# Patient Record
Sex: FEMALE | Race: WHITE | NOT HISPANIC OR LATINO | Employment: FULL TIME | URBAN - METROPOLITAN AREA
[De-identification: names, ages, dates, MRNs, and addresses within clinical notes are randomized per-mention and may not be internally consistent; named-entity substitution may affect disease eponyms.]

---

## 2017-10-13 ENCOUNTER — TELEPHONE (OUTPATIENT)
Dept: FAMILY MEDICINE | Facility: CLINIC | Age: 52
End: 2017-10-13

## 2017-10-13 NOTE — TELEPHONE ENCOUNTER
Pt called the office and spoke to phone , Lyndsay and stated that she needed to speak to the office. While Lyndsay was trying to transfer the call the pt hung up. Lyndsay tried calling the pt back twice with no answer. I tried calling the pt back with no answer.

## 2017-10-16 ENCOUNTER — TELEPHONE (OUTPATIENT)
Dept: FAMILY MEDICINE | Facility: CLINIC | Age: 52
End: 2017-10-16

## 2017-10-16 NOTE — TELEPHONE ENCOUNTER
----- Message from Cynthia Diaz sent at 10/16/2017  7:30 AM CDT -----  Contact: [phone]/ self   Pt its requesting an appointment for today , states she has anxiety problems . Please advise

## 2017-10-20 ENCOUNTER — OFFICE VISIT (OUTPATIENT)
Dept: FAMILY MEDICINE | Facility: CLINIC | Age: 52
End: 2017-10-20
Payer: OTHER GOVERNMENT

## 2017-10-20 VITALS
DIASTOLIC BLOOD PRESSURE: 90 MMHG | BODY MASS INDEX: 49.21 KG/M2 | SYSTOLIC BLOOD PRESSURE: 142 MMHG | HEART RATE: 84 BPM | HEIGHT: 63 IN | OXYGEN SATURATION: 97 % | WEIGHT: 277.75 LBS

## 2017-10-20 DIAGNOSIS — R03.0 ELEVATED BLOOD PRESSURE READING: ICD-10-CM

## 2017-10-20 DIAGNOSIS — F41.9 ANXIETY: Primary | ICD-10-CM

## 2017-10-20 PROCEDURE — 99213 OFFICE O/P EST LOW 20 MIN: CPT | Mod: PBBFAC,PO | Performed by: FAMILY MEDICINE

## 2017-10-20 PROCEDURE — 99203 OFFICE O/P NEW LOW 30 MIN: CPT | Mod: S$PBB,,, | Performed by: FAMILY MEDICINE

## 2017-10-20 PROCEDURE — 99999 PR PBB SHADOW E&M-EST. PATIENT-LVL III: CPT | Mod: PBBFAC,,, | Performed by: FAMILY MEDICINE

## 2017-10-20 RX ORDER — ESCITALOPRAM OXALATE 10 MG/1
10 TABLET ORAL DAILY
Qty: 90 TABLET | Refills: 0 | Status: SHIPPED | OUTPATIENT
Start: 2017-10-20 | End: 2017-11-06 | Stop reason: SDUPTHER

## 2017-10-20 NOTE — ASSESSMENT & PLAN NOTE
Likely component of depression and anxiety along with bereavement   PHQ9: severe depresion   No TV in bedroom  Sleep hygiene   Journal - 3 good things ever day  Every bad day is a good day to journal  The opposite of depression is expression  Discussed 5 stages of grief  Counseling visit in the future will be considered  SSRI - Lexapro today  No suicidal thoughts, patient repeatedly counseled that if any thoughts being, she is to seek medical advice immediately

## 2017-10-20 NOTE — PATIENT INSTRUCTIONS
No TV in bedroom  Sleep hygiene   Journal - 3 good things  Every bad day is a good day to journal  Families problems are not your own  Counseling visit in the future  SSRI      Helping Yourself Through Grief and Loss  Do what you can to stay healthy. Reaching out for support will help a great deal. You may find yourself asking: Why? Its normal to seek meaning by asking questions, but theres not always an answer for loss. With time, your loss may still be part of your life, but not the only thing in it.    Take care of yourself  Taking good care of yourself helps your body heal from the physical and emotional symptoms of grief. Pay extra attention to healthy exercise, sleep, and eating routines. What else do you need to feel better? Having family around can help you feel loved. Or you might need a walk or movie with friends to take your mind off things for a little while.  Accept support  Joining the world again is part of healing. These tips may help:  · Stay in touch with family and friends, even if its hard to talk.  · Tell people how they can help. It can be as simple as walking your dog.  · Stick to a daily routine that keeps you connected to friends and family.  · Try to avoid making major decisions   · Attend a support group of people who have been through the same type of loss.  When to get help  There's no normal length of time to grieve. But if you feel stuck and unable to move on, or if it has been 6 months or more since your loss and you still have signs of grief listed below, it may be time for professional help. Seeking professional help is not a sign of weakness. It indicates that you are taking responsibility for your recovery. Be alert to depression and call your healthcare provider if you:  · Cant go to work or take care of the kids.  · Cant eat or sleep normally.  · Feel helpless, hopeless, or worthless.  · Lose interest in hobbies, friends, and activities that used to give  pleasure.  · Gain or lose a lot of weight.  · Feel your grief is getting worse, or not getting any better.  · Have repeated thoughts of suicide or of harming yourself. You can also call 9-1-1 or a crisis hotline (located in the yellow pages of your phonebook) if you have these thoughts.  At some point, youll begin thinking about the future. Youll want to look ahead and make plans. To help yourself reach this point, try to do one thing each day to join in life. Keep at it, even if it feels strange at first. Your life can never be exactly the same. But one day youll find youre living life fully again.  Date Last Reviewed: 11/10/2015  © 3017-8355 [employer]. [address] [place] [address], [place] [address]. All rights reserved. This information is not intended as a substitute for professional medical care. Always follow your healthcare professional's instructions.    Treating Anxiety Disorders with Medicine  An anxiety disorder can make you feel nervous or apprehensive, even without a clear reason. In people age 65 and older, generalized anxiety disorder is one of the most commonly diagnosed anxiety disorders. Many times it occurs with depression. Certain anxiety disorders can cause intense feelings of fear or panic. You may even have physical symptoms such as a racing heartbeat, sweating, or dizziness. If you have these feelings, you dont have to suffer anymore. Treatment to help you overcome your fears will likely include therapy (also called counseling). Medicine may also be prescribed to help control your symptoms.    Medicines  Certain medicines may be prescribed to help control your symptoms. So you may feel less anxious. You may also feel able to move forward with therapy. At first, medicines and dosages may need to be adjusted to find what works best for you. Try to be patient. Tell your healthcare provider how a medicine makes you feel. This way, you can work together to find the treatment thats  best for you. Keep in mind that medicines can have side effects. Talk with your provider about any side effects that are bothering you. Changing the dose or type of medicine may help. Dont stop taking medicine on your own. That can cause symptoms to come back.  · Anti-anxiety medicine. This medicine eases symptoms and helps you relax. Your healthcare provider will explain when and how to use it. It may be prescribed for use before situations that make you anxious. You may also be told to take medicine on a regular schedule. Anti-anxiety medicine may make you feel a little sleepy or out of it. Dont drive a car or operate machinery while on this medicine, until you know how it affects you.  Caution  Never use alcohol or other drugs with anti-anxiety medicines. This could result in loss of muscular control, sedation, coma, or death. Also, use only the amount of medicine prescribed for you. If you think you may have taken too much, get emergency care right away.   · Antidepressant medicine. This kind of medicine is often used to treat anxiety, even if you arent depressed. An antidepressant helps balance out brain chemicals. This helps keep anxiety under control. This medicine is taken on a schedule. It takes a few weeks to start working. If you dont notice a change at first, you may just need more time. But if you dont notice results after the first few weeks, tell your provider.  Keep taking medicines as prescribed  Never change your dosage, share or use another person's medicine, or stop taking your medicines without talking to your healthcare provider first. Keep the following in mind:  · Some medicines must be taken on a schedule. Make this part of your daily routine. For instance, always take your pill before brushing your teeth. A pillbox can help you remember if youve taken your medicine each day.  · Medicines are often taken for 6 to 12 months. Your healthcare provider will then evaluate whether you need  to stay on them. Many people who have also had therapy may no longer need medicine to manage anxiety.  · You may need to stop taking medicine slowly to give your body time to adjust. When its time to stop, your healthcare provider will tell you more. Remember: Never stop taking your medicine without talking to your provider first.  · If symptoms return, you may need to start taking medicines again. This isnt your fault. Its just the nature of your anxiety disorder.  Special concerns  · Side effects. Medicines may cause side effects. Ask your healthcare provider or pharmacist what you can expect. They may have ideas for avoiding some side effects.  · Sexual problems. Some antidepressants can affect your desire for sex or your ability to have an orgasm. A change in dosage or medicine often solves the problem. If you have a sexual side effect that concerns you, tell your healthcare provider.  · Addiction. If youve never had a problem with drugs or alcohol, you may not have a problem with medicines used to treat anxiety disorders. But always discuss the medicines with your healthcare provider before taking them. If you have a history of addiction, you may not be able to use certain medicines used to treat anxiety disorders.  · Medicine interactions. Always check with your pharmacist before using any over-the-counter medicines, including herbal supplements.   Date Last Reviewed: 5/1/2017  © 5379-4647 [employer]. [address] [place] [address], [place] [address]. All rights reserved. This information is not intended as a substitute for professional medical care. Always follow your healthcare professional's instructions.

## 2017-10-20 NOTE — PROGRESS NOTES
Subjective:       Patient ID: Kalina Ryan is a 52 y.o. female.    Chief Complaint: Anxiety    Patient is presenting today for anxiety. 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 She was given IVIG and lexapro at that time and that helped. Her mom recently passed away in March. She has had to sell her mom's home and possessions. She feels like she wants to ball up and cry. She has not tried anything for it.   No thoughts of self harm  She hasn't seen a doctor since 2011  Social: she is at home with her , and two kids. Her daughter is  and lives nearby. She feels like she has a good support system.     PHQ9: 24      Anxiety   Presents for initial visit. Onset was 1 to 6 months ago. The problem has been gradually worsening. Symptoms include chest pain and palpitations. Patient reports no confusion. Symptoms occur constantly. The severity of symptoms is causing significant distress and interfering with daily activities. The symptoms are aggravated by family issues. The quality of sleep is non-restorative. Nighttime awakenings: several.     Risk factors include a major life event. Her past medical history is significant for anxiety/panic attacks. There is no history of suicide attempts. Past treatments include nothing.     Review of Systems   Constitutional: Positive for activity change. Negative for unexpected weight change.   HENT: Negative for hearing loss, rhinorrhea and trouble swallowing.    Eyes: Negative for discharge and visual disturbance.   Respiratory: Negative for chest tightness and wheezing.    Cardiovascular: Positive for chest pain and palpitations.   Gastrointestinal: Negative for blood in stool, constipation, diarrhea and vomiting.   Endocrine: Negative for polydipsia and polyuria.   Genitourinary: Negative for difficulty urinating, dysuria, hematuria and menstrual problem.   Musculoskeletal: Negative for  arthralgias, joint swelling and neck pain.   Neurological: Negative for weakness and headaches.   Psychiatric/Behavioral: Positive for dysphoric mood. Negative for confusion.         Objective:      Physical Exam   Constitutional: She is oriented to person, place, and time.   tearful   Eyes: Conjunctivae are normal.   Cardiovascular: Normal rate and regular rhythm.    Pulmonary/Chest: Effort normal and breath sounds normal.   Neurological: She is alert and oriented to person, place, and time.   Skin: Skin is warm.   Psychiatric: Her speech is normal. Her mood appears anxious. Cognition and memory are normal. She does not express impulsivity. She exhibits a depressed mood.   tearful She is attentive.   Nursing note and vitals reviewed.      Assessment:       1. Anxiety    2. Elevated blood pressure reading        Plan:       Anxiety  Likely component of depression and anxiety along with bereavement   PHQ9: severe depresion   No TV in bedroom  Sleep hygiene   Journal - 3 good things ever day  Every bad day is a good day to journal  The opposite of depression is expression  Discussed 5 stages of grief  Counseling visit in the future will be considered  SSRI - Lexapro today  No suicidal thoughts, patient repeatedly counseled that if any thoughts being, she is to seek medical advice immediately    Elevated Pressure  Noted, likely due to emotional state  Will recheck at next visit    Warning signs discussed, patient to call with any further issues or worsening of symptoms.

## 2017-11-06 ENCOUNTER — OFFICE VISIT (OUTPATIENT)
Dept: FAMILY MEDICINE | Facility: CLINIC | Age: 52
End: 2017-11-06
Payer: OTHER GOVERNMENT

## 2017-11-06 VITALS
DIASTOLIC BLOOD PRESSURE: 86 MMHG | TEMPERATURE: 98 F | WEIGHT: 275.56 LBS | SYSTOLIC BLOOD PRESSURE: 128 MMHG | HEART RATE: 80 BPM | BODY MASS INDEX: 48.82 KG/M2 | OXYGEN SATURATION: 98 % | HEIGHT: 63 IN

## 2017-11-06 DIAGNOSIS — F41.9 ANXIETY: ICD-10-CM

## 2017-11-06 PROCEDURE — 99213 OFFICE O/P EST LOW 20 MIN: CPT | Mod: S$PBB,,, | Performed by: FAMILY MEDICINE

## 2017-11-06 PROCEDURE — 99999 PR PBB SHADOW E&M-EST. PATIENT-LVL III: CPT | Mod: PBBFAC,,, | Performed by: FAMILY MEDICINE

## 2017-11-06 PROCEDURE — 99213 OFFICE O/P EST LOW 20 MIN: CPT | Mod: PBBFAC,PO | Performed by: FAMILY MEDICINE

## 2017-11-06 RX ORDER — ESCITALOPRAM OXALATE 10 MG/1
10 TABLET ORAL DAILY
Qty: 90 TABLET | Refills: 2 | Status: SHIPPED | OUTPATIENT
Start: 2017-11-06 | End: 2018-06-11 | Stop reason: SDUPTHER

## 2017-11-06 NOTE — PROGRESS NOTES
Subjective:       Patient ID: Kalina Ryan is a 52 y.o. female.    Chief Complaint: Medication Reaction (2 week f/u starting Lexapro)      Kalina is a 52 y.o. female well known to me who presents today for follow up of anxiety. Things are improved. She states that she feels about 80% better. Her emotional lability has improved. Her triggers included a recent death in the family. At the last visit, she was counseled to start journaling; she has complied and this has helped. She is also trying to make a cookbook with her mother's recipes and this has been therapeutic. Previously, she was not able to do her daily activities, but now she can. She has also started biking.     No thoughts of self harm  She hasn't seen a doctor since 2011, until the last visit.   Social: she is at home with her , and two kids. Her daughter is  and lives nearby. She feels like she has a good support system.          Anxiety   Presents for follow-up visit. Symptoms include excessive worry, irritability and restlessness. Patient reports no compulsions, decreased concentration, dry mouth, hyperventilation, insomnia, nausea, nervous/anxious behavior, obsessions, palpitations, panic, shortness of breath or suicidal ideas. The severity of symptoms is interfering with daily activities. The quality of sleep is poor.     Compliance with medications is %. Treatment side effects: none.     Review of Systems   Constitutional: Positive for irritability.   Eyes: Negative for visual disturbance.   Respiratory: Negative for shortness of breath.    Cardiovascular: Negative for palpitations.   Gastrointestinal: Negative for nausea.   Genitourinary: Negative for difficulty urinating.   Neurological: Negative for light-headedness and headaches.   Psychiatric/Behavioral: Positive for sleep disturbance. Negative for decreased concentration, self-injury and suicidal ideas. The patient is not nervous/anxious and does not have insomnia.          Anxiety - improved         Objective:     Vitals:    11/06/17 0839   BP: 128/86   Pulse: 80   Temp: 98.3 °F (36.8 °C)        Physical Exam   Constitutional: She appears well-developed and well-nourished.   No longer tearful as in last visit, smiling and pleasant   HENT:   Head: Normocephalic and atraumatic.   Eyes: Conjunctivae are normal.   Neck: Neck supple.   Cardiovascular: Normal rate and regular rhythm.    Pulmonary/Chest: Effort normal and breath sounds normal.   Abdominal: Soft.   Psychiatric: She has a normal mood and affect. Her behavior is normal. Judgment and thought content normal.   Nursing note and vitals reviewed.      Assessment:       1. Anxiety    2.     Elevated blood pressure reading    Plan:       Anxiety  Likely component of depression and anxiety along with bereavement   Significantly improved since last visit - reports 80% improvement  Continue Lexapro, exercise, journal  Sleep hygiene   Journal - 3 good things every day  Every bad day is a good day to journal  The opposite of depression is expression  Discussed 5 stages of grief  No suicidal thoughts, patient repeatedly counseled that if any thoughts being, she is to seek medical advice immediately  Patient would like to find a PCP closer to home, recommended Dr. Alex. Refilled medicine but strongly recommended close follow up, q2-3 months for anxiety. Patient expressed understanding     Elevated Pressure  Resolved      Warning signs discussed, patient to call with any further issues or worsening of symptoms.

## 2018-06-11 ENCOUNTER — TELEPHONE (OUTPATIENT)
Dept: OPHTHALMOLOGY | Facility: CLINIC | Age: 53
End: 2018-06-11

## 2018-06-11 ENCOUNTER — OFFICE VISIT (OUTPATIENT)
Dept: OPTOMETRY | Facility: CLINIC | Age: 53
End: 2018-06-11
Payer: OTHER GOVERNMENT

## 2018-06-11 ENCOUNTER — LAB VISIT (OUTPATIENT)
Dept: LAB | Facility: HOSPITAL | Age: 53
End: 2018-06-11
Attending: FAMILY MEDICINE
Payer: OTHER GOVERNMENT

## 2018-06-11 ENCOUNTER — OFFICE VISIT (OUTPATIENT)
Dept: INTERNAL MEDICINE | Facility: CLINIC | Age: 53
End: 2018-06-11
Payer: OTHER GOVERNMENT

## 2018-06-11 VITALS
OXYGEN SATURATION: 99 % | HEIGHT: 63 IN | BODY MASS INDEX: 50.71 KG/M2 | TEMPERATURE: 98 F | DIASTOLIC BLOOD PRESSURE: 104 MMHG | SYSTOLIC BLOOD PRESSURE: 173 MMHG | WEIGHT: 286.19 LBS | HEART RATE: 83 BPM

## 2018-06-11 DIAGNOSIS — F41.9 ANXIETY: ICD-10-CM

## 2018-06-11 DIAGNOSIS — Z00.00 ROUTINE GENERAL MEDICAL EXAMINATION AT A HEALTH CARE FACILITY: ICD-10-CM

## 2018-06-11 DIAGNOSIS — H53.8 FLASHING LIGHTS SEEN: ICD-10-CM

## 2018-06-11 DIAGNOSIS — I10 ESSENTIAL HYPERTENSION: Primary | ICD-10-CM

## 2018-06-11 DIAGNOSIS — H43.811 POSTERIOR VITREOUS DETACHMENT OF RIGHT EYE: Primary | ICD-10-CM

## 2018-06-11 DIAGNOSIS — I10 ESSENTIAL HYPERTENSION: ICD-10-CM

## 2018-06-11 LAB
ALBUMIN SERPL BCP-MCNC: 3.6 G/DL
ALP SERPL-CCNC: 141 U/L
ALT SERPL W/O P-5'-P-CCNC: 14 U/L
ANION GAP SERPL CALC-SCNC: 9 MMOL/L
AST SERPL-CCNC: 14 U/L
BASOPHILS # BLD AUTO: 0.02 K/UL
BASOPHILS NFR BLD: 0.2 %
BILIRUB SERPL-MCNC: 0.3 MG/DL
BUN SERPL-MCNC: 17 MG/DL
CALCIUM SERPL-MCNC: 9.4 MG/DL
CHLORIDE SERPL-SCNC: 108 MMOL/L
CO2 SERPL-SCNC: 26 MMOL/L
CREAT SERPL-MCNC: 0.9 MG/DL
DIFFERENTIAL METHOD: ABNORMAL
EOSINOPHIL # BLD AUTO: 0.2 K/UL
EOSINOPHIL NFR BLD: 2 %
ERYTHROCYTE [DISTWIDTH] IN BLOOD BY AUTOMATED COUNT: 13.6 %
EST. GFR  (AFRICAN AMERICAN): >60 ML/MIN/1.73 M^2
EST. GFR  (NON AFRICAN AMERICAN): >60 ML/MIN/1.73 M^2
ESTIMATED AVG GLUCOSE: 134 MG/DL
GLUCOSE SERPL-MCNC: 102 MG/DL
HBA1C MFR BLD HPLC: 6.3 %
HCT VFR BLD AUTO: 40.1 %
HGB BLD-MCNC: 12.8 G/DL
IMM GRANULOCYTES # BLD AUTO: 0.02 K/UL
IMM GRANULOCYTES NFR BLD AUTO: 0.2 %
LYMPHOCYTES # BLD AUTO: 2.9 K/UL
LYMPHOCYTES NFR BLD: 35.7 %
MCH RBC QN AUTO: 27.8 PG
MCHC RBC AUTO-ENTMCNC: 31.9 G/DL
MCV RBC AUTO: 87 FL
MONOCYTES # BLD AUTO: 0.4 K/UL
MONOCYTES NFR BLD: 4.8 %
NEUTROPHILS # BLD AUTO: 4.6 K/UL
NEUTROPHILS NFR BLD: 57.1 %
NRBC BLD-RTO: 0 /100 WBC
PLATELET # BLD AUTO: 296 K/UL
PMV BLD AUTO: 9.2 FL
POTASSIUM SERPL-SCNC: 4.9 MMOL/L
PROT SERPL-MCNC: 7.1 G/DL
RBC # BLD AUTO: 4.6 M/UL
SODIUM SERPL-SCNC: 143 MMOL/L
T4 FREE SERPL-MCNC: 0.91 NG/DL
TSH SERPL DL<=0.005 MIU/L-ACNC: 1.99 UIU/ML
WBC # BLD AUTO: 8.1 K/UL

## 2018-06-11 PROCEDURE — 99214 OFFICE O/P EST MOD 30 MIN: CPT | Mod: PBBFAC,PO | Performed by: FAMILY MEDICINE

## 2018-06-11 PROCEDURE — 84439 ASSAY OF FREE THYROXINE: CPT

## 2018-06-11 PROCEDURE — 83036 HEMOGLOBIN GLYCOSYLATED A1C: CPT

## 2018-06-11 PROCEDURE — 84443 ASSAY THYROID STIM HORMONE: CPT

## 2018-06-11 PROCEDURE — 80053 COMPREHEN METABOLIC PANEL: CPT

## 2018-06-11 PROCEDURE — 36415 COLL VENOUS BLD VENIPUNCTURE: CPT | Mod: PO

## 2018-06-11 PROCEDURE — 85025 COMPLETE CBC W/AUTO DIFF WBC: CPT

## 2018-06-11 PROCEDURE — 99999 PR PBB SHADOW E&M-EST. PATIENT-LVL IV: CPT | Mod: PBBFAC,,, | Performed by: FAMILY MEDICINE

## 2018-06-11 PROCEDURE — 92004 COMPRE OPH EXAM NEW PT 1/>: CPT | Mod: S$PBB,,, | Performed by: OPTOMETRIST

## 2018-06-11 PROCEDURE — 99212 OFFICE O/P EST SF 10 MIN: CPT | Mod: PBBFAC,27,PO | Performed by: OPTOMETRIST

## 2018-06-11 PROCEDURE — 99215 OFFICE O/P EST HI 40 MIN: CPT | Mod: S$PBB,,, | Performed by: FAMILY MEDICINE

## 2018-06-11 PROCEDURE — 99999 PR PBB SHADOW E&M-EST. PATIENT-LVL II: CPT | Mod: PBBFAC,,, | Performed by: OPTOMETRIST

## 2018-06-11 RX ORDER — ESCITALOPRAM OXALATE 10 MG/1
10 TABLET ORAL DAILY
Qty: 90 TABLET | Refills: 3 | Status: SHIPPED | OUTPATIENT
Start: 2018-06-11 | End: 2018-06-11 | Stop reason: SDUPTHER

## 2018-06-11 RX ORDER — ESCITALOPRAM OXALATE 10 MG/1
10 TABLET ORAL DAILY
Qty: 90 TABLET | Refills: 3 | Status: SHIPPED | OUTPATIENT
Start: 2018-06-11 | End: 2018-06-15 | Stop reason: SDUPTHER

## 2018-06-11 RX ORDER — LISINOPRIL 20 MG/1
20 TABLET ORAL DAILY
Qty: 30 TABLET | Refills: 1 | Status: SHIPPED | OUTPATIENT
Start: 2018-06-11 | End: 2018-06-15 | Stop reason: SDUPTHER

## 2018-06-11 RX ORDER — ESCITALOPRAM OXALATE 10 MG/1
10 TABLET ORAL DAILY
COMMUNITY
End: 2018-06-11

## 2018-06-11 NOTE — PROGRESS NOTES
Subjective:   Patient ID: Kalina Ryna is a 53 y.o. female.    Chief Complaint: Blood Pressure Check (elevated blood pressure) and Eye Problem (seeing flashes of light in the right eye)      HPI  54 yo female has had some episodic flashes of light in right eye and noticed a new and rather large floater; started about two weeks ago. Not worsening and not improving. No eye pain. 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 She denies headache or any other focal neurological changes. She has a first-degree relative who had a detached retina.     Patient queried and denies any further complaints.    Her labs from outside facility today and these were actually non-fasting  Total chol 163  HDL 46  Trig 131  LDL 91  Glucose 106    ALLERGIES AND MEDICATIONS: updated and reviewed.  Review of patient's allergies indicates:   Allergen Reactions    Amoxicillin Hives       Current Outpatient Prescriptions:     escitalopram oxalate (LEXAPRO) 10 MG tablet, Take 1 tablet (10 mg total) by mouth once daily., Disp: 90 tablet, Rfl: 3    lisinopril (PRINIVIL,ZESTRIL) 20 MG tablet, Take 1 tablet (20 mg total) by mouth once daily., Disp: 30 tablet, Rfl: 1    Review of Systems   Constitutional: Negative for activity change, appetite change, chills, diaphoresis, fatigue, fever and unexpected weight change.   HENT: Negative for congestion, ear discharge, ear pain, facial swelling, hearing loss, nosebleeds, postnasal drip, rhinorrhea, sinus pressure, sneezing, sore throat, tinnitus, trouble swallowing and voice change.    Eyes: Negative for photophobia, pain, discharge, redness and itching.   Respiratory: Negative for cough, chest tightness, shortness of breath and wheezing.    Cardiovascular: Negative for chest pain, palpitations and leg swelling.   Gastrointestinal: Negative for abdominal distention, abdominal pain, anal bleeding, blood in stool,  "constipation, diarrhea, nausea, rectal pain and vomiting.   Endocrine: Negative for cold intolerance, heat intolerance, polydipsia, polyphagia and polyuria.   Genitourinary: Negative for difficulty urinating, dysuria and flank pain.   Musculoskeletal: Negative for arthralgias, back pain, joint swelling, myalgias and neck pain.   Skin: Negative for rash.   Neurological: Negative for dizziness, tremors, seizures, syncope, speech difficulty, weakness, light-headedness, numbness and headaches.   Psychiatric/Behavioral: Negative for behavioral problems, confusion, decreased concentration, dysphoric mood, sleep disturbance and suicidal ideas. The patient is not nervous/anxious and is not hyperactive.        Objective:     Vitals:    06/11/18 1122   BP: (!) 173/104   Pulse: 83   Temp: 98.4 °F (36.9 °C)   TempSrc: Oral   SpO2: 99%   Weight: 129.8 kg (286 lb 2.5 oz)   Height: 5' 3" (1.6 m)   PainSc: 0-No pain     Body mass index is 50.69 kg/m².    Physical Exam   Constitutional: She is oriented to person, place, and time. She appears well-developed and well-nourished. She is cooperative. She does not have a sickly appearance. No distress.   HENT:   Head: Normocephalic and atraumatic.   Right Ear: Hearing, tympanic membrane, external ear and ear canal normal. No tenderness.   Left Ear: Hearing, tympanic membrane, external ear and ear canal normal. No tenderness.   Nose: Nose normal.   Mouth/Throat: Oropharynx is clear and moist. Normal dentition. No oropharyngeal exudate, posterior oropharyngeal edema or posterior oropharyngeal erythema.   Eyes: Conjunctivae and lids are normal. Right eye exhibits no discharge. Left eye exhibits no discharge. Right conjunctiva is not injected. Left conjunctiva is not injected. No scleral icterus. Right eye exhibits normal extraocular motion. Left eye exhibits normal extraocular motion.   Neck: Normal range of motion. Neck supple. No JVD present. Carotid bruit is not present. No tracheal " deviation and no edema present. No thyromegaly present.   Cardiovascular: Normal rate, regular rhythm, normal heart sounds and normal pulses.  Exam reveals no friction rub.    No murmur heard.  Pulmonary/Chest: Effort normal and breath sounds normal. No accessory muscle usage. No respiratory distress. She has no wheezes. She has no rhonchi. She has no rales.   Musculoskeletal: She exhibits no edema.   Lymphadenopathy:        Head (right side): No submandibular adenopathy present.        Head (left side): No submandibular adenopathy present.     She has no cervical adenopathy.   Neurological: She is alert and oriented to person, place, and time.   Skin: Skin is warm and dry. She is not diaphoretic.   Psychiatric: Her speech is normal and behavior is normal. Thought content normal. Her mood appears not anxious. Her affect is not angry, not labile and not inappropriate. She does not exhibit a depressed mood.       Assessment and Plan:   Kalina was seen today for blood pressure check and eye problem.    Diagnoses and all orders for this visit:    Essential hypertension  -     CBC auto differential; Future  -     Comprehensive metabolic panel; Future    Flashing lights seen  -     CBC auto differential; Future  -     Comprehensive metabolic panel; Future  -     Cancel: Ambulatory Referral to Ophthalmology  -     Ambulatory Referral to Ophthalmology    Anxiety  -     Discontinue: escitalopram oxalate (LEXAPRO) 10 MG tablet; Take 1 tablet (10 mg total) by mouth once daily.  -     escitalopram oxalate (LEXAPRO) 10 MG tablet; Take 1 tablet (10 mg total) by mouth once daily.    Routine general medical examination at a health care facility  -     CBC auto differential; Future  -     Comprehensive metabolic panel; Future  -     TSH; Future  -     T4, free; Future  -     Hemoglobin A1c; Future    Other orders  -     lisinopril (PRINIVIL,ZESTRIL) 20 MG tablet; Take 1 tablet (20 mg total) by mouth once daily.    start lisinopril  today. She has appt with new pcp in four days so will check bp again then. Get her in with ophthalmology today. For any other focal neurological change or for dyspnea or chest pain--which she has not had--she will go to the ER or call 911 immediately. Patient expressed understanding of the plan as evidenced by brief summary back to me.     PREVENTIVE MED  Diet  Exercise  Colorectal Ca  Alcohol use  Tobacco  BP  Depression  Type 2 DM  Hep C  STD  Vision  ALL REVIEWED  60min  Follow-up in about 1 week (around 6/18/2018).    THIS NOTE WILL BE SHARED WITH THE PATIENT.

## 2018-06-15 ENCOUNTER — OFFICE VISIT (OUTPATIENT)
Dept: FAMILY MEDICINE | Facility: CLINIC | Age: 53
End: 2018-06-15
Payer: OTHER GOVERNMENT

## 2018-06-15 VITALS
HEIGHT: 63 IN | BODY MASS INDEX: 50.72 KG/M2 | OXYGEN SATURATION: 98 % | SYSTOLIC BLOOD PRESSURE: 138 MMHG | RESPIRATION RATE: 18 BRPM | TEMPERATURE: 98 F | WEIGHT: 286.25 LBS | HEART RATE: 80 BPM | DIASTOLIC BLOOD PRESSURE: 88 MMHG

## 2018-06-15 DIAGNOSIS — I10 ESSENTIAL HYPERTENSION: Primary | ICD-10-CM

## 2018-06-15 DIAGNOSIS — F41.9 ANXIETY: ICD-10-CM

## 2018-06-15 DIAGNOSIS — Z12.31 ENCOUNTER FOR SCREENING MAMMOGRAM FOR BREAST CANCER: ICD-10-CM

## 2018-06-15 PROCEDURE — 99215 OFFICE O/P EST HI 40 MIN: CPT | Mod: S$PBB,,, | Performed by: FAMILY MEDICINE

## 2018-06-15 PROCEDURE — 99214 OFFICE O/P EST MOD 30 MIN: CPT | Mod: PBBFAC,PN | Performed by: FAMILY MEDICINE

## 2018-06-15 PROCEDURE — 99999 PR PBB SHADOW E&M-EST. PATIENT-LVL IV: CPT | Mod: PBBFAC,,, | Performed by: FAMILY MEDICINE

## 2018-06-15 RX ORDER — ESCITALOPRAM OXALATE 10 MG/1
10 TABLET ORAL DAILY
Qty: 90 TABLET | Refills: 0 | Status: SHIPPED | OUTPATIENT
Start: 2018-06-15 | End: 2018-09-18 | Stop reason: SDUPTHER

## 2018-06-15 RX ORDER — LISINOPRIL 20 MG/1
20 TABLET ORAL DAILY
Qty: 30 TABLET | Refills: 2 | Status: SHIPPED | OUTPATIENT
Start: 2018-06-15 | End: 2018-09-18 | Stop reason: SDUPTHER

## 2018-06-15 NOTE — PROGRESS NOTES
HPI:  Kalina Ryan is a 53 y.o. year old female that  presents to establish care. She will be seeing an Ophthalmologist  and retinal specialist . She was sent home from work during a Wellness Check due to an elevated BP reading.  Pt has had a gastric bypass in the past and the a subsequent gall bladder removal. She was not happy with the result so she has not been back for any kind of f/u and does not want to do any f/u with [facility]. She also developed Gullian Danese. She states that every time she eats anything it goes right through he. Lianet nelson not really watch what she eats and is not riding her bike like she should.  Chief Complaint   Patient presents with    [facility] [place] [facility]     pt needs a lipid, hepC, mammo, FIT kit--pt denies Tdap because of Guillain Danese    Hyperglycemia     pt concerned with becoming diabetic A1c 6.3 & has family hx of diabetes   .     HPI    Past Medical History:   Diagnosis Date    Anxiety     Guillain Barré syndrome 2009     Social History     Social History    Marital status:      Spouse name: N/A    Number of children: N/A    Years of education: N/A     Occupational History    Not on file.     Social History Main Topics    Smoking status: Never Smoker    Smokeless tobacco: Never Used    Alcohol use 0.5 oz/week     1 Standard drinks or equivalent per week    Drug use: No    Sexual activity: Not on file     Other Topics Concern    Not on file     Social History Narrative    No narrative on file     Past Surgical History:   Procedure Laterality Date    CHOLECYSTECTOMY  2009    GASTRIC BYPASS  2009    HYSTERECTOMY  1999     Family History   Problem Relation Age of Onset    Heart disease Mother     Diabetes Mother     Hypertension Mother     Heart disease Father     Diabetes Father     Hypertension Father     Dementia Father     Pancreatic cancer Brother     Stomach cancer Brother     No Known Problems Daughter     No Known Problems Son  "    Heart disease Brother     Hypertension Brother            Review of Systems  General ROS: negative for chills, fever or weight loss  Psychological ROS: negative for hallucination, depression or suicidal ideation  Ophthalmic ROS: negative for blurry vision, photophobia or eye pain  ENT ROS: negative for epistaxis, sore throat or rhinorrhea  Respiratory ROS: no cough, shortness of breath, or wheezing  Cardiovascular ROS: no chest pain or dyspnea on exertion  Gastrointestinal ROS: no abdominal pain, change in bowel habits, or black/ bloody stools  Genito-Urinary ROS: no dysuria, trouble voiding, or hematuria  Musculoskeletal ROS: negative for gait disturbance or muscular weakness  Neurological ROS: no syncope or seizures; no ataxia  Dermatological ROS: negative for pruritis, rash and jaundice      Physical Exam:  /88   Pulse 80   Temp 98.4 °F (36.9 °C) (Oral)   Resp 18   Ht 5' 3" (1.6 m)   Wt 129.8 kg (286 lb 3.6 oz)   SpO2 98%   BMI 50.70 kg/m²   General appearance: alert, cooperative, no distress  Constitutional:Oriented to person, place, and time.appears well-developed and well-nourished.  HEENT: Normocephalic, atraumatic, neck symmetrical, no nasal discharge, TM - clear bilaterally   Eyes: conjunctivae/corneas clear, PERRL, EOM's intact  Lungs: clear to auscultation bilaterally, no dullness to percussion bilaterally  Heart: regular rate and rhythm without rub; no displacement of the PMI   Abdomen: soft, non-tender; bowel sounds normoactive; no organomegaly  Extremities: extremities symmetric; no clubbing, cyanosis, or edema  Integument: Skin color, texture, turgor normal; no rashes; hair distrubution normal  Neurologic: Alert and oriented X 3, normal strength, normal coordination and gait  Psychiatric: no pressured speech; normal affect; no evidence of impaired cognition   Physical Exam  LABS:    Complete Blood Count  Lab Results   Component Value Date    RBC 4.60 06/11/2018    HGB 12.8 " 06/11/2018    HCT 40.1 06/11/2018    MCV 87 06/11/2018    MCH 27.8 06/11/2018    MCHC 31.9 (L) 06/11/2018    RDW 13.6 06/11/2018     06/11/2018    MPV 9.2 06/11/2018    GRAN 4.6 06/11/2018    GRAN 57.1 06/11/2018    LYMPH 2.9 06/11/2018    LYMPH 35.7 06/11/2018    MONO 0.4 06/11/2018    MONO 4.8 06/11/2018    EOS 0.2 06/11/2018    BASO 0.02 06/11/2018    EOSINOPHIL 2.0 06/11/2018    BASOPHIL 0.2 06/11/2018    DIFFMETHOD Automated 06/11/2018       Comprehensive Metabolic Panel  Lab Results   Component Value Date     06/11/2018    BUN 17 06/11/2018    CREATININE 0.9 06/11/2018     06/11/2018    K 4.9 06/11/2018     06/11/2018    PROT 7.1 06/11/2018    ALBUMIN 3.6 06/11/2018    BILITOT 0.3 06/11/2018    AST 14 06/11/2018    ALKPHOS 141 (H) 06/11/2018    CO2 26 06/11/2018    ALT 14 06/11/2018    ANIONGAP 9 06/11/2018    EGFRNONAA >60.0 06/11/2018    ESTGFRAFRICA >60.0 06/11/2018       LIPID  Lab Results   Component Value Date    CHOL 172 08/03/2010    HDL 61 08/03/2010       TSH  Lab Results   Component Value Date    TSH 1.992 06/11/2018       Current Outpatient Prescriptions   Medication Sig Dispense Refill    escitalopram oxalate (LEXAPRO) 10 MG tablet Take 1 tablet (10 mg total) by mouth once daily. 90 tablet 0    lisinopril (PRINIVIL,ZESTRIL) 20 MG tablet Take 1 tablet (20 mg total) by mouth once daily. 30 tablet 2     No current facility-administered medications for this visit.        Assessment:    ICD-10-CM ICD-9-CM    1. Essential hypertension I10 401.9 Lipid panel      lisinopril (PRINIVIL,ZESTRIL) 20 MG tablet      [facility]      [facility]      MyChart Patient Entered [facility] ([employer])   2. Anxiety F41.9 300.00 escitalopram oxalate (LEXAPRO) 10 MG tablet   3. BMI 50.0-59.9, adult Z68.43 V85.43 Ambulatory Referral to [facility] ([employer])      [facility] ASSIGN QUESTIONNAIRE SERIES ([employer])       Maria Guadalupe Patient Entered [facility] ([employer])   4. Encounter for screening mammogram for breast cancer Z12.31 V76.12 Mammo Digital Screening Bilateral With CAD         Plan:    Follow-up in 3 months (on 9/18/2018).          Erum Hernández MD  Answers for HPI/ROS submitted by the patient on 6/13/2018   activity change: No  unexpected weight change: No  neck pain: No  hearing loss: No  rhinorrhea: No  trouble swallowing: No  eye discharge: No  visual disturbance: Yes  chest tightness: No  wheezing: No  chest pain: No  palpitations: No  blood in stool: No  constipation: No  vomiting: No  diarrhea: No  polydipsia: No  polyuria: No  difficulty urinating: No  hematuria: No  menstrual problem: No  dysuria: No  joint swelling: No  arthralgias: No  headaches: No  weakness: No  confusion: No  dysphoric mood: No

## 2018-06-15 NOTE — PROGRESS NOTES
[employer] was given to patient on 6/15/2018 8:43 AM         Answers for HPI/ROS submitted by the patient on 6/13/2018   activity change: No  unexpected weight change: No  neck pain: No  hearing loss: No  rhinorrhea: No  trouble swallowing: No  eye discharge: No  visual disturbance: Yes  chest tightness: No  wheezing: No  chest pain: No  palpitations: No  blood in stool: No  constipation: No  vomiting: No  diarrhea: No  polydipsia: No  polyuria: No  difficulty urinating: No  hematuria: No  menstrual problem: No  dysuria: No  joint swelling: No  arthralgias: No  headaches: No  weakness: No  confusion: No  dysphoric mood: No

## 2018-06-21 ENCOUNTER — INITIAL CONSULT (OUTPATIENT)
Dept: OPHTHALMOLOGY | Facility: CLINIC | Age: 53
End: 2018-06-21
Payer: OTHER GOVERNMENT

## 2018-06-21 DIAGNOSIS — H43.811 POSTERIOR VITREOUS DETACHMENT OF RIGHT EYE: Primary | ICD-10-CM

## 2018-06-21 DIAGNOSIS — H31.001 CHORIORETINAL SCAR OF RIGHT EYE: ICD-10-CM

## 2018-06-21 PROCEDURE — 99999 PR PBB SHADOW E&M-EST. PATIENT-LVL II: CPT | Mod: PBBFAC,,, | Performed by: OPHTHALMOLOGY

## 2018-06-21 PROCEDURE — 92014 COMPRE OPH EXAM EST PT 1/>: CPT | Mod: S$PBB,,, | Performed by: OPHTHALMOLOGY

## 2018-06-21 PROCEDURE — 92225 PR SPECIAL EYE EXAM, INITIAL: CPT | Mod: PBBFAC,RT | Performed by: OPHTHALMOLOGY

## 2018-06-21 PROCEDURE — 92225 PR SPECIAL EYE EXAM, INITIAL: CPT | Mod: S$PBB,RT,, | Performed by: OPHTHALMOLOGY

## 2018-06-21 PROCEDURE — 99212 OFFICE O/P EST SF 10 MIN: CPT | Mod: PBBFAC | Performed by: OPHTHALMOLOGY

## 2018-06-21 NOTE — LETTER
June 22, 2018      Justin Rosales, OD  [address]  [place] [address]           [facility] - Ophthalmology  [address]  [place] [address]  Phone: [phone]  Fax: [phone]          Patient: Kalina Ryan   MR Number: [id number]   YOB: 1965   Date of Visit: 6/21/2018       Dear Dr. Justin Rosales:    Thank you for referring Kalina Ryan to me for evaluation. Attached you will find relevant portions of my assessment and plan of care.    If you have questions, please do not hesitate to call me. I look forward to following Kalina Ryan along with you.    Sincerely,    Silverio Whitehead MD    Enclosure  CC:  No Recipients    If you would like to receive this communication electronically, please contact [email or web address] or [phone] to request more information on [employer] access.    For providers and/or their staff who would like to refer a patient to [facility], please contact us through our one-stop-shop provider referral line, [facility], at [phone].    If you feel you have received this communication in error or would no longer like to receive these types of communications, please e-mail [email or web address]

## 2018-06-21 NOTE — PROGRESS NOTES
HPI     Eye Problem    Additional comments: consult per Bobby PVD           Comments   She started seeing a flashing light temporally OD and hairlike floater.   This started 4 weeks ago. She is also seeing flashes several times a   daily. This happens on a daily basis. She is also noticing the vision very   blurry since this started.  Floater and flash OD about same as onset.  No   f/f OS.  Va stable OS (always worse eye) and blurrier OD.  VF full OU.    +FH father with RD       Last edited by Silverio Whitehead MD on 6/21/2018  9:20 AM. (History)            Assessment /Plan     For exam results, see Encounter Report.    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

## 2018-06-22 DIAGNOSIS — Z12.11 COLON CANCER SCREENING: ICD-10-CM

## 2018-06-29 ENCOUNTER — PATIENT MESSAGE (OUTPATIENT)
Dept: ADMINISTRATIVE | Facility: HOSPITAL | Age: 53
End: 2018-06-29

## 2018-07-11 ENCOUNTER — PATIENT OUTREACH (OUTPATIENT)
Dept: FAMILY MEDICINE | Facility: CLINIC | Age: 53
End: 2018-07-11

## 2018-07-12 ENCOUNTER — OFFICE VISIT (OUTPATIENT)
Dept: OPHTHALMOLOGY | Facility: CLINIC | Age: 53
End: 2018-07-12
Payer: OTHER GOVERNMENT

## 2018-07-12 DIAGNOSIS — H43.811 POSTERIOR VITREOUS DETACHMENT OF RIGHT EYE: Primary | ICD-10-CM

## 2018-07-12 DIAGNOSIS — H31.001 CHORIORETINAL SCAR OF RIGHT EYE: ICD-10-CM

## 2018-07-12 PROCEDURE — 92226 PR SPECIAL EYE EXAM, SUBSEQUENT: CPT | Mod: PBBFAC,RT | Performed by: OPHTHALMOLOGY

## 2018-07-12 PROCEDURE — 92226 PR SPECIAL EYE EXAM, SUBSEQUENT: CPT | Mod: S$PBB,RT,, | Performed by: OPHTHALMOLOGY

## 2018-07-12 PROCEDURE — 99213 OFFICE O/P EST LOW 20 MIN: CPT | Mod: PBBFAC | Performed by: OPHTHALMOLOGY

## 2018-07-12 PROCEDURE — 99999 PR PBB SHADOW E&M-EST. PATIENT-LVL III: CPT | Mod: PBBFAC,,, | Performed by: OPHTHALMOLOGY

## 2018-07-12 PROCEDURE — 92012 INTRM OPH EXAM EST PATIENT: CPT | Mod: S$PBB,,, | Performed by: OPHTHALMOLOGY

## 2018-07-13 DIAGNOSIS — Z12.11 COLON CANCER SCREENING: Primary | ICD-10-CM

## 2018-07-13 NOTE — PROGRESS NOTES
"HPI     DLS: 06/21/2018    Patient states she is doing much better. She states she no longer has the   flashes but still the "little worm" floating in front of the right eye   persists.  Va and VF nl OU     1.) Posterior vitreous detachment of right eye  2.) Chorioretinal scar of right eye    Last edited by Silverio Whitehead MD on 7/12/2018 10:30 PM. (History)            Assessment /Plan     For exam results, see Encounter Report.    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

## 2018-07-18 ENCOUNTER — TELEPHONE (OUTPATIENT)
Dept: ENDOSCOPY | Facility: HOSPITAL | Age: 53
End: 2018-07-18

## 2018-07-18 DIAGNOSIS — Z12.11 SPECIAL SCREENING FOR MALIGNANT NEOPLASMS, COLON: Primary | ICD-10-CM

## 2018-07-18 RX ORDER — POLYETHYLENE GLYCOL 3350, SODIUM SULFATE ANHYDROUS, SODIUM BICARBONATE, SODIUM CHLORIDE, POTASSIUM CHLORIDE 236; 22.74; 6.74; 5.86; 2.97 G/4L; G/4L; G/4L; G/4L; G/4L
4 POWDER, FOR SOLUTION ORAL ONCE
Qty: 4000 ML | Refills: 0 | Status: SHIPPED | OUTPATIENT
Start: 2018-07-18 | End: 2018-07-18

## 2018-07-31 PROBLEM — Z80.0 FAMILY HISTORY OF COLON CANCER REQUIRING SCREENING COLONOSCOPY: Status: ACTIVE | Noted: 2018-07-31

## 2018-07-31 PROBLEM — Z12.11 COLON CANCER SCREENING: Status: ACTIVE | Noted: 2018-07-31

## 2018-09-18 ENCOUNTER — OFFICE VISIT (OUTPATIENT)
Dept: FAMILY MEDICINE | Facility: CLINIC | Age: 53
End: 2018-09-18
Payer: OTHER GOVERNMENT

## 2018-09-18 VITALS
SYSTOLIC BLOOD PRESSURE: 126 MMHG | HEART RATE: 72 BPM | OXYGEN SATURATION: 98 % | HEIGHT: 63 IN | RESPIRATION RATE: 18 BRPM | DIASTOLIC BLOOD PRESSURE: 84 MMHG | BODY MASS INDEX: 43.4 KG/M2 | TEMPERATURE: 99 F

## 2018-09-18 DIAGNOSIS — I10 ESSENTIAL HYPERTENSION: ICD-10-CM

## 2018-09-18 DIAGNOSIS — F41.9 ANXIETY: ICD-10-CM

## 2018-09-18 PROCEDURE — 99213 OFFICE O/P EST LOW 20 MIN: CPT | Mod: PBBFAC,PN | Performed by: FAMILY MEDICINE

## 2018-09-18 PROCEDURE — 99214 OFFICE O/P EST MOD 30 MIN: CPT | Mod: S$PBB,,, | Performed by: FAMILY MEDICINE

## 2018-09-18 PROCEDURE — 99999 PR PBB SHADOW E&M-EST. PATIENT-LVL III: CPT | Mod: PBBFAC,,, | Performed by: FAMILY MEDICINE

## 2018-09-18 RX ORDER — LISINOPRIL 20 MG/1
20 TABLET ORAL DAILY
Qty: 30 TABLET | Refills: 2 | Status: SHIPPED | OUTPATIENT
Start: 2018-09-18 | End: 2019-01-07 | Stop reason: SDUPTHER

## 2018-09-18 RX ORDER — ESCITALOPRAM OXALATE 10 MG/1
10 TABLET ORAL DAILY
Qty: 90 TABLET | Refills: 0 | Status: SHIPPED | OUTPATIENT
Start: 2018-09-18 | End: 2019-01-07 | Stop reason: SDUPTHER

## 2018-09-18 NOTE — PROGRESS NOTES
HPI:  Kalina Ryan is a 53 y.o. year old female that  presents for follow-up of lab work.  The patient states she feels good and he has not been able to get to the gym due to removal of her job.  She has a plan to go to the gym in the next month after her job transferred her back to her usual location.  She needs refill on medications  Chief Complaint   Patient presents with    Follow-up     lab results    Medication Refill     on all medications    .     HPI      Past Medical History:   Diagnosis Date    Anxiety     Guillain Barré syndrome 2009     Social History     Socioeconomic History    Marital status:      Spouse name: Not on file    Number of children: Not on file    Years of education: Not on file    Highest education level: Not on file   Social Needs    Financial resource strain: Not on file    Food insecurity - worry: Not on file    Food insecurity - inability: Not on file    Transportation needs - medical: Not on file    Transportation needs - non-medical: Not on file   Occupational History    Not on file   Tobacco Use    Smoking status: Never Smoker    Smokeless tobacco: Never Used   Substance and Sexual Activity    Alcohol use: Yes     Alcohol/week: 0.5 oz     Types: 1 Standard drinks or equivalent per week    Drug use: No    Sexual activity: Not on file   Other Topics Concern    Not on file   Social History Narrative    Not on file     Past Surgical History:   Procedure Laterality Date    CHOLECYSTECTOMY  2009    COLONOSCOPY N/A 7/31/2018    Procedure: COLONOSCOPY;  Surgeon: Elpidio Fortune MD;  [facility];  Service: Endoscopy;  Laterality: N/A;    COLONOSCOPY N/A 7/31/2018    Performed by [facility]io Fortune [facility] at [facility]    GASTRIC BYPASS  2009    HYSTERECTOMY  1999     Family History   Problem Relation Age of Onset    Heart disease Mother     Diabetes Mother     Hypertension Mother     Heart disease Father     Diabetes Father      "Hypertension Father     Dementia Father     Pancreatic cancer Brother     Stomach cancer Brother     No Known Problems Daughter     No Known Problems Son     Heart disease Brother     Hypertension Brother            Review of Systems  General ROS: negative for chills, fever or weight loss  ENT ROS: negative for epistaxis, sore throat or rhinorrhea  Respiratory ROS: no cough, shortness of breath, or wheezing  Cardiovascular ROS: no chest pain or dyspnea on exertion  Gastrointestinal ROS: no abdominal pain, change in bowel habits, or black/ bloody stools    Physical Exam:  /84   Pulse 72   Temp 98.5 °F (36.9 °C) (Oral)   Resp 18   Ht 5' 3" (1.6 m)   SpO2 98%   BMI 43.40 kg/m²   General appearance: alert, cooperative, no distress  Constitutional:Oriented to person, place, and time.appears well-developed and well-nourished.  HEENT: Normocephalic, atraumatic, neck symmetrical, no nasal discharge, TM- clear bilaterally  Lungs: clear to auscultation bilaterally, no dullness to percussion bilaterally  Heart: regular rate and rhythm without rub; no displacement of the PMI , S1&S2 present  Abdomen: soft, non-tender; bowel sounds normoactive; no organomegaly  Physical Exam    LABS:    Complete Blood Count  Lab Results   Component Value Date    RBC 4.60 06/11/2018    HGB 12.8 06/11/2018    HCT 40.1 06/11/2018    MCV 87 06/11/2018    MCH 27.8 06/11/2018    MCHC 31.9 (L) 06/11/2018    RDW 13.6 06/11/2018     06/11/2018    MPV 9.2 06/11/2018    GRAN 4.6 06/11/2018    GRAN 57.1 06/11/2018    LYMPH 2.9 06/11/2018    LYMPH 35.7 06/11/2018    MONO 0.4 06/11/2018    MONO 4.8 06/11/2018    EOS 0.2 06/11/2018    BASO 0.02 06/11/2018    EOSINOPHIL 2.0 06/11/2018    BASOPHIL 0.2 06/11/2018    DIFFMETHOD Automated 06/11/2018       Comprehensive Metabolic Panel  Lab Results   Component Value Date     06/11/2018    BUN 17 06/11/2018    CREATININE 0.9 06/11/2018     06/11/2018    K 4.9 06/11/2018    CL " 108 06/11/2018    PROT 7.1 06/11/2018    ALBUMIN 3.6 06/11/2018    BILITOT 0.3 06/11/2018    AST 14 06/11/2018    ALKPHOS 141 (H) 06/11/2018    CO2 26 06/11/2018    ALT 14 06/11/2018    ANIONGAP 9 06/11/2018    EGFRNONAA >60.0 06/11/2018    ESTGFRAFRICA >60.0 06/11/2018       LIPID  Lab Results   Component Value Date    CHOL 153 06/25/2018    HDL 46 06/25/2018         TSH  Lab Results   Component Value Date    TSH 1.992 06/11/2018       Current Outpatient Medications   Medication Sig Dispense Refill    escitalopram oxalate (LEXAPRO) 10 MG tablet Take 1 tablet (10 mg total) by mouth once daily. 90 tablet 0    lisinopril (PRINIVIL,ZESTRIL) 20 MG tablet Take 1 tablet (20 mg total) by mouth once daily. 30 tablet 2     No current facility-administered medications for this visit.        Assessment:    ICD-10-CM ICD-9-CM    1. Essential hypertension I10 401.9 lisinopril (PRINIVIL,ZESTRIL) 20 MG tablet   2. Anxiety F41.9 300.00 escitalopram oxalate (LEXAPRO) 10 MG tablet         Plan:  Exercise counseling done. Pt advised to exercise 30-45 minutes 5-7 times a week.Pt advised to decrease intake of white bread, white rice, corn, potatoes, pasta and sugar. May have moderate intake of Complex Carbohydrates such as brown rice, whole grain bread, and other whole grains.  Follow-up in 6 days (on 9/24/2018).          Erum Hernández MD

## 2018-09-24 ENCOUNTER — OFFICE VISIT (OUTPATIENT)
Dept: FAMILY MEDICINE | Facility: CLINIC | Age: 53
End: 2018-09-24
Payer: OTHER GOVERNMENT

## 2018-09-24 VITALS
RESPIRATION RATE: 18 BRPM | BODY MASS INDEX: 50.49 KG/M2 | TEMPERATURE: 99 F | SYSTOLIC BLOOD PRESSURE: 130 MMHG | HEART RATE: 83 BPM | OXYGEN SATURATION: 97 % | HEIGHT: 63 IN | WEIGHT: 284.94 LBS | DIASTOLIC BLOOD PRESSURE: 84 MMHG

## 2018-09-24 DIAGNOSIS — F41.9 ANXIETY: ICD-10-CM

## 2018-09-24 DIAGNOSIS — I10 ESSENTIAL HYPERTENSION: ICD-10-CM

## 2018-09-24 DIAGNOSIS — N89.8 VAGINAL DISCHARGE: ICD-10-CM

## 2018-09-24 DIAGNOSIS — Z12.4 CERVICAL CANCER SCREENING: Primary | ICD-10-CM

## 2018-09-24 LAB
CANDIDA RRNA VAG QL PROBE: NEGATIVE
G VAGINALIS RRNA GENITAL QL PROBE: NEGATIVE
T VAGINALIS RRNA GENITAL QL PROBE: NEGATIVE

## 2018-09-24 PROCEDURE — 87491 CHLMYD TRACH DNA AMP PROBE: CPT

## 2018-09-24 PROCEDURE — 87480 CANDIDA DNA DIR PROBE: CPT

## 2018-09-24 PROCEDURE — 99215 OFFICE O/P EST HI 40 MIN: CPT | Mod: 25,S$PBB,, | Performed by: FAMILY MEDICINE

## 2018-09-24 PROCEDURE — 99213 OFFICE O/P EST LOW 20 MIN: CPT | Mod: PBBFAC,PN | Performed by: FAMILY MEDICINE

## 2018-09-24 PROCEDURE — 88175 CYTOPATH C/V AUTO FLUID REDO: CPT

## 2018-09-24 PROCEDURE — 99999 PR PBB SHADOW E&M-EST. PATIENT-LVL III: CPT | Mod: PBBFAC,,, | Performed by: FAMILY MEDICINE

## 2018-09-24 PROCEDURE — 87510 GARDNER VAG DNA DIR PROBE: CPT

## 2018-09-25 LAB
C TRACH DNA SPEC QL NAA+PROBE: NOT DETECTED
N GONORRHOEA DNA SPEC QL NAA+PROBE: NOT DETECTED

## 2018-09-26 NOTE — PROGRESS NOTES
HPI:  Kalina Ryan is a 53 y.o. year old female that  presents with   Chief Complaint   Patient presents with    wellwomen   .     HPI      Past Medical History:   Diagnosis Date    Anxiety     Guillain Barré syndrome 2009     Social History     Socioeconomic History    Marital status:      Spouse name: Not on file    Number of children: Not on file    Years of education: Not on file    Highest education level: Not on file   Social Needs    Financial resource strain: Not on file    Food insecurity - worry: Not on file    Food insecurity - inability: Not on file    Transportation needs - medical: Not on file    Transportation needs - non-medical: Not on file   Occupational History    Not on file   Tobacco Use    Smoking status: Never Smoker    Smokeless tobacco: Never Used   Substance and Sexual Activity    Alcohol use: Yes     Alcohol/week: 0.5 oz     Types: 1 Standard drinks or equivalent per week    Drug use: No    Sexual activity: Not on file   Other Topics Concern    Not on file   Social History Narrative    Not on file     Past Surgical History:   Procedure Laterality Date    CHOLECYSTECTOMY  2009    COLONOSCOPY N/A 7/31/2018    Procedure: COLONOSCOPY;  Surgeon: Elpidio Fortune MD;  Location: [facility];  Service: Endoscopy;  Laterality: N/A;    COLONOSCOPY N/A 7/31/2018    [facility]    GASTRIC BYPASS  2009    HYSTERECTOMY  1999     Family History   Problem Relation Age of Onset    Heart disease Mother     Diabetes Mother     Hypertension Mother     Heart disease Father     Diabetes Father     Hypertension Father     Dementia Father     Pancreatic cancer Brother     Stomach cancer Brother     No Known Problems Daughter     No Known Problems Son     Heart disease Brother     Hypertension Brother            Review of Systems  General ROS: negative for chills, fever or weight loss  ENT ROS: negative for epistaxis, sore  "throat or rhinorrhea  Respiratory ROS: no cough, shortness of breath, or wheezing  Cardiovascular ROS: no chest pain or dyspnea on exertion  Gastrointestinal ROS: no abdominal pain, change in bowel habits, or black/ bloody stools    Physical Exam:  /84 (BP Location: Right arm, Patient Position: Sitting, BP Method: Large (Manual))   Pulse 83   Temp 98.5 °F (36.9 °C) (Oral)   Resp 18   Ht 5' 3" (1.6 m)   Wt 129.2 kg (284 lb 15.1 oz)   SpO2 97%   BMI 50.48 kg/m²   General appearance: alert, cooperative, no distress  Constitutional:Oriented to person, place, and time.appears well-developed and well-nourished.  HEENT: Normocephalic, atraumatic, neck symmetrical, no nasal discharge, TM- clear bilaterally  Lungs: clear to auscultation bilaterally, no dullness to percussion bilaterally  Heart: regular rate and rhythm without rub; no displacement of the PMI , S1&S2 present  Abdomen: soft, non-tender; bowel sounds normoactive; no organomegaly  Physical Exam   Genitourinary: Pelvic exam was performed with patient supine. Cervix exhibits no motion tenderness, no discharge and no friability. Right adnexum displays no mass, no tenderness and no fullness. Left adnexum displays no mass, no tenderness and no fullness. No erythema, tenderness or bleeding in the vagina. No foreign body in the vagina. No signs of injury around the vagina. Vaginal discharge found.       LABS:    Complete Blood Count  Lab Results   Component Value Date    RBC 4.60 06/11/2018    HGB 12.8 06/11/2018    HCT 40.1 06/11/2018    MCV 87 06/11/2018    MCH 27.8 06/11/2018    MCHC 31.9 (L) 06/11/2018    RDW 13.6 06/11/2018     06/11/2018    MPV 9.2 06/11/2018    GRAN 4.6 06/11/2018    GRAN 57.1 06/11/2018    LYMPH 2.9 06/11/2018    LYMPH 35.7 06/11/2018    MONO 0.4 06/11/2018    MONO 4.8 06/11/2018    EOS 0.2 06/11/2018    BASO 0.02 06/11/2018    EOSINOPHIL 2.0 06/11/2018    BASOPHIL 0.2 06/11/2018    DIFFMETHOD Automated 06/11/2018 "       Comprehensive Metabolic Panel  Lab Results   Component Value Date     06/11/2018    BUN 17 06/11/2018    CREATININE 0.9 06/11/2018     06/11/2018    K 4.9 06/11/2018     06/11/2018    PROT 7.1 06/11/2018    ALBUMIN 3.6 06/11/2018    BILITOT 0.3 06/11/2018    AST 14 06/11/2018    ALKPHOS 141 (H) 06/11/2018    CO2 26 06/11/2018    ALT 14 06/11/2018    ANIONGAP 9 06/11/2018    EGFRNONAA >60.0 06/11/2018    ESTGFRAFRICA >60.0 06/11/2018       LIPID  Lab Results   Component Value Date    CHOL 153 06/25/2018    HDL 46 06/25/2018         TSH  Lab Results   Component Value Date    TSH 1.992 06/11/2018       Current Outpatient Medications   Medication Sig Dispense Refill    escitalopram oxalate (LEXAPRO) 10 MG tablet Take 1 tablet (10 mg total) by mouth once daily. 90 tablet 0    lisinopril (PRINIVIL,ZESTRIL) 20 MG tablet Take 1 tablet (20 mg total) by mouth once daily. 30 tablet 2     No current facility-administered medications for this visit.        Assessment:    ICD-10-CM ICD-9-CM    1. Cervical cancer screening Z12.4 V76.2 Liquid-based pap smear, screening   2. Vaginal discharge N89.8 623.5 VAGINOSIS SCREEN BY DNA PROBE      C. trachomatis/N. gonorrhoeae by AMP DNA [facility]; Vagina   3. Essential hypertension I10 401.9    4. Anxiety F41.9 300.00          Plan:    Follow-up in 3 months (on 1/7/2019).          Erum Hernández MD

## 2018-10-26 ENCOUNTER — OFFICE VISIT (OUTPATIENT)
Dept: URGENT CARE | Facility: CLINIC | Age: 53
End: 2018-10-26
Payer: OTHER GOVERNMENT

## 2018-10-26 VITALS
SYSTOLIC BLOOD PRESSURE: 138 MMHG | HEIGHT: 63 IN | HEART RATE: 92 BPM | WEIGHT: 284 LBS | DIASTOLIC BLOOD PRESSURE: 86 MMHG | TEMPERATURE: 97 F | BODY MASS INDEX: 50.32 KG/M2 | RESPIRATION RATE: 18 BRPM | OXYGEN SATURATION: 98 %

## 2018-10-26 DIAGNOSIS — R19.7 DIARRHEA, UNSPECIFIED TYPE: ICD-10-CM

## 2018-10-26 DIAGNOSIS — J32.9 SINUSITIS, UNSPECIFIED CHRONICITY, UNSPECIFIED LOCATION: Primary | ICD-10-CM

## 2018-10-26 PROCEDURE — 96372 THER/PROPH/DIAG INJ SC/IM: CPT | Mod: S$GLB,,, | Performed by: FAMILY MEDICINE

## 2018-10-26 PROCEDURE — 99214 OFFICE O/P EST MOD 30 MIN: CPT | Mod: 25,S$GLB,, | Performed by: NURSE PRACTITIONER

## 2018-10-26 RX ORDER — AZITHROMYCIN 250 MG/1
TABLET, FILM COATED ORAL
Qty: 6 TABLET | Refills: 0 | Status: SHIPPED | OUTPATIENT
Start: 2018-10-26 | End: 2019-01-07

## 2018-10-26 RX ORDER — IPRATROPIUM BROMIDE 21 UG/1
2 SPRAY, METERED NASAL 3 TIMES DAILY PRN
Qty: 30 ML | Refills: 0 | Status: SHIPPED | OUTPATIENT
Start: 2018-10-26 | End: 2018-10-30

## 2018-10-26 RX ORDER — DEXAMETHASONE SODIUM PHOSPHATE 100 MG/10ML
6 INJECTION INTRAMUSCULAR; INTRAVENOUS ONCE
Status: COMPLETED | OUTPATIENT
Start: 2018-10-26 | End: 2018-10-26

## 2018-10-26 RX ADMIN — DEXAMETHASONE SODIUM PHOSPHATE 6 MG: 100 INJECTION INTRAMUSCULAR; INTRAVENOUS at 02:10

## 2018-10-26 NOTE — PATIENT INSTRUCTIONS
"Please follow up with your Primary care provider within 2-5 days if your signs and symptoms have not resolved or worsen.  The usual course of cold symptoms are 10-14 days.     If your condition worsens or fails to improve we recommend that you receive another evaluation at the emergency room immediately or contact your primary medical clinic to discuss your concerns.     You must understand that you have received an Urgent Care treatment only and that you may be released before all of your medical problems are known or treated.   You, the patient, will arrange for follow up care as instructed.     Tylenol or Ibuprofen can also be used as directed for pain/fever unless you have an allergy to them or medical condition such as stomach ulcers, kidney or liver disease or blood thinners etc for which you should not be taking these type of medications.     Take over the counter cough medication as directed as needed for cough.  You should avoid medications with pseudoephedrine or phenylephrine (any medication with "D") if you have high blood pressure as this can cause an elevation in your blood pressure. Instead consider Corcidin HBP as needed to prevent an elevated blood pressure.     Natural remedies of symptoms (as needed) include humidification, saline nasal sprays, and/or steamy showers.  Increase fluids, warm tea with honey, cough drops as needed.  You may also use salt water gargles for sore throat.    IF you received a steroid shot today - As discussed, this can elevate your blood pressure, elevate your blood sugar, water weight gain, nervous energy, redness to the face and dimpling of the skin at the injection site.       Nausea/Vomiting/Diarrhea    Increase fluids and advance your diet as tolerated.      Start with liquids, soft foods, and progress into regular diet.  Please drink Pedialyte or use Pedialyte ice pops for electrolyte replacement daily as needed for fluid loss from diarrhea or vomiting.      Keep " hydrated by drinking fluids regularly, can start with ice chips or sips.      Take over the counter Imodium as directed/ as needed for diarrhea.    Take probiotics or yogurt to replace lost gut clifton from GI issues.     If you were given Zofran, please wait 15-30 minutes after taking it to attempt fluid or food consumption.     Watch for any increase pain, fever, localized pain to right lower abdomen or continued vomiting or diarrhea. Follow up in ER for these symptoms.          Sinusitis (Antibiotic Treatment)    The sinuses are air-filled spaces within the bones of the face. They connect to the inside of the nose. Sinusitis is an inflammation of the tissue lining the sinus cavity. Sinus inflammation can occur during a cold. It can also be due to allergies to pollens and other particles in the air. Sinusitis can cause symptoms of sinus congestion and fullness. A sinus infection causes fever, headache and facial pain. There is often green or yellow drainage from the nose or into the back of the throat (post-nasal drip). You have been given antibiotics to treat this condition.  Home care:  · Take the full course of antibiotics as instructed. Do not stop taking them, even if you feel better.  · Drink plenty of water, hot tea, and other liquids. This may help thin mucus. It also may promote sinus drainage.  · Heat may help soothe painful areas of the face. Use a towel soaked in hot water. Or,  the shower and direct the hot spray onto your face. Using a vaporizer along with a menthol rub at night may also help.   · An expectorant containing guaifenesin may help thin the mucus and promote drainage from the sinuses.  · Over-the-counter decongestants may be used unless a similar medicine was prescribed. Nasal sprays work the fastest. Use one that contains phenylephrine or oxymetazoline. First blow the nose gently. Then use the spray. Do not use these medicines more often than directed on the label or symptoms may  get worse. You may also use tablets containing pseudoephedrine. Avoid products that combine ingredients, because side effects may be increased. Read labels. You can also ask the pharmacist for help. (NOTE: Persons with high blood pressure should not use decongestants. They can raise blood pressure.)  · Over-the-counter antihistamines may help if allergies contributed to your sinusitis.    · Do not use nasal rinses or irrigation during an acute sinus infection, unless told to by your health care provider. Rinsing may spread the infection to other sinuses.  · Use acetaminophen or ibuprofen to control pain, unless another pain medicine was prescribed. (If you have chronic liver or kidney disease or ever had a stomach ulcer, talk with your doctor before using these medicines. Aspirin should never be used in anyone under 18 years of age who is ill with a fever. It may cause severe liver damage.)  · Don't smoke. This can worsen symptoms.  Follow-up care  Follow up with your healthcare provider or our staff if you are not improving within the next week.  When to seek medical advice  Call your healthcare provider if any of these occur:  · Facial pain or headache becoming more severe  · Stiff neck  · Unusual drowsiness or confusion  · Swelling of the forehead or eyelids  · Vision problems, including blurred or double vision  · Fever of 100.4ºF (38ºC) or higher, or as directed by your healthcare provider  · Seizure  · Breathing problems  · Symptoms not resolving within 10 days  Date Last Reviewed: 4/13/2015  © 6917-8003 [employer]. [address] [place] [address], [place] [address]. All rights reserved. This information is not intended as a substitute for professional medical care. Always follow your healthcare professional's instructions.      Treating Diarrhea    Diarrhea happens when you have loose, watery, or frequent bowel movements. It is a common problem with many causes. Most cases of diarrhea clear up on their  own. But certain cases may need treatment. Be sure to see your healthcare provider if your symptoms do not improve within a few days.  Getting relief  Treatment of diarrhea depends on its cause. Diarrhea caused by bacterial or parasite infection is often treated with antibiotics. Diarrhea caused by other factors, such as a stomach virus, often improves with simple home treatment. The tips below may also help relieve your symptoms.  · Drink plenty of fluids. This helps prevent too much fluid loss (dehydration). Water, clear soups, and electrolyte solutions are good choices. Avoid alcohol, coffee, tea, and milk. These can irritate your intestines and make symptoms worse.  · Suck on ice chips if drinking makes you queasy.  · Return to your normal diet slowly. You may want to eat bland foods at first, such as rice and toast. Also, you may need to avoid certain foods for a while, such as dairy products. These can make symptoms worse. Ask your healthcare provider if there are any other foods you should avoid.  · If you were prescribed antibiotics, take them as directed.  · Do not take anti-diarrhea medicines without asking your healthcare provider first.  Call your healthcare provider   Call your healthcare provider if you have any of the following:   · A fever of 100.4°F (38.0°C) or higher, or as directed by your healthcare provider  · Severe pain  · Worsening diarrhea or diarrhea for more than 2 days  · Bloody vomit or stool  · Signs of dehydration (dizziness, dry mouth and tongue, rapid pulse, dark urine)  Date Last Reviewed: 7/1/2016  © 2535-4746 [employer]. [address] [place] [address], [place] [address]. All rights reserved. This information is not intended as a substitute for professional medical care. Always follow your healthcare professional's instructions.

## 2018-10-26 NOTE — PROGRESS NOTES
"Subjective:       Patient ID: Kalina Ryan is a 53 y.o. female.    Vitals:  height is 5' 3" (1.6 m) and weight is 128.8 kg (284 lb). Her temperature is 97 °F (36.1 °C). Her blood pressure is 138/86 and her pulse is 92. Her respiration is 18 and oxygen saturation is 98%.     Chief Complaint: Sinus Problem    Sinus Problem   This is a new problem. The current episode started in the past 7 days (6 days). The problem has been gradually worsening since onset. There has been no fever. Her pain is at a severity of 9/10. The pain is severe. Associated symptoms include congestion, ear pain, headaches, sinus pressure and sneezing. Pertinent negatives include no chills, coughing, hoarse voice, shortness of breath or sore throat. Past treatments include oral decongestants (Zyrtec). The treatment provided no relief.     Review of Systems   Constitution: Negative for chills, fever and malaise/fatigue.   HENT: Positive for congestion, ear pain, sinus pressure and sneezing. Negative for hoarse voice and sore throat.    Eyes: Negative for discharge and redness.   Cardiovascular: Negative for chest pain, dyspnea on exertion and leg swelling.   Respiratory: Negative for cough, shortness of breath, sputum production and wheezing.    Musculoskeletal: Negative for myalgias.   Gastrointestinal: Positive for diarrhea. Negative for abdominal pain and nausea.   Neurological: Positive for headaches.       Objective:      Physical Exam    Assessment:       1. Sinusitis, unspecified chronicity, unspecified location    2. Diarrhea, unspecified type        Plan:         Sinusitis, unspecified chronicity, unspecified location    Diarrhea, unspecified type    Other orders  -     azithromycin (ZITHROMAX Z-KAMILLE) 250 MG tablet; Take 2 tablets (500 mg) on  Day 1,  followed by 1 tablet (250 mg) once daily on Days 2 through 5.  Dispense: 6 tablet; Refill: 0  -     dexamethasone injection 6 mg  -     ipratropium (ATROVENT) 0.03 % nasal spray; 2 sprays " by Nasal route 3 (three) times daily as needed. Use no more than 4 days.  Dispense: 30 mL; Refill: 0

## 2018-10-29 ENCOUNTER — TELEPHONE (OUTPATIENT)
Dept: URGENT CARE | Facility: CLINIC | Age: 53
End: 2018-10-29

## 2019-01-07 ENCOUNTER — OFFICE VISIT (OUTPATIENT)
Dept: FAMILY MEDICINE | Facility: CLINIC | Age: 54
End: 2019-01-07
Payer: OTHER GOVERNMENT

## 2019-01-07 VITALS
BODY MASS INDEX: 51.48 KG/M2 | TEMPERATURE: 98 F | RESPIRATION RATE: 18 BRPM | DIASTOLIC BLOOD PRESSURE: 82 MMHG | WEIGHT: 290.56 LBS | SYSTOLIC BLOOD PRESSURE: 126 MMHG | OXYGEN SATURATION: 97 % | HEIGHT: 63 IN | HEART RATE: 81 BPM

## 2019-01-07 DIAGNOSIS — I10 ESSENTIAL HYPERTENSION: ICD-10-CM

## 2019-01-07 DIAGNOSIS — F41.9 ANXIETY: ICD-10-CM

## 2019-01-07 PROCEDURE — 99999 PR PBB SHADOW E&M-EST. PATIENT-LVL III: CPT | Mod: PBBFAC,,, | Performed by: FAMILY MEDICINE

## 2019-01-07 PROCEDURE — 99999 PR PBB SHADOW E&M-EST. PATIENT-LVL III: ICD-10-PCS | Mod: PBBFAC,,, | Performed by: FAMILY MEDICINE

## 2019-01-07 PROCEDURE — 99214 PR OFFICE/OUTPT VISIT, EST, LEVL IV, 30-39 MIN: ICD-10-PCS | Mod: S$PBB,,, | Performed by: FAMILY MEDICINE

## 2019-01-07 PROCEDURE — 99213 OFFICE O/P EST LOW 20 MIN: CPT | Mod: PBBFAC,PN | Performed by: FAMILY MEDICINE

## 2019-01-07 PROCEDURE — 99214 OFFICE O/P EST MOD 30 MIN: CPT | Mod: S$PBB,,, | Performed by: FAMILY MEDICINE

## 2019-01-07 RX ORDER — LISINOPRIL 20 MG/1
20 TABLET ORAL DAILY
Qty: 30 TABLET | Refills: 2 | Status: SHIPPED | OUTPATIENT
Start: 2019-01-07 | End: 2019-05-07 | Stop reason: SDUPTHER

## 2019-01-07 RX ORDER — ESCITALOPRAM OXALATE 10 MG/1
10 TABLET ORAL DAILY
Qty: 90 TABLET | Refills: 0 | Status: SHIPPED | OUTPATIENT
Start: 2019-01-07 | End: 2019-05-16 | Stop reason: SDUPTHER

## 2019-01-07 RX ORDER — ESCITALOPRAM OXALATE 10 MG/1
TABLET ORAL
COMMUNITY
Start: 2018-12-04 | End: 2019-01-07 | Stop reason: SDUPTHER

## 2019-01-07 NOTE — PROGRESS NOTES
HPI:  Kalina Ryan is a 53 y.o. year old female that  presents for f/u and med refill. She denies any current complaints.States that her current dose of lexapro is doing well.  Chief Complaint   Patient presents with    Blood Pressure Check    Medication Refill     Lisinopril   .           Past Medical History:   Diagnosis Date    Anxiety     Guillain Barré syndrome 2009     Social History     Socioeconomic History    Marital status:      Spouse name: Not on file    Number of children: Not on file    Years of education: Not on file    Highest education level: Not on file   Social Needs    Financial resource strain: Not on file    Food insecurity - worry: Not on file    Food insecurity - inability: Not on file    Transportation needs - medical: Not on file    Transportation needs - non-medical: Not on file   Occupational History    Not on file   Tobacco Use    Smoking status: Never Smoker    Smokeless tobacco: Never Used   Substance and Sexual Activity    Alcohol use: Yes     Alcohol/week: 0.5 oz     Types: 1 Standard drinks or equivalent per week    Drug use: No    Sexual activity: Not on file   Other Topics Concern    Not on file   Social History Narrative    Not on file     Past Surgical History:   Procedure Laterality Date    CHOLECYSTECTOMY  2009    COLONOSCOPY N/A 7/31/2018    [facility]    GASTRIC BYPASS  2009    HYSTERECTOMY  1999     Family History   Problem Relation Age of Onset    Heart disease Mother     Diabetes Mother     Hypertension Mother     Heart disease Father     Diabetes Father     Hypertension Father     Dementia Father     Pancreatic cancer Brother     Stomach cancer Brother     No Known Problems Daughter     No Known Problems Son     Heart disease Brother     Hypertension Brother            Review of Systems  General ROS: negative for chills, fever or weight loss  ENT ROS: negative for epistaxis, sore  "throat or rhinorrhea  Respiratory ROS: no cough, shortness of breath, or wheezing  Cardiovascular ROS: no chest pain or dyspnea on exertion  Gastrointestinal ROS: no abdominal pain, change in bowel habits, or black/ bloody stools    Physical Exam:  /82   Pulse 81   Temp 98.2 °F (36.8 °C) (Oral)   Resp 18   Ht 5' 3" (1.6 m)   Wt 131.8 kg (290 lb 9.1 oz)   SpO2 97%   BMI 51.47 kg/m²   General appearance: alert, cooperative, no distress  Constitutional:Oriented to person, place, and time.appears well-developed and well-nourished.  Lungs: clear to auscultation bilaterally, no dullness to percussion bilaterally  Heart: regular rate and rhythm without rub; no displacement of the PMI , S1&S2 present  Abdomen: soft, non-tender; bowel sounds normoactive; no organomegaly  Physical Exam      LABS:    Complete Blood Count  Lab Results   Component Value Date    RBC 4.60 06/11/2018    HGB 12.8 06/11/2018    HCT 40.1 06/11/2018    MCV 87 06/11/2018    MCH 27.8 06/11/2018    MCHC 31.9 (L) 06/11/2018    RDW 13.6 06/11/2018     06/11/2018    MPV 9.2 06/11/2018    GRAN 4.6 06/11/2018    GRAN 57.1 06/11/2018    LYMPH 2.9 06/11/2018    LYMPH 35.7 06/11/2018    MONO 0.4 06/11/2018    MONO 4.8 06/11/2018    EOS 0.2 06/11/2018    BASO 0.02 06/11/2018    EOSINOPHIL 2.0 06/11/2018    BASOPHIL 0.2 06/11/2018    DIFFMETHOD Automated 06/11/2018       Comprehensive Metabolic Panel  Lab Results   Component Value Date     06/11/2018    BUN 17 06/11/2018    CREATININE 0.9 06/11/2018     06/11/2018    K 4.9 06/11/2018     06/11/2018    PROT 7.1 06/11/2018    ALBUMIN 3.6 06/11/2018    BILITOT 0.3 06/11/2018    AST 14 06/11/2018    ALKPHOS 141 (H) 06/11/2018    CO2 26 06/11/2018    ALT 14 06/11/2018    ANIONGAP 9 06/11/2018    EGFRNONAA >60.0 06/11/2018    ESTGFRAFRICA >60.0 06/11/2018       LIPID  Lab Results   Component Value Date    CHOL 153 06/25/2018    HDL 46 06/25/2018         TSH  Lab Results   Component " Value Date    TSH 1.992 06/11/2018       Current Outpatient Medications   Medication Sig Dispense Refill    lisinopril (PRINIVIL,ZESTRIL) 20 MG tablet Take 1 tablet (20 mg total) by mouth once daily. 30 tablet 2    escitalopram oxalate (LEXAPRO) 10 MG tablet Take 1 tablet (10 mg total) by mouth once daily. 90 tablet 0     No current facility-administered medications for this visit.        Assessment:    ICD-10-CM ICD-9-CM    1. Essential hypertension I10 401.9 lisinopril (PRINIVIL,ZESTRIL) 20 MG tablet   2. Anxiety F41.9 300.00 escitalopram oxalate (LEXAPRO) 10 MG tablet         Plan:    Follow-up in 6 months (on 7/7/2019) for Annual Well Visit.          Erum Hernández MD

## 2019-02-07 ENCOUNTER — OFFICE VISIT (OUTPATIENT)
Dept: URGENT CARE | Facility: CLINIC | Age: 54
End: 2019-02-07
Payer: OTHER GOVERNMENT

## 2019-02-07 VITALS
DIASTOLIC BLOOD PRESSURE: 85 MMHG | SYSTOLIC BLOOD PRESSURE: 139 MMHG | TEMPERATURE: 98 F | BODY MASS INDEX: 51.38 KG/M2 | RESPIRATION RATE: 18 BRPM | HEART RATE: 86 BPM | HEIGHT: 63 IN | OXYGEN SATURATION: 98 % | WEIGHT: 290 LBS

## 2019-02-07 DIAGNOSIS — J01.90 ACUTE SINUSITIS, RECURRENCE NOT SPECIFIED, UNSPECIFIED LOCATION: Primary | ICD-10-CM

## 2019-02-07 PROCEDURE — 96372 THER/PROPH/DIAG INJ SC/IM: CPT | Mod: S$GLB,,, | Performed by: EMERGENCY MEDICINE

## 2019-02-07 PROCEDURE — 96372 PR INJECTION,THERAP/PROPH/DIAG2ST, IM OR SUBCUT: ICD-10-PCS | Mod: S$GLB,,, | Performed by: EMERGENCY MEDICINE

## 2019-02-07 PROCEDURE — 99214 OFFICE O/P EST MOD 30 MIN: CPT | Mod: 25,S$GLB,, | Performed by: EMERGENCY MEDICINE

## 2019-02-07 PROCEDURE — 99214 PR OFFICE/OUTPT VISIT, EST, LEVL IV, 30-39 MIN: ICD-10-PCS | Mod: 25,S$GLB,, | Performed by: EMERGENCY MEDICINE

## 2019-02-07 RX ORDER — BETAMETHASONE SODIUM PHOSPHATE AND BETAMETHASONE ACETATE 3; 3 MG/ML; MG/ML
6 INJECTION, SUSPENSION INTRA-ARTICULAR; INTRALESIONAL; INTRAMUSCULAR; SOFT TISSUE
Status: COMPLETED | OUTPATIENT
Start: 2019-02-07 | End: 2019-02-07

## 2019-02-07 RX ORDER — AZITHROMYCIN 250 MG/1
TABLET, FILM COATED ORAL
Qty: 6 TABLET | Refills: 0 | Status: SHIPPED | OUTPATIENT
Start: 2019-02-07 | End: 2019-09-04 | Stop reason: ALTCHOICE

## 2019-02-07 RX ADMIN — BETAMETHASONE SODIUM PHOSPHATE AND BETAMETHASONE ACETATE 6 MG: 3; 3 INJECTION, SUSPENSION INTRA-ARTICULAR; INTRALESIONAL; INTRAMUSCULAR; SOFT TISSUE at 09:02

## 2019-02-07 NOTE — PATIENT INSTRUCTIONS
Dontrell Vizcaino MD  Go to the Emergency Department for any problems  Call your PCP for follow up next available.    Sinusitis (Antibiotic Treatment)    The sinuses are air-filled spaces within the bones of the face. They connect to the inside of the nose. Sinusitis is an inflammation of the tissue lining the sinus cavity. Sinus inflammation can occur during a cold. It can also be due to allergies to pollens and other particles in the air. Sinusitis can cause symptoms of sinus congestion and fullness. A sinus infection causes fever, headache and facial pain. There is often green or yellow drainage from the nose or into the back of the throat (post-nasal drip). You have been given antibiotics to treat this condition.  Home care:  · Take the full course of antibiotics as instructed. Do not stop taking them, even if you feel better.  · Drink plenty of water, hot tea, and other liquids. This may help thin mucus. It also may promote sinus drainage.  · Heat may help soothe painful areas of the face. Use a towel soaked in hot water. Or,  the shower and direct the hot spray onto your face. Using a vaporizer along with a menthol rub at night may also help.   · An expectorant containing guaifenesin may help thin the mucus and promote drainage from the sinuses.  · Over-the-counter decongestants may be used unless a similar medicine was prescribed. Nasal sprays work the fastest. Use one that contains phenylephrine or oxymetazoline. First blow the nose gently. Then use the spray. Do not use these medicines more often than directed on the label or symptoms may get worse. You may also use tablets containing pseudoephedrine. Avoid products that combine ingredients, because side effects may be increased. Read labels. You can also ask the pharmacist for help. (NOTE: Persons with high blood pressure should not use decongestants. They can raise blood pressure.)  · Over-the-counter antihistamines may help if allergies contributed  to your sinusitis.    · Do not use nasal rinses or irrigation during an acute sinus infection, unless told to by your health care provider. Rinsing may spread the infection to other sinuses.  · Use acetaminophen or ibuprofen to control pain, unless another pain medicine was prescribed. (If you have chronic liver or kidney disease or ever had a stomach ulcer, talk with your doctor before using these medicines. Aspirin should never be used in anyone under 18 years of age who is ill with a fever. It may cause severe liver damage.)  · Don't smoke. This can worsen symptoms.  Follow-up care  Follow up with your healthcare provider or our staff if you are not improving within the next week.  When to seek medical advice  Call your healthcare provider if any of these occur:  · Facial pain or headache becoming more severe  · Stiff neck  · Unusual drowsiness or confusion  · Swelling of the forehead or eyelids  · Vision problems, including blurred or double vision  · Fever of 100.4ºF (38ºC) or higher, or as directed by your healthcare provider  · Seizure  · Breathing problems  · Symptoms not resolving within 10 days  Date Last Reviewed: 4/13/2015  © 3531-4444 [employer]. [address] [place] [id number]. All rights reserved. This information is not intended as a substitute for professional medical care. Always follow your healthcare professional's instructions.

## 2019-02-07 NOTE — LETTER
February 7, 2019      [facility]  [address], [address]  [place] [address]  Phone: [phone]  Fax: [phone]       Patient: Kalina Ryan   YOB: 1965  Date of Visit: 02/07/2019    To Whom It May Concern:    Karthikeyan Ryan  was at [facility] on 02/07/2019. She may return to work/school on 2/11/19, earlier if feels better with no restrictions. If you have any questions or concerns, or if I can be of further assistance, please do not hesitate to contact me.    Sincerely,            Dontrell Vizcaino MD

## 2019-02-07 NOTE — PROGRESS NOTES
"Subjective:       Patient ID: Kalina Ryan is a 53 y.o. female.    Vitals:  height is 5' 3" (1.6 m) and weight is 131.5 kg (290 lb). Her oral temperature is 98 °F (36.7 °C). Her blood pressure is 139/85 and her pulse is 86. Her respiration is 18 and oxygen saturation is 98%.     Chief Complaint: Sinus Problem    3-4 d hx sinus drainage/congestion/pressure, hx of same, does well with steroid IM and z-karolina, NOC      Sinus Problem   This is a new problem. Episode onset: 4 days. The problem has been gradually worsening since onset. There has been no fever. Her pain is at a severity of 7/10. The pain is moderate. Associated symptoms include congestion, headaches, sinus pressure and sneezing. Pertinent negatives include no chills, coughing, diaphoresis, ear pain, shortness of breath or sore throat. Treatments tried: OTC sinus medication. The treatment provided no relief.       Constitution: Negative for chills, sweating, fatigue and fever.   HENT: Positive for congestion, sinus pain and sinus pressure. Negative for ear pain, sore throat and voice change.         Pressure in both ears   Neck: Negative for painful lymph nodes.   Eyes: Negative for eye redness.   Respiratory: Negative for chest tightness, cough, sputum production, bloody sputum, COPD, shortness of breath, stridor, wheezing and asthma.    Gastrointestinal: Negative for nausea and vomiting.   Musculoskeletal: Negative for muscle ache.   Skin: Negative for rash.   Allergic/Immunologic: Positive for sneezing. Negative for seasonal allergies and asthma.   Neurological: Positive for headaches.   Hematologic/Lymphatic: Negative for swollen lymph nodes.       Objective:      Physical Exam   Constitutional: She is oriented to person, place, and time.   Overweight   HENT:   Head: Normocephalic and atraumatic.   Right Ear: Tympanic membrane, external ear and ear canal normal.   Left Ear: Tympanic membrane, external ear and ear canal normal.   Nose: Mucosal edema " present. No rhinorrhea. Right sinus exhibits maxillary sinus tenderness and frontal sinus tenderness. Left sinus exhibits maxillary sinus tenderness and frontal sinus tenderness.   Mouth/Throat: Uvula is midline, oropharynx is clear and moist and mucous membranes are normal.   Neck: Normal range of motion. Neck supple.   Cardiovascular: Normal rate, regular rhythm and normal heart sounds.   Pulmonary/Chest: Breath sounds normal.   Musculoskeletal: Normal range of motion.   Lymphadenopathy:     She has no cervical adenopathy.   Neurological: She is alert and oriented to person, place, and time.   Skin: Skin is warm and dry.   Psychiatric: She has a normal mood and affect. Her behavior is normal.       Assessment:       1. Acute sinusitis, recurrence not specified, unspecified location        Plan:         Acute sinusitis, recurrence not specified, unspecified location  -     betamethasone acetate-betamethasone sodium phosphate injection 6 mg  -     azithromycin (ZITHROMAX Z-KAMILLE) 250 MG tablet; Take 2 tablets (500 mg) on  Day 1,  followed by 1 tablet (250 mg) once daily on Days 2 through 5.  Dispense: 6 tablet; Refill: 0        Dontrell Vizcaino MD  Go to the Emergency Department for any problems  Call your PCP for follow up next available.

## 2019-02-10 ENCOUNTER — TELEPHONE (OUTPATIENT)
Dept: URGENT CARE | Facility: CLINIC | Age: 54
End: 2019-02-10

## 2019-05-07 DIAGNOSIS — I10 ESSENTIAL HYPERTENSION: ICD-10-CM

## 2019-05-07 RX ORDER — LISINOPRIL 20 MG/1
TABLET ORAL
Qty: 30 TABLET | Refills: 2 | Status: SHIPPED | OUTPATIENT
Start: 2019-05-07 | End: 2019-08-12 | Stop reason: SDUPTHER

## 2019-05-13 PROBLEM — J01.90 ACUTE SINUSITIS: Status: RESOLVED | Noted: 2019-02-07 | Resolved: 2019-05-13

## 2019-05-16 DIAGNOSIS — F41.9 ANXIETY: ICD-10-CM

## 2019-05-16 RX ORDER — ESCITALOPRAM OXALATE 10 MG/1
TABLET ORAL
Qty: 90 TABLET | Refills: 0 | Status: SHIPPED | OUTPATIENT
Start: 2019-05-16 | End: 2019-08-12 | Stop reason: SDUPTHER

## 2019-07-10 DIAGNOSIS — I10 ESSENTIAL HYPERTENSION: ICD-10-CM

## 2019-07-10 RX ORDER — LISINOPRIL 20 MG/1
TABLET ORAL
Qty: 30 TABLET | Refills: 1 | OUTPATIENT
Start: 2019-07-10

## 2019-08-12 DIAGNOSIS — Z13.1 DIABETES MELLITUS SCREENING: ICD-10-CM

## 2019-08-12 DIAGNOSIS — Z13.220 SCREENING CHOLESTEROL LEVEL: ICD-10-CM

## 2019-08-12 DIAGNOSIS — Z11.59 NEED FOR HEPATITIS C SCREENING TEST: ICD-10-CM

## 2019-08-12 DIAGNOSIS — Z13.0 SCREENING FOR DEFICIENCY ANEMIA: Primary | ICD-10-CM

## 2019-08-12 DIAGNOSIS — I10 ESSENTIAL HYPERTENSION: ICD-10-CM

## 2019-08-12 DIAGNOSIS — Z13.29 THYROID DISORDER SCREEN: ICD-10-CM

## 2019-08-12 DIAGNOSIS — F41.9 ANXIETY: ICD-10-CM

## 2019-08-12 RX ORDER — ESCITALOPRAM OXALATE 10 MG/1
10 TABLET ORAL DAILY
Qty: 30 TABLET | Refills: 0 | Status: SHIPPED | OUTPATIENT
Start: 2019-08-12 | End: 2019-09-04 | Stop reason: SDUPTHER

## 2019-08-12 RX ORDER — LISINOPRIL 20 MG/1
20 TABLET ORAL DAILY
Qty: 30 TABLET | Refills: 0 | Status: SHIPPED | OUTPATIENT
Start: 2019-08-12 | End: 2019-09-04 | Stop reason: SDUPTHER

## 2019-08-12 NOTE — TELEPHONE ENCOUNTER
Called and spoke with pt in regards of medication refill. Pt made appt to see CHIRAG Ayon on 8/26/2019. Pt made her lab appt for 8/22/19. Can you please put in lab orders for pt please. Please advise.

## 2019-08-26 ENCOUNTER — TELEPHONE (OUTPATIENT)
Dept: FAMILY MEDICINE | Facility: CLINIC | Age: 54
End: 2019-08-26

## 2019-08-26 NOTE — TELEPHONE ENCOUNTER
----- Message from Dyana Zafar sent at 8/26/2019  8:56 AM CDT -----  Called patient to reschedule appointment for 08/26/19 at 1:00 , she states would like to be seen on 09/04/19 at 8:00 am , it's here [facility] , please call her about this matter # [phone]. Thanks

## 2019-09-04 ENCOUNTER — TELEPHONE (OUTPATIENT)
Dept: FAMILY MEDICINE | Facility: CLINIC | Age: 54
End: 2019-09-04

## 2019-09-04 ENCOUNTER — OFFICE VISIT (OUTPATIENT)
Dept: FAMILY MEDICINE | Facility: CLINIC | Age: 54
End: 2019-09-04
Payer: OTHER GOVERNMENT

## 2019-09-04 VITALS
WEIGHT: 291 LBS | OXYGEN SATURATION: 96 % | DIASTOLIC BLOOD PRESSURE: 70 MMHG | BODY MASS INDEX: 51.56 KG/M2 | RESPIRATION RATE: 18 BRPM | HEIGHT: 63 IN | TEMPERATURE: 98 F | HEART RATE: 87 BPM | SYSTOLIC BLOOD PRESSURE: 128 MMHG

## 2019-09-04 DIAGNOSIS — I10 ESSENTIAL HYPERTENSION: ICD-10-CM

## 2019-09-04 DIAGNOSIS — Z12.39 BREAST CANCER SCREENING: ICD-10-CM

## 2019-09-04 DIAGNOSIS — G61.0 GUILLAIN BARRÉ SYNDROME: ICD-10-CM

## 2019-09-04 DIAGNOSIS — Z00.00 ANNUAL PHYSICAL EXAM: Primary | ICD-10-CM

## 2019-09-04 DIAGNOSIS — E66.01 CLASS 3 SEVERE OBESITY DUE TO EXCESS CALORIES WITH SERIOUS COMORBIDITY AND BODY MASS INDEX (BMI) OF 50.0 TO 59.9 IN ADULT: ICD-10-CM

## 2019-09-04 DIAGNOSIS — Z98.84 PERSONAL HISTORY OF GASTRIC BYPASS: ICD-10-CM

## 2019-09-04 DIAGNOSIS — F41.9 ANXIETY: ICD-10-CM

## 2019-09-04 DIAGNOSIS — E11.9 NEW ONSET TYPE 2 DIABETES MELLITUS: ICD-10-CM

## 2019-09-04 PROBLEM — E66.813 CLASS 3 SEVERE OBESITY DUE TO EXCESS CALORIES WITH SERIOUS COMORBIDITY AND BODY MASS INDEX (BMI) OF 50.0 TO 59.9 IN ADULT: Status: ACTIVE | Noted: 2019-09-04

## 2019-09-04 PROCEDURE — 99999 PR PBB SHADOW E&M-EST. PATIENT-LVL IV: CPT | Mod: PBBFAC,,, | Performed by: NURSE PRACTITIONER

## 2019-09-04 PROCEDURE — 99214 OFFICE O/P EST MOD 30 MIN: CPT | Mod: PBBFAC,PN | Performed by: NURSE PRACTITIONER

## 2019-09-04 PROCEDURE — 99999 PR PBB SHADOW E&M-EST. PATIENT-LVL IV: ICD-10-PCS | Mod: PBBFAC,,, | Performed by: NURSE PRACTITIONER

## 2019-09-04 PROCEDURE — 99396 PREV VISIT EST AGE 40-64: CPT | Mod: S$PBB,,, | Performed by: NURSE PRACTITIONER

## 2019-09-04 PROCEDURE — 99396 PR PREVENTIVE VISIT,EST,40-64: ICD-10-PCS | Mod: S$PBB,,, | Performed by: NURSE PRACTITIONER

## 2019-09-04 RX ORDER — LISINOPRIL 20 MG/1
20 TABLET ORAL DAILY
Qty: 90 TABLET | Refills: 0 | Status: SHIPPED | OUTPATIENT
Start: 2019-09-04 | End: 2019-12-10 | Stop reason: SDUPTHER

## 2019-09-04 RX ORDER — ESCITALOPRAM OXALATE 10 MG/1
10 TABLET ORAL DAILY
Qty: 30 TABLET | Refills: 2 | Status: SHIPPED | OUTPATIENT
Start: 2019-09-04 | End: 2019-12-10 | Stop reason: SDUPTHER

## 2019-09-04 NOTE — PROGRESS NOTES
"Subjective:       Patient ID: Kalina Ryan is a 54 y.o. female.    Chief Complaint: Wellness (pt here for a wellness visit/ med refill )    ####NEW PATIENT TO ME - OLD PATIENT OF DR. 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    Patient has hypertension that is controlled on lisinopril 20 mg daily.  /70 (BP Location: Right arm, Patient Position: Sitting, BP Method: Large (Manual))   Pulse 87   Temp 97.8 °F (36.6 °C) (Oral)   Resp 18   Ht 5' 3" (1.6 m)   Wt 132 kg (291 lb 0.1 oz)   SpO2 96%   BMI 51.55 kg/m²     Patient has anxiety that is controlled on Lexapro 10 mg daily.    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  She reports residual generalized weakness and neuropathy to her hands and feet.  She reports an occasional foot drop.  She is able to ambulate without difficulty and without assistance.    Patient has a personal history of gastric bypass in 2009.  Patient is morbidly obese with a BMI 51.55.    Patient is noted to have NEW ONSET TYPE 2 DIABETES today on wellness exam September 2019.  Her fasting blood sugar is 129 with a hemoglobin A1c 6.5%.  Will refer patient to diabetes educator and recheck in 3 months.    Wellness labs:  -  CBC is within normal limits  -  CMP is okay other than impaired fasting glucose of 129 with hemoglobin A1c is 6.5%:  New onset diabetes  -  cholesterol levels are good with a total cholesterol 156 and an LDL of 86.8  -  thyroid screening is normal    Health maintenance:  -  patient is not allowed to have any vaccines due to Herlinda [place].  -  due for mammogram will schedule    Component      Latest Ref Rng & Units 8/22/2019 6/25/2018 " 6/11/2018   WBC      3.90 - 12.70 K/uL 7.29  8.10   RBC      4.00 - 5.40 M/uL 4.50  4.60   Hemoglobin      12.0 - 16.0 g/dL 12.6  12.8   Hematocrit      37.0 - 48.5 % 38.4  40.1   MCV      82 - 98 fL 85  87   MCH      27.0 - 31.0 pg 28.0  27.8   MCHC      32.0 - 36.0 g/dL 32.8  31.9 (L)   RDW      11.5 - 14.5 % 14.3  13.6   Platelets      150 - 350 K/uL 286  296   MPV      9.2 - 12.9 fL 8.7 (L)  9.2   Immature Granulocytes      0.0 - 0.5 %   0.2   Gran # (ANC)      1.8 - 7.7 K/uL 3.9  4.6   Immature Grans (Abs)      0.00 - 0.04 K/uL   0.02   Lymph #      1.0 - 4.8 K/uL 2.8  2.9   Mono #      0.3 - 1.0 K/uL 0.4  0.4   Eos #      0.0 - 0.5 K/uL 0.2  0.2   Baso #      0.00 - 0.20 K/uL 0.02  0.02   nRBC      0 /100 WBC   0   Gran%      38.0 - 73.0 % 53.7  57.1   Lymph%      18.0 - 48.0 % 38.3  35.7   Mono%      4.0 - 15.0 % 4.9  4.8   Eosinophil%      0.0 - 8.0 % 2.9  2.0   Basophil%      0.0 - 1.9 % 0.3  0.2   Differential Method       Automated  Automated   Sodium      136 - 145 mmol/L 140  143   Potassium      3.5 - 5.1 mmol/L 4.5  4.9   Chloride      95 - 110 mmol/L 107  108   CO2      23 - 29 mmol/L 27  26   Glucose      70 - 110 mg/dL 129 (H)  102   BUN, Bld      7 - 17 mg/dL 21 (H)  17   Creatinine      0.50 - 1.40 mg/dL 0.75  0.9   Calcium      8.7 - 10.5 mg/dL 9.1  9.4   PROTEIN TOTAL      6.0 - 8.4 g/dL 7.0  7.1   Albumin      3.5 - 5.2 g/dL 4.0  3.6   BILIRUBIN TOTAL      0.1 - 1.0 mg/dL 0.3  0.3   Alkaline Phosphatase      38 - 126 U/L 130 (H)  141 (H)   AST      15 - 46 U/L 22  14   ALT      10 - 44 U/L 22  14   Anion Gap      8 - 16 mmol/L 6 (L)  9   eGFR if African American      >60 mL/min/1.73 m:2 >60.0  >60.0   eGFR if non African American      >60 mL/min/1.73 m:2 >60.0  >60.0   Cholesterol      120 - 199 mg/dL 156 153    Triglycerides      30 - 150 mg/dL 121 112    HDL      40 - 75 mg/dL 45 46    LDL Cholesterol External      63.0 - 159.0 mg/dL 86.8 84.6    Hdl/Cholesterol Ratio      20.0 - 50.0 % 28.8  30.1    Total Cholesterol/HDL Ratio      2.0 - 5.0 3.5 3.3    Non-HDL Cholesterol      mg/dL 111 107    Hemoglobin A1C External      4.0 - 5.6 % 6.5 (H)  6.3 (H)   Estimated Avg Glucose      68 - 131 mg/dL 140 (H)  134 (H)   TSH      0.400 - 4.000 uIU/mL 3.000  1.992   Hepatitis C Ab       Negative       Current Outpatient Medications   Medication Sig Dispense Refill    escitalopram oxalate (LEXAPRO) 10 MG tablet Take 1 tablet (10 mg total) by mouth once daily. 30 tablet 0    lisinopril (PRINIVIL,ZESTRIL) 20 MG tablet Take 1 tablet (20 mg total) by mouth once daily. 30 tablet 0    azithromycin (ZITHROMAX Z-KAMILLE) 250 MG tablet Take 2 tablets (500 mg) on  Day 1,  followed by 1 tablet (250 mg) once daily on Days 2 through 5. 6 tablet 0     No current facility-administered medications for this visit.        Past Medical History:   Diagnosis Date    Anxiety     Guillain Barré syndrome 2009       Past Surgical History:   Procedure Laterality Date    CHOLECYSTECTOMY  2009    COLONOSCOPY N/A 7/31/2018    [facility]    GASTRIC BYPASS  2009    HYSTERECTOMY  1999       Family History   Problem Relation Age of Onset    Heart disease Mother     Diabetes Mother     Hypertension Mother     Heart disease Father     Diabetes Father     Hypertension Father     Dementia Father     Pancreatic cancer Brother     Stomach cancer Brother     No Known Problems Daughter     No Known Problems Son     Heart disease Brother     Hypertension Brother        Social History     Socioeconomic History    Marital status:      Spouse name: Not on file    Number of children: Not on file    Years of education: Not on file    Highest education level: Not on file   Occupational History    Not on file   Social Needs    Financial resource strain: Not on file    Food insecurity:     Worry: Not on file     Inability: Not on file    Transportation needs:     Medical: Not on file      Non-medical: Not on file   Tobacco Use    Smoking status: Never Smoker    Smokeless tobacco: Never Used   Substance and Sexual Activity    Alcohol use: Yes     Alcohol/week: 0.5 oz     Types: 1 Standard drinks or equivalent per week     Frequency: 2-4 times a month     Drinks per session: 1 or 2     Binge frequency: Never    Drug use: No    Sexual activity: Not on file   Lifestyle    Physical activity:     Days per week: 0 days     Minutes per session: 0 min    Stress: Only a little   Relationships    Social connections:     Talks on phone: Once a week     Gets together: Once a week     Attends Synagogue service: Not on file     Active member of club or organization: No     Attends meetings of clubs or organizations: Never     Relationship status:    Other Topics Concern    Not on file   Social History Narrative    Not on file       Review of Systems   Constitutional: Negative for appetite change, chills, fatigue, fever and unexpected weight change.   HENT: Negative for congestion, ear pain, mouth sores, nosebleeds, postnasal drip, rhinorrhea, sinus pressure, sneezing, sore throat, trouble swallowing and voice change.    Eyes: Negative for photophobia, pain, discharge, redness, itching and visual disturbance.   Respiratory: Negative for cough, chest tightness and shortness of breath.    Cardiovascular: Negative for chest pain, palpitations and leg swelling.   Gastrointestinal: Negative for abdominal pain, blood in stool, constipation, diarrhea, nausea and vomiting.   Genitourinary: Negative for dysuria, frequency, hematuria and urgency.   Musculoskeletal: Negative for arthralgias, back pain, joint swelling and myalgias.   Skin: Negative for color change and rash.   Allergic/Immunologic: Negative for immunocompromised state.   Neurological: Positive for weakness. Negative for dizziness, seizures, syncope and headaches.        Generalized weakness to extremities due to the Herlinda [place]  "  Hematological: Negative for adenopathy. Does not bruise/bleed easily.   Psychiatric/Behavioral: Negative for agitation, dysphoric mood, sleep disturbance and suicidal ideas. The patient is not nervous/anxious.          Objective:     Vitals:    09/04/19 0755   BP: 128/70   BP Location: Right arm   Patient Position: Sitting   BP Method: Large (Manual)   Pulse: 87   Resp: 18   Temp: 97.8 °F (36.6 °C)   TempSrc: Oral   SpO2: 96%   Weight: 132 kg (291 lb 0.1 oz)   Height: 5' 3" (1.6 m)          Physical Exam   Constitutional: She is oriented to person, place, and time. She appears well-developed and well-nourished. No distress.   + morbid obesity with Body mass index is 51.55 kg/m².   HENT:   Head: Normocephalic and atraumatic.   Right Ear: External ear normal.   Left Ear: External ear normal.   Nose: Nose normal.   Mouth/Throat: Oropharynx is clear and moist. No oropharyngeal exudate.   Eyes: Pupils are equal, round, and reactive to light. EOM are normal.   Neck: Normal range of motion. Neck supple. No tracheal deviation present. No thyromegaly present.   Cardiovascular: Normal rate, regular rhythm and normal heart sounds.   No murmur heard.  Pulmonary/Chest: Effort normal and breath sounds normal. No respiratory distress.   Abdominal: Soft. She exhibits no distension.   Musculoskeletal: Normal range of motion. She exhibits no edema.   Lymphadenopathy:     She has no cervical adenopathy.   Neurological: She is alert and oriented to person, place, and time. No cranial nerve deficit. Coordination normal.   Skin: Skin is warm and dry. No rash noted. She is not diaphoretic.   Psychiatric: She has a normal mood and affect.         Assessment:         ICD-10-CM ICD-9-CM   1. Annual physical exam Z00.00 V70.0   2. New onset type 2 diabetes mellitus E11.9 250.00   3. Essential hypertension I10 401.9   4. Anxiety F41.9 300.00   5. Guillain Barré syndrome G61.0 357.0   6. Personal history of gastric bypass Z98.84 V45.86   7. " Class 3 severe obesity due to excess calories with serious comorbidity and body mass index (BMI) of 50.0 to 59.9 in adult E66.01 278.01    Z68.43 V85.43   8. Breast cancer screening Z12.31 V76.10       Plan:       Annual physical exam  -  will schedule mammogram  Health Maintenance Summary     Mammogram Next Due 6/25/2020     Done 6/25/2018 MAMMO DIGITAL SCREENING BILAT WITH CAD    Declined 6/11/2018    Colonoscopy Next Due 7/31/2023     Done 7/31/2018 COLONOSCOPY    Done 7/31/2018 [facility]: COLONOSCOPY PROCEDURE COMPLETED    Declined 6/11/2018    Lipid Panel Next Due 8/22/2024     Done 8/22/2019 LIPID PANEL    Done 6/25/2018 LIPID PANEL    Done 6/11/2018     Done 8/3/2010 LIPID PANEL    Done 4/29/2010 LIPID PANEL    Patient has more history with this topic...   Hepatitis C Screening This plan is no longer active.     Done 8/22/2019 HEPATITIS C ANTIBODY    Declined 6/11/2018    TETANUS VACCINE This plan is no longer active.    Shingles Vaccine This plan is no longer active.    Influenza Vaccine This plan is no longer active.          New onset type 2 diabetes mellitus  -  referral to Diabetes educator.  Recheck in 3 months  -     Ambulatory consult to Diabetic Education  -     Comprehensive metabolic panel; Future; Expected date: 09/04/2019  -     Hemoglobin A1c; Future; Expected date: 09/04/2019    Essential hypertension  -  controlled on lisinopril at present dose.  Recheck in 3 months  -     lisinopril (PRINIVIL,ZESTRIL) 20 MG tablet; Take 1 tablet (20 mg total) by mouth once daily.  Dispense: 90 tablet; Refill: 0    Anxiety  -  controlled on Lexapro at present dose.  Will recheck in 3 months  -     escitalopram oxalate (LEXAPRO) 10 MG tablet; Take 1 tablet (10 mg total) by mouth once daily.  Dispense: 30 tablet; Refill: 2    Guillain Barré syndrome  -  generalized weakness present    Personal history of gastric bypass    Morbid obesity  -  work on lifestyle modifications to promote weight loss    Breast cancer  screening  -     Mammo Digital Screening Bilat w/ Tristan; Future; Expected date: 09/04/2019      Follow up in about 3 months (around 12/4/2019) for fasting labs and follow up.     Patient's Medications   New Prescriptions    No medications on file   Previous Medications    No medications on file   Modified Medications    Modified Medication Previous Medication    ESCITALOPRAM OXALATE (LEXAPRO) 10 MG TABLET escitalopram oxalate (LEXAPRO) 10 MG tablet       Take 1 tablet (10 mg total) by mouth once daily.    Take 1 tablet (10 mg total) by mouth once daily.    LISINOPRIL (PRINIVIL,ZESTRIL) 20 MG TABLET lisinopril (PRINIVIL,ZESTRIL) 20 MG tablet       Take 1 tablet (20 mg total) by mouth once daily.    Take 1 tablet (20 mg total) by mouth once daily.   Discontinued Medications    AZITHROMYCIN (ZITHROMAX Z-KAMILLE) 250 MG TABLET    Take 2 tablets (500 mg) on  Day 1,  followed by 1 tablet (250 mg) once daily on Days 2 through 5.

## 2019-09-23 ENCOUNTER — CLINICAL SUPPORT (OUTPATIENT)
Dept: DIABETES | Facility: CLINIC | Age: 54
End: 2019-09-23
Payer: OTHER GOVERNMENT

## 2019-09-23 DIAGNOSIS — E11.9 NEW ONSET TYPE 2 DIABETES MELLITUS: ICD-10-CM

## 2019-09-23 DIAGNOSIS — E11.9 DIABETES MELLITUS WITHOUT COMPLICATION: Primary | ICD-10-CM

## 2019-09-23 PROCEDURE — G0108 DIAB MANAGE TRN  PER INDIV: HCPCS | Mod: PBBFAC,PN | Performed by: DIETITIAN, REGISTERED

## 2019-09-23 RX ORDER — INSULIN PUMP SYRINGE, 3 ML
EACH MISCELLANEOUS
Qty: 1 EACH | Refills: 0 | Status: SHIPPED | OUTPATIENT
Start: 2019-09-23 | End: 2021-04-16 | Stop reason: ALTCHOICE

## 2019-09-23 RX ORDER — LANCETS
1 EACH MISCELLANEOUS DAILY
Qty: 30 EACH | Refills: 11 | Status: SHIPPED | OUTPATIENT
Start: 2019-09-23 | End: 2021-04-16 | Stop reason: ALTCHOICE

## 2019-09-23 NOTE — PROGRESS NOTES
Diabetes Education  Author: Shahida Salazar RD  Date: 9/23/2019    Diabetes Care Management Summary  Diabetes Education Record Assessment/Progress: Initial  Current Diabetes Risk Level: Low         Diabetes Type  Diabetes Type : Type II    Diabetes History  Diabetes Diagnosis: 0-1 year  Reviewed Problem List with Patient: No    Health Maintenance was reviewed today with patient. Discussed with patient importance of routine eye exams, foot exams/foot care, blood work (i.e.: A1c, microalbumin, and lipid), dental visits, yearly flu vaccine, and pneumonia vaccine as indicated by PCP. Patient verbalized understanding.     Health Maintenance Topics with due status: Not Due       Topic Last Completion Date    Mammogram 06/25/2018    Colonoscopy 07/31/2018    Lipid Panel 08/22/2019     There are no preventive care reminders to display for this patient.    Nutrition  Meal Planning: skipping meals  What type of sweetener do you use?: sugar  What type of beverages do you drink?: (Coffee)  Meal Plan 24 Hour Recall - Dinner: Steak & Eggs  Patient states that meals vary - s/p gastric bypass 2009      Monitoring   Self Monitoring : No Monitor  Blood Glucose Logs: No  Do you use a personal continuous glucose monitor?: No    Exercise   Exercise Type: none         Social History  Preferred Learning Method: Face to Face  Primary Support: Self  Smoking Status: Never a Smoker  Alcohol Use: Weekly    DDS-2 Score  ( > 3 = SIGNIFICANT DISTRESS): 2    Barriers to Change  Barriers to Change: None  Learning Challenges : None    Readiness to Learn   Readiness to Learn : Acceptance    Cultural Influences  Cultural Influences: None    Diabetes Education Assessment/Progress  Diabetes Disease Process (diabetes disease process and treatment options): Individual Session, Written Materials Provided, Instructed, Demonstrates Understanding/Competency(verbalizes/demonstrates), Discussion  Nutrition (Incorporating nutritional management into one's  lifestyle): Individual Session, Written Materials Provided, Instructed, Demonstrates Understanding/Competency (verbalizes/demonstrates), Discussion  Physical Activity (incorporating physical activity into one's lifestyle): Individual Session, Written Materials Provided, Instructed, Demonstrates Understanding/Competency (verbalizes/demonstrates), Discussion  Medications (states correct name, dose, onset, peak, duration, side effects & timing of meds): Individual Session, Written Materials Provided, Instructed, Demonstrates Understanding/Competency(verbalizes/demonstrates), Discussion  Monitoring (monitoring blood glucose/other parameters & using results): Individual Session, Written Materials Provided, Instructed, Demonstrates Understanding/Competency (verbalizes/demonstrates), Discussion  Acute Complications (preventing, detecting, and treating acute complications): Individual Session, Written Materials Provided, Instructed, Demonstrates Understanding/Competency (verbalizes/demonstrates), Discussion  Chronic Complications (preventing, detecting, and treating chronic complications): Individual Session, Written Materials Provided, Instructed, Demonstrates Understanding/Competency (verbalizes/demonstrates), Discussion  Clinical (diabetes, other pertinent medical history, and relevant comorbidities reviewed during visit): Discussion, Comprehends Key Points  Cognitive (knowledge of self-management skills, functional health literacy): Discussion, Comprehends Key Points  Psychosocial (emotional response to diabetes): Discussion, Comprehends Key Points  Diabetes Distress and Support Systems: Discussion, Comprehends Key Points  Behavioral (readiness for change, lifestyle practices, self-care behaviors): Discussion, Comprehends Key Points    Goals  Patient has selected/evaluated goals during today's session: Yes, selected  Healthy Eating: Set(Patient will limit CHO intake in order to keep blood glucose levels wnl)  Start  Date: 09/23/19         Diabetes Care Plan/Intervention  Education Plan/Intervention: Individual Follow-Up DSMT    Diabetes Meal Plan  Carbohydrate Per Meal: 30-45g  Carbohydrate Per Snack : 7-15g    Today's Self-Management Care Plan was developed with the patient's input and is based on barriers identified during today's assessment.    The long and short-term goals in the care plan were written with the patient/caregiver's input. The patient has agreed to work toward these goals to improve her overall diabetes control.      The patient received a copy of today's self-management plan and verbalized understanding of the care plan, goals, and all of today's instructions.      The patient was encouraged to communicate with her physician and care team regarding her condition(s) and treatment.  I provided the patient with my contact information today and encouraged her to contact me via phone or patient portal as needed.     Education Units of Time   Time Spent: 60 min

## 2019-11-29 ENCOUNTER — TELEPHONE (OUTPATIENT)
Dept: FAMILY MEDICINE | Facility: CLINIC | Age: 54
End: 2019-11-29

## 2019-11-29 NOTE — TELEPHONE ENCOUNTER
----- Message from Sue Parrish sent at 11/29/2019  2:01 PM CST -----  Contact: [phone]/self  Type:  Patient Returning Call    Who Called: pt  Who Left Message for Patient: unknown  Does the patient know what this is regarding?: reschedule appt  Would the patient rather a call back or a response via [facility]? Call back  Best Call Back Number: [phone]  Additional Information:

## 2019-12-10 ENCOUNTER — OFFICE VISIT (OUTPATIENT)
Dept: FAMILY MEDICINE | Facility: CLINIC | Age: 54
End: 2019-12-10
Payer: OTHER GOVERNMENT

## 2019-12-10 VITALS
BODY MASS INDEX: 51.71 KG/M2 | SYSTOLIC BLOOD PRESSURE: 136 MMHG | WEIGHT: 291.88 LBS | HEART RATE: 91 BPM | TEMPERATURE: 99 F | HEIGHT: 63 IN | DIASTOLIC BLOOD PRESSURE: 84 MMHG | OXYGEN SATURATION: 98 %

## 2019-12-10 DIAGNOSIS — E11.59 HYPERTENSION ASSOCIATED WITH DIABETES: Primary | ICD-10-CM

## 2019-12-10 DIAGNOSIS — E66.01 CLASS 3 SEVERE OBESITY DUE TO EXCESS CALORIES WITH SERIOUS COMORBIDITY AND BODY MASS INDEX (BMI) OF 50.0 TO 59.9 IN ADULT: ICD-10-CM

## 2019-12-10 DIAGNOSIS — G61.0 GUILLAIN BARRÉ SYNDROME: ICD-10-CM

## 2019-12-10 DIAGNOSIS — F41.9 ANXIETY: ICD-10-CM

## 2019-12-10 DIAGNOSIS — E11.9 TYPE 2 DIABETES MELLITUS WITHOUT COMPLICATION, WITHOUT LONG-TERM CURRENT USE OF INSULIN: ICD-10-CM

## 2019-12-10 DIAGNOSIS — I15.2 HYPERTENSION ASSOCIATED WITH DIABETES: Primary | ICD-10-CM

## 2019-12-10 PROCEDURE — 99999 PR PBB SHADOW E&M-EST. PATIENT-LVL IV: CPT | Mod: PBBFAC,,, | Performed by: NURSE PRACTITIONER

## 2019-12-10 PROCEDURE — 99214 OFFICE O/P EST MOD 30 MIN: CPT | Mod: S$PBB,,, | Performed by: NURSE PRACTITIONER

## 2019-12-10 PROCEDURE — 99999 PR PBB SHADOW E&M-EST. PATIENT-LVL IV: ICD-10-PCS | Mod: PBBFAC,,, | Performed by: NURSE PRACTITIONER

## 2019-12-10 PROCEDURE — 99214 OFFICE O/P EST MOD 30 MIN: CPT | Mod: PBBFAC,PN | Performed by: NURSE PRACTITIONER

## 2019-12-10 PROCEDURE — 99214 PR OFFICE/OUTPT VISIT, EST, LEVL IV, 30-39 MIN: ICD-10-PCS | Mod: S$PBB,,, | Performed by: NURSE PRACTITIONER

## 2019-12-10 RX ORDER — LISINOPRIL 20 MG/1
20 TABLET ORAL DAILY
Qty: 90 TABLET | Refills: 0 | Status: SHIPPED | OUTPATIENT
Start: 2019-12-10 | End: 2020-03-06

## 2019-12-10 RX ORDER — ESCITALOPRAM OXALATE 10 MG/1
10 TABLET ORAL DAILY
Qty: 30 TABLET | Refills: 2 | Status: SHIPPED | OUTPATIENT
Start: 2019-12-10 | End: 2020-03-10 | Stop reason: SDUPTHER

## 2019-12-10 NOTE — PROGRESS NOTES
"Subjective:       Patient ID: Kalina Ryan is a 54 y.o. female.    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     Patient has hypertension that is controlled on lisinopril 20 mg daily. Blood pressure 136/84, pulse 91, temperature 98.7 °F (37.1 °C), temperature source Oral, height 5' 3" (1.6 m), weight 132.4 kg (291 lb 14.2 oz), SpO2 98 %. No chest pain, headache, shortness of breath, or edema.    Patient has anxiety that is controlled on Lexapro 10 mg daily.     Patient has a personal history of Herlinda Cottonwood syndrome diagnosed in 2009 by [facility].  She reports residual generalized weakness and neuropathy to her hands and feet.  She reports an occasional foot drop.  She is able to ambulate without difficulty and without assistance.     Patient has a personal history of gastric bypass in 2009.  Patient is morbidly obese with a BMI 51.55.     Patient is noted to have NEW ONSET TYPE 2 DIABETES as of 09/2019. Slight improvement in HgbA1c compared to 3 months ago, 6.3% and 6.5% respectively. Patient continues to work on low carb intake.       Component      Latest Ref Rng & Units 12/6/2019 8/22/2019   Sodium      136 - 145 mmol/L 143 140   Potassium      3.5 - 5.1 mmol/L 4.6 4.5   Chloride      95 - 110 mmol/L 107 107   CO2      23 - 29 mmol/L 27 27   Glucose      70 - 110 mg/dL 124 (H) 129 (H)   BUN, Bld      7 - 17 mg/dL 23 (H) 21 (H)   Creatinine      0.50 - 1.40 mg/dL 0.69 0.75   Calcium      8.7 - 10.5 mg/dL 8.9 9.1   PROTEIN TOTAL      6.0 - 8.4 g/dL 7.2 7.0   Albumin      3.5 - 5.2 g/dL 3.8 4.0   BILIRUBIN TOTAL      0.1 - 1.0 mg/dL 0.2 0.3   Alkaline Phosphatase      38 - 126 U/L 121 130 (H)   AST      15 - 46 U/L 25 22   ALT      10 - 44 U/L 21 22   Anion " Gap      8 - 16 mmol/L 9 6 (L)   eGFR if African American      >60 mL/min/1.73 m:2 >60.0 >60.0   eGFR if non African American      >60 mL/min/1.73 m:2 >60.0 >60.0   Cholesterol      120 - 199 mg/dL  156   Triglycerides      30 - 150 mg/dL  121   HDL      40 - 75 mg/dL  45   LDL Cholesterol External      63.0 - 159.0 mg/dL  86.8   Hdl/Cholesterol Ratio      20.0 - 50.0 %  28.8   Total Cholesterol/HDL Ratio      2.0 - 5.0  3.5   Non-HDL Cholesterol      mg/dL  111   Hemoglobin A1C External      4.0 - 5.6 % 6.3 (H) 6.5 (H)   Estimated Avg Glucose      68 - 131 mg/dL 134 (H) 140 (H)   TSH      0.400 - 4.000 uIU/mL  3.000   Hepatitis C Ab        Negative     Current Outpatient Medications   Medication Sig Dispense Refill    escitalopram oxalate (LEXAPRO) 10 MG tablet Take 1 tablet (10 mg total) by mouth once daily. 30 tablet 2    lisinopril (PRINIVIL,ZESTRIL) 20 MG tablet Take 1 tablet (20 mg total) by mouth once daily. 90 tablet 0    blood sugar diagnostic (BLOOD GLUCOSE TEST) Strp 1 each by Misc.(Non-Drug; Combo Route) route once daily. (Patient not taking: Reported on 12/10/2019) 30 each 11    blood-glucose meter kit Use as instructed (Patient not taking: Reported on 12/10/2019) 1 each 0    lancets Misc 1 each by Misc.(Non-Drug; Combo Route) route once daily. (Patient not taking: Reported on 12/10/2019) 30 each 11     No current facility-administered medications for this visit.        Past Medical History:   Diagnosis Date    Anxiety     Guillain Barré syndrome 2009    weakness and neuropathy to hands and feet - diagnosed by Neurologist back in 2009 and last seen by neurology in 2011    Hypertension     New onset type 2 diabetes mellitus 9/4/2019    Personal history of gastric bypass 9/4/2019       Past Surgical History:   Procedure Laterality Date    CHOLECYSTECTOMY  2009    COLONOSCOPY N/A 7/31/2018    Procedure: COLONOSCOPY;  Surgeon: Elpidio Fortune MD;  Location: [facility];  Service: Endoscopy;   Laterality: N/A;    GASTRIC BYPASS  2009    HYSTERECTOMY  1999       Family History   Problem Relation Age of Onset    Heart disease Mother     Hypertension Mother     Diabetes Mother 65    Heart disease Father 60        Massive MI; DEFIB/ CABG    Diabetes Father     Hypertension Father     Dementia Father     Pancreatic cancer Brother 54    Stomach cancer Brother     No Known Problems Daughter     No Known Problems Son     Heart disease Brother 56        maintain with medication - no surgery    Hypertension Brother     Hypertension Brother        Social History     Socioeconomic History    Marital status:      Spouse name: Not on file    Number of children: Not on file    Years of education: Not on file    Highest education level: Not on file   Occupational History    Occupation: work at bank   Social Needs    Financial resource strain: Not on file    Food insecurity:     Worry: Not on file     Inability: Not on file    Transportation needs:     Medical: Not on file     Non-medical: Not on file   Tobacco Use    Smoking status: Never Smoker    Smokeless tobacco: Never Used   Substance and Sexual Activity    Alcohol use: Yes     Alcohol/week: 0.8 standard drinks     Types: 1 Standard drinks or equivalent per week     Frequency: 2-4 times a month     Drinks per session: 1 or 2     Binge frequency: Never     Comment: every other weekend - 1 drink per occasion    Drug use: No    Sexual activity: Not Currently   Lifestyle    Physical activity:     Days per week: 0 days     Minutes per session: 0 min    Stress: Only a little   Relationships    Social connections:     Talks on phone: Once a week     Gets together: Once a week     Attends [employer] service: Not on file     Active member of club or organization: No     Attends meetings of clubs or organizations: Never     Relationship status:    Other Topics Concern    Not on file   Social History Narrative    Not on file  "      Review of Systems   Constitutional: Negative for activity change, fatigue, fever and unexpected weight change.   HENT: Negative for hearing loss, rhinorrhea and trouble swallowing.    Eyes: Negative for discharge and visual disturbance.   Respiratory: Negative for chest tightness and wheezing.    Cardiovascular: Negative for chest pain and palpitations.   Gastrointestinal: Negative for blood in stool, constipation, diarrhea and vomiting.   Endocrine: Negative for polydipsia and polyuria.   Genitourinary: Negative for difficulty urinating, dysuria, hematuria and menstrual problem.   Musculoskeletal: Negative for arthralgias, joint swelling and neck pain.   Allergic/Immunologic: Negative for immunocompromised state.   Neurological: Positive for weakness (2/2 Guillian Rowley). Negative for headaches.   Psychiatric/Behavioral: Negative for confusion and dysphoric mood.         Objective:     Vitals:    12/10/19 0940   BP: 136/84   Pulse: 91   Temp: 98.7 °F (37.1 °C)   TempSrc: Oral   SpO2: 98%   Weight: 132.4 kg (291 lb 14.2 oz)   Height: 5' 3" (1.6 m)          Physical Exam   Constitutional: She is oriented to person, place, and time. She appears well-developed and well-nourished. No distress.   +obesity with: Body mass index is 51.71 kg/m².   HENT:   Head: Normocephalic.   Eyes: Pupils are equal, round, and reactive to light. Conjunctivae are normal.   Neck: Normal range of motion. Neck supple.   Cardiovascular: Normal rate, regular rhythm and intact distal pulses.   No murmur heard.  Pulses:       Dorsalis pedis pulses are 2+ on the right side, and 2+ on the left side.        Posterior tibial pulses are 2+ on the right side, and 2+ on the left side.   Pulmonary/Chest: Effort normal and breath sounds normal.   Abdominal: Soft. Bowel sounds are normal.   Musculoskeletal: Normal range of motion.        Right foot: There is normal range of motion and no deformity.        Left foot: There is normal range of motion " and no deformity.   Feet:   Right Foot:   Protective Sensation: 7 sites tested. 7 sites sensed.   Skin Integrity: Negative for skin breakdown or callus.   Left Foot:   Protective Sensation: 7 sites tested. 7 sites sensed.   Skin Integrity: Negative for skin breakdown or callus.   Neurological: She is alert and oriented to person, place, and time.   Skin: Skin is warm and dry.   Psychiatric: She has a normal mood and affect. Her behavior is normal.         Assessment:         ICD-10-CM ICD-9-CM   1. Hypertension associated with diabetes E11.59 250.80    I10 401.9   2. Type 2 diabetes mellitus without complication, without long-term current use of insulin E11.9 250.00   3. Anxiety F41.9 300.00   4. Class 3 severe obesity due to excess calories with serious comorbidity and body mass index (BMI) of 50.0 to 59.9 in adult E66.01 278.01    Z68.43 V85.43   5. Guillain Barré syndrome G61.0 357.0       Plan:       Hypertension associated with diabetes  -     lisinopril (PRINIVIL,ZESTRIL) 20 MG tablet; Take 1 tablet (20 mg total) by mouth once daily.  Dispense: 90 tablet; Refill: 0    Type 2 diabetes mellitus without complication, without long-term current use of insulin  -     Hemoglobin A1c; Future; Expected date: 03/10/2020  -     Comprehensive metabolic panel; Future; Expected date: 03/10/2020    Anxiety  -     escitalopram oxalate (LEXAPRO) 10 MG tablet; Take 1 tablet (10 mg total) by mouth once daily.  Dispense: 30 tablet; Refill: 2    Class 3 severe obesity due to excess calories with serious comorbidity and body mass index (BMI) of 50.0 to 59.9 in adult  -Weight loss advised. Dietary and exercise counseling done.    Guillain Barré syndrome      Follow up in about 3 months (around 3/10/2020) for lab results, medication management.     Patient's Medications   New Prescriptions    No medications on file   Previous Medications    BLOOD SUGAR DIAGNOSTIC (BLOOD GLUCOSE TEST) STRP    1 each by Misc.(Non-Drug; Combo Route)  route once daily.    BLOOD-GLUCOSE METER KIT    Use as instructed    LANCETS MISC    1 each by Misc.(Non-Drug; Combo Route) route once daily.   Modified Medications    Modified Medication Previous Medication    ESCITALOPRAM OXALATE (LEXAPRO) 10 MG TABLET escitalopram oxalate (LEXAPRO) 10 MG tablet       Take 1 tablet (10 mg total) by mouth once daily.    Take 1 tablet (10 mg total) by mouth once daily.    LISINOPRIL (PRINIVIL,ZESTRIL) 20 MG TABLET lisinopril (PRINIVIL,ZESTRIL) 20 MG tablet       Take 1 tablet (20 mg total) by mouth once daily.    Take 1 tablet (20 mg total) by mouth once daily.   Discontinued Medications    No medications on file

## 2019-12-17 ENCOUNTER — PATIENT MESSAGE (OUTPATIENT)
Dept: FAMILY MEDICINE | Facility: CLINIC | Age: 54
End: 2019-12-17

## 2019-12-17 ENCOUNTER — OFFICE VISIT (OUTPATIENT)
Dept: URGENT CARE | Facility: CLINIC | Age: 54
End: 2019-12-17
Payer: OTHER GOVERNMENT

## 2019-12-17 VITALS
HEIGHT: 63 IN | WEIGHT: 291 LBS | SYSTOLIC BLOOD PRESSURE: 124 MMHG | OXYGEN SATURATION: 99 % | BODY MASS INDEX: 51.56 KG/M2 | DIASTOLIC BLOOD PRESSURE: 60 MMHG | RESPIRATION RATE: 17 BRPM | TEMPERATURE: 98 F | HEART RATE: 99 BPM

## 2019-12-17 DIAGNOSIS — R19.7 DIARRHEA, UNSPECIFIED TYPE: ICD-10-CM

## 2019-12-17 DIAGNOSIS — J10.1 INFLUENZA A: Primary | ICD-10-CM

## 2019-12-17 DIAGNOSIS — R05.9 COUGH: ICD-10-CM

## 2019-12-17 LAB
CTP QC/QA: YES
FLUAV AG NPH QL: POSITIVE
FLUBV AG NPH QL: NEGATIVE

## 2019-12-17 PROCEDURE — 99214 OFFICE O/P EST MOD 30 MIN: CPT | Mod: S$GLB,,, | Performed by: NURSE PRACTITIONER

## 2019-12-17 PROCEDURE — 87804 POCT INFLUENZA A/B: ICD-10-PCS | Mod: 59,QW,S$GLB, | Performed by: NURSE PRACTITIONER

## 2019-12-17 PROCEDURE — 99214 PR OFFICE/OUTPT VISIT, EST, LEVL IV, 30-39 MIN: ICD-10-PCS | Mod: S$GLB,,, | Performed by: NURSE PRACTITIONER

## 2019-12-17 PROCEDURE — 87804 INFLUENZA ASSAY W/OPTIC: CPT | Mod: QW,S$GLB,, | Performed by: NURSE PRACTITIONER

## 2019-12-17 RX ORDER — OSELTAMIVIR PHOSPHATE 75 MG/1
75 CAPSULE ORAL 2 TIMES DAILY
Qty: 10 CAPSULE | Refills: 0 | Status: SHIPPED | OUTPATIENT
Start: 2019-12-17 | End: 2019-12-22

## 2019-12-17 RX ORDER — LANCETS 28 GAUGE
EACH MISCELLANEOUS
COMMUNITY
Start: 2019-12-10 | End: 2021-04-16 | Stop reason: ALTCHOICE

## 2019-12-17 RX ORDER — ONDANSETRON 4 MG/1
4 TABLET, FILM COATED ORAL EVERY 8 HOURS PRN
Qty: 16 TABLET | Refills: 0 | Status: SHIPPED | OUTPATIENT
Start: 2019-12-17 | End: 2019-12-21

## 2019-12-17 NOTE — PATIENT INSTRUCTIONS
Always follow your healthcare professional's instructions.    You have received urgent care diagnosis and treatment and you may be released before all of your medical problems are known or treated. Unless you have been given a referral, you (the patient), will arrange for follow-up care as instructed.     Please follow up with your primary care provider within 5-7 days if your signs and symptoms have not resolved or have worsen.     If your condition worsens or fails to improve, I recommend that you receive another evaluation in the emergency room immediately or contact your primary care office to discuss your concerns.     The Flu (Influenza)     The virus that causes the flu spreads through the air in droplets when someone who has the flu coughs, sneezes, laughs, or talks.     The flu (influenza) is an infection that affects your respiratory tract. This tract is made up of your mouth, nose, and lungs, and the passages between them. Unlike a cold, the flu can make you very ill. And it can lead to pneumonia, a serious lung infection. The flu can have serious complications and even cause death.  You have been given an antiviral today for treatment of your condition.  Please complete the antiviral as directed.     Who is at risk for the flu?  Anyone can get the flu. But you are more likely to become infected if you:  · Have a weakened immune system  · Work in a healthcare setting where you may be exposed to flu germs  · Live or work with someone who has the flu  · Haven't had an annual flu shot    How does the flu spread?  The flu is caused by a virus. The virus spreads through the air in droplets when someone who has the flu coughs, sneezes, laughs, or talks. You can become infected when you inhale these viruses directly. You can also become infected when you touch a surface on which the droplets have landed and then transfer the germs to your eyes, nose, or mouth. Touching used tissues, or sharing utensils, drinking  glasses, or a toothbrush from an infected person can expose you to flu viruses, too.    What are the symptoms of the flu?  Flu symptoms tend to come on quickly and may last a few days to a few weeks. They include:   Fever usually higher than 100.4°F  (38°C) and chills   Sore throat and headache   Dry cough   Runny nose   Tiredness and weakness   Muscle aches    Who is at risk for flu complications?  For some people, the flu can be very serious. The risk for complications is greater for:  · Children younger than age 5  · Adults ages 65 and older  · People with a chronic illness such as diabetes or heart, kidney, or lung disease  People who live in a nursing home or long-term care facility    Easing flu symptoms  · Drink lots of fluids such as water, juice, and warm soup. A good rule is to drink enough so that you urinate your normal amount.  · Get plenty of rest.  · Ask your healthcare provider what to take for fever and pain.  · Call your provider if your fever is 100.4°F (38°C) or higher, or you become dizzy, lightheaded, or short of breath.    Taking steps to protect others  · Wash your hands often, especially after coughing or sneezing. Or clean your hands with an alcohol-based hand  containing at least 60% alcohol.  · Cough or sneeze into a tissue. Then throw the tissue away and wash your hands. If you don't have a tissue, cough and sneeze into your elbow.  · Stay home until at least 24 hours after you no longer have a fever or chills. Be sure the fever isn't being hidden by fever-reducing medicine.  · Don't share food, utensils, drinking glasses, or a toothbrush with others.  · Ask your healthcare provider if others in your household should get antiviral medicine to help them avoid infection.    How can the flu be prevented?  · One of the best ways to avoid the flu is to get a flu vaccine each year. The virus that causes the flu changes from year to year. For that reason, healthcare providers  recommend getting the flu vaccine each year, as soon as it's available in your area. The vaccine is given as a shot. Your healthcare provider can tell you which vaccine is right for you. A nasal spray is also available but is not recommended for the 3557-7453 flu season. The CDC says this is because the nasal spray did not seem to protect against the flu over the last several flu seasons. In the past, it was meant for people ages 2 to 49.  · Wash your hands often. Frequent handwashing is a proven way to help prevent infection.  · Carry an alcohol-based hand gel containing at least 60% alcohol. Use it when you can't use soap and water. Then wash your hands as soon as you can.  · Avoid touching your eyes, nose, and mouth.  · At home and work, clean phones, computer keyboards, and toys often with disinfectant wipes.  · If possible, avoid close contact with others who have the flu or symptoms of the flu.    Handwashing tips  Handwashing is one of the best ways to prevent many common infections. If you are caring for or visiting someone with the flu, wash your hands each time you enter and leave the room. Follow these steps:  · Use warm water and plenty of soap. Rub your hands together well.  · Clean the whole hand, including under your nails, between your fingers, and up the wrists.  · Wash for at least 15 seconds.  · Rinse, letting the water run down your fingers, not up your wrists.  · Dry your hands well. Use a paper towel to turn off the faucet and open the door.    Using alcohol-based hand   Alcohol-based hand  are also a good choice. Use them when you can't use soap and water. Follow these steps:  · Squeeze about a tablespoon of gel into the palm of one hand.  · Rub your hands together briskly, cleaning the backs of your hands, the palms, between your fingers, and up the wrists.  · Rub until the gel is gone and your hands are completely dry.    Preventing the flu in healthcare settings  The flu is  a special concern for people in hospitals and long-term care facilities. To help prevent the spread of flu, many hospitals and nursing homes take these steps:  · Healthcare providers wash their hands or use an alcohol-based hand  before and after treating each patient.  · People with the flu have private rooms and bathrooms or share a room with someone with the same infection.  · People who are at high risk for the flu but don't have it are encouraged to get the flu and pneumonia vaccines.  All healthcare workers are encouraged or required to get flu shots.    Rapid flu testing:    Why wasn't I tested for the flu? Why did I receive medication?  During known influenza outbreaks, most patients with acute febrile illnesses not requiring hospital admission and who are not at increased risk for complications can be diagnosed based on symptoms alone. The rapid test performed in clinic can distinguish between influenza A and B, but not among subtypes. False-negative results are more likely to occur when influenza prevalence is high in the community, which is typically at the peak of the influenza season. When you were tested, your viral load may not have been high enough to detect the flu (too early on or too late on in the course of your illness).    The decision of whether to test a patient is NOT influenced by the patient's influenza vaccination (vaccinations should prevent MORTALITY, they do NOT necessarily prevent disease acquisition).    oseltamivir (TAMIFLU)  Good Bad      Decreases the amount of virus you are carrying  Should shorten the length of your illness and lessen the likeliness of you spreading the virus  Improve the effectiveness of infection prevention and control measures  Helps to prevent epidemics and pandemics     Does not CURE the flu, you will still have symptoms  Will not shorten the length of your illness by much  Side effects can include nausea, vomiting, or diarrhea due to irritation of  the GI tract   Decreases in benefit to you if not administered within the first 48 hrs of your illness  Cost of the medication may be expensive     Taking the medication is a personal decision, you need to decide if taking the medication is the right thing for YOU.   You have been given a work/school note for FIVE days, so that you can complete oseltamivir before increasing your exposure to others. If you decide not to take the medication, you will still be given a work/school note for FIVE days. I do NOT include the terminology when fever free for 24 hrs when I have confirmed diagnosis of the flu because you may be fever free and still have enough virus to spread.     Symptom management: Despite all of this, your symptoms might persist for as long as TWO weeks.    Fever Cough Body Aches Nausea and Vomiting Diarrhea Congestion or Runny Nose    OTC  Tylenol   OTC  NSAIDs  Tessalon Pearls   Phenergan DM   OTC cough medications  Mobic   OTC  Tylenol   OTC  NSAIDs  Zofran   Phenergan   OTC Emetrol  DO NOT take ant-diarrheal medications  OTC Claritin, Zyrtec, Allegra, OR Xyzal   OTC Flonase   OTC Benadryl      Cough Allergy Symptoms Asthma   Tessalon Perles are a non-narcotic cough medicine. It works by numbing the throat and lungs, making the cough reflex less active, it is used to relieve coughing during the day. If you have been given Phenergan DM cough syrup, you do NOT need to take both medications at the same time.    Phenergan DM is a combination medication that is used to treat cough. Phenergan DM works like an antihistamine and cough suppressant. This medication will make you sleepy like Benadryl, have a drying effect, and act on a part of the brain (cough center) to reduce the need to cough. DO NOT take Benadryl and Phenergan together. Take over-the-counter claritin, zyrtec, allegra, or xyzal as directed, these are antihistamines that will work to dry up secretions/mucus. Antihistamines work to  "block the effects of a certain natural substance (histamine), which causes allergy symptoms.    Use over the counter Flonase as directed for sinus congestion and postnasal drip. Proper administration is to "look down at your toes and aim for your nose". The goal is to aim for your nasolabial folds, the creases in your nose. If you feel the medication drip down your throat, you have NOT administered it correctly. If you can "smell the roses" (floral scent), then you have administered it correctly. If you have a history of asthma, expressed a concern about wheezing or have been told you were wheezing during your exam today, you are being given Albuterol. Albuterol is a bronchodilator that relaxes muscles in the airways and increases air flow to the lungs; it is used to treat or prevent bronchospasm, or narrowing of the airways in the lungs.     If you have a history of asthma, expressed a concern about wheezing or have been told you were wheezing during your exam today and are NOT being prescribed Albuterol that is because you have insured me that you have an adequate supply of the drug at home.        Dehydration (Adult)  Dehydration occurs when your body loses too much fluid. This may be the result of prolonged vomiting or diarrhea, excessive sweating, or a high fever. It may also happen if you don't drink enough fluid when you're sick or out in the heat. Misuse of diuretics (water pills) can also be a cause.  Symptoms include thirst and decreased urine output. You may also feel dizzy, weak, fatigued, or very drowsy. The diet described below is usually enough to treat dehydration. In some cases, you may need medicine.    Home care  · Drink at least 12 8-ounce glasses of fluid every day to resolve the dehydration. Fluid may include water; orange juice; lemonade; apple, grape, or cranberry juice; clear fruit drinks; electrolyte replacement and sports drinks; and teas and coffee without caffeine. If you have been " diagnosed with a kidney disease, ask your doctor how much and what types of fluids you should drink to prevent dehydration. If you have kidney disease, fluid can build up in the body. This can be dangerous to your health.  · If you have a fever, muscle aches, or a headache as a result of a cold or flu, you may take acetaminophen or ibuprofen, unless another medicine was prescribed. If you have chronic liver or kidney disease, or have ever had a stomach ulcer or gastrointestinal bleeding, talk with your health care provider before using these medicines. Don't take aspirin if you are younger than 18 and have a fever. Aspirin raises the chance for severe liver injury.    Why didn't I get a steroid shot or a medrol dose pack?    The benefits are small and, unlike topical glucocorticoids, systemic glucocorticoids possess a significant side effect profile. Major side effects include:     Effects immunity predisposing you to getting a more severe infection and increases your white blood cell count. You have the flu, you do not need anything to weaken your immune system right now.   Skin consequences: Skin thinning and ecchymoses, Cushingoid appearance (rounded face), acne, weight gain, mild hirsutism, facial erythema, and striae.   Eye consequences: Cataracts, increased intraocular pressure, exophthalmos.    Cardiovascular consequences: Fluid retention, premature atherosclerotic disease, and arrhythmias.    GI consequences: Increased risk for adverse gastrointestinal effects, such as gastritis, ulcer formation, and gastrointestinal bleeding   Bone and muscle consequences: Osteoporosis, osteonecrosis, and myopathy.   Neuropsychiatric effects: Mood disorders, psychosis, memory impairment, and    Metabolic and Endocrine consequences: Suppress the hypothalamic-pituitary-adrenal (HPA) axis and increase blood sugar.   Young children -- Growth impairment        Can I have a shot instead of pills?  No. I have diagnosed  you with influenza and I want you to have a FULL course of antivirals. An antibiotic shot will not help you because viruses do not have cell walls and this is how antibiotics work. I have discussed above why you did not receive a steroid shot.                                                                                    When to seek medical advice  DEHYDRATION:  · Continued vomiting  · Frequent diarrhea (more than 5 times a day); blood (red or black color) or mucus in diarrhea  · Blood in vomit or stool  · Swollen abdomen or increasing abdominal pain  · Weakness, dizziness, or fainting  · Unusual drowsiness or confusion  · Reduced urine output or extreme thirst  FEVER:  Call your healthcare provider right away if any of these occur:  · Your child is 3 months old or younger and has a fever of 100.4°F (38°C) or higher. Get medical care right away. Fever in a young baby can be a sign of a dangerous infection.  · Your child is of any age and has repeated fevers above 104°F (40°C).  · Your child is younger than 2 years of age and a fever of 100.4°F (38°C) continues for more than 1 day.  · A fever of 100.4°F (38°C) for more than 3 days in anyone older than 2 years of age.       Your provider discussed your plan of care with you during your physical exam. It was reviewed once more by the provider when giving you an after visit summary. If the patient is a minor, the discharge instructions were discussed with an adult and that adult acknowledge their understanding of the provider's teaching.

## 2019-12-17 NOTE — LETTER
December 17, 2019      [facility]  [address] [address], [address]  [place] [address]  Phone: [phone]  Fax: [phone]       Patient: Kalina Ryan   YOB: 1965  Date of Visit: 12/17/2019    To Whom It May Concern:    Karthikeyan Ryan  was at [facility] on 12/17/2019. She may return to work/school on 12/23/19 with no restrictions. If you have any questions or concerns, or if I can be of further assistance, please do not hesitate to contact me.    Sincerely,    Analia Johnson NP

## 2019-12-17 NOTE — PROGRESS NOTES
"Subjective:       Patient ID: Kalina Ryan is a 54 y.o. female.    Vitals:  height is 5' 3" (1.6 m) and weight is 132 kg (291 lb). Her tympanic temperature is 98.3 °F (36.8 °C). Her blood pressure is 124/60 and her pulse is 99. Her respiration is 17 and oxygen saturation is 99%.     Chief Complaint: Sinus Problem    Patient presents with cough , sinus pain and pressure and has been having watery eyes . Patient denies fever but says she feels lethargic . Onset of symptoms three days ago .     Sinus Problem   This is a new problem. The current episode started in the past 7 days (3 days ago ). The problem is unchanged. There has been no fever. Her pain is at a severity of 8/10. The pain is moderate. Associated symptoms include congestion, coughing and sinus pressure. Pertinent negatives include no chills, headaches, shortness of breath or sore throat. Past treatments include nothing (tylenol sinus cold and flu ). The treatment provided no relief.       Constitution: Positive for fatigue. Negative for chills and fever.   HENT: Positive for congestion, postnasal drip, sinus pain and sinus pressure. Negative for sore throat.    Neck: Negative for painful lymph nodes.   Cardiovascular: Negative for chest pain and leg swelling.   Eyes: Negative for double vision and blurred vision.   Respiratory: Positive for cough. Negative for shortness of breath.    Gastrointestinal: Positive for diarrhea. Negative for nausea and vomiting.   Genitourinary: Negative for dysuria, frequency, urgency and history of kidney stones.   Musculoskeletal: Negative for joint pain, joint swelling, muscle cramps and muscle ache.   Skin: Negative for color change, pale, rash and bruising.   Allergic/Immunologic: Negative for seasonal allergies.   Neurological: Negative for dizziness, history of vertigo, light-headedness, passing out and headaches.   Hematologic/Lymphatic: Negative for swollen lymph nodes.   Psychiatric/Behavioral: Negative for " nervous/anxious, sleep disturbance and depression. The patient is not nervous/anxious.        Objective:      Physical Exam   Constitutional: She is oriented to person, place, and time. She appears well-developed and well-nourished. She is cooperative.  Non-toxic appearance. She does not have a sickly appearance. She does not appear ill. No distress.   HENT:   Head: Normocephalic and atraumatic.   Right Ear: Hearing, external ear and ear canal normal. A middle ear effusion is present.   Left Ear: Hearing, external ear and ear canal normal. A middle ear effusion is present.   Nose: Mucosal edema present. No rhinorrhea or nasal deformity. No epistaxis. Right sinus exhibits maxillary sinus tenderness. Right sinus exhibits no frontal sinus tenderness. Left sinus exhibits maxillary sinus tenderness. Left sinus exhibits no frontal sinus tenderness.   Mouth/Throat: Uvula is midline and mucous membranes are normal. No trismus in the jaw. Normal dentition. No uvula swelling. Posterior oropharyngeal erythema present. No oropharyngeal exudate or posterior oropharyngeal edema.   Eyes: Conjunctivae and lids are normal. No scleral icterus.   Neck: Trachea normal, full passive range of motion without pain and phonation normal. Neck supple. No neck rigidity. No edema and no erythema present.   Cardiovascular: Normal rate, regular rhythm, normal heart sounds, intact distal pulses and normal pulses.   Pulmonary/Chest: Effort normal and breath sounds normal. No respiratory distress. She has no decreased breath sounds. She has no rhonchi.   Abdominal: Normal appearance.   Musculoskeletal: Normal range of motion. She exhibits no edema or deformity.   Lymphadenopathy:     She has cervical adenopathy.        Right cervical: Superficial cervical adenopathy present.        Left cervical: Superficial cervical adenopathy present.   Neurological: She is alert and oriented to person, place, and time. She exhibits normal muscle tone.  Coordination normal.   Skin: Skin is warm, dry, intact, not diaphoretic and not pale.   Psychiatric: She has a normal mood and affect. Her speech is normal and behavior is normal. Judgment and thought content normal. Cognition and memory are normal.   Nursing note and vitals reviewed.        Assessment:       1. Influenza A    2. Cough    3. Diarrhea, unspecified type        Plan:         Influenza A  -     oseltamivir (TAMIFLU) 75 MG capsule; Take 1 capsule (75 mg total) by mouth 2 (two) times daily. for 5 days  Dispense: 10 capsule; Refill: 0  -     ondansetron (ZOFRAN) 4 MG tablet; Take 1 tablet (4 mg total) by mouth every 8 (eight) hours as needed for Nausea.  Dispense: 16 tablet; Refill: 0    Cough  -     POCT Influenza A/B    Diarrhea, unspecified type         Results for orders placed or performed in visit on 12/17/19   POCT Influenza A/B   Result Value Ref Range    Rapid Influenza A Ag Positive (A) Negative    Rapid Influenza B Ag Negative Negative     Acceptable Yes      Patient Instructions     Always follow your healthcare professional's instructions.    You have received urgent care diagnosis and treatment and you may be released before all of your medical problems are known or treated. Unless you have been given a referral, you (the patient), will arrange for follow-up care as instructed.     Please follow up with your primary care provider within 5-7 days if your signs and symptoms have not resolved or have worsen.     If your condition worsens or fails to improve, I recommend that you receive another evaluation in the emergency room immediately or contact your primary care office to discuss your concerns.     The Flu (Influenza)     The virus that causes the flu spreads through the air in droplets when someone who has the flu coughs, sneezes, laughs, or talks.     The flu (influenza) is an infection that affects your respiratory tract. This tract is made up of your mouth, nose, and  lungs, and the passages between them. Unlike a cold, the flu can make you very ill. And it can lead to pneumonia, a serious lung infection. The flu can have serious complications and even cause death.  You have been given an antiviral today for treatment of your condition.  Please complete the antiviral as directed.     Who is at risk for the flu?  Anyone can get the flu. But you are more likely to become infected if you:  · Have a weakened immune system  · Work in a healthcare setting where you may be exposed to flu germs  · Live or work with someone who has the flu  · Haven't had an annual flu shot    How does the flu spread?  The flu is caused by a virus. The virus spreads through the air in droplets when someone who has the flu coughs, sneezes, laughs, or talks. You can become infected when you inhale these viruses directly. You can also become infected when you touch a surface on which the droplets have landed and then transfer the germs to your eyes, nose, or mouth. Touching used tissues, or sharing utensils, drinking glasses, or a toothbrush from an infected person can expose you to flu viruses, too.    What are the symptoms of the flu?  Flu symptoms tend to come on quickly and may last a few days to a few weeks. They include:   Fever usually higher than 100.4°F  (38°C) and chills   Sore throat and headache   Dry cough   Runny nose   Tiredness and weakness   Muscle aches    Who is at risk for flu complications?  For some people, the flu can be very serious. The risk for complications is greater for:  · Children younger than age 5  · Adults ages 65 and older  · People with a chronic illness such as diabetes or heart, kidney, or lung disease  People who live in a nursing home or long-term care facility    Easing flu symptoms  · Drink lots of fluids such as water, juice, and warm soup. A good rule is to drink enough so that you urinate your normal amount.  · Get plenty of rest.  · Ask your healthcare  provider what to take for fever and pain.  · Call your provider if your fever is 100.4°F (38°C) or higher, or you become dizzy, lightheaded, or short of breath.    Taking steps to protect others  · Wash your hands often, especially after coughing or sneezing. Or clean your hands with an alcohol-based hand  containing at least 60% alcohol.  · Cough or sneeze into a tissue. Then throw the tissue away and wash your hands. If you don't have a tissue, cough and sneeze into your elbow.  · Stay home until at least 24 hours after you no longer have a fever or chills. Be sure the fever isn't being hidden by fever-reducing medicine.  · Don't share food, utensils, drinking glasses, or a toothbrush with others.  · Ask your healthcare provider if others in your household should get antiviral medicine to help them avoid infection.    How can the flu be prevented?  · One of the best ways to avoid the flu is to get a flu vaccine each year. The virus that causes the flu changes from year to year. For that reason, healthcare providers recommend getting the flu vaccine each year, as soon as it's available in your area. The vaccine is given as a shot. Your healthcare provider can tell you which vaccine is right for you. A nasal spray is also available but is not recommended for the 0530-3245 flu season. The CDC says this is because the nasal spray did not seem to protect against the flu over the last several flu seasons. In the past, it was meant for people ages 2 to 49.  · Wash your hands often. Frequent handwashing is a proven way to help prevent infection.  · Carry an alcohol-based hand gel containing at least 60% alcohol. Use it when you can't use soap and water. Then wash your hands as soon as you can.  · Avoid touching your eyes, nose, and mouth.  · At home and work, clean phones, computer keyboards, and toys often with disinfectant wipes.  · If possible, avoid close contact with others who have the flu or symptoms of the  flu.    Handwashing tips  Handwashing is one of the best ways to prevent many common infections. If you are caring for or visiting someone with the flu, wash your hands each time you enter and leave the room. Follow these steps:  · Use warm water and plenty of soap. Rub your hands together well.  · Clean the whole hand, including under your nails, between your fingers, and up the wrists.  · Wash for at least 15 seconds.  · Rinse, letting the water run down your fingers, not up your wrists.  · Dry your hands well. Use a paper towel to turn off the faucet and open the door.    Using alcohol-based hand   Alcohol-based hand  are also a good choice. Use them when you can't use soap and water. Follow these steps:  · Squeeze about a tablespoon of gel into the palm of one hand.  · Rub your hands together briskly, cleaning the backs of your hands, the palms, between your fingers, and up the wrists.  · Rub until the gel is gone and your hands are completely dry.    Preventing the flu in healthcare settings  The flu is a special concern for people in hospitals and long-term care facilities. To help prevent the spread of flu, many hospitals and nursing homes take these steps:  · Healthcare providers wash their hands or use an alcohol-based hand  before and after treating each patient.  · People with the flu have private rooms and bathrooms or share a room with someone with the same infection.  · People who are at high risk for the flu but don't have it are encouraged to get the flu and pneumonia vaccines.  All healthcare workers are encouraged or required to get flu shots.    Rapid flu testing:    Why wasn't I tested for the flu? Why did I receive medication?  During known influenza outbreaks, most patients with acute febrile illnesses not requiring hospital admission and who are not at increased risk for complications can be diagnosed based on symptoms alone. The rapid test performed in clinic can  distinguish between influenza A and B, but not among subtypes. False-negative results are more likely to occur when influenza prevalence is high in the community, which is typically at the peak of the influenza season. When you were tested, your viral load may not have been high enough to detect the flu (too early on or too late on in the course of your illness).    The decision of whether to test a patient is NOT influenced by the patient's influenza vaccination (vaccinations should prevent MORTALITY, they do NOT necessarily prevent disease acquisition).    oseltamivir (TAMIFLU)  Good Bad      Decreases the amount of virus you are carrying  Should shorten the length of your illness and lessen the likeliness of you spreading the virus  Improve the effectiveness of infection prevention and control measures  Helps to prevent epidemics and pandemics     Does not CURE the flu, you will still have symptoms  Will not shorten the length of your illness by much  Side effects can include nausea, vomiting, or diarrhea due to irritation of the GI tract   Decreases in benefit to you if not administered within the first 48 hrs of your illness  Cost of the medication may be expensive     Taking the medication is a personal decision, you need to decide if taking the medication is the right thing for YOU.   You have been given a work/school note for FIVE days, so that you can complete oseltamivir before increasing your exposure to others. If you decide not to take the medication, you will still be given a work/school note for FIVE days. I do NOT include the terminology when fever free for 24 hrs when I have confirmed diagnosis of the flu because you may be fever free and still have enough virus to spread.     Symptom management: Despite all of this, your symptoms might persist for as long as TWO weeks.    Fever Cough Body Aches Nausea and Vomiting Diarrhea Congestion or Runny Nose    OTC  Tylenol   OTC  NSAIDs  Tessalon  "Pearls   Phenergan DM   OTC cough medications  Mobic   OTC  Tylenol   OTC  NSAIDs  Zofran   Phenergan   OTC Emetrol  DO NOT take ant-diarrheal medications  OTC Claritin, Zyrtec, Allegra, OR Xyzal   OTC Flonase   OTC Benadryl      Cough Allergy Symptoms Asthma   Tessalon Perles are a non-narcotic cough medicine. It works by numbing the throat and lungs, making the cough reflex less active, it is used to relieve coughing during the day. If you have been given Phenergan DM cough syrup, you do NOT need to take both medications at the same time.    Phenergan DM is a combination medication that is used to treat cough. Phenergan DM works like an antihistamine and cough suppressant. This medication will make you sleepy like Benadryl, have a drying effect, and act on a part of the brain (cough center) to reduce the need to cough. DO NOT take Benadryl and Phenergan together. Take over-the-counter claritin, zyrtec, allegra, or xyzal as directed, these are antihistamines that will work to dry up secretions/mucus. Antihistamines work to block the effects of a certain natural substance (histamine), which causes allergy symptoms.    Use over the counter Flonase as directed for sinus congestion and postnasal drip. Proper administration is to "look down at your toes and aim for your nose". The goal is to aim for your nasolabial folds, the creases in your nose. If you feel the medication drip down your throat, you have NOT administered it correctly. If you can "smell the roses" (floral scent), then you have administered it correctly. If you have a history of asthma, expressed a concern about wheezing or have been told you were wheezing during your exam today, you are being given Albuterol. Albuterol is a bronchodilator that relaxes muscles in the airways and increases air flow to the lungs; it is used to treat or prevent bronchospasm, or narrowing of the airways in the lungs.     If you have a history of asthma, expressed " a concern about wheezing or have been told you were wheezing during your exam today and are NOT being prescribed Albuterol that is because you have insured me that you have an adequate supply of the drug at home.        Dehydration (Adult)  Dehydration occurs when your body loses too much fluid. This may be the result of prolonged vomiting or diarrhea, excessive sweating, or a high fever. It may also happen if you don't drink enough fluid when you're sick or out in the heat. Misuse of diuretics (water pills) can also be a cause.  Symptoms include thirst and decreased urine output. You may also feel dizzy, weak, fatigued, or very drowsy. The diet described below is usually enough to treat dehydration. In some cases, you may need medicine.    Home care  · Drink at least 12 8-ounce glasses of fluid every day to resolve the dehydration. Fluid may include water; orange juice; lemonade; apple, grape, or cranberry juice; clear fruit drinks; electrolyte replacement and sports drinks; and teas and coffee without caffeine. If you have been diagnosed with a kidney disease, ask your doctor how much and what types of fluids you should drink to prevent dehydration. If you have kidney disease, fluid can build up in the body. This can be dangerous to your health.  · If you have a fever, muscle aches, or a headache as a result of a cold or flu, you may take acetaminophen or ibuprofen, unless another medicine was prescribed. If you have chronic liver or kidney disease, or have ever had a stomach ulcer or gastrointestinal bleeding, talk with your health care provider before using these medicines. Don't take aspirin if you are younger than 18 and have a fever. Aspirin raises the chance for severe liver injury.    Why didn't I get a steroid shot or a medrol dose pack?    The benefits are small and, unlike topical glucocorticoids, systemic glucocorticoids possess a significant side effect profile. Major side effects include:     Effects  immunity predisposing you to getting a more severe infection and increases your white blood cell count. You have the flu, you do not need anything to weaken your immune system right now.   Skin consequences: Skin thinning and ecchymoses, Cushingoid appearance (rounded face), acne, weight gain, mild hirsutism, facial erythema, and striae.   Eye consequences: Cataracts, increased intraocular pressure, exophthalmos.    Cardiovascular consequences: Fluid retention, premature atherosclerotic disease, and arrhythmias.    GI consequences: Increased risk for adverse gastrointestinal effects, such as gastritis, ulcer formation, and gastrointestinal bleeding   Bone and muscle consequences: Osteoporosis, osteonecrosis, and myopathy.   Neuropsychiatric effects: Mood disorders, psychosis, memory impairment, and    Metabolic and Endocrine consequences: Suppress the hypothalamic-pituitary-adrenal (HPA) axis and increase blood sugar.   Young children -- Growth impairment        Can I have a shot instead of pills?  No. I have diagnosed you with influenza and I want you to have a FULL course of antivirals. An antibiotic shot will not help you because viruses do not have cell walls and this is how antibiotics work. I have discussed above why you did not receive a steroid shot.                                                                                    When to seek medical advice  DEHYDRATION:  · Continued vomiting  · Frequent diarrhea (more than 5 times a day); blood (red or black color) or mucus in diarrhea  · Blood in vomit or stool  · Swollen abdomen or increasing abdominal pain  · Weakness, dizziness, or fainting  · Unusual drowsiness or confusion  · Reduced urine output or extreme thirst  FEVER:  Call your healthcare provider right away if any of these occur:  · Your child is 3 months old or younger and has a fever of 100.4°F (38°C) or higher. Get medical care right away. Fever in a young baby can be a sign of a  dangerous infection.  · Your child is of any age and has repeated fevers above 104°F (40°C).  · Your child is younger than 2 years of age and a fever of 100.4°F (38°C) continues for more than 1 day.  · A fever of 100.4°F (38°C) for more than 3 days in anyone older than 2 years of age.       Your provider discussed your plan of care with you during your physical exam. It was reviewed once more by the provider when giving you an after visit summary. If the patient is a minor, the discharge instructions were discussed with an adult and that adult acknowledge their understanding of the provider's teaching.

## 2019-12-20 ENCOUNTER — TELEPHONE (OUTPATIENT)
Dept: URGENT CARE | Facility: CLINIC | Age: 54
End: 2019-12-20

## 2019-12-20 NOTE — TELEPHONE ENCOUNTER
Call back DOS 12/17/19 - Spoke with patient, she said that she is feeling a little better but is still having some headaches. She said she still has a few days left of Tamiflu.

## 2020-01-16 ENCOUNTER — PATIENT OUTREACH (OUTPATIENT)
Dept: ADMINISTRATIVE | Facility: OTHER | Age: 55
End: 2020-01-16

## 2020-01-16 DIAGNOSIS — Z13.5 ENCOUNTER FOR SCREENING FOR DIABETIC RETINOPATHY: Primary | ICD-10-CM

## 2020-01-16 NOTE — PROGRESS NOTES
Chart reviewed.   Immunizations: RECONCILED   Orders placed: DIABETIC EYE PHOTO  Upcoming appts: OPHTHALMOLOGY

## 2020-03-05 ENCOUNTER — PATIENT OUTREACH (OUTPATIENT)
Dept: ADMINISTRATIVE | Facility: OTHER | Age: 55
End: 2020-03-05

## 2020-03-05 NOTE — PROGRESS NOTES
Chart reviewed.   Immunizations: NOT in Links  Orders placed: n/a  Upcoming appts to satisfy MANUEL topics: Ophthalmology 3/06

## 2020-03-06 ENCOUNTER — OFFICE VISIT (OUTPATIENT)
Dept: OPHTHALMOLOGY | Facility: CLINIC | Age: 55
End: 2020-03-06
Payer: OTHER GOVERNMENT

## 2020-03-06 DIAGNOSIS — I15.2 HYPERTENSION ASSOCIATED WITH DIABETES: ICD-10-CM

## 2020-03-06 DIAGNOSIS — H43.811 POSTERIOR VITREOUS DETACHMENT OF RIGHT EYE: ICD-10-CM

## 2020-03-06 DIAGNOSIS — E11.59 HYPERTENSION ASSOCIATED WITH DIABETES: ICD-10-CM

## 2020-03-06 DIAGNOSIS — H31.001 CHORIORETINAL SCAR OF RIGHT EYE: Primary | ICD-10-CM

## 2020-03-06 PROCEDURE — 99212 OFFICE O/P EST SF 10 MIN: CPT | Mod: PBBFAC | Performed by: OPHTHALMOLOGY

## 2020-03-06 PROCEDURE — 92201 OPSCPY EXTND RTA DRAW UNI/BI: CPT | Mod: ,,, | Performed by: OPHTHALMOLOGY

## 2020-03-06 PROCEDURE — 92014 PR EYE EXAM, EST PATIENT,COMPREHESV: ICD-10-PCS | Mod: S$PBB,,, | Performed by: OPHTHALMOLOGY

## 2020-03-06 PROCEDURE — 99999 PR PBB SHADOW E&M-EST. PATIENT-LVL II: ICD-10-PCS | Mod: PBBFAC,,, | Performed by: OPHTHALMOLOGY

## 2020-03-06 PROCEDURE — 99999 PR PBB SHADOW E&M-EST. PATIENT-LVL II: CPT | Mod: PBBFAC,,, | Performed by: OPHTHALMOLOGY

## 2020-03-06 PROCEDURE — 92014 COMPRE OPH EXAM EST PT 1/>: CPT | Mod: S$PBB,,, | Performed by: OPHTHALMOLOGY

## 2020-03-06 PROCEDURE — 92201 PR OPHTHALMOSCOPY, EXT, W/RET DRAW/SCLERAL DEPR, I&R, UNI/BI: ICD-10-PCS | Mod: ,,, | Performed by: OPHTHALMOLOGY

## 2020-03-06 RX ORDER — LISINOPRIL 20 MG/1
TABLET ORAL
Qty: 90 TABLET | Refills: 0 | Status: SHIPPED | OUTPATIENT
Start: 2020-03-06 | End: 2020-03-10 | Stop reason: SDUPTHER

## 2020-03-07 NOTE — PROGRESS NOTES
Subjective:       Patient ID: Kalina Ryan is a 54 y.o. female      Chief Complaint   Patient presents with    Concerns About Ocular Health     History of Present Illness  HPI     Pt c/o increase in floaters OD in the past 2-3 months  She states she has always had floaters OD but they come and go. She is   also beginning to have more trouble with her near and far vision.  No flashes OU.      1.) Posterior vitreous detachment of right eye   2.) Chorioretinal scar of right eye    Last edited by Silverio Whitehead MD on 3/6/2020 11:46 AM. (History)        Imaging:    See report    Assessment/Plan:     1. Chorioretinal scar of right eye  Around tuft.  Stable.  Observe    2. Posterior vitreous detachment of right eye  Still with some symptoms.  No breaks    Pathology of PVD, Retinal Tear, Retinal Detachment reviewed in great detail  RD precautions discussed in detail, patient expressed understanding  RTC immediately PRN (especially ANY change flashes, floaters, vision, visual field)    Refraction    F/u yearly with me or with optometry    Follow up in about 1 year (around 3/6/2021), or if symptoms worsen or fail to improve, for Comprehensive Examination.

## 2020-03-10 ENCOUNTER — OFFICE VISIT (OUTPATIENT)
Dept: FAMILY MEDICINE | Facility: CLINIC | Age: 55
End: 2020-03-10
Payer: OTHER GOVERNMENT

## 2020-03-10 VITALS
BODY MASS INDEX: 46.46 KG/M2 | OXYGEN SATURATION: 98 % | RESPIRATION RATE: 18 BRPM | DIASTOLIC BLOOD PRESSURE: 66 MMHG | SYSTOLIC BLOOD PRESSURE: 106 MMHG | WEIGHT: 262.25 LBS | TEMPERATURE: 98 F | HEIGHT: 63 IN | HEART RATE: 79 BPM

## 2020-03-10 DIAGNOSIS — E66.01 CLASS 3 SEVERE OBESITY DUE TO EXCESS CALORIES WITH SERIOUS COMORBIDITY AND BODY MASS INDEX (BMI) OF 45.0 TO 49.9 IN ADULT: ICD-10-CM

## 2020-03-10 DIAGNOSIS — E11.9 TYPE 2 DIABETES MELLITUS WITHOUT COMPLICATION, WITHOUT LONG-TERM CURRENT USE OF INSULIN: Primary | ICD-10-CM

## 2020-03-10 DIAGNOSIS — I15.2 HYPERTENSION ASSOCIATED WITH DIABETES: ICD-10-CM

## 2020-03-10 DIAGNOSIS — F41.9 ANXIETY: ICD-10-CM

## 2020-03-10 DIAGNOSIS — E11.59 HYPERTENSION ASSOCIATED WITH DIABETES: ICD-10-CM

## 2020-03-10 PROCEDURE — 99214 PR OFFICE/OUTPT VISIT, EST, LEVL IV, 30-39 MIN: ICD-10-PCS | Mod: S$PBB,,, | Performed by: NURSE PRACTITIONER

## 2020-03-10 PROCEDURE — 99214 OFFICE O/P EST MOD 30 MIN: CPT | Mod: PBBFAC,PN | Performed by: NURSE PRACTITIONER

## 2020-03-10 PROCEDURE — 99999 PR PBB SHADOW E&M-EST. PATIENT-LVL IV: CPT | Mod: PBBFAC,,, | Performed by: NURSE PRACTITIONER

## 2020-03-10 PROCEDURE — 99999 PR PBB SHADOW E&M-EST. PATIENT-LVL IV: ICD-10-PCS | Mod: PBBFAC,,, | Performed by: NURSE PRACTITIONER

## 2020-03-10 PROCEDURE — 99214 OFFICE O/P EST MOD 30 MIN: CPT | Mod: S$PBB,,, | Performed by: NURSE PRACTITIONER

## 2020-03-10 RX ORDER — ESCITALOPRAM OXALATE 10 MG/1
10 TABLET ORAL DAILY
Qty: 90 TABLET | Refills: 0 | Status: SHIPPED | OUTPATIENT
Start: 2020-03-10 | End: 2020-06-08

## 2020-03-10 RX ORDER — LISINOPRIL 20 MG/1
20 TABLET ORAL DAILY
Qty: 90 TABLET | Refills: 0 | Status: SHIPPED | OUTPATIENT
Start: 2020-03-10 | End: 2020-06-11 | Stop reason: SDUPTHER

## 2020-03-10 NOTE — PROGRESS NOTES
"Subjective:       Patient ID: Kalina Ryan is a 54 y.o. female.    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     Patient has hypertension that is controlled on lisinopril 20 mg daily.  /66 (BP Location: Left arm, Patient Position: Sitting, BP Method: Large (Manual))   Pulse 79   Temp 98.4 °F (36.9 °C) (Oral)   Resp 18   Ht 5' 3" (1.6 m)   Wt 118.9 kg (262 lb 3.8 oz)   SpO2 98%   BMI 46.45 kg/m²     Patient has anxiety that is controlled on Lexapro 10 mg daily.     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  She reports she was completely paralyzed but did not require intubation. She reports residual generalized weakness and neuropathy to her hands and feet.  She reports an occasional foot drop.  She is able to ambulate without difficulty and without assistance.     Patient has a personal history of gastric bypass in 2009.  Patient is morbidly obese with Body mass index 46.45 kg/m². Patient has lost 30 pounds in the past 3 months on Optivia diet 5 in 1.         Patient has NEW ONSET TYPE 2 DIABETES noted on wellness exam September 2019.  Her fasting blood sugar was 129 with a hemoglobin A1c 6.5%.  Patient has been working on lifestyle modifications and A1C is now down to 6.1%.  Will recheck in 3 months.    Component      Latest Ref Rng & Units 3/6/2020 12/6/2019 8/22/2019 6/25/2018   Sodium      136 - 145 mmol/L  143 140    Potassium      3.5 - 5.1 mmol/L  4.6 4.5    Chloride      95 - 110 mmol/L  107 107    CO2      23 - 29 mmol/L  27 27    Glucose      70 - 110 mg/dL  124 (H) 129 (H)  "   BUN, Bld      7 - 17 mg/dL  23 (H) 21 (H)    Creatinine      0.50 - 1.40 mg/dL  0.69 0.75    Calcium      8.7 - 10.5 mg/dL  8.9 9.1    PROTEIN TOTAL      6.0 - 8.4 g/dL  7.2 7.0    Albumin      3.5 - 5.2 g/dL  3.8 4.0    BILIRUBIN TOTAL      0.1 - 1.0 mg/dL  0.2 0.3    Alkaline Phosphatase      38 - 126 U/L  121 130 (H)    AST      15 - 46 U/L  25 22    ALT      10 - 44 U/L  21 22    Anion Gap      8 - 16 mmol/L  9 6 (L)    eGFR if African American      >60 mL/min/1.73 m:2  >60.0 >60.0    eGFR if non African American      >60 mL/min/1.73 m:2  >60.0 >60.0    Cholesterol      120 - 199 mg/dL   156 153   Triglycerides      30 - 150 mg/dL   121 112   HDL      40 - 75 mg/dL   45 46   LDL Cholesterol External      63.0 - 159.0 mg/dL   86.8 84.6   Hdl/Cholesterol Ratio      20.0 - 50.0 %   28.8 30.1   Total Cholesterol/HDL Ratio      2.0 - 5.0   3.5 3.3   Non-HDL Cholesterol      mg/dL   111 107   Hemoglobin A1C External      4.0 - 5.6 % 6.1 (H) 6.3 (H) 6.5 (H)    Estimated Avg Glucose      68 - 131 mg/dL 128 134 (H) 140 (H)        Current Outpatient Medications   Medication Sig Dispense Refill    blood sugar diagnostic (BLOOD GLUCOSE TEST) Strp 1 each by Misc.(Non-Drug; Combo Route) route once daily. 30 each 11    blood-glucose meter kit Use as instructed 1 each 0    escitalopram oxalate (LEXAPRO) 10 MG tablet Take 1 tablet (10 mg total) by mouth once daily. 30 tablet 2    FREESTYLE LANCETS 28 gauge lancets       lancets Misc 1 each by Misc.(Non-Drug; Combo Route) route once daily. 30 each 11    lisinopril (PRINIVIL,ZESTRIL) 20 MG tablet TAKE 1 TABLET BY MOUTH ONCE DAILY 90 tablet 0     No current facility-administered medications for this visit.        Past Medical History:   Diagnosis Date    Anxiety     Guillain Barré syndrome 2009    weakness and neuropathy to hands and feet - diagnosed by Neurologist back in 2009 and last seen by neurology in 2011    Hypertension     New onset type 2 diabetes mellitus  9/4/2019    Personal history of gastric bypass 9/4/2019       Past Surgical History:   Procedure Laterality Date    CHOLECYSTECTOMY  2009    COLONOSCOPY N/A 7/31/2018    Procedure: COLONOSCOPY;  Surgeon: Elpidio Fortune MD;  Location: [facility];  Service: Endoscopy;  Laterality: N/A;    GASTRIC BYPASS  2009    HYSTERECTOMY  1999       Family History   Problem Relation Age of Onset    Heart disease Mother     Hypertension Mother     Diabetes Mother 65    Heart disease Father 60        Massive MI; DEFIB/ CABG    Diabetes Father     Hypertension Father     Dementia Father     Pancreatic cancer Brother 54    Stomach cancer Brother     No Known Problems Daughter     No Known Problems Son     Heart disease Brother 56        maintain with medication - no surgery    Hypertension Brother     Hypertension Brother        Social History     Socioeconomic History    Marital status:      Spouse name: Not on file    Number of children: Not on file    Years of education: Not on file    Highest education level: Not on file   Occupational History    Occupation: work at bank   Social Needs    Financial resource strain: Not on file    Food insecurity:     Worry: Not on file     Inability: Not on file    Transportation needs:     Medical: Not on file     Non-medical: Not on file   Tobacco Use    Smoking status: Never Smoker    Smokeless tobacco: Never Used   Substance and Sexual Activity    Alcohol use: Yes     Alcohol/week: 0.8 standard drinks     Types: 1 Standard drinks or equivalent per week     Frequency: 2-4 times a month     Drinks per session: 1 or 2     Binge frequency: Never     Comment: every other weekend - 1 drink per occasion    Drug use: No    Sexual activity: Not Currently   Lifestyle    Physical activity:     Days per week: 0 days     Minutes per session: 0 min    Stress: Only a little   Relationships    Social connections:     Talks on phone: Once a week     Gets together:  "Once a week     Attends Hinduism service: Not on file     Active member of club or organization: No     Attends meetings of clubs or organizations: Never     Relationship status:    Other Topics Concern    Not on file   Social History Narrative    Not on file       Review of Systems   Constitutional: Negative for activity change and unexpected weight change.   HENT: Negative for hearing loss, rhinorrhea and trouble swallowing.    Eyes: Negative for discharge and visual disturbance.   Respiratory: Negative for chest tightness and wheezing.    Cardiovascular: Negative for chest pain and palpitations.   Gastrointestinal: Negative for blood in stool, constipation, diarrhea and vomiting.   Endocrine: Negative for polydipsia and polyuria.   Genitourinary: Negative for difficulty urinating, dysuria, hematuria and menstrual problem.   Musculoskeletal: Negative for arthralgias, joint swelling and neck pain.   Neurological: Negative for weakness and headaches.   Psychiatric/Behavioral: Negative for confusion and dysphoric mood.         Objective:     Vitals:    03/10/20 0818   BP: 106/66   BP Location: Left arm   Patient Position: Sitting   BP Method: Large (Manual)   Pulse: 79   Resp: 18   Temp: 98.4 °F (36.9 °C)   TempSrc: Oral   SpO2: 98%   Weight: 118.9 kg (262 lb 3.8 oz)   Height: 5' 3" (1.6 m)          Physical Exam   Constitutional: She is oriented to person, place, and time. She appears well-developed and well-nourished. No distress.   + morbid obesity with Body mass index is 46.45 kg/m².   HENT:   Head: Normocephalic and atraumatic.   Right Ear: External ear normal.   Left Ear: External ear normal.   Nose: Nose normal.   Mouth/Throat: Oropharynx is clear and moist. No oropharyngeal exudate.   Eyes: Pupils are equal, round, and reactive to light. EOM are normal.   Neck: Normal range of motion. Neck supple. No tracheal deviation present. No thyromegaly present.   Cardiovascular: Normal rate, regular rhythm " and normal heart sounds.   No murmur heard.  Pulmonary/Chest: Effort normal and breath sounds normal. No respiratory distress.   Abdominal: Soft. She exhibits no distension.   Musculoskeletal: Normal range of motion. She exhibits no edema.   Lymphadenopathy:     She has no cervical adenopathy.   Neurological: She is alert and oriented to person, place, and time. No cranial nerve deficit. Coordination normal.   Skin: Skin is warm and dry. No rash noted. She is not diaphoretic.   Psychiatric: She has a normal mood and affect.         Assessment:         ICD-10-CM ICD-9-CM   1. Type 2 diabetes mellitus without complication, without long-term current use of insulin E11.9 250.00   2. Hypertension associated with diabetes E11.59 250.80    I10 401.9   3. Anxiety F41.9 300.00   4. Class 3 severe obesity due to excess calories with serious comorbidity and body mass index (BMI) of 45.0 to 49.9 in adult E66.01 278.01    Z68.42 V85.42       Plan:       Type 2 diabetes mellitus without complication, without long-term current use of insulin  -  Controlled with dietary modifications = will recheck in 3 months.  -     Comprehensive metabolic panel; Future; Expected date: 03/10/2020  -     Hemoglobin A1c; Future; Expected date: 03/10/2020    Hypertension associated with diabetes  -  Controlled on present medication - much improved with weight loss.  Advised if she starts experiencing dizziness with standing, etc - check blood pressure at home.  IF BP starts dropping < 100/60 - send me message over portal - may need to cut back on Lisinopril as she continues to lose weight.  -     lisinopriL (PRINIVIL,ZESTRIL) 20 MG tablet; Take 1 tablet (20 mg total) by mouth once daily.  Dispense: 90 tablet; Refill: 0    Anxiety  -     escitalopram oxalate (LEXAPRO) 10 MG tablet; Take 1 tablet (10 mg total) by mouth once daily.  Dispense: 90 tablet; Refill: 0    Class 3 severe obesity due to excess calories with serious comorbidity and body mass  index (BMI) of 45.0 to 49.9 in adult  -  Lost 30 pounds in past 3 months -       Follow up in about 3 months (around 6/10/2020) for fasting labs and follow up.     Patient's Medications   New Prescriptions    No medications on file   Previous Medications    BLOOD SUGAR DIAGNOSTIC (BLOOD GLUCOSE TEST) STRP    1 each by Misc.(Non-Drug; Combo Route) route once daily.    BLOOD-GLUCOSE METER KIT    Use as instructed    FREESTYLE LANCETS 28 GAUGE LANCETS        LANCETS MISC    1 each by Misc.(Non-Drug; Combo Route) route once daily.   Modified Medications    Modified Medication Previous Medication    ESCITALOPRAM OXALATE (LEXAPRO) 10 MG TABLET escitalopram oxalate (LEXAPRO) 10 MG tablet       Take 1 tablet (10 mg total) by mouth once daily.    Take 1 tablet (10 mg total) by mouth once daily.    LISINOPRIL (PRINIVIL,ZESTRIL) 20 MG TABLET lisinopril (PRINIVIL,ZESTRIL) 20 MG tablet       Take 1 tablet (20 mg total) by mouth once daily.    TAKE 1 TABLET BY MOUTH ONCE DAILY   Discontinued Medications    No medications on file

## 2020-05-10 ENCOUNTER — PATIENT MESSAGE (OUTPATIENT)
Dept: FAMILY MEDICINE | Facility: CLINIC | Age: 55
End: 2020-05-10

## 2020-06-11 ENCOUNTER — OFFICE VISIT (OUTPATIENT)
Dept: FAMILY MEDICINE | Facility: CLINIC | Age: 55
End: 2020-06-11
Payer: OTHER GOVERNMENT

## 2020-06-11 VITALS
WEIGHT: 240.06 LBS | HEART RATE: 75 BPM | BODY MASS INDEX: 38.58 KG/M2 | OXYGEN SATURATION: 97 % | HEIGHT: 66 IN | RESPIRATION RATE: 16 BRPM | DIASTOLIC BLOOD PRESSURE: 64 MMHG | SYSTOLIC BLOOD PRESSURE: 102 MMHG | TEMPERATURE: 98 F

## 2020-06-11 DIAGNOSIS — E66.01 CLASS 2 SEVERE OBESITY WITH SERIOUS COMORBIDITY AND BODY MASS INDEX (BMI) OF 38.0 TO 38.9 IN ADULT, UNSPECIFIED OBESITY TYPE: ICD-10-CM

## 2020-06-11 DIAGNOSIS — Z23 NEED FOR TDAP VACCINATION: ICD-10-CM

## 2020-06-11 DIAGNOSIS — Z13.0 SCREENING FOR DEFICIENCY ANEMIA: ICD-10-CM

## 2020-06-11 DIAGNOSIS — Z86.69 HISTORY OF GUILLAIN-BARRE SYNDROME: ICD-10-CM

## 2020-06-11 DIAGNOSIS — Z98.84 PERSONAL HISTORY OF GASTRIC BYPASS: ICD-10-CM

## 2020-06-11 DIAGNOSIS — E11.59 HYPERTENSION ASSOCIATED WITH DIABETES: ICD-10-CM

## 2020-06-11 DIAGNOSIS — F41.9 ANXIETY: ICD-10-CM

## 2020-06-11 DIAGNOSIS — Z23 NEED FOR STREPTOCOCCUS PNEUMONIAE VACCINATION: ICD-10-CM

## 2020-06-11 DIAGNOSIS — Z13.29 THYROID DISORDER SCREEN: ICD-10-CM

## 2020-06-11 DIAGNOSIS — Z53.20 HIV SCREENING DECLINED: ICD-10-CM

## 2020-06-11 DIAGNOSIS — I15.2 HYPERTENSION ASSOCIATED WITH DIABETES: ICD-10-CM

## 2020-06-11 DIAGNOSIS — Z13.220 SCREENING CHOLESTEROL LEVEL: ICD-10-CM

## 2020-06-11 DIAGNOSIS — E11.9 TYPE 2 DIABETES MELLITUS WITHOUT COMPLICATION, WITHOUT LONG-TERM CURRENT USE OF INSULIN: Primary | ICD-10-CM

## 2020-06-11 PROBLEM — E66.812 CLASS 2 SEVERE OBESITY WITH SERIOUS COMORBIDITY AND BODY MASS INDEX (BMI) OF 38.0 TO 38.9 IN ADULT: Status: ACTIVE | Noted: 2019-09-04

## 2020-06-11 PROCEDURE — 90472 IMMUNIZATION ADMIN EACH ADD: CPT | Mod: PBBFAC,PN

## 2020-06-11 PROCEDURE — 99999 PR PBB SHADOW E&M-EST. PATIENT-LVL V: ICD-10-PCS | Mod: PBBFAC,,, | Performed by: NURSE PRACTITIONER

## 2020-06-11 PROCEDURE — 99999 PR PBB SHADOW E&M-EST. PATIENT-LVL V: CPT | Mod: PBBFAC,,, | Performed by: NURSE PRACTITIONER

## 2020-06-11 PROCEDURE — 99214 PR OFFICE/OUTPT VISIT, EST, LEVL IV, 30-39 MIN: ICD-10-PCS | Mod: S$PBB,,, | Performed by: NURSE PRACTITIONER

## 2020-06-11 PROCEDURE — 99214 OFFICE O/P EST MOD 30 MIN: CPT | Mod: S$PBB,,, | Performed by: NURSE PRACTITIONER

## 2020-06-11 PROCEDURE — 90471 IMMUNIZATION ADMIN: CPT | Mod: PBBFAC,PN

## 2020-06-11 PROCEDURE — 99215 OFFICE O/P EST HI 40 MIN: CPT | Mod: PBBFAC,PN | Performed by: NURSE PRACTITIONER

## 2020-06-11 RX ORDER — LISINOPRIL 10 MG/1
10 TABLET ORAL DAILY
Qty: 90 TABLET | Refills: 1 | Status: SHIPPED | OUTPATIENT
Start: 2020-06-11 | End: 2020-10-02 | Stop reason: SDUPTHER

## 2020-06-11 NOTE — PROGRESS NOTES
"Subjective:       Patient ID: Kalina Ryan is a 55 y.o. female.    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     Patient has hypertension and currently takes lisinopril 20 mg daily. Patient has lost 22 pounds in the past 3 months and blood pressure is on the low end of normal. Will cut back on dose and recheck in 3 to 4 months.  /64 (BP Location: Left arm)   Pulse 75   Temp 98 °F (36.7 °C) (Oral)   Resp 16   Ht 5' 6" (1.676 m)   Wt 108.9 kg (240 lb 1.3 oz)   SpO2 97%   BMI 38.75 kg/m²      Patient has anxiety that is controlled on Lexapro 10 mg daily.     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  She reports she was completely paralyzed but did not require intubation. She reports residual generalized weakness and neuropathy to her hands and feet.  She reports an occasional foot drop.  She is able to ambulate without difficulty and without assistance.     Patient has a personal history of gastric bypass in 2009.  Patient is obese with Body mass index  38.75 kg/m². Patient has lost 50 pounds in the past 6 months on Optivia diet 5 in 1.          Patient has TYPE 2 DIABETES diagnosed on wellness exam September 2019.  Her fasting blood sugar was 129 with a hemoglobin A1c 6.5%.  Patient has been working on lifestyle modifications and A1C is now down to 5.5% on lifestyle modifications only.  Will recheck in 3 months.    Patient's AST level is 69 today but never had high levels in past.  AST level is isolated high - no other liver elevations.  NO tylenol.  Does not drink - very rare.  No on any medications that usually affect " liver.       Hypertension   This is a chronic problem. The current episode started more than 1 year ago. The problem has been gradually improving since onset. The problem is controlled. Associated symptoms include blurred vision. Pertinent negatives include no anxiety, chest pain, headaches, malaise/fatigue, neck pain, orthopnea, palpitations, peripheral edema, PND, shortness of breath or sweats. Risk factors for coronary artery disease include family history and obesity. Past treatments include lifestyle changes and ACE inhibitors. The current treatment provides significant improvement. Compliance problems include exercise.      Component      Latest Ref Rng & Units 6/8/2020 3/6/2020 12/6/2019 8/22/2019   Sodium      136 - 145 mmol/L 139  143 140   Potassium      3.5 - 5.1 mmol/L 4.7  4.6 4.5   Chloride      95 - 110 mmol/L 106  107 107   CO2      23 - 29 mmol/L 28  27 27   Glucose      70 - 110 mg/dL 104  124 (H) 129 (H)   BUN, Bld      7 - 17 mg/dL 31 (H)  23 (H) 21 (H)   Creatinine      0.50 - 1.40 mg/dL 0.95  0.69 0.75   Calcium      8.7 - 10.5 mg/dL 9.5  8.9 9.1   PROTEIN TOTAL      6.0 - 8.4 g/dL 7.0  7.2 7.0   Albumin      3.5 - 5.2 g/dL 3.9  3.8 4.0   BILIRUBIN TOTAL      0.1 - 1.0 mg/dL 0.4  0.2 0.3   Alkaline Phosphatase      38 - 126 U/L 97  121 130 (H)   AST      15 - 46 U/L 69 (H)  25 22   ALT      10 - 44 U/L 19  21 22   Anion Gap      8 - 16 mmol/L 5 (L)  9 6 (L)   eGFR if African American      >60 mL/min/1.73 m:2 >60.0  >60.0 >60.0   eGFR if non African American      >60 mL/min/1.73 m:2 >60.0  >60.0 >60.0   Hemoglobin A1C External      4.0 - 5.6 % 5.5 6.1 (H) 6.3 (H) 6.5 (H)   Estimated Avg Glucose      68 - 131 mg/dL 111 128 134 (H) 140 (H)     Current Outpatient Medications   Medication Sig Dispense Refill    escitalopram oxalate (LEXAPRO) 10 MG tablet TAKE 1 TABLET BY MOUTH ONCE DAILY 90 tablet 0    lisinopriL (PRINIVIL,ZESTRIL) 20 MG tablet Take 1 tablet (20 mg total) by mouth once daily. 90  tablet 0    blood sugar diagnostic (BLOOD GLUCOSE TEST) Strp 1 each by Misc.(Non-Drug; Combo Route) route once daily. (Patient not taking: Reported on 6/11/2020) 30 each 11    blood-glucose meter kit Use as instructed (Patient not taking: Reported on 6/11/2020) 1 each 0    FREESTYLE LANCETS 28 gauge lancets       lancets Misc 1 each by Misc.(Non-Drug; Combo Route) route once daily. (Patient not taking: Reported on 6/11/2020) 30 each 11     No current facility-administered medications for this visit.        Past Medical History:   Diagnosis Date    Anxiety     Guillain Barré syndrome 2009    full paralysis but no intubation; residual weakness and neuropathy to hands and feet - diagnosed by Neurologist back in 2009 and last seen by neurology in 2011    Hypertension     New onset type 2 diabetes mellitus 9/4/2019    Personal history of gastric bypass 9/4/2019       Past Surgical History:   Procedure Laterality Date    CHOLECYSTECTOMY  2009    COLONOSCOPY N/A 7/31/2018    Procedure: COLONOSCOPY;  Surgeon: Elpidio Fortune MD;  Location: [facility];  Service: Endoscopy;  Laterality: N/A;    GASTRIC BYPASS  2009    HYSTERECTOMY  1999       Family History   Problem Relation Age of Onset    Heart disease Mother     Hypertension Mother     Diabetes Mother 65    Heart disease Father 60        Massive MI; DEFIB/ CABG    Diabetes Father     Hypertension Father     Dementia Father     Pancreatic cancer Brother 54    Stomach cancer Brother     No Known Problems Daughter     No Known Problems Son     Heart disease Brother 56        maintain with medication - no surgery    Hypertension Brother     Hypertension Brother        Social History     Socioeconomic History    Marital status:      Spouse name: Not on file    Number of children: Not on file    Years of education: Not on file    Highest education level: Not on file   Occupational History    Occupation: work at bank   Social Needs     Financial resource strain: Not on file    Food insecurity:     Worry: Not on file     Inability: Not on file    Transportation needs:     Medical: Not on file     Non-medical: Not on file   Tobacco Use    Smoking status: Never Smoker    Smokeless tobacco: Never Used   Substance and Sexual Activity    Alcohol use: Yes     Alcohol/week: 0.8 standard drinks     Types: 1 Standard drinks or equivalent per week     Frequency: 2-4 times a month     Drinks per session: 1 or 2     Binge frequency: Never     Comment: every other weekend - 1 drink per occasion    Drug use: No    Sexual activity: Not Currently   Lifestyle    Physical activity:     Days per week: 0 days     Minutes per session: 0 min    Stress: Only a little   Relationships    Social connections:     Talks on phone: Once a week     Gets together: Once a week     Attends Jewish service: Not on file     Active member of club or organization: No     Attends meetings of clubs or organizations: Never     Relationship status:    Other Topics Concern    Not on file   Social History Narrative    Not on file       Review of Systems   Constitutional: Negative for appetite change, chills, fatigue, fever, malaise/fatigue and unexpected weight change.   HENT: Negative for congestion, ear pain, mouth sores, nosebleeds, postnasal drip, rhinorrhea, sinus pressure, sneezing, sore throat, trouble swallowing and voice change.    Eyes: Positive for blurred vision. Negative for photophobia, pain, discharge, redness, itching and visual disturbance.   Respiratory: Negative for cough, chest tightness and shortness of breath.    Cardiovascular: Negative for chest pain, palpitations, orthopnea, leg swelling and PND.   Gastrointestinal: Negative for abdominal pain, blood in stool, constipation, diarrhea, nausea and vomiting.   Genitourinary: Negative for dysuria, frequency, hematuria and urgency.   Musculoskeletal: Negative for arthralgias, back pain, joint swelling,  "myalgias and neck pain.   Skin: Negative for color change and rash.   Allergic/Immunologic: Negative for immunocompromised state.   Neurological: Negative for dizziness, seizures, syncope, weakness and headaches.   Hematological: Negative for adenopathy. Does not bruise/bleed easily.   Psychiatric/Behavioral: Negative for agitation, dysphoric mood, sleep disturbance and suicidal ideas. The patient is not nervous/anxious.    All other systems reviewed and are negative.        Objective:     Vitals:    06/11/20 0708 06/11/20 0724   BP: (!) 98/58 102/64   BP Location: Left arm Left arm   Patient Position: Sitting    BP Method: Large (Manual)    Pulse: 75    Resp: 16    Temp: 98 °F (36.7 °C)    TempSrc: Oral    SpO2: 97%    Weight: 108.9 kg (240 lb 1.3 oz)    Height: 5' 6" (1.676 m)           Physical Exam   Constitutional: She is oriented to person, place, and time. She appears well-developed and well-nourished. No distress.   + morbid obesity with Body mass index  38.75 kg/m².     HENT:   Head: Normocephalic and atraumatic.   Right Ear: External ear normal.   Left Ear: External ear normal.   Nose: Nose normal.   Mouth/Throat: Oropharynx is clear and moist. No oropharyngeal exudate.   Eyes: Pupils are equal, round, and reactive to light. EOM are normal.   Neck: Normal range of motion. Neck supple. No tracheal deviation present. No thyromegaly present.   Cardiovascular: Normal rate, regular rhythm and normal heart sounds.   No murmur heard.  Pulmonary/Chest: Effort normal and breath sounds normal. No respiratory distress.   Abdominal: Soft. She exhibits no distension.   Musculoskeletal: Normal range of motion. She exhibits no edema.   Lymphadenopathy:     She has no cervical adenopathy.   Neurological: She is alert and oriented to person, place, and time. No cranial nerve deficit. Coordination normal.   Skin: Skin is warm and dry. No rash noted. She is not diaphoretic.   Psychiatric: She has a normal mood and affect.    "      Assessment:         ICD-10-CM ICD-9-CM   1. Type 2 diabetes mellitus without complication, without long-term current use of insulin E11.9 250.00   2. Hypertension associated with diabetes E11.59 250.80    I10 401.9   3. Anxiety F41.9 300.00   4. Class 2 severe obesity with serious comorbidity and body mass index (BMI) of 38.0 to 38.9 in adult, unspecified obesity type E66.01 278.01    Z68.38 V85.38   5. Personal history of gastric bypass Z98.84 V45.86   6. History of Guillain-[place] syndrome Z86.69 V12.49   7. Need for Tdap vaccination Z23 V06.1   8. Need for Streptococcus pneumoniae vaccination Z23 V03.82   9. Screening for deficiency anemia Z13.0 V78.1   10. Thyroid disorder screen Z13.29 V77.0   11. Screening cholesterol level Z13.220 V77.91   12. HIV screening declined Z53.20 V64.2       Plan:       Type 2 diabetes mellitus without complication, without long-term current use of insulin  -  continue with current lifestyle modifications.  Will recheck for wellness exam around October.  -     Pneumococcal Polysaccharide Vaccine (23 Valent) (SQ/IM)  -     Comprehensive metabolic panel; Future; Expected date: 06/11/2020  -     Hemoglobin A1C; Future; Expected date: 06/11/2020    Hypertension associated with diabetes  -  decrease the lisinopril down to 10 mg daily and will recheck in October  -     lisinopriL 10 MG tablet; Take 1 tablet (10 mg total) by mouth once daily.  Dispense: 90 tablet; Refill: 1    Anxiety  -  controlled on Lexapro present dose    Class 2 severe obesity with serious comorbidity and body mass index (BMI) of 38.0 to 38.9 in adult, unspecified obesity type  -  continue to work on lifestyle modifications as you are doing great    Personal history of gastric bypass    History of Guillain-[place] syndrome    Need for Tdap vaccination  -  patient is expecting the grandbaby - needs Pertussis vaccine.  CDC web site checked and a history of Herlinda [place] does not contradict Patient from having Tdap  vaccine  -     Tdap Vaccine    Need for Streptococcus pneumoniae vaccination  -   due for pneumonia vaccine due to diabetes  -     Pneumococcal Polysaccharide Vaccine (23 Valent) (SQ/IM)    Screening for deficiency anemia  -     CBC auto differential; Future; Expected date: 06/11/2020    Thyroid disorder screen  -     TSH; Future; Expected date: 06/11/2020    Screening cholesterol level  -     Lipid Panel; Future; Expected date: 06/11/2020    HIV screening declined  -     HIV 1/2 Ag/Ab (4th Gen); Future; Expected date: 06/11/2020      Follow up in about 4 months (around 10/11/2020) for fasting labs and WELLNESS EXAM.     Patient's Medications   New Prescriptions    No medications on file   Previous Medications    BLOOD SUGAR DIAGNOSTIC (BLOOD GLUCOSE TEST) STRP    1 each by Misc.(Non-Drug; Combo Route) route once daily.    BLOOD-GLUCOSE METER KIT    Use as instructed    ESCITALOPRAM OXALATE (LEXAPRO) 10 MG TABLET    TAKE 1 TABLET BY MOUTH ONCE DAILY    FREESTYLE LANCETS 28 GAUGE LANCETS        LANCETS MISC    1 each by Misc.(Non-Drug; Combo Route) route once daily.   Modified Medications    Modified Medication Previous Medication    LISINOPRIL 10 MG TABLET lisinopriL (PRINIVIL,ZESTRIL) 20 MG tablet       Take 1 tablet (10 mg total) by mouth once daily.    Take 1 tablet (20 mg total) by mouth once daily.   Discontinued Medications    No medications on file

## 2020-10-02 ENCOUNTER — OFFICE VISIT (OUTPATIENT)
Dept: FAMILY MEDICINE | Facility: CLINIC | Age: 55
End: 2020-10-02
Payer: OTHER GOVERNMENT

## 2020-10-02 VITALS
DIASTOLIC BLOOD PRESSURE: 68 MMHG | BODY MASS INDEX: 36.16 KG/M2 | TEMPERATURE: 98 F | SYSTOLIC BLOOD PRESSURE: 106 MMHG | WEIGHT: 225 LBS | OXYGEN SATURATION: 96 % | HEART RATE: 72 BPM | HEIGHT: 66 IN

## 2020-10-02 DIAGNOSIS — E66.01 CLASS 2 SEVERE OBESITY WITH SERIOUS COMORBIDITY AND BODY MASS INDEX (BMI) OF 36.0 TO 36.9 IN ADULT, UNSPECIFIED OBESITY TYPE: ICD-10-CM

## 2020-10-02 DIAGNOSIS — E11.9 TYPE 2 DIABETES MELLITUS WITHOUT COMPLICATION, WITHOUT LONG-TERM CURRENT USE OF INSULIN: ICD-10-CM

## 2020-10-02 DIAGNOSIS — Z86.69 HISTORY OF GUILLAIN-BARRE SYNDROME: ICD-10-CM

## 2020-10-02 DIAGNOSIS — I15.2 HYPERTENSION ASSOCIATED WITH DIABETES: ICD-10-CM

## 2020-10-02 DIAGNOSIS — F41.9 ANXIETY: ICD-10-CM

## 2020-10-02 DIAGNOSIS — Z00.00 ANNUAL PHYSICAL EXAM: Primary | ICD-10-CM

## 2020-10-02 DIAGNOSIS — E11.59 HYPERTENSION ASSOCIATED WITH DIABETES: ICD-10-CM

## 2020-10-02 PROCEDURE — 99396 PREV VISIT EST AGE 40-64: CPT | Mod: S$PBB,,, | Performed by: NURSE PRACTITIONER

## 2020-10-02 PROCEDURE — 99999 PR PBB SHADOW E&M-EST. PATIENT-LVL IV: ICD-10-PCS | Mod: PBBFAC,,, | Performed by: NURSE PRACTITIONER

## 2020-10-02 PROCEDURE — 99396 PR PREVENTIVE VISIT,EST,40-64: ICD-10-PCS | Mod: S$PBB,,, | Performed by: NURSE PRACTITIONER

## 2020-10-02 PROCEDURE — 99214 OFFICE O/P EST MOD 30 MIN: CPT | Mod: PBBFAC,PN | Performed by: NURSE PRACTITIONER

## 2020-10-02 PROCEDURE — 99999 PR PBB SHADOW E&M-EST. PATIENT-LVL IV: CPT | Mod: PBBFAC,,, | Performed by: NURSE PRACTITIONER

## 2020-10-02 RX ORDER — LISINOPRIL 10 MG/1
10 TABLET ORAL DAILY
Qty: 90 TABLET | Refills: 1 | Status: SHIPPED | OUTPATIENT
Start: 2020-10-02 | End: 2021-04-16 | Stop reason: SDUPTHER

## 2020-10-02 RX ORDER — ESCITALOPRAM OXALATE 10 MG/1
10 TABLET ORAL DAILY
Qty: 90 TABLET | Refills: 1 | Status: SHIPPED | OUTPATIENT
Start: 2020-10-02 | End: 2021-04-16 | Stop reason: SDUPTHER

## 2020-10-02 NOTE — PROGRESS NOTES
"Subjective:       Patient ID: Kalina Ryan is a 55 y.o. female.    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     Patient has hypertension and currently takes lisinopril 10 mg daily. Blood pressure is well controlled on present medication.  /68   Pulse 72   Temp 98.1 °F (36.7 °C) (Oral)   Ht 5' 6" (1.676 m)   Wt 102 kg (224 lb 15.7 oz)   SpO2 96%   BMI 36.31 kg/m²      Patient has anxiety that is controlled on Lexapro 10 mg daily.     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  She reports she was completely paralyzed but did not require intubation. She reports residual generalized weakness and neuropathy to her hands and feet.  She reports an occasional foot drop.  She is able to ambulate without difficulty and without assistance. She has chronic decreased sensation and tingling sensation to the toes of both feet.     Patient has a personal history of gastric bypass in 2009.  Patient is obese with Body mass index  36.31 kg/m².Patient has lost 66 pounds in the past 9 months and 15 pounds in the past 3 months.  She is on Optivia diet 5 in 1.          Patient has TYPE 2 DIABETES diagnosed on wellness exam September 2019.  Her fasting blood sugar was 129 with a hemoglobin A1c 6.5%.  Patient has been working on lifestyle modifications and A1C is now down to 5.5% on lifestyle modifications only.  Will recheck in 6 months.     Patient's AST level was 69 in June 2020 but never had high levels in past.  AST level was isolated high - no other liver elevations. Repeat AST level today is normal.     Wellness " labs:  -  CBC within normal limits  -  CMP with normal limits  -  cholesterol levels are good  -  thyroid screening is normal  -  HIV screening is negative    Health maintenance:  -  declined flu and shingles vaccines due to history of Herlinda [place]  -  Diabetic foot exam today    Hypertension  This is a chronic problem. The current episode started more than 1 year ago. The problem has been rapidly improving since onset. The problem is controlled. Associated symptoms include anxiety and blurred vision. Pertinent negatives include no chest pain, headaches, malaise/fatigue, neck pain, orthopnea, palpitations, PND or shortness of breath. There are no associated agents to hypertension. Risk factors for coronary artery disease include diabetes mellitus and obesity. Past treatments include ACE inhibitors. The current treatment provides moderate improvement. Compliance problems include exercise.      Component      Latest Ref Rng & Units 9/25/2020 6/8/2020 3/6/2020   WBC      3.90 - 12.70 K/uL 9.16     RBC      4.00 - 5.40 M/uL 4.38     Hemoglobin      12.0 - 16.0 g/dL 12.6     Hematocrit      37.0 - 48.5 % 39.5     MCV      82 - 98 fL 90     MCH      27.0 - 31.0 pg 28.8     MCHC      32.0 - 36.0 g/dL 31.9 (L)     RDW      11.5 - 14.5 % 13.6     Platelets      150 - 350 K/uL 284     MPV      9.2 - 12.9 fL 9.2     Immature Granulocytes      0.0 - 0.5 % 0.2     Gran # (ANC)      1.8 - 7.7 K/uL 4.9     Immature Grans (Abs)      0.00 - 0.04 K/uL 0.02     Lymph #      1.0 - 4.8 K/uL 3.5     Mono #      0.3 - 1.0 K/uL 0.5     Eos #      0.0 - 0.5 K/uL 0.2     Baso #      0.00 - 0.20 K/uL 0.04     nRBC      0 /100 WBC 0     Gran%      38.0 - 73.0 % 53.4     Lymph%      18.0 - 48.0 % 37.9     Mono%      4.0 - 15.0 % 5.7     Eosinophil%      0.0 - 8.0 % 2.4     Basophil%      0.0 - 1.9 % 0.4     Differential Method       Automated     Sodium      136 - 145 mmol/L 143 139    Potassium      3.5 - 5.1 mmol/L 5.0 4.7    Chloride       95 - 110 mmol/L 107 106    CO2      23 - 29 mmol/L 28 28    Glucose      70 - 110 mg/dL 104 104    BUN, Bld      7 - 17 mg/dL 33 (H) 31 (H)    Creatinine      0.50 - 1.40 mg/dL 0.88 0.95    Calcium      8.7 - 10.5 mg/dL 9.3 9.5    PROTEIN TOTAL      6.0 - 8.4 g/dL 7.2 7.0    Albumin      3.5 - 5.2 g/dL 4.1 3.9    BILIRUBIN TOTAL      0.1 - 1.0 mg/dL 0.3 0.4    Alkaline Phosphatase      38 - 126 U/L 89 97    AST      15 - 46 U/L 26 69 (H)    ALT      10 - 44 U/L 18 19    Anion Gap      8 - 16 mmol/L 8 5 (L)    eGFR if African American      >60 mL/min/1.73 m:2 >60.0 >60.0    eGFR if non African American      >60 mL/min/1.73 m:2 >60.0 >60.0    Cholesterol      120 - 199 mg/dL 140     Triglycerides      30 - 150 mg/dL 63     HDL      40 - 75 mg/dL 48     LDL Cholesterol External      63.0 - 159.0 mg/dL 79.4     Hdl/Cholesterol Ratio      20.0 - 50.0 % 34.3     Total Cholesterol/HDL Ratio      2.0 - 5.0 2.9     Non-HDL Cholesterol      mg/dL 92     Hemoglobin A1C External      4.0 - 5.6 % 5.5 5.5 6.1 (H)   Estimated Avg Glucose      68 - 131 mg/dL 111 111 128   TSH      0.400 - 4.000 uIU/mL 2.810     HIV 1/2 Ag/Ab      Negative Negative         Current Outpatient Medications   Medication Sig Dispense Refill    blood sugar diagnostic (BLOOD GLUCOSE TEST) Strp 1 each by Misc.(Non-Drug; Combo Route) route once daily. 30 each 11    escitalopram oxalate (LEXAPRO) 10 MG tablet Take 1 tablet (10 mg total) by mouth once daily. 90 tablet 1    FREESTYLE LANCETS 28 gauge lancets       lancets Misc 1 each by Misc.(Non-Drug; Combo Route) route once daily. 30 each 11    lisinopriL 10 MG tablet Take 1 tablet (10 mg total) by mouth once daily. 90 tablet 1    blood-glucose meter kit Use as instructed (Patient not taking: Reported on 6/11/2020) 1 each 0     No current facility-administered medications for this visit.        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;  Service: Endoscopy;  Laterality: N/A;    GASTRIC BYPASS  2009    HYSTERECTOMY  1999       Family History   Problem Relation Age of Onset    Heart disease Mother     Hypertension Mother     Diabetes Mother 65    Heart disease Father 60        Massive MI; DEFIB/ CABG    Diabetes Father     Hypertension Father     Dementia Father     Pancreatic cancer Brother 54    Stomach cancer Brother     No Known Problems Daughter     No Known Problems Son     Heart disease Brother 56        maintain with medication - no surgery    Hypertension Brother     Hypertension Brother        Social History     Socioeconomic History    Marital status:      Spouse name: Not on file    Number of children: Not on file    Years of education: Not on file    Highest education level: Not on file   Occupational History    Occupation: work at bank   Social Needs    Financial resource strain: Not on file    Food insecurity     Worry: Not on file     Inability: Not on file    Transportation needs     Medical: Not on file     Non-medical: Not on file   Tobacco Use    Smoking status: Never Smoker    Smokeless tobacco: Never Used   Substance and Sexual Activity    Alcohol use: Yes     Alcohol/week: 0.8 standard drinks     Types: 1 Standard drinks or equivalent per week     Frequency: 2-4 times a month     Drinks per session: 1 or 2     Binge frequency: Never     Comment: every other weekend - 1 drink per occasion    Drug use: No    Sexual activity: Not Currently   Lifestyle     Physical activity     Days per week: 0 days     Minutes per session: 0 min    Stress: Only a little   Relationships    Social connections     Talks on phone: Once a week     Gets together: Once a week     Attends Confucianism service: Not on file     Active member of club or organization: No     Attends meetings of clubs or organizations: Never     Relationship status:    Other Topics Concern    Not on file   Social History Narrative    Not on file       Review of Systems   Constitutional: Negative for appetite change, chills, fatigue, fever, malaise/fatigue and unexpected weight change.   HENT: Negative for congestion, ear pain, mouth sores, nosebleeds, postnasal drip, rhinorrhea, sinus pressure, sneezing, sore throat, trouble swallowing and voice change.    Eyes: Positive for blurred vision. Negative for photophobia, pain, discharge, redness and itching.   Respiratory: Negative for cough, chest tightness and shortness of breath.    Cardiovascular: Negative for chest pain, palpitations, orthopnea, leg swelling and PND.   Gastrointestinal: Negative for abdominal pain, blood in stool, constipation, diarrhea, nausea and vomiting.   Genitourinary: Negative for dysuria, frequency, hematuria and urgency.   Musculoskeletal: Negative for arthralgias, back pain, joint swelling, myalgias and neck pain.   Skin: Negative for color change and rash.   Allergic/Immunologic: Negative for immunocompromised state.   Neurological: Negative for dizziness, seizures, syncope, weakness and headaches.        Chronic neuropathy of toes to bilateral feet with mild foot drop due to history of Herlinda [place]   Hematological: Negative for adenopathy. Does not bruise/bleed easily.   Psychiatric/Behavioral: Negative for agitation, dysphoric mood, sleep disturbance and suicidal ideas. The patient is not nervous/anxious.    All other systems reviewed and are negative.        Objective:     Vitals:    10/02/20 0706   BP: 106/68   Pulse: 72  "  Temp: 98.1 °F (36.7 °C)   TempSrc: Oral   SpO2: 96%   Weight: 102 kg (224 lb 15.7 oz)   Height: 5' 6" (1.676 m)          Physical Exam  Constitutional:       General: She is not in acute distress.     Appearance: She is well-developed. She is obese. She is not ill-appearing, toxic-appearing or diaphoretic.      Comments: +obesity with Body mass index 36.31 kg/m².       HENT:      Head: Normocephalic and atraumatic.      Right Ear: Tympanic membrane and external ear normal. There is no impacted cerumen.      Left Ear: Tympanic membrane and external ear normal. There is no impacted cerumen.      Nose: Nose normal.   Eyes:      General: No scleral icterus.        Right eye: No discharge.         Left eye: No discharge.      Conjunctiva/sclera: Conjunctivae normal.      Pupils: Pupils are equal, round, and reactive to light.   Neck:      Musculoskeletal: Normal range of motion and neck supple.      Thyroid: No thyromegaly.      Trachea: No tracheal deviation.   Cardiovascular:      Rate and Rhythm: Normal rate and regular rhythm.      Pulses:           Dorsalis pedis pulses are 1+ on the right side and 1+ on the left side.      Heart sounds: Normal heart sounds. No murmur.   Pulmonary:      Effort: Pulmonary effort is normal. No respiratory distress.      Breath sounds: Normal breath sounds. No stridor. No wheezing, rhonchi or rales.   Abdominal:      General: There is no distension.      Palpations: Abdomen is soft.   Musculoskeletal: Normal range of motion.         General: No deformity.      Right lower leg: No edema.      Left lower leg: No edema.   Feet:      Right foot:      Protective Sensation: 7 sites tested. 4 sites sensed.      Skin integrity: No ulcer or skin breakdown.      Left foot:      Protective Sensation: 7 sites tested. 4 sites sensed.      Skin integrity: No ulcer or skin breakdown.      Comments: Chronic numbness/tingling with decreased sensation to the toes of bilateral feet from Herlinda [place] " syndrome in 2009.   Lymphadenopathy:      Cervical: No cervical adenopathy.   Skin:     General: Skin is warm and dry.      Findings: No rash.   Neurological:      Mental Status: She is alert and oriented to person, place, and time.      Cranial Nerves: No cranial nerve deficit.      Coordination: Coordination normal.   Psychiatric:         Mood and Affect: Mood normal.         Behavior: Behavior normal.         Thought Content: Thought content normal.         Judgment: Judgment normal.           Assessment:         ICD-10-CM ICD-9-CM   1. Annual physical exam  Z00.00 V70.0   2. Type 2 diabetes mellitus without complication, without long-term current use of insulin  E11.9 250.00   3. Hypertension associated with diabetes  E11.59 250.80    I10 401.9   4. Anxiety  F41.9 300.00   5. History of Guillain-Quin[place] syndrome  Z86.69 V12.49   6. Class 2 severe obesity with serious comorbidity and body mass index (BMI) of 36.0 to 36.9 in adult, unspecified obesity type  E66.01 278.01    Z68.36 V85.36       Plan:       Annual physical exam  Health Maintenance Summary    Influenza Vaccine Postponed 6/30/2021 Originally 8/1/2020. Patient Refused   Shingles Vaccine Postponed 10/2/2021 Originally 3/24/2014. Patient Refused   Eye Exam Next Due 3/6/2021     Done 3/6/2020 LOS:[id number] [place] EYE EXAM, EST PATIENT,COMPREHESV    Done 7/12/2018 LOS:26834 [place] EYE EXAM, EST PATIENT,INTERMED    Done 6/21/2018 LOS:81419 [place] EYE EXAM, EST PATIENT,COMPREHESV    Done 6/11/2018 LOS:86957 [place] EYE EXAM, NEW PATIENT,COMPREHESV   Hemoglobin A1c Next Due 3/25/2021     Done 9/25/2020 HEMOGLOBIN A1C Hemoglobin A1C External          Done 6/8/2020 HEMOGLOBIN A1C Hemoglobin A1C External          Done 3/6/2020 HEMOGLOBIN A1C  Hemoglobin A1C External           Done 12/6/2019 HEMOGLOBIN A1C  Hemoglobin A1C External           Done 8/22/2019 HEMOGLOBIN A1C  Hemoglobin A1C External           Patient has more history with this topic...   Mammogram Next Due 9/23/2021      Done 9/23/2019 MAMMO DIGITAL SCREENING BILAT WITH TOMOSYNTHESIS_CAD    Done 6/25/2018 MAMMO DIGITAL SCREENING BILAT WITH CAD    Declined 6/11/2018    Lipid Panel Next Due 9/25/2021     Done 9/25/2020 LIPID PANEL    Done 8/22/2019 LIPID PANEL    Done 6/25/2018 LIPID PANEL    Done 6/11/2018     Done 8/3/2010 LIPID PANEL    Patient has more history with this topic...   Foot Exam Next Due 10/2/2021     Done 10/2/2020     Done 12/10/2019 [employer]: WORKFLOW - DIABETES - DIABETIC FOOT EXAM PERFORMED   Colorectal Cancer Screening Next Due 7/31/2023    TETANUS VACCINE Next Due 6/11/2030     Done 6/11/2020 Imm Admin: Tdap   Pneumococcal Vaccine (Medium Risk) This plan is no longer active.     Done 6/11/2020 Imm Admin: Pneumococcal Polysaccharide - 23 Valent   HIV Screening This plan is no longer active.     Done 9/25/2020 HIV 1 / 2 ANTIBODY   Hepatitis C Screening This plan is no longer active.     Done 8/22/2019 HEPATITIS C ANTIBODY    Declined 6/11/2018    Low Dose Statin This plan is no longer active.          Type 2 diabetes mellitus without complication, without long-term current use of insulin  -  Controlled with lifestyle modfications  -  continue working on weight loss and will recheck labs in 6 months  -     Comprehensive metabolic panel; Future; Expected date: 10/02/2020  -     Hemoglobin A1C; Future; Expected date: 10/02/2020    Hypertension associated with diabetes  -  controlled on lisinopril present dose.  Recheck in 6 months  -     lisinopriL 10 MG tablet; Take 1 tablet (10 mg total) by mouth once daily.  Dispense: 90 tablet; Refill: 1    Anxiety  -  controlled on Lexapro present dose.  Recheck in 6 months  -     escitalopram oxalate (LEXAPRO) 10 MG tablet; Take 1 tablet (10 mg total) by mouth once daily.  Dispense: 90 tablet; Refill: 1    History of Guillain-Hinkle syndrome    Class 2 severe obesity with serious comorbidity and body mass index (BMI) of 36.0 to 36.9 in adult, unspecified obesity type  -   following diet to and continues to work on weight loss.  Recheck in 6 months      Follow up in about 6 months (around 4/2/2021) for fasting labs and follow up.     Patient's Medications   New Prescriptions    No medications on file   Previous Medications    BLOOD SUGAR DIAGNOSTIC (BLOOD GLUCOSE TEST) STRP    1 each by Misc.(Non-Drug; Combo Route) route once daily.    BLOOD-GLUCOSE METER KIT    Use as instructed    FREESTYLE LANCETS 28 GAUGE LANCETS        LANCETS MISC    1 each by Misc.(Non-Drug; Combo Route) route once daily.   Modified Medications    Modified Medication Previous Medication    ESCITALOPRAM OXALATE (LEXAPRO) 10 MG TABLET escitalopram oxalate (LEXAPRO) 10 MG tablet       Take 1 tablet (10 mg total) by mouth once daily.    TAKE 1 TABLET BY MOUTH ONCE DAILY     LISINOPRIL 10 MG TABLET lisinopriL 10 MG tablet       Take 1 tablet (10 mg total) by mouth once daily.    Take 1 tablet (10 mg total) by mouth once daily.   Discontinued Medications    No medications on file

## 2021-01-26 ENCOUNTER — CLINICAL SUPPORT (OUTPATIENT)
Dept: URGENT CARE | Facility: CLINIC | Age: 56
End: 2021-01-26
Payer: OTHER GOVERNMENT

## 2021-01-26 VITALS — TEMPERATURE: 98 F

## 2021-01-26 DIAGNOSIS — U07.1 COVID-19: Primary | ICD-10-CM

## 2021-01-26 LAB
CTP QC/QA: YES
SARS-COV-2 RDRP RESP QL NAA+PROBE: POSITIVE

## 2021-01-26 PROCEDURE — U0002: ICD-10-PCS | Mod: QW,S$GLB,, | Performed by: NURSE PRACTITIONER

## 2021-01-26 PROCEDURE — U0002 COVID-19 LAB TEST NON-CDC: HCPCS | Mod: QW,S$GLB,, | Performed by: NURSE PRACTITIONER

## 2021-01-27 DIAGNOSIS — U07.1 COVID-19 VIRUS DETECTED: ICD-10-CM

## 2021-01-28 ENCOUNTER — NURSE TRIAGE (OUTPATIENT)
Dept: ADMINISTRATIVE | Facility: CLINIC | Age: 56
End: 2021-01-28

## 2021-03-25 ENCOUNTER — PATIENT OUTREACH (OUTPATIENT)
Dept: ADMINISTRATIVE | Facility: HOSPITAL | Age: 56
End: 2021-03-25

## 2021-03-25 DIAGNOSIS — Z12.31 VISIT FOR SCREENING MAMMOGRAM: Primary | ICD-10-CM

## 2021-04-02 NOTE — TELEPHONE ENCOUNTER
Patient states she is doing much better.   Recent dog bite has drainage,  Sutures removed , skin is slightly open will be apply steri strips and calling provider for a follow  Reason for Disposition  • [1] Taking antibiotic AND [2] infected bite wound looks improved AND [3] no fever    Additional Information  • Negative: [1] Major bleeding (e.g., actively dripping or spurting) AND [2] can't be stopped  • Negative: Sounds like a life-threatening emergency to the triager  • Negative: Snake bite  • Negative: Bite, wound, or sting from fish  • Negative: [1] Any break in skin from BITE (e.g., cut, puncture or scratch) AND[2] WILD animal at risk for RABIES (e.g., bat, raccoon, brady, skunk, coyote, other carnivores)  • Negative: [1] Any break in skin from BITE (e.g., cut, puncture or scratch) AND[2] PET animial (e.g., dog, cat, or ferret) at risk for RABIES (e.g., sick, stray, unprovoked bite, developing country)  • Negative: [1] Any break in skin from BITE (e.g., cut, puncture or scratch) AND[2] monkey  • Negative: [1] EXPOSURE of non-intact skin (e.g., exposed person has dermatitis, abrasion, wound) AND[2] with animal BODY FLUID (e.g., saliva such as licking, blood, brain) AND[3] animal at high-risk for RABIES (e.g., bat, raccoon, brady, skunk, coyote, other carnivores)  • Negative: [1] Cut (length > 1/8 inch or 3 mm) or skin tear AND [2] any animal  • Negative: [1] Bleeding AND [2] won't stop after 10 minutes of direct pressure (using correct technique)  • Negative: Description of bite sounds severe to the triager  • Negative: [1] Puncture wound (hole through the skin) AND [2] from a cat bite (or deep claw puncture wound)  • Negative: [1] Puncture wound or small cut AND [2] on face  • Negative: [1] Puncture wound or small cut AND [2] on hands or genitals  • Negative: [1] Puncture wound or small cut AND [2] weak immune system (e.g., HIV positive, cancer chemo, splenectomy, organ transplant, chronic steroids)  • Negative: Looks infected (red area, red streak, or  "pus)  • Negative: [1] No bite roland or scratch AND [2] suspected bat exposure (e.g., bat found in same room as sleeping adult)  • Negative: [1] Taking antibiotic > 24 hours for infected bite AND [2] bite getting worse (more swollen, more redness and increased pain)  • Negative: [1] Taking antibiotic > 48 hours (2 days) for infected bite AND [2] fever persists  • Negative: [1] Taking antibiotic > 72 hours (3 days) for infected bite AND [2] has not improved (i.e., pain, pus, redness)  • Negative: [1] No prior tetanus shots (or is not fully vaccinated) AND [2] any wound (e.g., cut, scrape)  • Negative: [1] Last tetanus shot > 5 years ago AND [2] any wound (e.g., cut, scrape)  • Negative: [1] Taking antibiotic < 48 hours for infected bite AND [2] fever persists  • Negative: [1] Taking antibiotic < 72 hours (3 days) for infected bite AND [2] has not improved  • Negative: Cut that's too small to irrigate (<1/8 inch or 3 mm)  • Negative: Small puncture wound (e.g., from gerbil, mouse, hamster, puppy, turtle)  • Negative: Superficial scratch that didn't go through the dermis  • Negative: [1] Turtle bite AND [2] minor break in the skin  • Negative: Bite that didn't break the skin  • Negative: Rabies risk and reporting animal bites to animal control, questions about    Answer Assessment - Initial Assessment Questions  1. ANIMAL: \"What type of animal caused the bite?\" \"Is the injury from a bite or a claw?\" If the animal is a dog or a cat, ask: \"Was it a pet or a stray?\" \"Was it acting ill or behaving strangely?\"     bite  2. LOCATION: \"Where is the bite located?\"   Right ankle  3. SIZE: \"How big is the bite?\" \"What does it look like?\"    has been sutured  4. ONSET: \"When did the bite happen?\" (Minutes or hours ago)    03/18  5. CIRCUMSTANCES: \"Tell me how this happened.\"    unknown  6. TETANUS: \"When was the last tetanus booster?\"    yes. PREGNANCY: \"Is there any chance you are pregnant?\" \"When was your last menstrual " "period?\"  na    Protocols used: ANIMAL BITE-ADULT-AH      "

## 2021-04-05 ENCOUNTER — PATIENT MESSAGE (OUTPATIENT)
Dept: ADMINISTRATIVE | Facility: HOSPITAL | Age: 56
End: 2021-04-05

## 2021-04-16 ENCOUNTER — OFFICE VISIT (OUTPATIENT)
Dept: FAMILY MEDICINE | Facility: CLINIC | Age: 56
End: 2021-04-16
Payer: OTHER GOVERNMENT

## 2021-04-16 VITALS
SYSTOLIC BLOOD PRESSURE: 116 MMHG | HEART RATE: 74 BPM | HEIGHT: 66 IN | WEIGHT: 238.56 LBS | OXYGEN SATURATION: 97 % | DIASTOLIC BLOOD PRESSURE: 70 MMHG | BODY MASS INDEX: 38.34 KG/M2 | TEMPERATURE: 98 F

## 2021-04-16 DIAGNOSIS — E11.9 TYPE 2 DIABETES MELLITUS WITHOUT COMPLICATION, WITHOUT LONG-TERM CURRENT USE OF INSULIN: Primary | ICD-10-CM

## 2021-04-16 DIAGNOSIS — Z00.00 ENCOUNTER FOR BLOOD TEST FOR ROUTINE GENERAL PHYSICAL EXAMINATION: ICD-10-CM

## 2021-04-16 DIAGNOSIS — Z13.0 SCREENING FOR DEFICIENCY ANEMIA: ICD-10-CM

## 2021-04-16 DIAGNOSIS — E11.59 HYPERTENSION ASSOCIATED WITH DIABETES: ICD-10-CM

## 2021-04-16 DIAGNOSIS — E66.01 CLASS 2 SEVERE OBESITY WITH SERIOUS COMORBIDITY AND BODY MASS INDEX (BMI) OF 38.0 TO 38.9 IN ADULT, UNSPECIFIED OBESITY TYPE: ICD-10-CM

## 2021-04-16 DIAGNOSIS — I15.2 HYPERTENSION ASSOCIATED WITH DIABETES: ICD-10-CM

## 2021-04-16 DIAGNOSIS — F41.9 ANXIETY: ICD-10-CM

## 2021-04-16 DIAGNOSIS — Z13.29 THYROID DISORDER SCREEN: ICD-10-CM

## 2021-04-16 DIAGNOSIS — Z86.69 HISTORY OF GUILLAIN-BARRE SYNDROME: ICD-10-CM

## 2021-04-16 DIAGNOSIS — Z13.220 SCREENING CHOLESTEROL LEVEL: ICD-10-CM

## 2021-04-16 PROCEDURE — 99999 PR PBB SHADOW E&M-EST. PATIENT-LVL III: ICD-10-PCS | Mod: PBBFAC,,, | Performed by: NURSE PRACTITIONER

## 2021-04-16 PROCEDURE — 99213 OFFICE O/P EST LOW 20 MIN: CPT | Mod: PBBFAC,PN | Performed by: NURSE PRACTITIONER

## 2021-04-16 PROCEDURE — 99214 OFFICE O/P EST MOD 30 MIN: CPT | Mod: S$PBB,,, | Performed by: NURSE PRACTITIONER

## 2021-04-16 PROCEDURE — 99214 PR OFFICE/OUTPT VISIT, EST, LEVL IV, 30-39 MIN: ICD-10-PCS | Mod: S$PBB,,, | Performed by: NURSE PRACTITIONER

## 2021-04-16 PROCEDURE — 99999 PR PBB SHADOW E&M-EST. PATIENT-LVL III: CPT | Mod: PBBFAC,,, | Performed by: NURSE PRACTITIONER

## 2021-04-16 RX ORDER — LISINOPRIL 10 MG/1
10 TABLET ORAL DAILY
Qty: 90 TABLET | Refills: 1 | Status: SHIPPED | OUTPATIENT
Start: 2021-04-16 | End: 2021-10-19 | Stop reason: SDUPTHER

## 2021-04-16 RX ORDER — ESCITALOPRAM OXALATE 10 MG/1
10 TABLET ORAL DAILY
Qty: 90 TABLET | Refills: 1 | Status: SHIPPED | OUTPATIENT
Start: 2021-04-16 | End: 2021-10-19 | Stop reason: SDUPTHER

## 2021-06-16 ENCOUNTER — CLINICAL SUPPORT (OUTPATIENT)
Dept: OTHER | Facility: CLINIC | Age: 56
End: 2021-06-16

## 2021-06-16 DIAGNOSIS — Z00.8 ENCOUNTER FOR OTHER GENERAL EXAMINATION: ICD-10-CM

## 2021-06-28 ENCOUNTER — OFFICE VISIT (OUTPATIENT)
Dept: FAMILY MEDICINE | Facility: CLINIC | Age: 56
End: 2021-06-28
Payer: OTHER GOVERNMENT

## 2021-06-28 VITALS
HEART RATE: 69 BPM | BODY MASS INDEX: 39.97 KG/M2 | TEMPERATURE: 98 F | OXYGEN SATURATION: 98 % | DIASTOLIC BLOOD PRESSURE: 86 MMHG | RESPIRATION RATE: 18 BRPM | HEIGHT: 66 IN | WEIGHT: 248.69 LBS | SYSTOLIC BLOOD PRESSURE: 132 MMHG

## 2021-06-28 DIAGNOSIS — M25.561 PAIN AND SWELLING OF RIGHT KNEE: ICD-10-CM

## 2021-06-28 DIAGNOSIS — M25.461 PAIN AND SWELLING OF RIGHT KNEE: ICD-10-CM
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
## 2021-06-28 DIAGNOSIS — S89.91XA INJURY OF RIGHT KNEE, INITIAL ENCOUNTER: Primary | ICD-10-CM

## 2021-06-28 PROCEDURE — 99999 PR PBB SHADOW E&M-EST. PATIENT-LVL V: ICD-10-PCS | Mod: PBBFAC,,, | Performed by: NURSE PRACTITIONER

## 2021-06-28 PROCEDURE — 99215 OFFICE O/P EST HI 40 MIN: CPT | Mod: PBBFAC,PN | Performed by: NURSE PRACTITIONER

## 2021-06-28 PROCEDURE — 99214 OFFICE O/P EST MOD 30 MIN: CPT | Mod: S$PBB,,, | Performed by: NURSE PRACTITIONER

## 2021-06-28 PROCEDURE — 99214 PR OFFICE/OUTPT VISIT, EST, LEVL IV, 30-39 MIN: ICD-10-PCS | Mod: S$PBB,,, | Performed by: NURSE PRACTITIONER

## 2021-06-28 PROCEDURE — 99999 PR PBB SHADOW E&M-EST. PATIENT-LVL V: CPT | Mod: PBBFAC,,, | Performed by: NURSE PRACTITIONER

## 2021-06-29 ENCOUNTER — HOSPITAL ENCOUNTER (OUTPATIENT)
Dept: RADIOLOGY | Facility: HOSPITAL | Age: 56
Discharge: HOME | End: 2021-06-29
Attending: PHYSICIAN ASSISTANT
Payer: OTHER GOVERNMENT

## 2021-06-29 ENCOUNTER — OFFICE VISIT (OUTPATIENT)
Dept: ORTHOPEDICS | Facility: CLINIC | Age: 56
End: 2021-06-29
Payer: OTHER GOVERNMENT

## 2021-06-29 VITALS
DIASTOLIC BLOOD PRESSURE: 98 MMHG | HEIGHT: 63 IN | BODY MASS INDEX: 44.32 KG/M2 | SYSTOLIC BLOOD PRESSURE: 168 MMHG | HEART RATE: 102 BPM | WEIGHT: 250.13 LBS

## 2021-06-29 DIAGNOSIS — S80.01XA CONTUSION OF RIGHT KNEE, INITIAL ENCOUNTER: Primary | ICD-10-CM

## 2021-06-29 DIAGNOSIS — M25.561 RIGHT KNEE PAIN, UNSPECIFIED CHRONICITY: ICD-10-CM

## 2021-06-29 DIAGNOSIS — M25.561 RIGHT KNEE PAIN, UNSPECIFIED CHRONICITY: Primary | ICD-10-CM

## 2021-06-29 DIAGNOSIS — S89.91XA INJURY OF RIGHT KNEE, INITIAL ENCOUNTER: ICD-10-CM

## 2021-06-29 PROCEDURE — 99203 PR OFFICE/OUTPT VISIT, NEW, LEVL III, 30-44 MIN: ICD-10-PCS | Mod: S$PBB,,, | Performed by: PHYSICIAN ASSISTANT

## 2021-06-29 PROCEDURE — 73564 XR KNEE COMP 4 OR MORE VIEWS RIGHT: ICD-10-PCS | Mod: 26,RT,, | Performed by: RADIOLOGY

## 2021-06-29 PROCEDURE — 99213 OFFICE O/P EST LOW 20 MIN: CPT | Mod: PBBFAC,25,PN | Performed by: PHYSICIAN ASSISTANT

## 2021-06-29 PROCEDURE — 99203 OFFICE O/P NEW LOW 30 MIN: CPT | Mod: S$PBB,,, | Performed by: PHYSICIAN ASSISTANT

## 2021-06-29 PROCEDURE — 73564 X-RAY EXAM KNEE 4 OR MORE: CPT | Mod: TC,FY,RT

## 2021-06-29 PROCEDURE — 73564 X-RAY EXAM KNEE 4 OR MORE: CPT | Mod: 26,RT,, | Performed by: RADIOLOGY

## 2021-06-29 PROCEDURE — 99999 PR PBB SHADOW E&M-EST. PATIENT-LVL III: CPT | Mod: PBBFAC,,, | Performed by: PHYSICIAN ASSISTANT

## 2021-06-29 PROCEDURE — 99999 PR PBB SHADOW E&M-EST. PATIENT-LVL III: ICD-10-PCS | Mod: PBBFAC,,, | Performed by: PHYSICIAN ASSISTANT

## 2021-07-06 VITALS — HEIGHT: 63 IN | BODY MASS INDEX: 42.26 KG/M2

## 2021-07-06 LAB
HDLC SERPL-MCNC: 68 MG/DL
POC CHOLESTEROL, LDL (DOCK): 81 MG/DL
POC CHOLESTEROL, TOTAL: 173 MG/DL
POC GLUCOSE, FASTING: 97 MG/DL (ref 60–110)
TRIGL SERPL-MCNC: 118 MG/DL

## 2021-07-07 ENCOUNTER — PATIENT MESSAGE (OUTPATIENT)
Dept: ADMINISTRATIVE | Facility: HOSPITAL | Age: 56
End: 2021-07-07

## 2021-10-05 ENCOUNTER — PATIENT MESSAGE (OUTPATIENT)
Dept: ADMINISTRATIVE | Facility: HOSPITAL | Age: 56
End: 2021-10-05

## 2021-10-19 ENCOUNTER — OFFICE VISIT (OUTPATIENT)
Dept: FAMILY MEDICINE | Facility: CLINIC | Age: 56
End: 2021-10-19
Payer: OTHER GOVERNMENT

## 2021-10-19 VITALS
SYSTOLIC BLOOD PRESSURE: 130 MMHG | BODY MASS INDEX: 46.78 KG/M2 | HEART RATE: 93 BPM | WEIGHT: 264 LBS | OXYGEN SATURATION: 99 % | HEIGHT: 63 IN | RESPIRATION RATE: 18 BRPM | TEMPERATURE: 98 F | DIASTOLIC BLOOD PRESSURE: 80 MMHG

## 2021-10-19 DIAGNOSIS — Z00.00 ANNUAL PHYSICAL EXAM: Primary | ICD-10-CM

## 2021-10-19 DIAGNOSIS — E11.59 HYPERTENSION ASSOCIATED WITH DIABETES: ICD-10-CM

## 2021-10-19 DIAGNOSIS — E66.01 CLASS 3 SEVERE OBESITY DUE TO EXCESS CALORIES WITH SERIOUS COMORBIDITY AND BODY MASS INDEX (BMI) OF 45.0 TO 49.9 IN ADULT: ICD-10-CM

## 2021-10-19 DIAGNOSIS — Z13.31 POSITIVE DEPRESSION SCREENING: ICD-10-CM

## 2021-10-19 DIAGNOSIS — F41.9 ANXIETY: ICD-10-CM

## 2021-10-19 DIAGNOSIS — F32.9 REACTIVE DEPRESSION: ICD-10-CM

## 2021-10-19 DIAGNOSIS — Z98.84 PERSONAL HISTORY OF GASTRIC BYPASS: ICD-10-CM

## 2021-10-19 DIAGNOSIS — I15.2 HYPERTENSION ASSOCIATED WITH DIABETES: ICD-10-CM

## 2021-10-19 DIAGNOSIS — Z86.69 HISTORY OF GUILLAIN-BARRE SYNDROME: ICD-10-CM

## 2021-10-19 DIAGNOSIS — E11.9 TYPE 2 DIABETES MELLITUS WITHOUT COMPLICATION, WITHOUT LONG-TERM CURRENT USE OF INSULIN: ICD-10-CM

## 2021-10-19 PROCEDURE — 99396 PR PREVENTIVE VISIT,EST,40-64: ICD-10-PCS | Mod: S$PBB,,, | Performed by: NURSE PRACTITIONER

## 2021-10-19 PROCEDURE — 99396 PREV VISIT EST AGE 40-64: CPT | Mod: S$PBB,,, | Performed by: NURSE PRACTITIONER

## 2021-10-19 PROCEDURE — 99999 PR PBB SHADOW E&M-EST. PATIENT-LVL IV: ICD-10-PCS | Mod: PBBFAC,,, | Performed by: NURSE PRACTITIONER

## 2021-10-19 PROCEDURE — 82570 ASSAY OF URINE CREATININE: CPT | Performed by: NURSE PRACTITIONER

## 2021-10-19 PROCEDURE — 99214 OFFICE O/P EST MOD 30 MIN: CPT | Mod: PBBFAC,PN | Performed by: NURSE PRACTITIONER

## 2021-10-19 PROCEDURE — 99999 PR PBB SHADOW E&M-EST. PATIENT-LVL IV: CPT | Mod: PBBFAC,,, | Performed by: NURSE PRACTITIONER

## 2021-10-19 RX ORDER — ESCITALOPRAM OXALATE 10 MG/1
10 TABLET ORAL DAILY
Qty: 90 TABLET | Refills: 1 | Status: SHIPPED | OUTPATIENT
Start: 2021-10-19 | End: 2022-04-19 | Stop reason: SDUPTHER

## 2021-10-19 RX ORDER — LISINOPRIL 10 MG/1
10 TABLET ORAL DAILY
Qty: 90 TABLET | Refills: 1 | Status: SHIPPED | OUTPATIENT
Start: 2021-10-19 | End: 2022-04-19 | Stop reason: DRUGHIGH

## 2021-10-20 LAB
ALBUMIN/CREAT UR: 25.6 UG/MG (ref 0–30)
CREAT UR-MCNC: 43 MG/DL (ref 15–325)
MICROALBUMIN UR DL<=1MG/L-MCNC: 11 UG/ML

## 2021-11-12 ENCOUNTER — PATIENT OUTREACH (OUTPATIENT)
Dept: ADMINISTRATIVE | Facility: OTHER | Age: 56
End: 2021-11-12
Payer: OTHER GOVERNMENT

## 2021-11-15 ENCOUNTER — OFFICE VISIT (OUTPATIENT)
Dept: OPTOMETRY | Facility: CLINIC | Age: 56
End: 2021-11-15
Payer: OTHER GOVERNMENT

## 2021-11-15 DIAGNOSIS — H52.13 MYOPIA OF BOTH EYES WITH REGULAR ASTIGMATISM AND PRESBYOPIA: ICD-10-CM

## 2021-11-15 DIAGNOSIS — H52.223 MYOPIA OF BOTH EYES WITH REGULAR ASTIGMATISM AND PRESBYOPIA: ICD-10-CM

## 2021-11-15 DIAGNOSIS — H25.13 NUCLEAR SCLEROSIS, BILATERAL: ICD-10-CM

## 2021-11-15 DIAGNOSIS — H52.4 MYOPIA OF BOTH EYES WITH REGULAR ASTIGMATISM AND PRESBYOPIA: ICD-10-CM

## 2021-11-15 DIAGNOSIS — E11.9 TYPE 2 DIABETES MELLITUS WITHOUT COMPLICATION, WITHOUT LONG-TERM CURRENT USE OF INSULIN: Primary | ICD-10-CM

## 2021-11-15 PROCEDURE — 99999 PR PBB SHADOW E&M-EST. PATIENT-LVL III: ICD-10-PCS | Mod: PBBFAC,,, | Performed by: OPTOMETRIST

## 2021-11-15 PROCEDURE — 92004 PR EYE EXAM, NEW PATIENT,COMPREHESV: ICD-10-PCS | Mod: S$PBB,,, | Performed by: OPTOMETRIST

## 2021-11-15 PROCEDURE — 99213 OFFICE O/P EST LOW 20 MIN: CPT | Mod: PBBFAC | Performed by: OPTOMETRIST

## 2021-11-15 PROCEDURE — 99999 PR PBB SHADOW E&M-EST. PATIENT-LVL III: CPT | Mod: PBBFAC,,, | Performed by: OPTOMETRIST

## 2021-11-15 PROCEDURE — 92015 PR REFRACTION: ICD-10-PCS | Mod: ,,, | Performed by: OPTOMETRIST

## 2021-11-15 PROCEDURE — 92015 DETERMINE REFRACTIVE STATE: CPT | Mod: ,,, | Performed by: OPTOMETRIST

## 2021-11-15 PROCEDURE — 92004 COMPRE OPH EXAM NEW PT 1/>: CPT | Mod: S$PBB,,, | Performed by: OPTOMETRIST

## 2021-12-15 ENCOUNTER — OFFICE VISIT (OUTPATIENT)
Dept: URGENT CARE | Facility: CLINIC | Age: 56
End: 2021-12-15
Payer: OTHER GOVERNMENT

## 2021-12-15 VITALS
DIASTOLIC BLOOD PRESSURE: 65 MMHG | HEART RATE: 76 BPM | WEIGHT: 264 LBS | RESPIRATION RATE: 18 BRPM | BODY MASS INDEX: 46.78 KG/M2 | OXYGEN SATURATION: 100 % | TEMPERATURE: 98 F | SYSTOLIC BLOOD PRESSURE: 143 MMHG | HEIGHT: 63 IN

## 2021-12-15 DIAGNOSIS — U07.1 COVID-19 VIRUS INFECTION: Primary | ICD-10-CM

## 2021-12-15 LAB
CTP QC/QA: YES
SARS-COV-2 RDRP RESP QL NAA+PROBE: POSITIVE

## 2021-12-15 PROCEDURE — 99213 PR OFFICE/OUTPT VISIT, EST, LEVL III, 20-29 MIN: ICD-10-PCS | Mod: S$GLB,,, | Performed by: NURSE PRACTITIONER

## 2021-12-15 PROCEDURE — U0002: ICD-10-PCS | Mod: QW,S$GLB,, | Performed by: NURSE PRACTITIONER

## 2021-12-15 PROCEDURE — 99213 OFFICE O/P EST LOW 20 MIN: CPT | Mod: S$GLB,,, | Performed by: NURSE PRACTITIONER

## 2021-12-15 PROCEDURE — U0002 COVID-19 LAB TEST NON-CDC: HCPCS | Mod: QW,S$GLB,, | Performed by: NURSE PRACTITIONER

## 2021-12-17 ENCOUNTER — INFUSION (OUTPATIENT)
Dept: INFECTIOUS DISEASES | Facility: HOSPITAL | Age: 56
End: 2021-12-17
Payer: OTHER GOVERNMENT

## 2021-12-17 VITALS
HEART RATE: 74 BPM | DIASTOLIC BLOOD PRESSURE: 81 MMHG | RESPIRATION RATE: 16 BRPM | TEMPERATURE: 98 F | BODY MASS INDEX: 46.07 KG/M2 | OXYGEN SATURATION: 98 % | SYSTOLIC BLOOD PRESSURE: 155 MMHG | HEIGHT: 63 IN | WEIGHT: 260 LBS

## 2021-12-17 DIAGNOSIS — U07.1 COVID-19: Primary | ICD-10-CM

## 2021-12-17 PROCEDURE — 25000003 PHARM REV CODE 250: Performed by: INTERNAL MEDICINE

## 2021-12-17 PROCEDURE — M0243 CASIRIVI AND IMDEVI INFUSION: HCPCS | Performed by: INTERNAL MEDICINE

## 2021-12-17 PROCEDURE — 63600175 PHARM REV CODE 636 W HCPCS: Performed by: INTERNAL MEDICINE

## 2021-12-17 RX ORDER — EPINEPHRINE 0.3 MG/.3ML
0.3 INJECTION SUBCUTANEOUS
Status: DISCONTINUED | OUTPATIENT
Start: 2021-12-17 | End: 2022-04-19

## 2021-12-17 RX ORDER — ACETAMINOPHEN 325 MG/1
650 TABLET ORAL ONCE AS NEEDED
Status: DISCONTINUED | OUTPATIENT
Start: 2021-12-17 | End: 2022-04-19

## 2021-12-17 RX ORDER — SODIUM CHLORIDE 0.9 % (FLUSH) 0.9 %
10 SYRINGE (ML) INJECTION
Status: DISCONTINUED | OUTPATIENT
Start: 2021-12-17 | End: 2022-04-19

## 2021-12-17 RX ORDER — DIPHENHYDRAMINE HYDROCHLORIDE 50 MG/ML
25 INJECTION INTRAMUSCULAR; INTRAVENOUS ONCE AS NEEDED
Status: DISCONTINUED | OUTPATIENT
Start: 2021-12-17 | End: 2022-04-19

## 2021-12-17 RX ORDER — ALBUTEROL SULFATE 90 UG/1
2 AEROSOL, METERED RESPIRATORY (INHALATION)
Status: DISCONTINUED | OUTPATIENT
Start: 2021-12-17 | End: 2022-04-19

## 2021-12-17 RX ORDER — ONDANSETRON 4 MG/1
4 TABLET, ORALLY DISINTEGRATING ORAL ONCE AS NEEDED
Status: DISCONTINUED | OUTPATIENT
Start: 2021-12-17 | End: 2022-04-19

## 2021-12-17 RX ADMIN — CASIRIVIMAB AND IMDEVIMAB 600 MG: 600; 600 INJECTION, SOLUTION, CONCENTRATE INTRAVENOUS at 07:12

## 2022-04-19 ENCOUNTER — OFFICE VISIT (OUTPATIENT)
Dept: FAMILY MEDICINE | Facility: CLINIC | Age: 57
End: 2022-04-19
Payer: OTHER GOVERNMENT

## 2022-04-19 VITALS
DIASTOLIC BLOOD PRESSURE: 88 MMHG | BODY MASS INDEX: 51.1 KG/M2 | SYSTOLIC BLOOD PRESSURE: 136 MMHG | TEMPERATURE: 98 F | WEIGHT: 288.38 LBS | HEIGHT: 63 IN | HEART RATE: 91 BPM | OXYGEN SATURATION: 98 %

## 2022-04-19 DIAGNOSIS — Z12.31 ENCOUNTER FOR SCREENING MAMMOGRAM FOR MALIGNANT NEOPLASM OF BREAST: ICD-10-CM

## 2022-04-19 DIAGNOSIS — E11.9 TYPE 2 DIABETES MELLITUS WITHOUT COMPLICATION, WITHOUT LONG-TERM CURRENT USE OF INSULIN: Primary | ICD-10-CM

## 2022-04-19 DIAGNOSIS — I15.2 HYPERTENSION ASSOCIATED WITH DIABETES: ICD-10-CM

## 2022-04-19 DIAGNOSIS — F41.9 ANXIETY: ICD-10-CM

## 2022-04-19 DIAGNOSIS — Z86.69 HISTORY OF GUILLAIN-BARRE SYNDROME: ICD-10-CM

## 2022-04-19 DIAGNOSIS — Z98.84 PERSONAL HISTORY OF GASTRIC BYPASS: ICD-10-CM

## 2022-04-19 DIAGNOSIS — E11.59 HYPERTENSION ASSOCIATED WITH DIABETES: ICD-10-CM

## 2022-04-19 DIAGNOSIS — E66.01 CLASS 3 SEVERE OBESITY DUE TO EXCESS CALORIES WITH SERIOUS COMORBIDITY AND BODY MASS INDEX (BMI) OF 50.0 TO 59.9 IN ADULT: ICD-10-CM

## 2022-04-19 PROCEDURE — 99999 PR PBB SHADOW E&M-EST. PATIENT-LVL III: CPT | Mod: PBBFAC,,, | Performed by: NURSE PRACTITIONER

## 2022-04-19 PROCEDURE — 99213 OFFICE O/P EST LOW 20 MIN: CPT | Mod: PBBFAC,PN | Performed by: NURSE PRACTITIONER

## 2022-04-19 PROCEDURE — 99999 PR PBB SHADOW E&M-EST. PATIENT-LVL III: ICD-10-PCS | Mod: PBBFAC,,, | Performed by: NURSE PRACTITIONER

## 2022-04-19 PROCEDURE — 99214 PR OFFICE/OUTPT VISIT, EST, LEVL IV, 30-39 MIN: ICD-10-PCS | Mod: S$PBB,,, | Performed by: NURSE PRACTITIONER

## 2022-04-19 PROCEDURE — 99214 OFFICE O/P EST MOD 30 MIN: CPT | Mod: S$PBB,,, | Performed by: NURSE PRACTITIONER

## 2022-04-19 RX ORDER — ESCITALOPRAM OXALATE 10 MG/1
10 TABLET ORAL DAILY
Qty: 90 TABLET | Refills: 0 | Status: SHIPPED | OUTPATIENT
Start: 2022-04-19 | End: 2022-07-18

## 2022-04-19 RX ORDER — LISINOPRIL 20 MG/1
20 TABLET ORAL DAILY
Qty: 90 TABLET | Refills: 0 | Status: SHIPPED | OUTPATIENT
Start: 2022-04-19 | End: 2022-07-18

## 2022-04-19 NOTE — PROGRESS NOTES
"Subjective:       Patient ID: Kalina Ryan is a 57 y.o. female.    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     TYPE 2 DIABETES   diagnosed September 2019 with  & hemoglobin A1c 6.5%.    Patient has been working on lifestyle modifications.    She has always been DIET CONTROLLED.     and A1C now 6.6% - big jump from A1C 6.0% in October 2021 - will work on diet and recheck in 3 months.    Hypertension   Uncontrolled on lisinopril 10 mg daily.   /88   Pulse 91   Temp 98.4 °F (36.9 °C) (Temporal)   Ht 5' 3" (1.6 m)   Wt 130.8 kg (288 lb 5.8 oz)   SpO2 98%   BMI 51.08 kg/m²   Will increase dose to 20 mg daily and recheck in 3 months.  Goal < 130/80    Morbid Obesity  Body mass index is 51.08 kg/m².  personal history of gastric bypass in 2009      Anxiety   controlled on Lexapro 10 mg daily.       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  She reports she was completely paralyzed but did not require intubation. She reports residual generalized weakness and neuropathy to her hands and feet.  She reports an occasional foot drop.  She is able to ambulate without difficulty and without assistance. She has chronic decreased sensation and tingling sensation to the toes of both feet.     Personal history of gastric bypass in 2009.        Component      Latest Ref Rng & Units 4/14/2022 10/12/2021 4/5/2021 9/25/2020   Sodium      136 - 145 mmol/L 144 141 142 143   Potassium      3.5 - 5.1 mmol/L 4.8 5.2 (H) 5.1 5.0   Chloride      95 - 110 mmol/L 110 108 109 107   CO2      23 - 29 " mmol/L 28 28 27 28   Glucose      70 - 110 mg/dL 133 (H) 104 93 104   BUN      7 - 17 mg/dL 21 (H) 27 (H) 26 (H) 33 (H)   Creatinine      0.50 - 1.40 mg/dL 0.87 0.89 0.91 0.88   Calcium      8.7 - 10.5 mg/dL 8.8 9.5 9.3 9.3   PROTEIN TOTAL      6.0 - 8.4 g/dL 6.9 7.0 6.6 7.2   Albumin      3.5 - 5.2 g/dL 3.8 4.0 3.6 4.1   BILIRUBIN TOTAL      0.1 - 1.0 mg/dL 0.3 0.4 0.3 0.3   Alkaline Phosphatase      38 - 126 U/L 139 (H) 110 90 89   AST      15 - 46 U/L 25 28 28 26   ALT      10 - 44 U/L 20 20 16 18   Anion Gap      8 - 16 mmol/L 6 (L) 5 (L) 6 (L) 8   eGFR if African American      >60 mL/min/1.73 m:2 >60.0 >60.0 >60.0 >60.0   eGFR if non African American      >60 mL/min/1.73 m:2 >60.0 >60.0 >60.0 >60.0   Hemoglobin A1C External      4.0 - 5.6 % 6.6 (H) 6.0 (H) 5.6 5.5   Estimated Avg Glucose      68 - 131 mg/dL 143 (H) 126 114 111        Review of Systems   Constitutional: Negative for appetite change, chills, fatigue, fever and unexpected weight change.   HENT: Negative for congestion, ear pain, mouth sores, nosebleeds, postnasal drip, rhinorrhea, sinus pressure, sneezing, sore throat, trouble swallowing and voice change.    Eyes: Negative for photophobia, pain, discharge, redness, itching and visual disturbance.   Respiratory: Negative for cough, chest tightness and shortness of breath.    Cardiovascular: Negative for chest pain, palpitations and leg swelling.   Gastrointestinal: Negative for abdominal pain, blood in stool, constipation, diarrhea, nausea and vomiting.   Genitourinary: Negative for dysuria, frequency, hematuria and urgency.   Musculoskeletal: Negative for arthralgias, back pain, joint swelling and myalgias.   Skin: Negative for color change and rash.   Allergic/Immunologic: Negative for immunocompromised state.   Neurological: Negative for dizziness, seizures, syncope, weakness and headaches.   Hematological: Negative for adenopathy. Does not bruise/bleed easily.   Psychiatric/Behavioral: Negative  "for agitation, dysphoric mood, sleep disturbance and suicidal ideas. The patient is not nervous/anxious.          Objective:     Vitals:    04/19/22 1613   BP: 136/88   Pulse: 91   Temp: 98.4 °F (36.9 °C)   TempSrc: Temporal   SpO2: 98%   Weight: 130.8 kg (288 lb 5.8 oz)   Height: 5' 3" (1.6 m)          Physical Exam  Constitutional:       General: She is not in acute distress.     Appearance: She is well-developed. She is obese. She is not ill-appearing, toxic-appearing or diaphoretic.      Comments: +obesity. Body mass index is 51.08 kg/m².           HENT:      Head: Normocephalic and atraumatic.   Eyes:      General: No scleral icterus.        Right eye: No discharge.         Left eye: No discharge.      Extraocular Movements: Extraocular movements intact.      Conjunctiva/sclera: Conjunctivae normal.   Neck:      Thyroid: No thyromegaly.      Trachea: No tracheal deviation.   Cardiovascular:      Rate and Rhythm: Normal rate and regular rhythm.      Heart sounds: Normal heart sounds. No murmur heard.  Pulmonary:      Effort: Pulmonary effort is normal. No respiratory distress.      Breath sounds: Normal breath sounds. No stridor. No wheezing, rhonchi or rales.   Abdominal:      General: There is no distension.   Musculoskeletal:         General: No deformity. Normal range of motion.      Cervical back: Normal range of motion and neck supple.      Right lower leg: No edema.      Left lower leg: No edema.   Feet:      Comments: Chronic numbness/tingling with decreased sensation to the toes of bilateral feet from Herlinda [place] syndrome in 2009.   Lymphadenopathy:      Cervical: No cervical adenopathy.   Skin:     General: Skin is warm and dry.      Findings: No rash.   Neurological:      Mental Status: She is alert and oriented to person, place, and time.      Cranial Nerves: No cranial nerve deficit.      Coordination: Coordination normal.   Psychiatric:         Mood and Affect: Mood normal.         Behavior: " Behavior normal.         Thought Content: Thought content normal.         Judgment: Judgment normal.           Assessment:         ICD-10-CM ICD-9-CM   1. Type 2 diabetes mellitus without complication, without long-term current use of insulin  E11.9 250.00   2. Hypertension associated with diabetes  E11.59 250.80    I15.2 401.9   3. Class 3 severe obesity due to excess calories with serious comorbidity and body mass index (BMI) of 50.0 to 59.9 in adult  E66.01 278.01    Z68.43 V85.43   4. Anxiety  F41.9 300.00   5. Personal history of gastric bypass  Z98.84 V45.86   6. History of Guillain-[place] syndrome  Z86.69 V12.49   7. Encounter for screening mammogram for malignant neoplasm of breast  Z12.31 V76.12       Plan:       Type 2 diabetes mellitus without complication, without long-term current use of insulin  -  STRICT lifestyle modifications- recheck in 3 months.  -     Comprehensive Metabolic Panel; Future; Expected date: 04/19/2022  -     Hemoglobin A1C; Future; Expected date: 04/19/2022    Hypertension associated with diabetes  - INCREASE Lisinopril to 20 mg daily and recheck in 3 months.    Class 3 severe obesity due to excess calories with serious comorbidity and body mass index (BMI) of 50.0 to 59.9 in adult  Work on weight loss    Anxiety  Continue current medication(s).  Follow up in 3 months.  -     EScitalopram oxalate (LEXAPRO) 10 MG tablet; Take 1 tablet (10 mg total) by mouth once daily.  Dispense: 90 tablet; Refill: 0    Personal history of gastric bypass    History of Guillain-[place] syndrome    Encounter for screening mammogram for malignant neoplasm of breast  -     Mammo Digital Screening Bilat w/ Tristan; Future; Expected date: 04/19/2022    Other orders  -     lisinopriL (PRINIVIL,ZESTRIL) 20 MG tablet; Take 1 tablet (20 mg total) by mouth once daily.  Dispense: 90 tablet; Refill: 0      Follow up in about 3 months (around 7/19/2022), or fasting labs and follow up.     Patient's Medications   New  Prescriptions    LISINOPRIL (PRINIVIL,ZESTRIL) 20 MG TABLET    Take 1 tablet (20 mg total) by mouth once daily.   Previous Medications    No medications on file   Modified Medications    Modified Medication Previous Medication    ESCITALOPRAM OXALATE (LEXAPRO) 10 MG TABLET EScitalopram oxalate (LEXAPRO) 10 MG tablet       Take 1 tablet (10 mg total) by mouth once daily.    Take 1 tablet (10 mg total) by mouth once daily.   Discontinued Medications    LISINOPRIL 10 MG TABLET    Take 1 tablet (10 mg total) by mouth once daily.       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 - diagnosed by Neurologist back in  and last seen by neurology in     Hypertension     New onset type 2 diabetes mellitus 2019    Personal history of gastric bypass 2019       Past Surgical History:   Procedure Laterality Date    CHOLECYSTECTOMY      COLONOSCOPY N/A 2018    Procedure: COLONOSCOPY;  Surgeon: Elpidio Fortune MD;  [facility];  Service: Endoscopy;  Laterality: N/A;    GASTRIC BYPASS      HYSTERECTOMY         Family History   Problem Relation Age of Onset    Heart disease Mother     Hypertension Mother     Diabetes Mother 65            Heart disease Father 60        Massive MI; DEFIB/ CABG    Diabetes Father             Hypertension Father     Dementia Father     Stroke Father     Pancreatic cancer Brother 54    Stomach cancer Brother     Cancer Brother             No Known Problems Daughter     No Known Problems Son     Heart disease Brother 56        maintain with medication - no surgery    Hypertension Brother     Hypertension Brother        Social History     Socioeconomic History    Marital status:    Occupational History    Occupation: work at bank   Tobacco Use    Smoking status: Never Smoker     Smokeless tobacco: Never Used   Substance and Sexual Activity    Alcohol use: Yes     Alcohol/week: 1.0 standard drink     Types: 1 Drinks containing 0.5 oz of alcohol per week     Comment: every other weekend - 1 drink per occasion    Drug use: No    Sexual activity: Not Currently     Partners: Male     Birth control/protection: Partner-Vasectomy     Social Determinants of Health     Financial Resource Strain: Low Risk     Difficulty of Paying Living Expenses: Not very hard   Food Insecurity: No Food Insecurity    Worried About Running Out of Food in the Last Year: Never true    Ran Out of Food in the Last Year: Never true   Transportation Needs: No Transportation Needs    Lack of Transportation (Medical): No    Lack of Transportation (Non-Medical): No   Physical Activity: Insufficiently Active    Days of Exercise per Week: 1 day    Minutes of Exercise per Session: 30 min   Stress: No Stress Concern Present    Feeling of Stress : Only a little   Social Connections: Unknown    Frequency of Communication with Friends and Family: Never    Frequency of Social Gatherings with Friends and Family: Never    Active Member of Clubs or Organizations: No    Attends Club or Organization Meetings: Never    Marital Status:    Housing Stability: Low Risk     Unable to Pay for Housing in the Last Year: No    Number of Places Lived in the Last Year: 1    Unstable Housing in the Last Year: No

## 2022-05-31 ENCOUNTER — PATIENT MESSAGE (OUTPATIENT)
Dept: ADMINISTRATIVE | Facility: HOSPITAL | Age: 57
End: 2022-05-31
Payer: OTHER GOVERNMENT

## 2022-06-21 ENCOUNTER — CLINICAL SUPPORT (OUTPATIENT)
Dept: OTHER | Facility: CLINIC | Age: 57
End: 2022-06-21

## 2022-06-21 DIAGNOSIS — Z00.8 ENCOUNTER FOR OTHER GENERAL EXAMINATION: ICD-10-CM

## 2022-06-22 VITALS
DIASTOLIC BLOOD PRESSURE: 92 MMHG | SYSTOLIC BLOOD PRESSURE: 152 MMHG | WEIGHT: 283 LBS | BODY MASS INDEX: 50.14 KG/M2 | HEIGHT: 63 IN

## 2022-06-22 LAB
HBA1C MFR BLD: 6.4 %
HDLC SERPL-MCNC: 58 MG/DL
POC CHOLESTEROL, LDL (DOCK): 94.38 MG/DL
POC CHOLESTEROL, TOTAL: 181 MG/DL
POC GLUCOSE, FASTING: 109 MG/DL (ref 60–110)
TRIGL SERPL-MCNC: 167 MG/DL

## 2022-07-22 ENCOUNTER — PATIENT MESSAGE (OUTPATIENT)
Dept: FAMILY MEDICINE | Facility: CLINIC | Age: 57
End: 2022-07-22
Payer: OTHER GOVERNMENT

## 2022-07-25 ENCOUNTER — OFFICE VISIT (OUTPATIENT)
Dept: FAMILY MEDICINE | Facility: CLINIC | Age: 57
End: 2022-07-25
Payer: OTHER GOVERNMENT

## 2022-07-25 VITALS
HEART RATE: 90 BPM | HEIGHT: 63 IN | BODY MASS INDEX: 51.87 KG/M2 | DIASTOLIC BLOOD PRESSURE: 76 MMHG | SYSTOLIC BLOOD PRESSURE: 160 MMHG | OXYGEN SATURATION: 98 % | TEMPERATURE: 98 F | WEIGHT: 292.75 LBS

## 2022-07-25 DIAGNOSIS — Z98.84 PERSONAL HISTORY OF GASTRIC BYPASS: ICD-10-CM

## 2022-07-25 DIAGNOSIS — Z13.220 SCREENING CHOLESTEROL LEVEL: ICD-10-CM

## 2022-07-25 DIAGNOSIS — Z13.0 SCREENING FOR DEFICIENCY ANEMIA: ICD-10-CM

## 2022-07-25 DIAGNOSIS — E66.01 CLASS 3 SEVERE OBESITY DUE TO EXCESS CALORIES WITH SERIOUS COMORBIDITY AND BODY MASS INDEX (BMI) OF 50.0 TO 59.9 IN ADULT: ICD-10-CM

## 2022-07-25 DIAGNOSIS — Z13.29 THYROID DISORDER SCREEN: ICD-10-CM

## 2022-07-25 DIAGNOSIS — E11.59 HYPERTENSION ASSOCIATED WITH DIABETES: ICD-10-CM

## 2022-07-25 DIAGNOSIS — F41.9 ANXIETY: ICD-10-CM

## 2022-07-25 DIAGNOSIS — Z23 NEED FOR PNEUMOCOCCAL VACCINATION: ICD-10-CM

## 2022-07-25 DIAGNOSIS — Z00.00 ENCOUNTER FOR BLOOD TEST FOR ROUTINE GENERAL PHYSICAL EXAMINATION: ICD-10-CM

## 2022-07-25 DIAGNOSIS — Z86.69 HISTORY OF GUILLAIN-BARRE SYNDROME: ICD-10-CM

## 2022-07-25 DIAGNOSIS — E11.9 TYPE 2 DIABETES MELLITUS WITHOUT COMPLICATION, WITHOUT LONG-TERM CURRENT USE OF INSULIN: Primary | ICD-10-CM

## 2022-07-25 DIAGNOSIS — I15.2 HYPERTENSION ASSOCIATED WITH DIABETES: ICD-10-CM

## 2022-07-25 PROCEDURE — 99999 PR PBB SHADOW E&M-EST. PATIENT-LVL IV: CPT | Mod: PBBFAC,,, | Performed by: NURSE PRACTITIONER

## 2022-07-25 PROCEDURE — 99214 OFFICE O/P EST MOD 30 MIN: CPT | Mod: PBBFAC,PN | Performed by: NURSE PRACTITIONER

## 2022-07-25 PROCEDURE — 99396 PR PREVENTIVE VISIT,EST,40-64: ICD-10-PCS | Mod: S$PBB,,, | Performed by: NURSE PRACTITIONER

## 2022-07-25 PROCEDURE — 99999 PR PBB SHADOW E&M-EST. PATIENT-LVL IV: ICD-10-PCS | Mod: PBBFAC,,, | Performed by: NURSE PRACTITIONER

## 2022-07-25 PROCEDURE — 99396 PREV VISIT EST AGE 40-64: CPT | Mod: S$PBB,,, | Performed by: NURSE PRACTITIONER

## 2022-07-25 RX ORDER — LISINOPRIL 20 MG/1
20 TABLET ORAL DAILY
Qty: 90 TABLET | Refills: 0 | Status: SHIPPED | OUTPATIENT
Start: 2022-07-25 | End: 2022-10-18 | Stop reason: SDUPTHER

## 2022-07-25 RX ORDER — ESCITALOPRAM OXALATE 10 MG/1
10 TABLET ORAL DAILY
Qty: 90 TABLET | Refills: 0 | Status: SHIPPED | OUTPATIENT
Start: 2022-07-25 | End: 2022-10-18 | Stop reason: SDUPTHER

## 2022-07-25 RX ORDER — AMLODIPINE BESYLATE 5 MG/1
5 TABLET ORAL DAILY
Qty: 30 TABLET | Refills: 0 | Status: SHIPPED | OUTPATIENT
Start: 2022-07-25 | End: 2022-08-22

## 2022-07-25 NOTE — PROGRESS NOTES
"Subjective:       Patient ID: Kalina Ryan is a 57 y.o. female.    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     TYPE 2 DIABETES   diagnosed September 2019 with  & hemoglobin A1c 6.5%.    Patient has been working on lifestyle modifications.    She has always been DIET CONTROLLED.     and A1C now 6.6% - will work on diet and recheck in 3 months.  Declined medication at this time.     Hypertension   Uncontrolled on lisinopril 20 mg daily.   BP (!) 160/76   Pulse 90   Temp 98.1 °F (36.7 °C) (Temporal)   Ht 5' 3" (1.6 m)   Wt 132.8 kg (292 lb 12.3 oz)   SpO2 98%   BMI 51.86 kg/m²   Will add on Amlodipine 5 mg daily.  Recheck in 4 weeks.     Morbid Obesity  Body mass index is 51.86 kg/m².  personal history of gastric bypass in 2009        Anxiety   controlled on Lexapro 10 mg daily.       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  She reports she was completely paralyzed but did not require intubation. She reports residual generalized weakness and neuropathy to her hands and feet.  She reports an occasional foot drop.  She is able to ambulate without difficulty and without assistance. She has chronic decreased sensation and tingling sensation to the toes of both feet.     Personal history of gastric bypass in 2009.       Component      Latest Ref Rng & Units 7/25/2022 4/14/2022 10/12/2021   Sodium      136 - 145 mmol/L 138 144 141   Potassium      3.5 - 5.1 mmol/L 4.2 4.8 5.2 (H)   Chloride      95 - 110 mmol/L 103 110 108   CO2      23 - 29 mmol/L 30 (H) 28 28   Glucose      70 - 110 mg/dL 124 (H) " 133 (H) 104   BUN      7 - 17 mg/dL 23 (H) 21 (H) 27 (H)   Creatinine      0.50 - 1.40 mg/dL 0.88 0.87 0.89   Calcium      8.7 - 10.5 mg/dL 8.6 (L) 8.8 9.5   PROTEIN TOTAL      6.0 - 8.4 g/dL 6.7 6.9 7.0   Albumin      3.5 - 5.2 g/dL 3.8 3.8 4.0   BILIRUBIN TOTAL      0.1 - 1.0 mg/dL 0.5 0.3 0.4   Alkaline Phosphatase      38 - 126 U/L 137 (H) 139 (H) 110   AST      15 - 46 U/L 24 25 28   ALT      10 - 44 U/L 20 20 20   Anion Gap      8 - 16 mmol/L 5 (L) 6 (L) 5 (L)   eGFR if African American      >60 mL/min/1.73 m:2 >60.0 >60.0 >60.0   eGFR if non African American      >60 mL/min/1.73 m:2 >60.0 >60.0 >60.0   Hemoglobin A1C External      4.0 - 5.6 % 6.6 (H) 6.6 (H) 6.0 (H)   Estimated Avg Glucose      68 - 131 mg/dL 143 (H) 143 (H) 126     Review of Systems   Constitutional: Negative for appetite change, chills, fatigue, fever and unexpected weight change.   HENT: Negative for congestion, ear pain, mouth sores, nosebleeds, postnasal drip, rhinorrhea, sinus pressure, sneezing, sore throat, trouble swallowing and voice change.    Eyes: Negative for photophobia, pain, discharge, redness, itching and visual disturbance.   Respiratory: Negative for cough, chest tightness and shortness of breath.    Cardiovascular: Negative for chest pain, palpitations and leg swelling.   Gastrointestinal: Negative for abdominal pain, blood in stool, constipation, diarrhea, nausea and vomiting.   Genitourinary: Negative for dysuria, frequency, hematuria and urgency.   Musculoskeletal: Negative for arthralgias, back pain, joint swelling and myalgias.   Skin: Negative for color change and rash.   Allergic/Immunologic: Negative for immunocompromised state.   Neurological: Negative for dizziness, seizures, syncope, weakness and headaches.   Hematological: Negative for adenopathy. Does not bruise/bleed easily.   Psychiatric/Behavioral: Negative for agitation, dysphoric mood, sleep disturbance and suicidal ideas. The patient is not  "nervous/anxious.          Objective:     Vitals:    07/25/22 1616   BP: (!) 160/76   BP Location: Right arm   Patient Position: Sitting   BP Method: Large (Manual)   Pulse: 90   Temp: 98.1 °F (36.7 °C)   TempSrc: Temporal   SpO2: 98%   Weight: 132.8 kg (292 lb 12.3 oz)   Height: 5' 3" (1.6 m)          Physical Exam  Constitutional:       General: She is not in acute distress.     Appearance: She is well-developed. She is obese. She is not ill-appearing, toxic-appearing or diaphoretic.      Comments: +obesity. Body mass index is 51.86 kg/m².       HENT:      Head: Normocephalic and atraumatic.   Eyes:      General: No scleral icterus.        Right eye: No discharge.         Left eye: No discharge.      Extraocular Movements: Extraocular movements intact.      Conjunctiva/sclera: Conjunctivae normal.   Neck:      Thyroid: No thyromegaly.      Trachea: No tracheal deviation.   Cardiovascular:      Rate and Rhythm: Normal rate and regular rhythm.      Heart sounds: Normal heart sounds. No murmur heard.  Pulmonary:      Effort: Pulmonary effort is normal. No respiratory distress.      Breath sounds: Normal breath sounds. No stridor. No wheezing, rhonchi or rales.   Abdominal:      General: There is no distension.   Musculoskeletal:         General: No deformity. Normal range of motion.      Cervical back: Normal range of motion and neck supple.      Right lower leg: No edema.      Left lower leg: No edema.   Feet:      Comments: Chronic numbness/tingling with decreased sensation to the toes of bilateral feet from Herlinda [place] syndrome in 2009.   Lymphadenopathy:      Cervical: No cervical adenopathy.   Skin:     General: Skin is warm and dry.      Findings: No rash.   Neurological:      Mental Status: She is alert and oriented to person, place, and time.      Cranial Nerves: No cranial nerve deficit.      Coordination: Coordination normal.   Psychiatric:         Mood and Affect: Mood normal.         Behavior: Behavior " normal.         Thought Content: Thought content normal.         Judgment: Judgment normal.           Assessment:         ICD-10-CM ICD-9-CM   1. Type 2 diabetes mellitus without complication, without long-term current use of insulin  E11.9 250.00   2. Hypertension associated with diabetes  E11.59 250.80    I15.2 401.9   3. Class 3 severe obesity due to excess calories with serious comorbidity and body mass index (BMI) of 50.0 to 59.9 in adult  E66.01 278.01    Z68.43 V85.43   4. Anxiety  F41.9 300.00   5. Need for pneumococcal vaccination  Z23 V03.82   6. Personal history of gastric bypass  Z98.84 V45.86   7. History of Guillain-[place] syndrome  Z86.69 V12.49   8. Encounter for blood test for routine general physical examination  Z00.00 V72.62   9. Screening for deficiency anemia  Z13.0 V78.1   10. Thyroid disorder screen  Z13.29 V77.0   11. Screening cholesterol level  Z13.220 V77.91       Plan:       Type 2 diabetes mellitus without complication, without long-term current use of insulin  Stricter diet - recheck in 3 months  -     Comprehensive Metabolic Panel; Future; Expected date: 07/25/2022  -     Hemoglobin A1C; Future; Expected date: 07/25/2022    Hypertension associated with diabetes  START Amlodipine 5 mg daily  Continue Lisinopril 20 mg daily  Recheck in 4 weeks.  -     amLODIPine (NORVASC) 5 MG tablet; Take 1 tablet (5 mg total) by mouth once daily.  Dispense: 30 tablet; Refill: 0    Class 3 severe obesity due to excess calories with serious comorbidity and body mass index (BMI) of 50.0 to 59.9 in adult    Anxiety  Continue current medication(s).  Follow up in 3 months.  -     EScitalopram oxalate (LEXAPRO) 10 MG tablet; Take 1 tablet (10 mg total) by mouth once daily.  Dispense: 90 tablet; Refill: 0    Need for pneumococcal vaccination  -     Cancel: (In Office Administered) Pneumococcal Conjugate Vaccine (20 Valent) (IM)    Personal history of gastric bypass    History of Guillain-[place]  syndrome    Encounter for blood test for routine general physical examination  -     CBC Auto Differential; Future; Expected date: 07/25/2022  -     Comprehensive Metabolic Panel; Future; Expected date: 07/25/2022  -     Hemoglobin A1C; Future; Expected date: 07/25/2022  -     Lipid Panel; Future; Expected date: 07/25/2022  -     TSH; Future; Expected date: 07/25/2022    Screening for deficiency anemia  -     CBC Auto Differential; Future; Expected date: 07/25/2022    Thyroid disorder screen  -     TSH; Future; Expected date: 07/25/2022    Screening cholesterol level  -     Lipid Panel; Future; Expected date: 07/25/2022    Other orders  -     lisinopriL (PRINIVIL,ZESTRIL) 20 MG tablet; Take 1 tablet (20 mg total) by mouth once daily.  Dispense: 90 tablet; Refill: 0      Follow up in about 4 weeks (around 8/22/2022) for blood pressure check; fasting labs and WELLNESS EXAM in 3 months..     Patient's Medications   New Prescriptions    AMLODIPINE (NORVASC) 5 MG TABLET    Take 1 tablet (5 mg total) by mouth once daily.   Previous Medications    No medications on file   Modified Medications    Modified Medication Previous Medication    ESCITALOPRAM OXALATE (LEXAPRO) 10 MG TABLET EScitalopram oxalate (LEXAPRO) 10 MG tablet       Take 1 tablet (10 mg total) by mouth once daily.    TAKE 1 TABLET BY MOUTH ONCE DAILY    LISINOPRIL (PRINIVIL,ZESTRIL) 20 MG TABLET lisinopriL (PRINIVIL,ZESTRIL) 20 MG tablet       Take 1 tablet (20 mg total) by mouth once daily.    TAKE 1 TABLET BY MOUTH ONCE DAILY   Discontinued Medications    No medications on file       Past Medical History:   Diagnosis Date    Anxiety     Guillain Barré syndrome 2009    full paralysis but no intubation; residual weakness and neuropathy to hands and feet - diagnosed by Neurologist back in 2009 and last seen by neurology in 2011    Hypertension     New onset type 2 diabetes mellitus 9/4/2019    Personal history of gastric bypass 9/4/2019       Past  Surgical History:   Procedure Laterality Date    CHOLECYSTECTOMY      COLONOSCOPY N/A 2018    Procedure: COLONOSCOPY;  Surgeon: Elpidio Fortune MD;  Location: [facility];  Service: Endoscopy;  Laterality: N/A;    GASTRIC BYPASS      HYSTERECTOMY         Family History   Problem Relation Age of Onset    Heart disease Mother     Hypertension Mother     Diabetes Mother 65            Heart disease Father 60        Massive MI; DEFIB/ CABG    Diabetes Father             Hypertension Father     Dementia Father     Stroke Father     Pancreatic cancer Brother 54    Stomach cancer Brother     Cancer Brother             No Known Problems Daughter     No Known Problems Son     Heart disease Brother 56        maintain with medication - no surgery    Hypertension Brother     Hypertension Brother        Social History     Socioeconomic History    Marital status:    Occupational History    Occupation: work at bank   Tobacco Use    Smoking status: Never Smoker    Smokeless tobacco: Never Used   Substance and Sexual Activity    Alcohol use: Yes     Alcohol/week: 1.0 standard drink     Types: 1 Drinks containing 0.5 oz of alcohol per week     Comment: every other weekend - 1 drink per occasion    Drug use: No    Sexual activity: Not Currently     Partners: Male     Birth control/protection: Partner-Vasectomy     Social Determinants of Health     Financial Resource Strain: Low Risk     Difficulty of Paying Living Expenses: Not hard at all   Food Insecurity: No Food Insecurity    Worried About Running Out of Food in the Last Year: Never true    Ran Out of Food in the Last Year: Never true   Transportation Needs: No Transportation Needs    Lack of Transportation (Medical): No    Lack of Transportation (Non-Medical): No   Physical Activity: Insufficiently Active    Days of Exercise per Week: 1 day    Minutes of Exercise per Session: 30 min   Stress: No  Stress Concern Present    Feeling of Stress : Not at all   Social Connections: Unknown    Frequency of Communication with Friends and Family: Once a week    Frequency of Social Gatherings with Friends and Family: Once a week    Active Member of Clubs or Organizations: No    Attends Club or Organization Meetings: Never    Marital Status:    Housing Stability: Low Risk     Unable to Pay for Housing in the Last Year: No    Number of Places Lived in the Last Year: 1    Unstable Housing in the Last Year: No

## 2022-08-08 ENCOUNTER — PATIENT OUTREACH (OUTPATIENT)
Dept: ADMINISTRATIVE | Facility: HOSPITAL | Age: 57
End: 2022-08-08
Payer: OTHER GOVERNMENT

## 2022-08-08 NOTE — PROGRESS NOTES
Care Everywhere updates requested and reviewed.  Immunizations reconciled. Media reports reviewed.  Duplicate HM overrides and  orders removed.  Overdue HM topic chart audit and/or requested.  Overdue lab testing linked to upcoming lab appointments if applies.          Health Maintenance Due   Topic Date Due    Mammogram  2022

## 2022-08-21 DIAGNOSIS — E11.59 HYPERTENSION ASSOCIATED WITH DIABETES: ICD-10-CM

## 2022-08-21 DIAGNOSIS — I15.2 HYPERTENSION ASSOCIATED WITH DIABETES: ICD-10-CM

## 2022-08-22 RX ORDER — AMLODIPINE BESYLATE 5 MG/1
TABLET ORAL
Qty: 30 TABLET | Refills: 0 | Status: SHIPPED | OUTPATIENT
Start: 2022-08-22 | End: 2022-09-22

## 2022-08-22 NOTE — TELEPHONE ENCOUNTER
Medication(s) refilled after reviewing chart.      Mark Granger NP   [facility]  8/21/22

## 2022-08-24 ENCOUNTER — PATIENT MESSAGE (OUTPATIENT)
Dept: ADMINISTRATIVE | Facility: HOSPITAL | Age: 57
End: 2022-08-24
Payer: OTHER GOVERNMENT

## 2022-08-31 ENCOUNTER — OFFICE VISIT (OUTPATIENT)
Dept: FAMILY MEDICINE | Facility: CLINIC | Age: 57
End: 2022-08-31
Payer: OTHER GOVERNMENT

## 2022-08-31 VITALS
WEIGHT: 289.69 LBS | HEART RATE: 98 BPM | TEMPERATURE: 98 F | DIASTOLIC BLOOD PRESSURE: 80 MMHG | HEIGHT: 63 IN | OXYGEN SATURATION: 99 % | SYSTOLIC BLOOD PRESSURE: 132 MMHG | BODY MASS INDEX: 51.33 KG/M2

## 2022-08-31 DIAGNOSIS — I15.2 HYPERTENSION ASSOCIATED WITH DIABETES: Primary | ICD-10-CM

## 2022-08-31 DIAGNOSIS — E11.9 TYPE 2 DIABETES MELLITUS WITHOUT COMPLICATION, WITHOUT LONG-TERM CURRENT USE OF INSULIN: ICD-10-CM

## 2022-08-31 DIAGNOSIS — E11.59 HYPERTENSION ASSOCIATED WITH DIABETES: Primary | ICD-10-CM

## 2022-08-31 PROCEDURE — 99213 OFFICE O/P EST LOW 20 MIN: CPT | Mod: S$PBB,,, | Performed by: NURSE PRACTITIONER

## 2022-08-31 PROCEDURE — 99999 PR PBB SHADOW E&M-EST. PATIENT-LVL III: ICD-10-PCS | Mod: PBBFAC,,, | Performed by: NURSE PRACTITIONER

## 2022-08-31 PROCEDURE — 99213 OFFICE O/P EST LOW 20 MIN: CPT | Mod: PBBFAC,PN | Performed by: NURSE PRACTITIONER

## 2022-08-31 PROCEDURE — 99213 PR OFFICE/OUTPT VISIT, EST, LEVL III, 20-29 MIN: ICD-10-PCS | Mod: S$PBB,,, | Performed by: NURSE PRACTITIONER

## 2022-08-31 PROCEDURE — 99999 PR PBB SHADOW E&M-EST. PATIENT-LVL III: CPT | Mod: PBBFAC,,, | Performed by: NURSE PRACTITIONER

## 2022-08-31 NOTE — PROGRESS NOTES
"Subjective:       Patient ID: Kalina Ryan is a 57 y.o. female.    Chief Complaint: Follow-up (Pt here to 4 week bp check )    Follow-up  Pertinent negatives include no abdominal pain, arthralgias, chest pain, chills, congestion, coughing, fatigue, fever, headaches, joint swelling, myalgias, nausea, rash, sore throat, vomiting or weakness.     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       Hypertension   Now controlled on lisinopril 20 mg daily AND Amlodipine 5 mg daily  Tolerating the new medication Amlodipine well.  /80   Pulse 98   Temp 97.9 °F (36.6 °C) (Skin)   Ht 5' 3" (1.6 m)   Wt 131.4 kg (289 lb 11 oz)   SpO2 99%   BMI 51.32 kg/m²     TYPE 2 DIABETES   diagnosed September 2019 with  & hemoglobin A1c 6.5%.    Patient has been working on lifestyle modifications.    She has always been DIET CONTROLLED.     and A1C 6.6% in July 2022- will work on diet and recheck in 3 months.  Declined medication at this time but we may consider GLP-1 med such as Ozempic, Mounjaro, etc if levels still high at next visit.     Review of Systems   Constitutional:  Negative for appetite change, chills, fatigue, fever and unexpected weight change.   HENT:  Negative for congestion, ear pain, mouth sores, nosebleeds, postnasal drip, rhinorrhea, sinus pressure, sneezing, sore throat, trouble swallowing and voice change.    Eyes:  Negative for photophobia, pain, discharge, redness, itching and visual disturbance.   Respiratory:  Negative for cough, chest tightness and shortness of breath.    Cardiovascular:  Negative for chest pain, palpitations and leg swelling.   Gastrointestinal:  Negative for abdominal pain, blood in " "stool, constipation, diarrhea, nausea and vomiting.   Genitourinary:  Negative for dysuria, frequency, hematuria and urgency.   Musculoskeletal:  Negative for arthralgias, back pain, joint swelling and myalgias.   Skin:  Negative for color change and rash.   Allergic/Immunologic: Negative for immunocompromised state.   Neurological:  Negative for dizziness, seizures, syncope, weakness and headaches.   Hematological:  Negative for adenopathy. Does not bruise/bleed easily.   Psychiatric/Behavioral:  Negative for agitation, dysphoric mood, sleep disturbance and suicidal ideas. The patient is not nervous/anxious.        Objective:     Vitals:    08/31/22 1318   BP: 132/80   Pulse: 98   Temp: 97.9 °F (36.6 °C)   TempSrc: Skin   SpO2: 99%   Weight: 131.4 kg (289 lb 11 oz)   Height: 5' 3" (1.6 m)          Physical Exam  Constitutional:       General: She is not in acute distress.     Appearance: She is well-developed. She is obese. She is not ill-appearing, toxic-appearing or diaphoretic.      Comments: +obesity. Body mass index is 51.32 kg/m².     HENT:      Head: Normocephalic and atraumatic.   Eyes:      General: No scleral icterus.        Right eye: No discharge.         Left eye: No discharge.      Extraocular Movements: Extraocular movements intact.      Conjunctiva/sclera: Conjunctivae normal.   Neck:      Thyroid: No thyromegaly.      Trachea: No tracheal deviation.   Cardiovascular:      Rate and Rhythm: Normal rate and regular rhythm.      Heart sounds: Normal heart sounds. No murmur heard.  Pulmonary:      Effort: Pulmonary effort is normal. No respiratory distress.      Breath sounds: Normal breath sounds. No stridor. No wheezing, rhonchi or rales.   Abdominal:      General: There is no distension.   Musculoskeletal:         General: No deformity. Normal range of motion.      Cervical back: Normal range of motion and neck supple.      Right lower leg: No edema.      Left lower leg: No edema.   Feet:      " Comments: Chronic numbness/tingling with decreased sensation to the toes of bilateral feet from Herlinda Brixey syndrome in 2009.   Lymphadenopathy:      Cervical: No cervical adenopathy.   Skin:     General: Skin is warm and dry.      Findings: No rash.   Neurological:      Mental Status: She is alert and oriented to person, place, and time.      Cranial Nerves: No cranial nerve deficit.      Coordination: Coordination normal.   Psychiatric:         Mood and Affect: Mood normal.         Behavior: Behavior normal.         Thought Content: Thought content normal.         Judgment: Judgment normal.         Assessment:         ICD-10-CM ICD-9-CM   1. Hypertension associated with diabetes  E11.59 250.80    I15.2 401.9   2. Type 2 diabetes mellitus without complication, without long-term current use of insulin  E11.9 250.00       Plan:       Hypertension associated with diabetes  Continue current medication(s).  Follow up in 2 months.      Type 2 diabetes mellitus without complication, without long-term current use of insulin    Follow up in about 2 months (around 10/31/2022) for fasting labs and WELLNESS EXAM.     Patient's Medications   New Prescriptions    No medications on file   Previous Medications    AMLODIPINE (NORVASC) 5 MG TABLET    TAKE 1 TABLET BY MOUTH ONCE DAILY    ESCITALOPRAM OXALATE (LEXAPRO) 10 MG TABLET    Take 1 tablet (10 mg total) by mouth once daily.    LISINOPRIL (PRINIVIL,ZESTRIL) 20 MG TABLET    Take 1 tablet (20 mg total) by mouth once daily.   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; residual weakness and neuropathy to hands and feet - diagnosed by Neurologist back in 2009 and last seen by neurology in 2011    Hypertension     New onset type 2 diabetes mellitus 9/4/2019    Personal history of gastric bypass  2019       Past Surgical History:   Procedure Laterality Date    CHOLECYSTECTOMY  2009    COLONOSCOPY N/A 2018    Procedure: COLONOSCOPY;  Surgeon: Elpidio Fortune MD;  Location: [facility];  Service: Endoscopy;  Laterality: N/A;    GASTRIC BYPASS      HYSTERECTOMY         Family History   Problem Relation Age of Onset    Heart disease Mother     Hypertension Mother     Diabetes Mother 65            Heart disease Father 60        Massive MI; DEFIB/ CABG    Diabetes Father             Hypertension Father     Dementia Father     Stroke Father     Pancreatic cancer Brother 54    Stomach cancer Brother     Cancer Brother             No Known Problems Daughter     No Known Problems Son     Heart disease Brother 56        maintain with medication - no surgery    Hypertension Brother     Hypertension Brother        Social History     Socioeconomic History    Marital status:    Occupational History    Occupation: work at bank   Tobacco Use    Smoking status: Never    Smokeless tobacco: Never   Substance and Sexual Activity    Alcohol use: Yes     Alcohol/week: 1.0 standard drink     Types: 1 Drinks containing 0.5 oz of alcohol per week     Comment: every other weekend - 1 drink per occasion    Drug use: No    Sexual activity: Not Currently     Partners: Male     Birth control/protection: Partner-Vasectomy     Social Determinants of Health     Financial Resource Strain: Low Risk     Difficulty of Paying Living Expenses: Not hard at all   Food Insecurity: No Food Insecurity    Worried About Running Out of Food in the Last Year: Never true    Ran Out of Food in the Last Year: Never true   Transportation Needs: No Transportation Needs    Lack of Transportation (Medical): No    Lack of Transportation (Non-Medical): No   Physical Activity: Insufficiently Active    Days of Exercise per Week: 1 day    Minutes of Exercise per Session: 30 min   Stress: No Stress Concern Present     Feeling of Stress : Not at all   Social Connections: Unknown    Frequency of Communication with Friends and Family: Once a week    Frequency of Social Gatherings with Friends and Family: Once a week    Active Member of Clubs or Organizations: No    Attends Club or Organization Meetings: Never    Marital Status:    Housing Stability: Low Risk     Unable to Pay for Housing in the Last Year: No    Number of Places Lived in the Last Year: 1    Unstable Housing in the Last Year: No

## 2022-10-10 ENCOUNTER — PATIENT MESSAGE (OUTPATIENT)
Dept: ADMINISTRATIVE | Facility: HOSPITAL | Age: 57
End: 2022-10-10
Payer: OTHER GOVERNMENT

## 2022-10-18 ENCOUNTER — PATIENT MESSAGE (OUTPATIENT)
Dept: FAMILY MEDICINE | Facility: CLINIC | Age: 57
End: 2022-10-18

## 2022-10-18 ENCOUNTER — OFFICE VISIT (OUTPATIENT)
Dept: FAMILY MEDICINE | Facility: CLINIC | Age: 57
End: 2022-10-18
Payer: OTHER GOVERNMENT

## 2022-10-18 VITALS
OXYGEN SATURATION: 98 % | SYSTOLIC BLOOD PRESSURE: 140 MMHG | WEIGHT: 293 LBS | HEIGHT: 63 IN | BODY MASS INDEX: 51.91 KG/M2 | HEART RATE: 89 BPM | TEMPERATURE: 98 F | DIASTOLIC BLOOD PRESSURE: 90 MMHG

## 2022-10-18 DIAGNOSIS — E11.9 TYPE 2 DIABETES MELLITUS WITHOUT COMPLICATION, WITHOUT LONG-TERM CURRENT USE OF INSULIN: ICD-10-CM

## 2022-10-18 DIAGNOSIS — F41.9 ANXIETY: ICD-10-CM

## 2022-10-18 DIAGNOSIS — G62.89 OTHER POLYNEUROPATHY: ICD-10-CM

## 2022-10-18 DIAGNOSIS — Z00.00 ANNUAL PHYSICAL EXAM: Primary | ICD-10-CM

## 2022-10-18 DIAGNOSIS — F32.0 CURRENT MILD EPISODE OF MAJOR DEPRESSIVE DISORDER WITHOUT PRIOR EPISODE: ICD-10-CM

## 2022-10-18 DIAGNOSIS — Z98.84 PERSONAL HISTORY OF GASTRIC BYPASS: ICD-10-CM

## 2022-10-18 DIAGNOSIS — Z86.69 HISTORY OF GUILLAIN-BARRE SYNDROME: ICD-10-CM

## 2022-10-18 DIAGNOSIS — I15.2 HYPERTENSION ASSOCIATED WITH DIABETES: ICD-10-CM

## 2022-10-18 DIAGNOSIS — E11.59 HYPERTENSION ASSOCIATED WITH DIABETES: ICD-10-CM

## 2022-10-18 DIAGNOSIS — E66.01 CLASS 3 SEVERE OBESITY DUE TO EXCESS CALORIES WITH SERIOUS COMORBIDITY AND BODY MASS INDEX (BMI) OF 50.0 TO 59.9 IN ADULT: ICD-10-CM

## 2022-10-18 PROBLEM — S80.01XA CONTUSION OF RIGHT KNEE: Status: RESOLVED | Noted: 2021-06-29 | Resolved: 2022-10-18

## 2022-10-18 PROBLEM — G62.9 PERIPHERAL NEUROPATHY: Status: ACTIVE | Noted: 2022-10-18

## 2022-10-18 LAB
ALBUMIN/CREAT UR: 5.8 UG/MG (ref 0–30)
CREAT UR-MCNC: 86 MG/DL (ref 15–325)
MICROALBUMIN UR DL<=1MG/L-MCNC: 5 UG/ML

## 2022-10-18 PROCEDURE — 82043 UR ALBUMIN QUANTITATIVE: CPT | Performed by: NURSE PRACTITIONER

## 2022-10-18 PROCEDURE — 99213 OFFICE O/P EST LOW 20 MIN: CPT | Mod: PBBFAC,PN | Performed by: NURSE PRACTITIONER

## 2022-10-18 PROCEDURE — 99396 PREV VISIT EST AGE 40-64: CPT | Mod: S$PBB,,, | Performed by: NURSE PRACTITIONER

## 2022-10-18 PROCEDURE — 99999 PR PBB SHADOW E&M-EST. PATIENT-LVL III: CPT | Mod: PBBFAC,,, | Performed by: NURSE PRACTITIONER

## 2022-10-18 PROCEDURE — 99999 PR PBB SHADOW E&M-EST. PATIENT-LVL III: ICD-10-PCS | Mod: PBBFAC,,, | Performed by: NURSE PRACTITIONER

## 2022-10-18 PROCEDURE — 99396 PR PREVENTIVE VISIT,EST,40-64: ICD-10-PCS | Mod: S$PBB,,, | Performed by: NURSE PRACTITIONER

## 2022-10-18 PROCEDURE — 82570 ASSAY OF URINE CREATININE: CPT | Performed by: NURSE PRACTITIONER

## 2022-10-18 RX ORDER — LISINOPRIL 40 MG/1
40 TABLET ORAL DAILY
Qty: 30 TABLET | Refills: 0 | Status: SHIPPED | OUTPATIENT
Start: 2022-10-18 | End: 2022-11-16 | Stop reason: SDUPTHER

## 2022-10-18 RX ORDER — AMLODIPINE BESYLATE 5 MG/1
5 TABLET ORAL DAILY
Qty: 30 TABLET | Refills: 0 | Status: SHIPPED | OUTPATIENT
Start: 2022-10-18 | End: 2022-11-16 | Stop reason: SDUPTHER

## 2022-10-18 RX ORDER — TIRZEPATIDE 2.5 MG/.5ML
2.5 INJECTION, SOLUTION SUBCUTANEOUS
Qty: 4 PEN | Refills: 0 | Status: SHIPPED | OUTPATIENT
Start: 2022-10-18 | End: 2022-11-16 | Stop reason: DRUGHIGH

## 2022-10-18 RX ORDER — ESCITALOPRAM OXALATE 10 MG/1
10 TABLET ORAL DAILY
Qty: 90 TABLET | Refills: 0 | Status: SHIPPED | OUTPATIENT
Start: 2022-10-18 | End: 2023-06-29 | Stop reason: SDUPTHER

## 2022-10-19 NOTE — PROGRESS NOTES
"Subjective:       Patient ID: Kalina Ryan is a 57 y.o. female.    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        Hypertension   Uncontrolled on lisinopril 20 mg daily AND Amlodipine 5 mg daily  BP (!) 140/90   Pulse 89   Temp 98.1 °F (36.7 °C) (Temporal)   Ht 5' 3" (1.6 m)   Wt 133.6 kg (294 lb 8.6 oz)   SpO2 98%   BMI 52.17 kg/m²   Will increase the Lisinopril to 40 mg daily and continue Amlodipine  Recheck in 1 month    TYPE 2 DIABETES   diagnosed September 2019 with  & hemoglobin A1c 6.5%.    Patient has been working on lifestyle modifications.    Unable to tolerate Metformin due to chronic diarrhea  FBG now 141 with HgbA1C 6.9%  Will start Mounjaro 2.5 mg injection weekly  Recheck in 1 month for tolerance.    Morbid Obesity  Body mass index is 52.17 kg/m².  personal history of gastric bypass in 2009        Anxiety   controlled on Lexapro 10 mg daily.       Personal history of Herlinda Aydlett syndrome   diagnosed in 2009 by [facility].  She reports she was completely paralyzed but did not require intubation. She reports residual generalized weakness and neuropathy to her hands and feet.  She reports an occasional foot drop.  She is able to ambulate without difficulty and without assistance. She has chronic decreased sensation and tingling sensation to the toes of both feet.     Personal history of gastric bypass in 2009.     Wellness labs:  CBC with mild microcytic anemia.  Advise on multivitamin with iron.  CMP with impaired fasting glucose 141 with a A1c is 6.9%.    Kidney and liver function are normal   Cholesterol levels are good with a " LDL of 81   TSH normal     Health maintenance:  Mammogram is scheduled for 11/07/2022   Foot exam was done today day   Diabetic eye exam is due in November Declined COVID shingles and flu vaccine is   Declined pneumonia vaccine     Component      Latest Ref Rng & Units 10/14/2022 7/25/2022 4/14/2022 10/12/2021   WBC      3.90 - 12.70 K/uL 7.74   6.90   RBC      4.00 - 5.40 M/uL 4.13   4.27   Hemoglobin      12.0 - 16.0 g/dL 10.9 (L)   12.0   Hematocrit      37.0 - 48.5 % 34.6 (L)   38.1   MCV      82 - 98 fL 84   89   MCH      27.0 - 31.0 pg 26.4 (L)   28.1   MCHC      32.0 - 36.0 g/dL 31.5 (L)   31.5 (L)   RDW      11.5 - 14.5 % 14.0   13.5   Platelets      150 - 450 K/uL 294   271   MPV      9.2 - 12.9 fL 8.9 (L)   9.2   Immature Granulocytes      0.0 - 0.5 % 0.3   0.1   Gran # (ANC)      1.8 - 7.7 K/uL 4.5   3.8   Immature Grans (Abs)      0.00 - 0.04 K/uL 0.02   0.01   Lymph #      1.0 - 4.8 K/uL 2.5   2.4   Mono #      0.3 - 1.0 K/uL 0.4   0.4   Eos #      0.0 - 0.5 K/uL 0.2   0.2   Baso #      0.00 - 0.20 K/uL 0.04   0.03   nRBC      0 /100 WBC 0   0   Gran %      38.0 - 73.0 % 58.3   55.5   Lymph %      18.0 - 48.0 % 32.7   34.3   Mono %      4.0 - 15.0 % 5.4   6.4   Eosinophil %      0.0 - 8.0 % 2.8   3.3   Basophil %      0.0 - 1.9 % 0.5   0.4   Differential Method       Automated   Automated   Sodium      136 - 145 mmol/L 143 138 144 141   Potassium      3.5 - 5.1 mmol/L 4.0 4.2 4.8 5.2 (H)   Chloride      95 - 110 mmol/L 106 103 110 108   CO2      23 - 29 mmol/L 26 30 (H) 28 28   Glucose      70 - 110 mg/dL 141 (H) 124 (H) 133 (H) 104   BUN      7 - 17 mg/dL 24 (H) 23 (H) 21 (H) 27 (H)   Creatinine      0.50 - 1.40 mg/dL 0.79 0.88 0.87 0.89   Calcium      8.7 - 10.5 mg/dL 8.8 8.6 (L) 8.8 9.5   PROTEIN TOTAL      6.0 - 8.4 g/dL 7.0 6.7 6.9 7.0   Albumin      3.5 - 5.2 g/dL 3.9 3.8 3.8 4.0   BILIRUBIN TOTAL      0.1 - 1.0 mg/dL 0.2 0.5 0.3 0.4   Alkaline Phosphatase      38 - 126 U/L 116 137 (H) 139 (H)  110   AST      15 - 46 U/L 22 24 25 28   ALT      10 - 44 U/L 19 20 20 20   Anion Gap      8 - 16 mmol/L 11 5 (L) 6 (L) 5 (L)   eGFR      >60 mL/min/1.73 m:2 >60.0      Cholesterol      120 - 199 mg/dL 154   161   Triglycerides      30 - 150 mg/dL 100   82   HDL      40 - 75 mg/dL 53   59   LDL Cholesterol External      63.0 - 159.0 mg/dL 81.0   85.6   HDL/Cholesterol Ratio      20.0 - 50.0 % 34.4   36.6   Total Cholesterol/HDL Ratio      2.0 - 5.0 2.9   2.7   Non-HDL Cholesterol      mg/dL 101   102   Hemoglobin A1C External      4.0 - 5.6 % 6.9 (H) 6.6 (H) 6.6 (H) 6.0 (H)   Estimated Avg Glucose      68 - 131 mg/dL 151 (H) 143 (H) 143 (H) 126   TSH      0.400 - 4.000 uIU/mL 3.010   3.080       Review of Systems   Constitutional:  Negative for appetite change, chills, fatigue, fever and unexpected weight change.   HENT:  Negative for congestion, ear pain, mouth sores, nosebleeds, postnasal drip, rhinorrhea, sinus pressure, sneezing, sore throat, trouble swallowing and voice change.    Eyes:  Negative for photophobia, pain, discharge, redness, itching and visual disturbance.   Respiratory:  Negative for cough, chest tightness and shortness of breath.    Cardiovascular:  Negative for chest pain, palpitations and leg swelling.   Gastrointestinal:  Negative for abdominal pain, blood in stool, constipation, diarrhea, nausea and vomiting.   Genitourinary:  Negative for dysuria, frequency, hematuria and urgency.   Musculoskeletal:  Negative for arthralgias, back pain, joint swelling and myalgias.   Skin:  Negative for color change and rash.   Allergic/Immunologic: Negative for immunocompromised state.   Neurological:  Negative for dizziness, seizures, syncope, weakness and headaches.   Hematological:  Negative for adenopathy. Does not bruise/bleed easily.   Psychiatric/Behavioral:  Negative for agitation, dysphoric mood, sleep disturbance and suicidal ideas. The patient is not nervous/anxious.        Objective:  "    Vitals:    10/18/22 1551 10/18/22 1604   BP: (!) 140/90 (!) 140/90   BP Location: Left arm    Patient Position: Sitting    BP Method: Large (Manual)    Pulse: 89    Temp: 98.1 °F (36.7 °C)    TempSrc: Temporal    SpO2: 98%    Weight: 133.6 kg (294 lb 8.6 oz)    Height: 5' 3" (1.6 m)           Physical Exam  Constitutional:       General: She is not in acute distress.     Appearance: She is well-developed. She is obese. She is not ill-appearing, toxic-appearing or diaphoretic.      Comments: +obesity. Body mass index is 52.17 kg/m².   HENT:      Head: Normocephalic and atraumatic.   Eyes:      General: No scleral icterus.        Right eye: No discharge.         Left eye: No discharge.      Extraocular Movements: Extraocular movements intact.      Conjunctiva/sclera: Conjunctivae normal.   Neck:      Thyroid: No thyromegaly.      Trachea: No tracheal deviation.   Cardiovascular:      Rate and Rhythm: Normal rate and regular rhythm.      Pulses:           Dorsalis pedis pulses are 1+ on the right side and 1+ on the left side.      Heart sounds: Normal heart sounds. No murmur heard.  Pulmonary:      Effort: Pulmonary effort is normal. No respiratory distress.      Breath sounds: Normal breath sounds. No stridor. No wheezing, rhonchi or rales.   Abdominal:      General: There is no distension.   Musculoskeletal:         General: No deformity. Normal range of motion.      Cervical back: Normal range of motion and neck supple.      Right lower leg: No edema.      Left lower leg: No edema.   Feet:      Right foot:      Protective Sensation: 9 sites tested.  4 sites sensed.      Skin integrity: No ulcer or skin breakdown.      Left foot:      Protective Sensation: 9 sites tested.  4 sites sensed.      Skin integrity: No ulcer or skin breakdown.      Comments: Chronic numbness/tingling with decreased sensation to the toes of bilateral feet from Herlinda [place] syndrome in 2009.   Lymphadenopathy:      Cervical: No cervical " adenopathy.   Skin:     General: Skin is warm and dry.      Findings: No rash.   Neurological:      Mental Status: She is alert and oriented to person, place, and time.      Cranial Nerves: No cranial nerve deficit.      Coordination: Coordination normal.   Psychiatric:         Mood and Affect: Mood normal.         Behavior: Behavior normal.         Thought Content: Thought content normal.         Judgment: Judgment normal.         Assessment:         ICD-10-CM ICD-9-CM   1. Annual physical exam  Z00.00 V70.0   2. Type 2 diabetes mellitus without complication, without long-term current use of insulin  E11.9 250.00   3. Hypertension associated with diabetes  E11.59 250.80    I15.2 401.9   4. Class 3 severe obesity due to excess calories with serious comorbidity and body mass index (BMI) of 50.0 to 59.9 in adult  E66.01 278.01    Z68.43 V85.43   5. Current mild episode of major depressive disorder without prior episode  F32.0 296.21   6. Anxiety  F41.9 300.00   7. Other polyneuropathy  G62.89 357.89   8. History of Guillain-[place] syndrome  Z86.69 V12.49   9. Personal history of gastric bypass  Z98.84 V45.86       Plan:       Annual physical exam  Health Maintenance Summary     Full History      Expand All  Collapse All    Scheduled - Mammogram  (Yearly)  Scheduled for 11/7/2022 04/20/2021  Mammo Digital Screening Bilat w/ Tristan    09/23/2019  Mammo Digital Screening Bilat w/ Tristan    06/25/2018  Mammo Digital Screening Bilateral With CAD    06/11/2018  Declined      Eye Exam  (Yearly)  Due soon on 11/15/2022  11/15/2021  Level of Service: EYE EXAM, EST PATIENT,COMPREHESV    11/15/2021  Done    03/06/2020  Level of Service: [place] EYE EXAM, EST PATIENT,COMPREHESV    07/12/2018  Level of Service: NV EYE EXAM, EST PATIENT,INTERMED    06/21/2018  Level of Service: NV EYE EXAM, EST PATIENT,COMPREHESV    View More History     Postponed - Shingles Vaccine  (1 of 2)  Postponed until 10/19/2022  No completion history exists for  this topic.     Postponed - COVID-19 Vaccine  (1)  Postponed until 10/19/2022  No completion history exists for this topic.     Postponed - Influenza Vaccine  (1)  Postponed until 6/30/2023  No completion history exists for this topic.     Postponed - Pneumococcal Vaccines (Age 0-64)  (2 - PCV)  Postponed until 7/25/2023 06/11/2020  Imm Admin: Pneumococcal Polysaccharide - 23 Valent      Hemoglobin A1c  (Every 6 Months)  Next due on 4/14/2023  10/14/2022  Hemoglobin A1C External component of Hemoglobin A1C    07/25/2022  Hemoglobin A1C External component of Hemoglobin A1C    06/21/2022  POC Hemoglobin A1C component of POCT Glycosylated Hemoglobin, A1C    04/14/2022  Hemoglobin A1C External component of Hemoglobin A1C    10/12/2021  Hemoglobin A1C External component of Hemoglobin A1C    View More History     Colorectal Cancer Screening  (Colonoscopy - Every 5 Years)  Next due on 7/31/2023 07/31/2018  Colonoscopy    07/31/2018  Surgical Procedure: COLONOSCOPY    06/11/2018  Colonoscopy (Declined)      Lipid Panel  (Yearly)  Next due on 10/14/2023  10/14/2022  Lipid Panel    06/21/2022  POCT Lipid Profile w/ Glucose    10/12/2021  Lipid Panel    06/16/2021  POCT Lipid Profile w/ Glucose    09/25/2020  Lipid Panel    View More History     Diabetes Urine Screening  (Yearly)  Next due on 10/18/2023  10/18/2022  MICROALB/CREAT RATIO component of Microalbumin/Creatinine Ratio, Urine    10/19/2021  MICROALB/CREAT RATIO component of MICROALBUMIN / CREATININE RATIO URINE    03/23/2007  MICROALB/CREAT RATIO component of Microalbumin/creatinine urine ratio      Foot Exam  (Yearly)  Next due on 10/18/2023  10/18/2022  Done    10/19/2021  Done    10/19/2021  [employer]: WORKFLOW - DIABETES - DIABETIC FOOT EXAM PERFORMED    10/02/2020  [employer]: WORKFLOW - DIABETES - DIABETIC FOOT EXAM PERFORMED    10/02/2020  Done    View More History     TETANUS VACCINE  (Every 10 Years)  Next due on 6/11/2030 06/11/2020  Imm Admin: Tdap       Hepatitis C Screening  Completed  08/22/2019  Hepatitis C Ab component of Hepatitis C antibody    06/11/2018  Declined      HIV Screening  Completed  09/25/2020  HIV 1/2 Ag/Ab (4th Gen)      Discontinued - Low Dose Statin  Discontinued  No completion history exists for this topic.         Type 2 diabetes mellitus without complication, without long-term current use of insulin  START Mounjaro 2.5 mg injection weekly  Follow up in 1 month  -     tirzepatide (MOUNJARO) 2.5 mg/0.5 mL PnIj; Inject 2.5 mg into the skin every 7 days.  Dispense: 4 pen; Refill: 0  -     Microalbumin/Creatinine Ratio, Urine    Hypertension associated with diabetes  INCREASE the Lisinopril to 40 mg daily  Continue Amldipine 5 mg daily  Recheck in 4 weeks.  -     amLODIPine (NORVASC) 5 MG tablet; Take 1 tablet (5 mg total) by mouth once daily.  Dispense: 30 tablet; Refill: 0  -     lisinopriL (PRINIVIL,ZESTRIL) 40 MG tablet; Take 1 tablet (40 mg total) by mouth once daily.  Dispense: 30 tablet; Refill: 0    Class 3 severe obesity due to excess calories with serious comorbidity and body mass index (BMI) of 50.0 to 59.9 in adult  Work on DIET    Current mild episode of major depressive disorder without prior episode  Continue current medication(s).  Follow up in 6 months.      Anxiety  -     EScitalopram oxalate (LEXAPRO) 10 MG tablet; Take 1 tablet (10 mg total) by mouth once daily.  Dispense: 90 tablet; Refill: 0    Other polyneuropathy  Chronic to fingertips and toes d/t GB    History of Guillain-[place] syndrome    Personal history of gastric bypass    Other orders  Follow up in about 4 weeks (around 11/15/2022) for blood pressure and medication check.     Patient's Medications   New Prescriptions    TIRZEPATIDE (MOUNJARO) 2.5 MG/0.5 ML PNIJ    Inject 2.5 mg into the skin every 7 days.   Previous Medications    No medications on file   Modified Medications    Modified Medication Previous Medication    AMLODIPINE (NORVASC) 5 MG TABLET  amLODIPine (NORVASC) 5 MG tablet       Take 1 tablet (5 mg total) by mouth once daily.    TAKE 1 TABLET BY MOUTH ONCE DAILY    ESCITALOPRAM OXALATE (LEXAPRO) 10 MG TABLET EScitalopram oxalate (LEXAPRO) 10 MG tablet       Take 1 tablet (10 mg total) by mouth once daily.    Take 1 tablet (10 mg total) by mouth once daily.    LISINOPRIL (PRINIVIL,ZESTRIL) 40 MG TABLET lisinopriL (PRINIVIL,ZESTRIL) 20 MG tablet       Take 1 tablet (40 mg total) by mouth once daily.    Take 1 tablet (20 mg total) by mouth once daily.   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 - diagnosed by Neurologist back in  and last seen by neurology in     Hypertension     New onset type 2 diabetes mellitus 2019    Personal history of gastric bypass 2019       Past Surgical History:   Procedure Laterality Date    CHOLECYSTECTOMY      COLONOSCOPY N/A 2018    Procedure: COLONOSCOPY;  Surgeon: Elpidio Fortune MD;  Location: [facility];  Service: Endoscopy;  Laterality: N/A;    GASTRIC BYPASS      HYSTERECTOMY         Family History   Problem Relation Age of Onset    Heart disease Mother     Hypertension Mother     Diabetes Mother 65            Heart disease Father 60        Massive MI; DEFIB/ CABG    Diabetes Father             Hypertension Father     Dementia Father     Stroke Father     Pancreatic cancer Brother 54    Stomach cancer Brother     Cancer Brother             No Known Problems Daughter     No Known Problems Son     Heart disease Brother 56        maintain with medication - no surgery    Hypertension Brother     Hypertension Brother        Social History     Socioeconomic History    Marital status:    Occupational History    Occupation: work at bank   Tobacco Use    Smoking status: Never     Smokeless tobacco: Never   Substance and Sexual Activity    Alcohol use: Yes     Alcohol/week: 1.0 standard drink     Types: 1 Drinks containing 0.5 oz of alcohol per week     Comment: every other weekend - 1 drink per occasion    Drug use: No    Sexual activity: Not Currently     Partners: Male     Birth control/protection: Partner-Vasectomy     Social Determinants of Health     Financial Resource Strain: Low Risk     Difficulty of Paying Living Expenses: Not hard at all   Food Insecurity: No Food Insecurity    Worried About Running Out of Food in the Last Year: Never true    Ran Out of Food in the Last Year: Never true   Transportation Needs: No Transportation Needs    Lack of Transportation (Medical): No    Lack of Transportation (Non-Medical): No   Physical Activity: Insufficiently Active    Days of Exercise per Week: 1 day    Minutes of Exercise per Session: 30 min   Stress: No Stress Concern Present    Feeling of Stress : Not at all   Social Connections: Unknown    Frequency of Communication with Friends and Family: Once a week    Frequency of Social Gatherings with Friends and Family: Once a week    Active Member of Clubs or Organizations: No    Attends Club or Organization Meetings: Never    Marital Status:    Housing Stability: Low Risk     Unable to Pay for Housing in the Last Year: No    Number of Places Lived in the Last Year: 1    Unstable Housing in the Last Year: No

## 2022-11-02 ENCOUNTER — PATIENT OUTREACH (OUTPATIENT)
Dept: ADMINISTRATIVE | Facility: HOSPITAL | Age: 57
End: 2022-11-02
Payer: OTHER GOVERNMENT

## 2022-11-02 NOTE — PROGRESS NOTES
Care Everywhere updates requested and reviewed.  Immunizations reconciled. Media reports reviewed.  Duplicate HM overrides and  orders removed.  Overdue HM topic chart audit and/or requested.  Overdue lab testing linked to upcoming lab appointments if applies.      Mammogram scheduled  Health Maintenance Due   Topic Date Due    COVID-19 Vaccine (1) Never done    Shingles Vaccine (1 of 2) Never done    Mammogram  2022    Eye Exam  11/15/2022

## 2022-11-16 ENCOUNTER — OFFICE VISIT (OUTPATIENT)
Dept: FAMILY MEDICINE | Facility: CLINIC | Age: 57
End: 2022-11-16
Payer: OTHER GOVERNMENT

## 2022-11-16 VITALS
BODY MASS INDEX: 50.62 KG/M2 | HEART RATE: 99 BPM | TEMPERATURE: 98 F | HEIGHT: 63 IN | WEIGHT: 285.69 LBS | OXYGEN SATURATION: 98 % | DIASTOLIC BLOOD PRESSURE: 76 MMHG | SYSTOLIC BLOOD PRESSURE: 122 MMHG

## 2022-11-16 DIAGNOSIS — E11.9 TYPE 2 DIABETES MELLITUS WITHOUT COMPLICATION, WITHOUT LONG-TERM CURRENT USE OF INSULIN: Primary | ICD-10-CM

## 2022-11-16 DIAGNOSIS — I15.2 HYPERTENSION ASSOCIATED WITH DIABETES: ICD-10-CM

## 2022-11-16 DIAGNOSIS — E66.01 CLASS 3 SEVERE OBESITY DUE TO EXCESS CALORIES WITH SERIOUS COMORBIDITY AND BODY MASS INDEX (BMI) OF 50.0 TO 59.9 IN ADULT: ICD-10-CM

## 2022-11-16 DIAGNOSIS — E11.59 HYPERTENSION ASSOCIATED WITH DIABETES: ICD-10-CM

## 2022-11-16 PROCEDURE — 99999 PR PBB SHADOW E&M-EST. PATIENT-LVL III: ICD-10-PCS | Mod: PBBFAC,,, | Performed by: NURSE PRACTITIONER

## 2022-11-16 PROCEDURE — 99214 OFFICE O/P EST MOD 30 MIN: CPT | Mod: S$PBB,,, | Performed by: NURSE PRACTITIONER

## 2022-11-16 PROCEDURE — 99999 PR PBB SHADOW E&M-EST. PATIENT-LVL III: CPT | Mod: PBBFAC,,, | Performed by: NURSE PRACTITIONER

## 2022-11-16 PROCEDURE — 99214 PR OFFICE/OUTPT VISIT, EST, LEVL IV, 30-39 MIN: ICD-10-PCS | Mod: S$PBB,,, | Performed by: NURSE PRACTITIONER

## 2022-11-16 PROCEDURE — 99213 OFFICE O/P EST LOW 20 MIN: CPT | Mod: PBBFAC,PN | Performed by: NURSE PRACTITIONER

## 2022-11-16 RX ORDER — AMLODIPINE BESYLATE 5 MG/1
5 TABLET ORAL DAILY
Qty: 90 TABLET | Refills: 0 | Status: SHIPPED | OUTPATIENT
Start: 2022-11-16 | End: 2023-02-17

## 2022-11-16 RX ORDER — LISINOPRIL 40 MG/1
40 TABLET ORAL DAILY
Qty: 90 TABLET | Refills: 0 | Status: SHIPPED | OUTPATIENT
Start: 2022-11-16 | End: 2023-02-17

## 2022-11-16 RX ORDER — TIRZEPATIDE 5 MG/.5ML
5 INJECTION, SOLUTION SUBCUTANEOUS
Qty: 4 PEN | Refills: 2 | Status: SHIPPED | OUTPATIENT
Start: 2022-11-16 | End: 2023-01-11

## 2022-11-16 NOTE — PROGRESS NOTES
"Subjective:       Patient ID: Kalina Ryan is a 57 y.o. female.    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     Hypertension   Controlled on lisinopril 40 mg daily AND Amlodipine 5 mg daily  /76   Pulse 99   Temp 97.9 °F (36.6 °C)   Ht 5' 3" (1.6 m)   Wt 129.6 kg (285 lb 11.5 oz)   SpO2 98%   BMI 50.61 kg/m²        TYPE 2 DIABETES   diagnosed September 2019 with  & hemoglobin A1c 6.5%.    Patient has been working on lifestyle modifications.    Unable to tolerate Metformin due to chronic diarrhea  FBG now 141 with HgbA1C 6.9% - in October 2022  Started Mounjaro 2.5 mg injection weekly and tolerating well.  Will increase the Mounjaro to 5 mg injection weekly and recheck labs in 2 months.     Morbid Obesity  Body mass index is 50.61 kg/m².  personal history of gastric bypass in 2009  Lost 9 pounds in past month on Mounjaro        Anxiety   controlled on Lexapro 10 mg daily.       Personal history of Herlinda Sandy syndrome   diagnosed in 2009 by [facility].  She reports she was completely paralyzed but did not require intubation. She reports residual generalized weakness and neuropathy to her hands and feet.  She reports an occasional foot drop.  She is able to ambulate without difficulty and without assistance. She has chronic decreased sensation and tingling sensation to the toes of both feet.     Personal history of gastric bypass in 2009.      Review of Systems   Constitutional:  Negative for appetite change, chills, fatigue, fever and unexpected weight change.   HENT:  Negative for congestion, ear pain, mouth sores, nosebleeds, postnasal drip, rhinorrhea, sinus pressure, sneezing, sore " "throat, trouble swallowing and voice change.    Eyes:  Negative for photophobia, pain, discharge, redness, itching and visual disturbance.   Respiratory:  Negative for cough, chest tightness and shortness of breath.    Cardiovascular:  Negative for chest pain, palpitations and leg swelling.   Gastrointestinal:  Negative for abdominal pain, blood in stool, constipation, diarrhea, nausea and vomiting.   Genitourinary:  Negative for dysuria, frequency, hematuria and urgency.   Musculoskeletal:  Negative for arthralgias, back pain, joint swelling and myalgias.   Skin:  Negative for color change and rash.   Allergic/Immunologic: Negative for immunocompromised state.   Neurological:  Negative for dizziness, seizures, syncope, weakness and headaches.   Hematological:  Negative for adenopathy. Does not bruise/bleed easily.   Psychiatric/Behavioral:  Negative for agitation, dysphoric mood, sleep disturbance and suicidal ideas. The patient is not nervous/anxious.        Objective:     Vitals:    11/16/22 0705   Pulse: 99   Temp: 97.9 °F (36.6 °C)   SpO2: 98%   Weight: 129.6 kg (285 lb 11.5 oz)   Height: 5' 3" (1.6 m)          Physical Exam  Constitutional:       General: She is not in acute distress.     Appearance: She is well-developed. She is obese. She is not ill-appearing, toxic-appearing or diaphoretic.      Comments: +obesity. Body mass index is 50.61 kg/m².     HENT:      Head: Normocephalic and atraumatic.   Eyes:      General: No scleral icterus.        Right eye: No discharge.         Left eye: No discharge.      Extraocular Movements: Extraocular movements intact.      Conjunctiva/sclera: Conjunctivae normal.   Neck:      Thyroid: No thyromegaly.      Trachea: No tracheal deviation.   Cardiovascular:      Rate and Rhythm: Normal rate and regular rhythm.      Heart sounds: Normal heart sounds. No murmur heard.  Pulmonary:      Effort: Pulmonary effort is normal. No respiratory distress.      Breath sounds: Normal " breath sounds. No stridor. No wheezing, rhonchi or rales.   Abdominal:      General: There is no distension.   Musculoskeletal:         General: No deformity. Normal range of motion.      Cervical back: Normal range of motion and neck supple.      Right lower leg: No edema.      Left lower leg: No edema.   Feet:      Comments: Chronic numbness/tingling with decreased sensation to the toes of bilateral feet from Herlinda [place] syndrome in 2009.   Lymphadenopathy:      Cervical: No cervical adenopathy.   Skin:     General: Skin is warm and dry.      Findings: No rash.   Neurological:      Mental Status: She is alert and oriented to person, place, and time.      Cranial Nerves: No cranial nerve deficit.      Coordination: Coordination normal.   Psychiatric:         Mood and Affect: Mood normal.         Behavior: Behavior normal.         Thought Content: Thought content normal.         Judgment: Judgment normal.         Assessment:         ICD-10-CM ICD-9-CM   1. Type 2 diabetes mellitus without complication, without long-term current use of insulin  E11.9 250.00   2. Class 3 severe obesity due to excess calories with serious comorbidity and body mass index (BMI) of 50.0 to 59.9 in adult  E66.01 278.01    Z68.43 V85.43   3. Hypertension associated with diabetes  E11.59 250.80    I15.2 401.9       Plan:       Type 2 diabetes mellitus without complication, without long-term current use of insulin  INCREASE Mounjaro to 5 mg injection weekly  Recheck labs and follow up in 2 months.  -     tirzepatide (MOUNJARO) 5 mg/0.5 mL PnIj; Inject 5 mg into the skin every 7 days.  Dispense: 4 pen; Refill: 2  -     Comprehensive Metabolic Panel; Future; Expected date: 11/16/2022  -     Hemoglobin A1C; Future; Expected date: 11/16/2022    Class 3 severe obesity due to excess calories with serious comorbidity and body mass index (BMI) of 50.0 to 59.9 in adult    Hypertension associated with diabetes  Continue current medication(s).  Follow  up in 2 months.    -     amLODIPine (NORVASC) 5 MG tablet; Take 1 tablet (5 mg total) by mouth once daily.  Dispense: 90 tablet; Refill: 0  -     lisinopriL (PRINIVIL,ZESTRIL) 40 MG tablet; Take 1 tablet (40 mg total) by mouth once daily.  Dispense: 90 tablet; Refill: 0    Follow up in about 2 months (around 2023) for fasting labs and follow up.     Patient's Medications   New Prescriptions    TIRZEPATIDE (MOUNJARO) 5 MG/0.5 ML PNIJ    Inject 5 mg into the skin every 7 days.   Previous Medications    ESCITALOPRAM OXALATE (LEXAPRO) 10 MG TABLET    Take 1 tablet (10 mg total) by mouth once daily.   Modified Medications    Modified Medication Previous Medication    AMLODIPINE (NORVASC) 5 MG TABLET amLODIPine (NORVASC) 5 MG tablet       Take 1 tablet (5 mg total) by mouth once daily.    Take 1 tablet (5 mg total) by mouth once daily.    LISINOPRIL (PRINIVIL,ZESTRIL) 40 MG TABLET lisinopriL (PRINIVIL,ZESTRIL) 40 MG tablet       Take 1 tablet (40 mg total) by mouth once daily.    Take 1 tablet (40 mg total) by mouth once daily.   Discontinued Medications    TIRZEPATIDE (MOUNJARO) 2.5 MG/0.5 ML PNIJ    Inject 2.5 mg into the skin every 7 days.       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 - diagnosed by Neurologist back in  and last seen by neurology in     Hypertension     New onset type 2 diabetes mellitus 2019    Personal history of gastric bypass 2019       Past Surgical History:   Procedure Laterality Date    CHOLECYSTECTOMY      COLONOSCOPY N/A 2018    Procedure: COLONOSCOPY;  Surgeon: Elpidio Fortune MD;  Location: [facility];  Service: Endoscopy;  Laterality: N/A;    GASTRIC BYPASS      HYSTERECTOMY         Family History   Problem Relation Age of Onset    Heart disease Mother     Hypertension Mother     Diabetes Mother 65             Heart disease Father 60        Massive MI; DEFIB/ CABG    Diabetes Father             Hypertension Father     Dementia Father     Stroke Father     Pancreatic cancer Brother 54    Stomach cancer Brother     Cancer Brother             No Known Problems Daughter     No Known Problems Son     Heart disease Brother 56        maintain with medication - no surgery    Hypertension Brother     Hypertension Brother        Social History     Socioeconomic History    Marital status:    Occupational History    Occupation: work at bank   Tobacco Use    Smoking status: Never    Smokeless tobacco: Never   Substance and Sexual Activity    Alcohol use: Yes     Alcohol/week: 1.0 standard drink     Types: 1 Drinks containing 0.5 oz of alcohol per week     Comment: every other weekend - 1 drink per occasion    Drug use: No    Sexual activity: Not Currently     Partners: Male     Birth control/protection: Partner-Vasectomy     Social Determinants of Health     Financial Resource Strain: Low Risk     Difficulty of Paying Living Expenses: Not hard at all   Food Insecurity: No Food Insecurity    Worried About Running Out of Food in the Last Year: Never true    Ran Out of Food in the Last Year: Never true   Transportation Needs: No Transportation Needs    Lack of Transportation (Medical): No    Lack of Transportation (Non-Medical): No   Physical Activity: Insufficiently Active    Days of Exercise per Week: 1 day    Minutes of Exercise per Session: 30 min   Stress: No Stress Concern Present    Feeling of Stress : Not at all   Social Connections: Unknown    Frequency of Communication with Friends and Family: Once a week    Frequency of Social Gatherings with Friends and Family: Once a week    Active Member of Clubs or Organizations: No    Attends Club or Organization Meetings: Never    Marital Status:    Housing Stability: Low Risk     Unable to Pay for Housing in the Last Year: No    Number of Places Lived in  the Last Year: 1    Unstable Housing in the Last Year: No

## 2022-12-19 ENCOUNTER — OFFICE VISIT (OUTPATIENT)
Dept: OPTOMETRY | Facility: CLINIC | Age: 57
End: 2022-12-19
Payer: COMMERCIAL

## 2022-12-19 DIAGNOSIS — H52.4 MYOPIA OF BOTH EYES WITH REGULAR ASTIGMATISM AND PRESBYOPIA: ICD-10-CM

## 2022-12-19 DIAGNOSIS — H52.223 MYOPIA OF BOTH EYES WITH REGULAR ASTIGMATISM AND PRESBYOPIA: ICD-10-CM

## 2022-12-19 DIAGNOSIS — H52.13 MYOPIA OF BOTH EYES WITH REGULAR ASTIGMATISM AND PRESBYOPIA: ICD-10-CM

## 2022-12-19 DIAGNOSIS — Z01.00 EYE EXAM, ROUTINE: Primary | ICD-10-CM

## 2022-12-19 PROCEDURE — 99999 PR PBB SHADOW E&M-EST. PATIENT-LVL III: CPT | Mod: PBBFAC,,, | Performed by: OPTOMETRIST

## 2022-12-19 PROCEDURE — 92014 COMPRE OPH EXAM EST PT 1/>: CPT | Mod: S$GLB,,, | Performed by: OPTOMETRIST

## 2022-12-19 PROCEDURE — 92015 PR REFRACTION: ICD-10-PCS | Mod: S$GLB,,, | Performed by: OPTOMETRIST

## 2022-12-19 PROCEDURE — 92014 PR EYE EXAM, EST PATIENT,COMPREHESV: ICD-10-PCS | Mod: S$GLB,,, | Performed by: OPTOMETRIST

## 2022-12-19 PROCEDURE — 99999 PR PBB SHADOW E&M-EST. PATIENT-LVL III: ICD-10-PCS | Mod: PBBFAC,,, | Performed by: OPTOMETRIST

## 2022-12-19 PROCEDURE — 92015 DETERMINE REFRACTIVE STATE: CPT | Mod: S$GLB,,, | Performed by: OPTOMETRIST

## 2022-12-19 NOTE — PROGRESS NOTES
HPI    DLS: 11/15/2021 Dr. [facility], Diabetic    Hemoglobin A1C       Date                     Value               Ref Range             Status                10/14/2022               6.9 (H)             4.0 - 5.6 %           Final                 07/25/2022               6.6 (H)             4.0 - 5.6 %           Final                 04/14/2022               6.6 (H)             4.0 - 5.6 %           Final            ----------  Denies flashes, diplopia, photophobia, glare, HA's, or pain. (+)Floaters.    No gtts  Last edited by Kian Vila, OD on 12/19/2022  8:46 AM.            Assessment /Plan     For exam results, see Encounter Report.    Eye exam, routine  -No retinopathy noted today.  Continued control with primary care physician and annual comprehensive eye exam.  -Asad    Myopia of both eyes with regular astigmatism and presbyopia  Eyeglass Final Rx       Eyeglass Final Rx         Sphere Cylinder Axis Dist VA Add    Right -0.50 Sphere  20/20 +2.00    Left -1.00 +0.50 070 20/20 +2.00      Type: PAL    Expiration Date: 12/19/2023              Eyeglass Final Rx #2         Sphere Cylinder Axis Dist VA Add    Right +0.50 Sphere   +1.25    Left [place] +0.50 070  +1.25      Type: computer    Expiration Date: 12/19/2023                      RTC 1 yr

## 2023-01-10 ENCOUNTER — PATIENT MESSAGE (OUTPATIENT)
Dept: FAMILY MEDICINE | Facility: CLINIC | Age: 58
End: 2023-01-10
Payer: OTHER GOVERNMENT

## 2023-01-11 ENCOUNTER — PATIENT MESSAGE (OUTPATIENT)
Dept: FAMILY MEDICINE | Facility: CLINIC | Age: 58
End: 2023-01-11
Payer: OTHER GOVERNMENT

## 2023-01-17 ENCOUNTER — OFFICE VISIT (OUTPATIENT)
Dept: FAMILY MEDICINE | Facility: CLINIC | Age: 58
End: 2023-01-17
Payer: OTHER GOVERNMENT

## 2023-01-17 VITALS
BODY MASS INDEX: 49.6 KG/M2 | DIASTOLIC BLOOD PRESSURE: 80 MMHG | TEMPERATURE: 98 F | OXYGEN SATURATION: 98 % | SYSTOLIC BLOOD PRESSURE: 126 MMHG | WEIGHT: 280 LBS | HEART RATE: 95 BPM

## 2023-01-17 DIAGNOSIS — E66.01 CLASS 3 SEVERE OBESITY DUE TO EXCESS CALORIES WITH SERIOUS COMORBIDITY AND BODY MASS INDEX (BMI) OF 45.0 TO 49.9 IN ADULT: Primary | ICD-10-CM

## 2023-01-17 DIAGNOSIS — E11.9 TYPE 2 DIABETES MELLITUS WITHOUT COMPLICATION, WITHOUT LONG-TERM CURRENT USE OF INSULIN: ICD-10-CM

## 2023-01-17 DIAGNOSIS — F32.0 CURRENT MILD EPISODE OF MAJOR DEPRESSIVE DISORDER WITHOUT PRIOR EPISODE: ICD-10-CM

## 2023-01-17 DIAGNOSIS — E11.59 HYPERTENSION ASSOCIATED WITH DIABETES: ICD-10-CM

## 2023-01-17 DIAGNOSIS — I15.2 HYPERTENSION ASSOCIATED WITH DIABETES: ICD-10-CM

## 2023-01-17 PROCEDURE — 99999 PR PBB SHADOW E&M-EST. PATIENT-LVL III: CPT | Mod: PBBFAC,,, | Performed by: NURSE PRACTITIONER

## 2023-01-17 PROCEDURE — 99999 PR PBB SHADOW E&M-EST. PATIENT-LVL III: ICD-10-PCS | Mod: PBBFAC,,, | Performed by: NURSE PRACTITIONER

## 2023-01-17 PROCEDURE — 99214 OFFICE O/P EST MOD 30 MIN: CPT | Mod: S$PBB,,, | Performed by: NURSE PRACTITIONER

## 2023-01-17 PROCEDURE — 99213 OFFICE O/P EST LOW 20 MIN: CPT | Mod: PBBFAC,PN | Performed by: NURSE PRACTITIONER

## 2023-01-17 PROCEDURE — 99214 PR OFFICE/OUTPT VISIT, EST, LEVL IV, 30-39 MIN: ICD-10-PCS | Mod: S$PBB,,, | Performed by: NURSE PRACTITIONER

## 2023-01-17 NOTE — PROGRESS NOTES
Subjective:       Patient ID: Kalina Ryan is a 57 y.o. female.    Chief Complaint: Follow-up    Follow-up  Pertinent negatives include no abdominal pain, arthralgias, chest pain, chills, congestion, coughing, fatigue, fever, headaches, joint swelling, myalgias, nausea, rash, sore throat, vomiting or weakness.     Patient is a 57-year-old white female with Type 2 Diabetes diagnosed in September 2019, hypertension, anxiety, Herlinda [place] syndrome diagnosed in 2009 with generalized weakness and neuropathy to hands and feet, personal history of gastric bypass in 2009, and obesity that is here today for 2 month follow up.     Hypertension   Controlled on lisinopril 40 mg daily AND Amlodipine 5 mg daily  /80   Pulse 95   Temp 98.1 °F (36.7 °C)   Wt 127 kg (279 lb 15.8 oz)   SpO2 98%   BMI 49.60 kg/m²         TYPE 2 DIABETES   diagnosed September 2019 with  & hemoglobin A1c 6.5%.    Patient has been working on lifestyle modifications.    Unable to tolerate Metformin due to chronic diarrhea  Started Mounjaro injection weekly in October 2022  Now on Mounjaro to 5 mg injection weekly  - out for past 2 weeks due to pharmacy not having medication  FBG now 102 with HgbA1C 5.9%     Morbid Obesity  Body mass index is 49.6 kg/m².  personal history of gastric bypass in 2009  Lost 14 pounds on Mounjaro        Anxiety   controlled on Lexapro 10 mg daily.       Personal history of Herlinda Frank[place] syndrome   diagnosed in 2009 by [facility].  She reports she was completely paralyzed but did not require intubation. She reports residual generalized weakness and neuropathy to her hands and feet.  She reports an occasional foot drop.  She is able to ambulate without difficulty and without assistance. She has chronic decreased sensation and tingling sensation to the toes of both feet.     Personal history of gastric bypass in 2009.     Component      Latest Ref Rng & Units 1/13/2023 10/14/2022 7/25/2022 4/14/2022    Sodium      136 - 145 mmol/L 142 143 138 144   Potassium      3.5 - 5.1 mmol/L 3.9 4.0 4.2 4.8   Chloride      95 - 110 mmol/L 107 106 103 110   CO2      23 - 29 mmol/L 28 26 30 (H) 28   Glucose      70 - 110 mg/dL 102 141 (H) 124 (H) 133 (H)   BUN      7 - 17 mg/dL 24 (H) 24 (H) 23 (H) 21 (H)   Creatinine      0.50 - 1.40 mg/dL 0.90 0.79 0.88 0.87   Calcium      8.7 - 10.5 mg/dL 8.8 8.8 8.6 (L) 8.8   PROTEIN TOTAL      6.0 - 8.4 g/dL 7.1 7.0 6.7 6.9   Albumin      3.5 - 5.2 g/dL 4.0 3.9 3.8 3.8   BILIRUBIN TOTAL      0.1 - 1.0 mg/dL 0.4 0.2 0.5 0.3   Alkaline Phosphatase      38 - 126 U/L 119 116 137 (H) 139 (H)   AST      15 - 46 U/L 25 22 24 25   ALT      10 - 44 U/L 25 19 20 20   Anion Gap      8 - 16 mmol/L 7 (L) 11 5 (L) 6 (L)   eGFR      >60 mL/min/1.73 m:2 >60.0 >60.0     Hemoglobin A1C External      4.0 - 5.6 % 5.9 (H) 6.9 (H) 6.6 (H) 6.6 (H)   Estimated Avg Glucose      68 - 131 mg/dL 123 151 (H) 143 (H) 143 (H)        Review of Systems   Constitutional:  Negative for appetite change, chills, fatigue, fever and unexpected weight change.   HENT:  Negative for congestion, ear pain, mouth sores, nosebleeds, postnasal drip, rhinorrhea, sinus pressure, sneezing, sore throat, trouble swallowing and voice change.    Eyes:  Negative for photophobia, pain, discharge, redness, itching and visual disturbance.   Respiratory:  Negative for cough, chest tightness and shortness of breath.    Cardiovascular:  Negative for chest pain, palpitations and leg swelling.   Gastrointestinal:  Negative for abdominal pain, blood in stool, constipation, diarrhea, nausea and vomiting.   Genitourinary:  Negative for dysuria, frequency, hematuria and urgency.   Musculoskeletal:  Negative for arthralgias, back pain, joint swelling and myalgias.   Skin:  Negative for color change and rash.   Allergic/Immunologic: Negative for immunocompromised state.   Neurological:  Negative for dizziness, seizures, syncope, weakness and headaches.    Hematological:  Negative for adenopathy. Does not bruise/bleed easily.   Psychiatric/Behavioral:  Negative for agitation, dysphoric mood, sleep disturbance and suicidal ideas. The patient is not nervous/anxious.        Objective:     Vitals:    01/17/23 0708   BP: 126/80   Pulse: 95   Temp: 98.1 °F (36.7 °C)   SpO2: 98%   Weight: 127 kg (279 lb 15.8 oz)          Physical Exam  Constitutional:       General: She is not in acute distress.     Appearance: She is well-developed. She is obese. She is not ill-appearing, toxic-appearing or diaphoretic.      Comments: +obesity. Body mass index is 49.6 kg/m².   HENT:      Head: Normocephalic and atraumatic.   Eyes:      General: No scleral icterus.        Right eye: No discharge.         Left eye: No discharge.      Extraocular Movements: Extraocular movements intact.      Conjunctiva/sclera: Conjunctivae normal.   Neck:      Thyroid: No thyromegaly.      Trachea: No tracheal deviation.   Cardiovascular:      Rate and Rhythm: Normal rate and regular rhythm.      Heart sounds: Normal heart sounds. No murmur heard.  Pulmonary:      Effort: Pulmonary effort is normal. No respiratory distress.      Breath sounds: Normal breath sounds. No stridor. No wheezing, rhonchi or rales.   Abdominal:      General: There is no distension.   Musculoskeletal:         General: No deformity. Normal range of motion.      Cervical back: Normal range of motion and neck supple.      Right lower leg: No edema.      Left lower leg: No edema.   Feet:      Comments: Chronic numbness/tingling with decreased sensation to the toes of bilateral feet from Herlinda [place] syndrome in 2009.   Lymphadenopathy:      Cervical: No cervical adenopathy.   Skin:     General: Skin is warm and dry.      Findings: No rash.   Neurological:      Mental Status: She is alert and oriented to person, place, and time.      Cranial Nerves: No cranial nerve deficit.      Coordination: Coordination normal.   Psychiatric:          Mood and Affect: Mood normal.         Behavior: Behavior normal.         Thought Content: Thought content normal.         Judgment: Judgment normal.         Assessment:         ICD-10-CM ICD-9-CM   1. Class 3 severe obesity due to excess calories with serious comorbidity and body mass index (BMI) of 45.0 to 49.9 in adult  E66.01 278.01    Z68.42 V85.42   2. Type 2 diabetes mellitus without complication, without long-term current use of insulin  E11.9 250.00   3. Current mild episode of major depressive disorder without prior episode  F32.0 296.21   4. Hypertension associated with diabetes  E11.59 250.80    I15.2 401.9       Plan:       Class 3 severe obesity due to excess calories with serious comorbidity and body mass index (BMI) of 45.0 to 49.9 in adult    Type 2 diabetes mellitus without complication, without long-term current use of insulin  Improved on medication Mounjaro - recheck in 3 months.  -     Comprehensive Metabolic Panel; Future; Expected date: 01/17/2023  -     Hemoglobin A1C; Future; Expected date: 01/17/2023    Current mild episode of major depressive disorder without prior episode  Controlled on Lexapro    Hypertension associated with diabetes  Continue current medication(s).  Follow up in 3 months.      Follow up in about 3 months (around 4/17/2023) for fasting labs and follow up.     Patient's Medications   New Prescriptions    No medications on file   Previous Medications    AMLODIPINE (NORVASC) 5 MG TABLET    Take 1 tablet (5 mg total) by mouth once daily.    ESCITALOPRAM OXALATE (LEXAPRO) 10 MG TABLET    Take 1 tablet (10 mg total) by mouth once daily.    LISINOPRIL (PRINIVIL,ZESTRIL) 40 MG TABLET    Take 1 tablet (40 mg total) by mouth once daily.    TIRZEPATIDE (MOUNJARO) 5 MG/0.5 ML PNIJ    Inject 5 mg into the skin every 7 days.   Modified Medications    No medications on file   Discontinued Medications    No medications on file       Past Medical History:   Diagnosis Date    Anxiety      Guillain Barré syndrome 2009    full paralysis but no intubation; residual weakness and neuropathy to hands and feet - diagnosed by Neurologist back in  and last seen by neurology in     Hypertension     New onset type 2 diabetes mellitus 2019    Personal history of gastric bypass 2019       Past Surgical History:   Procedure Laterality Date    CHOLECYSTECTOMY      COLONOSCOPY N/A 2018    Procedure: COLONOSCOPY;  Surgeon: Elpidio Fortune MD;  Location: [facility];  Service: Endoscopy;  Laterality: N/A;    GASTRIC BYPASS      HYSTERECTOMY         Family History   Problem Relation Age of Onset    Heart disease Mother     Hypertension Mother     Diabetes Mother             Heart disease Father 60        Massive MI; DEFIB/ CABG    Diabetes Father             Hypertension Father     Dementia Father     Stroke Father     Pancreatic cancer Brother 54    Stomach cancer Brother     Cancer Brother             No Known Problems Daughter     No Known Problems Son     Heart disease Brother 56        maintain with medication - no surgery    Hypertension Brother     Hypertension Brother        Social History     Socioeconomic History    Marital status:    Occupational History    Occupation: work at bank   Tobacco Use    Smoking status: Never    Smokeless tobacco: Never   Substance and Sexual Activity    Alcohol use: Yes     Alcohol/week: 1.0 standard drink     Types: 1 Drinks containing 0.5 oz of alcohol per week     Comment: every other weekend - 1 drink per occasion    Drug use: No    Sexual activity: Not Currently     Partners: Male     Birth control/protection: Partner-Vasectomy     Social Determinants of Health     Financial Resource Strain: Low Risk     Difficulty of Paying Living Expenses: Not very hard   Food Insecurity: No Food Insecurity    Worried About Running Out of Food in the Last Year: Never true    Ran Out of Food in the Last Year: Never true    Transportation Needs: No Transportation Needs    Lack of Transportation (Medical): No    Lack of Transportation (Non-Medical): No   Physical Activity: Insufficiently Active    Days of Exercise per Week: 1 day    Minutes of Exercise per Session: 30 min   Stress: No Stress Concern Present    Feeling of Stress : Only a little   Social Connections: Unknown    Frequency of Communication with Friends and Family: Once a week    Frequency of Social Gatherings with Friends and Family: Once a week    Active Member of Clubs or Organizations: No    Attends Club or Organization Meetings: Never    Marital Status:    Housing Stability: Low Risk     Unable to Pay for Housing in the Last Year: No    Number of Places Lived in the Last Year: 1    Unstable Housing in the Last Year: No

## 2023-01-26 ENCOUNTER — PATIENT MESSAGE (OUTPATIENT)
Dept: FAMILY MEDICINE | Facility: CLINIC | Age: 58
End: 2023-01-26
Payer: OTHER GOVERNMENT

## 2023-04-04 ENCOUNTER — PATIENT OUTREACH (OUTPATIENT)
Dept: ADMINISTRATIVE | Facility: HOSPITAL | Age: 58
End: 2023-04-04
Payer: OTHER GOVERNMENT

## 2023-04-04 NOTE — PROGRESS NOTES
Care Everywhere updates requested and reviewed.  Immunizations reconciled. Media reports reviewed.  Duplicate HM overrides and  orders removed.  Overdue HM topic chart audit and/or requested.  Overdue lab testing linked to upcoming lab appointments if applies.        Health Maintenance Due   Topic Date Due    COVID-19 Vaccine (1) Never done    Shingles Vaccine (1 of 2) Never done

## 2023-04-18 ENCOUNTER — OFFICE VISIT (OUTPATIENT)
Dept: FAMILY MEDICINE | Facility: CLINIC | Age: 58
End: 2023-04-18
Payer: OTHER GOVERNMENT

## 2023-04-18 VITALS
DIASTOLIC BLOOD PRESSURE: 80 MMHG | WEIGHT: 278.69 LBS | HEIGHT: 63 IN | SYSTOLIC BLOOD PRESSURE: 128 MMHG | OXYGEN SATURATION: 98 % | BODY MASS INDEX: 49.38 KG/M2 | TEMPERATURE: 98 F | HEART RATE: 98 BPM

## 2023-04-18 DIAGNOSIS — Z86.69 HISTORY OF GUILLAIN-BARRE SYNDROME: ICD-10-CM

## 2023-04-18 DIAGNOSIS — F32.0 CURRENT MILD EPISODE OF MAJOR DEPRESSIVE DISORDER WITHOUT PRIOR EPISODE: ICD-10-CM

## 2023-04-18 DIAGNOSIS — Z13.0 SCREENING FOR DEFICIENCY ANEMIA: ICD-10-CM

## 2023-04-18 DIAGNOSIS — Z13.29 THYROID DISORDER SCREEN: ICD-10-CM

## 2023-04-18 DIAGNOSIS — Z98.84 PERSONAL HISTORY OF GASTRIC BYPASS: ICD-10-CM

## 2023-04-18 DIAGNOSIS — Z00.00 ENCOUNTER FOR BLOOD TEST FOR ROUTINE GENERAL PHYSICAL EXAMINATION: ICD-10-CM

## 2023-04-18 DIAGNOSIS — E11.59 HYPERTENSION ASSOCIATED WITH DIABETES: ICD-10-CM

## 2023-04-18 DIAGNOSIS — E11.9 TYPE 2 DIABETES MELLITUS WITHOUT COMPLICATION, WITHOUT LONG-TERM CURRENT USE OF INSULIN: Primary | ICD-10-CM

## 2023-04-18 DIAGNOSIS — Z13.220 SCREENING CHOLESTEROL LEVEL: ICD-10-CM

## 2023-04-18 DIAGNOSIS — E66.01 CLASS 3 SEVERE OBESITY DUE TO EXCESS CALORIES WITH SERIOUS COMORBIDITY AND BODY MASS INDEX (BMI) OF 45.0 TO 49.9 IN ADULT: ICD-10-CM

## 2023-04-18 DIAGNOSIS — I15.2 HYPERTENSION ASSOCIATED WITH DIABETES: ICD-10-CM

## 2023-04-18 PROCEDURE — 99999 PR PBB SHADOW E&M-EST. PATIENT-LVL III: CPT | Mod: PBBFAC,,, | Performed by: NURSE PRACTITIONER

## 2023-04-18 PROCEDURE — 99214 OFFICE O/P EST MOD 30 MIN: CPT | Mod: S$PBB,,, | Performed by: NURSE PRACTITIONER

## 2023-04-18 PROCEDURE — 99214 PR OFFICE/OUTPT VISIT, EST, LEVL IV, 30-39 MIN: ICD-10-PCS | Mod: S$PBB,,, | Performed by: NURSE PRACTITIONER

## 2023-04-18 PROCEDURE — 99213 OFFICE O/P EST LOW 20 MIN: CPT | Mod: PBBFAC,PN | Performed by: NURSE PRACTITIONER

## 2023-04-18 PROCEDURE — 99999 PR PBB SHADOW E&M-EST. PATIENT-LVL III: ICD-10-PCS | Mod: PBBFAC,,, | Performed by: NURSE PRACTITIONER

## 2023-04-18 NOTE — PROGRESS NOTES
"Subjective:       Patient ID: Kalina Ryan is a 58 y.o. female.    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     Hypertension   Controlled on lisinopril 40 mg daily AND Amlodipine 5 mg daily  /80 (BP Location: Left arm, Patient Position: Sitting, BP Method: Large (Manual))   Pulse 98   Temp 97.9 °F (36.6 °C) (Temporal)   Ht 5' 3" (1.6 m)   Wt 126.4 kg (278 lb 10.6 oz)   SpO2 98%   BMI 49.36 kg/m²           TYPE 2 DIABETES   diagnosed September 2019 with  & hemoglobin A1c 6.5%.    Patient has been working on lifestyle modifications.    Unable to tolerate Metformin due to chronic diarrhea  Started Mounjaro injection weekly in October 2022  Now on Mounjaro to 5 mg injection weekly  - out for past 2 months due to pharmacy not having medication - started back on medication about 6 weeks ago  FBG now 97 with HgbA1C 6.0%     Morbid Obesity  Body mass index is 49.6 kg/m².  personal history of gastric bypass in 2009  Lost 14 pounds on Mounjaro        Anxiety   controlled on Lexapro 10 mg daily.       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  She reports she was completely paralyzed but did not require intubation. She reports residual generalized weakness and neuropathy to her hands and feet.  She reports an occasional foot drop.  She is able to ambulate without difficulty and without assistance. She has chronic decreased sensation and tingling sensation to the toes of both feet.     Personal history of gastric bypass in 2009.       Component      Latest Ref Rng & Units 4/14/2023 1/13/2023 10/14/2022   WBC      3.90 - " 12.70 K/uL   7.74   RBC      4.00 - 5.40 M/uL   4.13   Hemoglobin      12.0 - 16.0 g/dL   10.9 (L)   Hematocrit      37.0 - 48.5 %   34.6 (L)   MCV      82 - 98 fL   84   MCH      27.0 - 31.0 pg   26.4 (L)   MCHC      32.0 - 36.0 g/dL   31.5 (L)   RDW      11.5 - 14.5 %   14.0   Platelets      150 - 450 K/uL   294   MPV      9.2 - 12.9 fL   8.9 (L)   Immature Granulocytes      0.0 - 0.5 %   0.3   Gran # (ANC)      1.8 - 7.7 K/uL   4.5   Immature Grans (Abs)      0.00 - 0.04 K/uL   0.02   Lymph #      1.0 - 4.8 K/uL   2.5   Mono #      0.3 - 1.0 K/uL   0.4   Eos #      0.0 - 0.5 K/uL   0.2   Baso #      0.00 - 0.20 K/uL   0.04   nRBC      0 /100 WBC   0   Gran %      38.0 - 73.0 %   58.3   Lymph %      18.0 - 48.0 %   32.7   Mono %      4.0 - 15.0 %   5.4   Eosinophil %      0.0 - 8.0 %   2.8   Basophil %      0.0 - 1.9 %   0.5   Differential Method         Automated   Sodium      136 - 145 mmol/L 141 142 143   Potassium      3.5 - 5.1 mmol/L 4.3 3.9 4.0   Chloride      95 - 110 mmol/L 107 107 106   CO2      23 - 29 mmol/L 28 28 26   Glucose      70 - 110 mg/dL 97 102 141 (H)   BUN      7 - 17 mg/dL 27 (H) 24 (H) 24 (H)   Creatinine      0.50 - 1.40 mg/dL 0.83 0.90 0.79   Calcium      8.7 - 10.5 mg/dL 8.9 8.8 8.8   PROTEIN TOTAL      6.0 - 8.4 g/dL 7.0 7.1 7.0   Albumin      3.5 - 5.2 g/dL 3.8 4.0 3.9   BILIRUBIN TOTAL      0.1 - 1.0 mg/dL 0.4 0.4 0.2   Alkaline Phosphatase      38 - 126 U/L 116 119 116   AST      15 - 46 U/L 20 25 22   ALT      10 - 44 U/L 17 25 19   Anion Gap      8 - 16 mmol/L 6 (L) 7 (L) 11   eGFR      >60 mL/min/1.73 m:2 >60.0 >60.0 >60.0   Cholesterol      120 - 199 mg/dL   154   Triglycerides      30 - 150 mg/dL   100   HDL      40 - 75 mg/dL   53   LDL Cholesterol External      63.0 - 159.0 mg/dL   81.0   HDL/Cholesterol Ratio      20.0 - 50.0 %   34.4   Total Cholesterol/HDL Ratio      2.0 - 5.0   2.9   Non-HDL Cholesterol      mg/dL   101   Hemoglobin A1C External      4.0 - 5.6 % 6.0 (H)  "5.9 (H) 6.9 (H)   Estimated Avg Glucose      68 - 131 mg/dL 126 123 151 (H)   TSH      0.400 - 4.000 uIU/mL   3.010     Review of Systems   Constitutional:  Negative for appetite change, chills, fatigue, fever and unexpected weight change.   HENT:  Negative for congestion, ear pain, mouth sores, nosebleeds, postnasal drip, rhinorrhea, sinus pressure, sneezing, sore throat, trouble swallowing and voice change.    Eyes:  Negative for photophobia, pain, discharge, redness, itching and visual disturbance.   Respiratory:  Negative for cough, chest tightness and shortness of breath.    Cardiovascular:  Negative for chest pain, palpitations and leg swelling.   Gastrointestinal:  Negative for abdominal pain, blood in stool, constipation, diarrhea, nausea and vomiting.   Genitourinary:  Negative for dysuria, frequency, hematuria and urgency.   Musculoskeletal:  Negative for arthralgias, back pain, joint swelling and myalgias.   Skin:  Negative for color change and rash.   Allergic/Immunologic: Negative for immunocompromised state.   Neurological:  Negative for dizziness, seizures, syncope, weakness and headaches.   Hematological:  Negative for adenopathy. Does not bruise/bleed easily.   Psychiatric/Behavioral:  Negative for agitation, dysphoric mood, sleep disturbance and suicidal ideas. The patient is not nervous/anxious.        Objective:     Vitals:    04/18/23 0712   BP: 128/80   BP Location: Left arm   Patient Position: Sitting   BP Method: Large (Manual)   Pulse: 98   Temp: 97.9 °F (36.6 °C)   TempSrc: Temporal   SpO2: 98%   Weight: 126.4 kg (278 lb 10.6 oz)   Height: 5' 3" (1.6 m)          Physical Exam  Constitutional:       General: She is not in acute distress.     Appearance: She is well-developed. She is obese. She is not ill-appearing, toxic-appearing or diaphoretic.      Comments: +obesity. Body mass index is 49.36 kg/m².   HENT:      Head: Normocephalic and atraumatic.   Eyes:      General: No scleral icterus. "        Right eye: No discharge.         Left eye: No discharge.      Extraocular Movements: Extraocular movements intact.      Conjunctiva/sclera: Conjunctivae normal.   Neck:      Thyroid: No thyromegaly.      Trachea: No tracheal deviation.   Cardiovascular:      Rate and Rhythm: Normal rate and regular rhythm.      Heart sounds: Normal heart sounds. No murmur heard.  Pulmonary:      Effort: Pulmonary effort is normal. No respiratory distress.      Breath sounds: Normal breath sounds. No stridor. No wheezing, rhonchi or rales.   Abdominal:      General: There is no distension.   Musculoskeletal:         General: No deformity. Normal range of motion.      Cervical back: Normal range of motion and neck supple.      Right lower leg: No edema.      Left lower leg: No edema.   Feet:      Comments: Chronic numbness/tingling with decreased sensation to the toes of bilateral feet from Herlinda Rexford syndrome in 2009.   Lymphadenopathy:      Cervical: No cervical adenopathy.   Skin:     General: Skin is warm and dry.      Findings: No rash.   Neurological:      Mental Status: She is alert and oriented to person, place, and time.      Cranial Nerves: No cranial nerve deficit.      Coordination: Coordination normal.   Psychiatric:         Mood and Affect: Mood normal.         Behavior: Behavior normal.         Thought Content: Thought content normal.         Judgment: Judgment normal.         Assessment:         ICD-10-CM ICD-9-CM   1. Type 2 diabetes mellitus without complication, without long-term current use of insulin  E11.9 250.00   2. Hypertension associated with diabetes  E11.59 250.80    I15.2 401.9   3. Current mild episode of major depressive disorder without prior episode  F32.0 296.21   4. Class 3 severe obesity due to excess calories with serious comorbidity and body mass index (BMI) of 45.0 to 49.9 in adult  E66.01 278.01    Z68.42 V85.42   5. History of Guillain-Rexford syndrome  Z86.69 V12.49   6. Personal  history of gastric bypass  Z98.84 V45.86       Plan:       Type 2 diabetes mellitus without complication, without long-term current use of insulin  Continue current medication(s).  Follow up in 6 months.  -     Comprehensive Metabolic Panel; Future; Expected date: 04/18/2023  -     Hemoglobin A1C; Future; Expected date: 04/18/2023    Hypertension associated with diabetes  Continue current medication(s).  Follow up in 6 months.    Current mild episode of major depressive disorder without prior episode  Continue current medication(s).  Follow up in 6 months.    Class 3 severe obesity due to excess calories with serious comorbidity and body mass index (BMI) of 45.0 to 49.9 in adult  Work on lifestyle modifications.    History of Guillain-Delta syndrome    Personal history of gastric bypass    Encounter for blood test for routine general physical examination  -     CBC Auto Differential; Future; Expected date: 04/18/2023  -     Comprehensive Metabolic Panel; Future; Expected date: 04/18/2023  -     Hemoglobin A1C; Future; Expected date: 04/18/2023  -     Lipid Panel; Future; Expected date: 04/18/2023  -     TSH; Future; Expected date: 04/18/2023    Screening for deficiency anemia  -     CBC Auto Differential; Future; Expected date: 04/18/2023    Thyroid disorder screen  -     TSH; Future; Expected date: 04/18/2023    Screening cholesterol level  -     Lipid Panel; Future; Expected date: 04/18/2023      Follow up in about 6 months (around 10/18/2023) for fasting labs and wellness exam.     Patient's Medications   New Prescriptions    No medications on file   Previous Medications    AMLODIPINE (NORVASC) 5 MG TABLET    Take 1 tablet (5 mg total) by mouth once daily.    ESCITALOPRAM OXALATE (LEXAPRO) 10 MG TABLET    Take 1 tablet (10 mg total) by mouth once daily.    LISINOPRIL (PRINIVIL,ZESTRIL) 40 MG TABLET    TAKE 1 TABLET BY MOUTH ONCE DAILY    TIRZEPATIDE (MOUNJARO) 5 MG/0.5 ML PNIJ    INJECT ONE PEN SUBCUTANEOUSLY  EVERY WEEK.   Modified Medications    No medications on file   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 - diagnosed by Neurologist back in  and last seen by neurology in     Hypertension     New onset type 2 diabetes mellitus 2019    Personal history of gastric bypass 2019       Past Surgical History:   Procedure Laterality Date    CHOLECYSTECTOMY      COLONOSCOPY N/A 2018    Procedure: COLONOSCOPY;  Surgeon: Elpidio Fortune MD;  Location: [facility];  Service: Endoscopy;  Laterality: N/A;    GASTRIC BYPASS      HYSTERECTOMY         Family History   Problem Relation Age of Onset    Heart disease Mother     Hypertension Mother     Diabetes Mother             Heart disease Father 60        Massive MI; DEFIB/ CABG    Diabetes Father             Hypertension Father     Dementia Father     Stroke Father     Pancreatic cancer Brother 54    Stomach cancer Brother     Cancer Brother             No Known Problems Daughter     No Known Problems Son     Heart disease Brother 56        maintain with medication - no surgery    Hypertension Brother     Hypertension Brother        Social History     Socioeconomic History    Marital status:    Occupational History    Occupation: work at bank   Tobacco Use    Smoking status: Never     Passive exposure: Never    Smokeless tobacco: Never   Substance and Sexual Activity    Alcohol use: Yes     Alcohol/week: 1.0 standard drink     Types: 1 Drinks containing 0.5 oz of alcohol per week     Comment: every other weekend - 1 drink per occasion    Drug use: No    Sexual activity: Not Currently     Partners: Male     Birth control/protection: Partner-Vasectomy     Social Determinants of Health     Financial Resource Strain: Low Risk      Difficulty of Paying Living Expenses: Not very hard   Food Insecurity: No Food Insecurity    Worried About Running Out of Food in the Last Year: Never true    Ran Out of Food in the Last Year: Never true   Transportation Needs: No Transportation Needs    Lack of Transportation (Medical): No    Lack of Transportation (Non-Medical): No   Physical Activity: Insufficiently Active    Days of Exercise per Week: 1 day    Minutes of Exercise per Session: 30 min   Stress: No Stress Concern Present    Feeling of Stress : Only a little   Social Connections: Unknown    Frequency of Communication with Friends and Family: Once a week    Frequency of Social Gatherings with Friends and Family: Once a week    Active Member of Clubs or Organizations: No    Attends Club or Organization Meetings: Never    Marital Status:    Housing Stability: Low Risk     Unable to Pay for Housing in the Last Year: No    Number of Places Lived in the Last Year: 1    Unstable Housing in the Last Year: No

## 2023-04-27 DIAGNOSIS — E11.9 TYPE 2 DIABETES MELLITUS WITHOUT COMPLICATION, WITHOUT LONG-TERM CURRENT USE OF INSULIN: ICD-10-CM

## 2023-04-28 RX ORDER — TIRZEPATIDE 5 MG/.5ML
5 INJECTION, SOLUTION SUBCUTANEOUS
Qty: 4 PEN | Refills: 5 | Status: ON HOLD | OUTPATIENT
Start: 2023-04-28 | End: 2023-08-18 | Stop reason: SDUPTHER

## 2023-05-05 ENCOUNTER — PATIENT MESSAGE (OUTPATIENT)
Dept: FAMILY MEDICINE | Facility: CLINIC | Age: 58
End: 2023-05-05
Payer: OTHER GOVERNMENT

## 2023-05-05 DIAGNOSIS — S99.929A INJURY OF FOOT, UNSPECIFIED LATERALITY, INITIAL ENCOUNTER: Primary | ICD-10-CM

## 2023-05-08 ENCOUNTER — OFFICE VISIT (OUTPATIENT)
Dept: PODIATRY | Facility: CLINIC | Age: 58
End: 2023-05-08
Payer: OTHER GOVERNMENT

## 2023-05-08 VITALS — HEIGHT: 63 IN | WEIGHT: 282.31 LBS | BODY MASS INDEX: 50.02 KG/M2

## 2023-05-08 DIAGNOSIS — M79.89 FOOT SWELLING: ICD-10-CM

## 2023-05-08 DIAGNOSIS — S99.929A INJURY OF FOOT, UNSPECIFIED LATERALITY, INITIAL ENCOUNTER: ICD-10-CM

## 2023-05-08 DIAGNOSIS — R58 ECCHYMOSIS: ICD-10-CM

## 2023-05-08 DIAGNOSIS — M79.672 LEFT FOOT PAIN: ICD-10-CM

## 2023-05-08 DIAGNOSIS — S92.912A CLOSED NONDISPLACED FRACTURE OF PHALANX OF TOE OF LEFT FOOT, UNSPECIFIED TOE, INITIAL ENCOUNTER: Primary | ICD-10-CM

## 2023-05-08 PROCEDURE — 99999 PR PBB SHADOW E&M-EST. PATIENT-LVL IV: CPT | Mod: PBBFAC,,, | Performed by: PODIATRIST

## 2023-05-08 PROCEDURE — 99214 OFFICE O/P EST MOD 30 MIN: CPT | Mod: PBBFAC,PN | Performed by: PODIATRIST

## 2023-05-08 PROCEDURE — 99204 PR OFFICE/OUTPT VISIT, NEW, LEVL IV, 45-59 MIN: ICD-10-PCS | Mod: S$PBB,,, | Performed by: PODIATRIST

## 2023-05-08 PROCEDURE — 99204 OFFICE O/P NEW MOD 45 MIN: CPT | Mod: S$PBB,,, | Performed by: PODIATRIST

## 2023-05-08 PROCEDURE — 99999 PR PBB SHADOW E&M-EST. PATIENT-LVL IV: ICD-10-PCS | Mod: PBBFAC,,, | Performed by: PODIATRIST

## 2023-05-08 NOTE — PROGRESS NOTES
Subjective:      Patient ID: Kalina Ryan is a 58 y.o. female.    Chief Complaint: Toe Injury (2nd 3rd & 4th toes injured on L ft)      58 y.o. female presenting with L foot pain. Was on vacation and fell. DOI was on . Noticed bruising, pain and swelling on L foot since the injury. In open toe shoes today. Aching and throbbing pain. Pain gets worse with pressure and walking. DM.     Level of ambulation/Occupation:   Smoking status:   Pita-D Def:   DM:  Other:     Review of Systems   Constitutional: Negative for decreased appetite and malaise/fatigue.   Cardiovascular:  Negative for claudication and leg swelling.   Musculoskeletal:  Positive for joint pain, joint swelling and stiffness. Negative for arthritis and muscle weakness.        L foot pain and swelling.    Neurological:  Negative for numbness and weakness.   Psychiatric/Behavioral:  Negative for altered mental status.            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 - diagnosed by Neurologist back in  and last seen by neurology in     Hypertension     New onset type 2 diabetes mellitus 2019    Personal history of gastric bypass 2019       Past Surgical History:   Procedure Laterality Date    CHOLECYSTECTOMY      COLONOSCOPY N/A 2018    Procedure: COLONOSCOPY;  Surgeon: Elpidio Fortune MD;  Location: [facility];  Service: Endoscopy;  Laterality: N/A;    GASTRIC BYPASS      HYSTERECTOMY         Family History   Problem Relation Age of Onset    Heart disease Mother     Hypertension Mother     Diabetes Mother             Heart disease Father 60        Massive MI; DEFIB/ CABG    Diabetes Father             Hypertension Father     Dementia Father     Stroke Father     Pancreatic cancer Brother 54    Stomach cancer Brother     Cancer Brother             No Known  Problems Daughter     No Known Problems Son     Heart disease Brother 56        maintain with medication - no surgery    Hypertension Brother     Hypertension Brother        Social History     Socioeconomic History    Marital status:    Occupational History    Occupation: work at bank   Tobacco Use    Smoking status: Never     Passive exposure: Never    Smokeless tobacco: Never   Substance and Sexual Activity    Alcohol use: Yes     Alcohol/week: 1.0 standard drink     Types: 1 Drinks containing 0.5 oz of alcohol per week     Comment: every other weekend - 1 drink per occasion    Drug use: No    Sexual activity: Not Currently     Partners: Male     Birth control/protection: Partner-Vasectomy     Social Determinants of Health     Financial Resource Strain: Low Risk     Difficulty of Paying Living Expenses: Not very hard   Food Insecurity: No Food Insecurity    Worried About Running Out of Food in the Last Year: Never true    Ran Out of Food in the Last Year: Never true   Transportation Needs: No Transportation Needs    Lack of Transportation (Medical): No    Lack of Transportation (Non-Medical): No   Physical Activity: Insufficiently Active    Days of Exercise per Week: 1 day    Minutes of Exercise per Session: 30 min   Stress: No Stress Concern Present    Feeling of Stress : Only a little   Social Connections: Unknown    Frequency of Communication with Friends and Family: Once a week    Frequency of Social Gatherings with Friends and Family: Once a week    Active Member of Clubs or Organizations: No    Attends Club or Organization Meetings: Never    Marital Status:    Housing Stability: Low Risk     Unable to Pay for Housing in the Last Year: No    Number of Places Lived in the Last Year: 1    Unstable Housing in the Last Year: No       Current Outpatient Medications   Medication Sig Dispense Refill    amLODIPine (NORVASC) 5 MG tablet Take 1 tablet (5 mg total) by mouth once daily. 90 tablet 0     "EScitalopram oxalate (LEXAPRO) 10 MG tablet Take 1 tablet (10 mg total) by mouth once daily. 90 tablet 0    lisinopriL (PRINIVIL,ZESTRIL) 40 MG tablet TAKE 1 TABLET BY MOUTH ONCE DAILY 90 tablet 0    tirzepatide (MOUNJARO) 5 mg/0.5 mL PnIj Inject 5 mg into the skin every 7 days. 4 pen 5     No current facility-administered medications for this visit.       Review of patient's allergies indicates:   Allergen Reactions    Amoxicillin Hives    Penicillins Hives       Vitals:    05/08/23 0818   Weight: 128.1 kg (282 lb 4.8 oz)   Height: 5' 3" (1.6 m)   PainSc:   6   PainLoc: Foot       Objective:      Physical Exam  Constitutional:       General: She is not in acute distress.     Appearance: She is well-developed.   HENT:      Nose: Nose normal.   Eyes:      Conjunctiva/sclera: Conjunctivae normal.   Pulmonary:      Effort: Pulmonary effort is normal.   Chest:      Chest wall: No tenderness.   Abdominal:      Tenderness: There is no abdominal tenderness.   Musculoskeletal:      Cervical back: Normal range of motion.   Neurological:      Mental Status: She is alert and oriented to person, place, and time.   Psychiatric:         Behavior: Behavior normal.       Vascular: Distal DP/PT pulses palpable 2/4. CRT < 3 sec to tips of toes. No vericosities noted to LEs. Hair growth present LE, warm to touch LE.  L foot: mild edema noted to forefoot.     Dermatologic: No open lesions, lacerations or wounds. Interdigital spaces clean, dry and intact. No erythema, rubor, calor noted LE  L foot: ecchymosis, bruising noted dorsal aspect of  toes.     Musculoskeletal: No calf tenderness LE, Compartments soft/compressible.  L foot: ttp dorsal aspect of toes especially over 3rd and 4th toe. Diffuse tenderness to dorsal MPJs. Limited ROM of lesser toes MPJs.     Neurological: Light touch, proprioception, and sharp/dull sensation are all intact. Protective threshold with the Dover-Wienstein monofilament is intact. Vibratory sensation " intact.         Assessment:       Encounter Diagnoses   Name Primary?    Closed nondisplaced fracture of phalanx of toe of left foot, unspecified toe, initial encounter Yes    Left foot pain     Injury of foot, unspecified laterality, initial encounter     Ecchymosis     Foot swelling          Plan:       Kalina was seen today for toe injury.    Diagnoses and all orders for this visit:    Closed nondisplaced fracture of phalanx of toe of left foot, unspecified toe, initial encounter    Left foot pain  -     X-Ray Foot Complete Left; Future    Injury of foot, unspecified laterality, initial encounter  -     Ambulatory referral/consult to Podiatry    Ecchymosis    Foot swelling      I counseled the patient on her conditions, their implications and medical management.    58 y.o. female with L foot injury. 3rd and 4th toe fracture.    -L foot xrays reviewed. Minimally displaced 3rd and 4th toe fracture.   -Discussed different treatment option with the patient.  Based on clinical and radiographic finding I recommend to treat this conservatively. Mild displacement of 3rd toe fracture. Discussed both pros and cons of each different treatment options were discussed. Recommend bruno splint with 2nd toe.  -WBAT in short CAM. Short CAM dispensed. RICE.     -I reviewed imaging, clinical history, and diagnosis as above with the patient. I attempted to use layman's terms to educate the patient as well as utilize foot models and/or pictures. I personally went through imaging with the patient.    -The nature of the condition, options for management, as well as potential risks and complications were discussed in detail with patient. Patient was amenable to my recommendations and left my office fully informed and will follow up as instructed or sooner if necessary.    -Advised for optimal glucose control and maintenance per primary care physician. Patient was also educated on healthy diet that is naturally rich in nutrients and low  in fat and calories.   -f/u 1 month     Note dictated with voice recognition software, please excuse any grammatical errors.

## 2023-05-08 NOTE — LETTER
May 8, 2023      [facility] - Podiatry  [address], [place] [address] [address]  Phone: [phone]  Fax: [phone]       Patient: Kalina Ryan   YOB: 1965  Date of Visit: 05/08/2023    To Whom It May Concern:    Karthikeyan Ryan  was at [facility] on 05/08/2023. The patient may return to work on 5/8/2023 with restrictions. Ms. Ryan must wear a CAM walker boot at all times while ambulating. If you have any questions or concerns, or if I can be of further assistance, please do not hesitate to contact me.    Sincerely,        Sidra Gómez LPN

## 2023-06-05 ENCOUNTER — CLINICAL SUPPORT (OUTPATIENT)
Dept: OTHER | Facility: CLINIC | Age: 58
End: 2023-06-05

## 2023-06-05 DIAGNOSIS — Z00.8 ENCOUNTER FOR OTHER GENERAL EXAMINATION: ICD-10-CM

## 2023-06-06 VITALS
HEIGHT: 63 IN | DIASTOLIC BLOOD PRESSURE: 74 MMHG | BODY MASS INDEX: 47.84 KG/M2 | WEIGHT: 270 LBS | SYSTOLIC BLOOD PRESSURE: 126 MMHG

## 2023-06-06 LAB
HBA1C MFR BLD: 6.1 %
HDLC SERPL-MCNC: 55 MG/DL
POC CHOLESTEROL, LDL (DOCK): 104 MG/DL
POC CHOLESTEROL, TOTAL: 180 MG/DL
POC GLUCOSE, FASTING: 86 MG/DL (ref 60–110)
TRIGL SERPL-MCNC: 118 MG/DL

## 2023-06-12 ENCOUNTER — OFFICE VISIT (OUTPATIENT)
Dept: PODIATRY | Facility: CLINIC | Age: 58
End: 2023-06-12
Payer: OTHER GOVERNMENT

## 2023-06-12 VITALS — WEIGHT: 270 LBS | BODY MASS INDEX: 47.84 KG/M2 | HEIGHT: 63 IN

## 2023-06-12 DIAGNOSIS — S92.912D CLOSED NONDISPLACED FRACTURE OF PHALANX OF TOE OF LEFT FOOT WITH ROUTINE HEALING, UNSPECIFIED TOE, SUBSEQUENT ENCOUNTER: Primary | ICD-10-CM

## 2023-06-12 DIAGNOSIS — M79.89 TOE SWELLING: ICD-10-CM

## 2023-06-12 PROCEDURE — 99213 OFFICE O/P EST LOW 20 MIN: CPT | Mod: PBBFAC,PN | Performed by: PODIATRIST

## 2023-06-12 PROCEDURE — 99999 PR PBB SHADOW E&M-EST. PATIENT-LVL III: CPT | Mod: PBBFAC,,, | Performed by: PODIATRIST

## 2023-06-12 PROCEDURE — 99213 OFFICE O/P EST LOW 20 MIN: CPT | Mod: S$PBB,,, | Performed by: PODIATRIST

## 2023-06-12 PROCEDURE — 99999 PR PBB SHADOW E&M-EST. PATIENT-LVL III: ICD-10-PCS | Mod: PBBFAC,,, | Performed by: PODIATRIST

## 2023-06-12 PROCEDURE — 99213 PR OFFICE/OUTPT VISIT, EST, LEVL III, 20-29 MIN: ICD-10-PCS | Mod: S$PBB,,, | Performed by: PODIATRIST

## 2023-06-12 NOTE — PROGRESS NOTES
Subjective:      Patient ID: Kalina Ryan is a 58 y.o. female.    Chief Complaint: Follow-up (Left foot f/u)    58 y.o. female presenting with L foot pain. Was on vacation and fell. DOI was on . Noticed bruising, pain and swelling on L foot since the injury. In open toe shoes today. Aching and throbbing pain. Pain gets worse with pressure and walking. DM.     23 L toe fracture. In short CAM today. Minimal pain over 3rd toe but overall improving. Still having some swelling on toes.     Level of ambulation/Occupation:   Smoking status:   Pita-D Def:   DM:  Other:     Review of Systems   Constitutional: Negative for decreased appetite and malaise/fatigue.   Cardiovascular:  Negative for claudication and leg swelling.   Musculoskeletal:  Positive for stiffness. Negative for arthritis, joint pain, joint swelling and muscle weakness.   Neurological:  Negative for numbness and weakness.   Psychiatric/Behavioral:  Negative for altered mental status.            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 - diagnosed by Neurologist back in  and last seen by neurology in     Hypertension     New onset type 2 diabetes mellitus 2019    Personal history of gastric bypass 2019       Past Surgical History:   Procedure Laterality Date    CHOLECYSTECTOMY      COLONOSCOPY N/A 2018    Procedure: COLONOSCOPY;  Surgeon: Elpidio Fortune MD;  Location: [facility];  Service: Endoscopy;  Laterality: N/A;    GASTRIC BYPASS      HYSTERECTOMY         Family History   Problem Relation Age of Onset    Heart disease Mother     Hypertension Mother     Diabetes Mother             Heart disease Father 60        Massive MI; DEFIB/ CABG    Diabetes Father             Hypertension Father     Dementia Father     Stroke Father     Pancreatic cancer Brother 54     Stomach cancer Brother     Cancer Brother             No Known Problems Daughter     No Known Problems Son     Heart disease Brother 56        maintain with medication - no surgery    Hypertension Brother     Hypertension Brother        Social History     Socioeconomic History    Marital status:    Occupational History    Occupation: work at bank   Tobacco Use    Smoking status: Never     Passive exposure: Never    Smokeless tobacco: Never   Substance and Sexual Activity    Alcohol use: Yes     Alcohol/week: 1.0 standard drink     Types: 1 Drinks containing 0.5 oz of alcohol per week     Comment: every other weekend - 1 drink per occasion    Drug use: No    Sexual activity: Not Currently     Partners: Male     Birth control/protection: Partner-Vasectomy     Social Determinants of Health     Financial Resource Strain: Low Risk     Difficulty of Paying Living Expenses: Not very hard   Food Insecurity: No Food Insecurity    Worried About Running Out of Food in the Last Year: Never true    Ran Out of Food in the Last Year: Never true   Transportation Needs: No Transportation Needs    Lack of Transportation (Medical): No    Lack of Transportation (Non-Medical): No   Physical Activity: Insufficiently Active    Days of Exercise per Week: 1 day    Minutes of Exercise per Session: 30 min   Stress: No Stress Concern Present    Feeling of Stress : Only a little   Social Connections: Unknown    Frequency of Communication with Friends and Family: Once a week    Frequency of Social Gatherings with Friends and Family: Once a week    Active Member of Clubs or Organizations: No    Attends Club or Organization Meetings: Never    Marital Status:    Housing Stability: Low Risk     Unable to Pay for Housing in the Last Year: No    Number of Places Lived in the Last Year: 1    Unstable Housing in the Last Year: No       Current Outpatient Medications   Medication Sig Dispense Refill    amLODIPine (NORVASC) 5 MG  "tablet Take 1 tablet (5 mg total) by mouth once daily. 90 tablet 0    EScitalopram oxalate (LEXAPRO) 10 MG tablet Take 1 tablet (10 mg total) by mouth once daily. 90 tablet 0    lisinopriL (PRINIVIL,ZESTRIL) 40 MG tablet TAKE 1 TABLET BY MOUTH ONCE DAILY 90 tablet 0    tirzepatide (MOUNJARO) 5 mg/0.5 mL PnIj Inject 5 mg into the skin every 7 days. 4 pen 5     No current facility-administered medications for this visit.       Review of patient's allergies indicates:   Allergen Reactions    Amoxicillin Hives    Penicillins Hives       Vitals:    06/12/23 0815   Weight: 122.5 kg (270 lb)   Height: 5' 3" (1.6 m)   PainSc: 0-No pain       Objective:      Physical Exam  Constitutional:       General: She is not in acute distress.     Appearance: She is well-developed.   HENT:      Nose: Nose normal.   Eyes:      Conjunctiva/sclera: Conjunctivae normal.   Pulmonary:      Effort: Pulmonary effort is normal.   Chest:      Chest wall: No tenderness.   Abdominal:      Tenderness: There is no abdominal tenderness.   Musculoskeletal:      Cervical back: Normal range of motion.   Neurological:      Mental Status: She is alert and oriented to person, place, and time.   Psychiatric:         Behavior: Behavior normal.       Vascular: Distal DP/PT pulses palpable 2/4. CRT < 3 sec to tips of toes. No vericosities noted to LEs. Hair growth present LE, warm to touch LE.  L foot: mild edema noted to forefoot.     Dermatologic: No open lesions, lacerations or wounds. Interdigital spaces clean, dry and intact. No erythema, rubor, calor noted LE    Musculoskeletal: No calf tenderness LE, Compartments soft/compressible.  L foot: mild pain over 3rd toe.     Neurological: Light touch, proprioception, and sharp/dull sensation are all intact. Protective threshold with the [place]ienstein monofilament is intact. Vibratory sensation intact.         Assessment:       Encounter Diagnoses   Name Primary?    Closed nondisplaced fracture of phalanx of " toe of left foot with routine healing, unspecified toe, subsequent encounter Yes    Toe swelling            Plan:       Kalina was seen today for follow-up.    Diagnoses and all orders for this visit:    Closed nondisplaced fracture of phalanx of toe of left foot with routine healing, unspecified toe, subsequent encounter    Toe swelling        I counseled the patient on her conditions, their implications and medical management.    58 y.o. female with L foot injury. 3rd and 4th toe fracture.    -L foot xrays reviewed. Minimally displaced 3rd and 4th toe fracture.   -clinically improving. Minimal pain over 3rd toe. Ok to transition to normal WB.     -It was discussed the importance of wearing shoes with adequate room in toe box to accommodate toe deformities. Recommended New Balance/[employer]ics shoe brands with adequate arch supports to alleviate abnormal pressure and improve stability of foot while walking. Avoid flat shoes and barefoot walking as these will exacerbate or worsen symptoms.   -I reviewed imaging, clinical history, and diagnosis as above with the patient. I attempted to use layman's terms to educate the patient as well as utilize foot models and/or pictures. I personally went through imaging with the patient.    -The nature of the condition, options for management, as well as potential risks and complications were discussed in detail with patient. Patient was amenable to my recommendations and left my office fully informed and will follow up as instructed or sooner if necessary.    -Advised for optimal glucose control and maintenance per primary care physician. Patient was also educated on healthy diet that is naturally rich in nutrients and low in fat and calories.   -f/u prn    Note dictated with voice recognition software, please excuse any grammatical errors.

## 2023-06-29 DIAGNOSIS — F41.9 ANXIETY: ICD-10-CM

## 2023-06-29 DIAGNOSIS — I15.2 HYPERTENSION ASSOCIATED WITH DIABETES: ICD-10-CM

## 2023-06-29 DIAGNOSIS — E11.59 HYPERTENSION ASSOCIATED WITH DIABETES: ICD-10-CM

## 2023-06-29 RX ORDER — LISINOPRIL 40 MG/1
40 TABLET ORAL DAILY
Qty: 90 TABLET | Refills: 0 | Status: ON HOLD | OUTPATIENT
Start: 2023-06-29 | End: 2023-08-18 | Stop reason: HOSPADM

## 2023-06-29 RX ORDER — ESCITALOPRAM OXALATE 10 MG/1
10 TABLET ORAL DAILY
Qty: 90 TABLET | Refills: 0 | Status: SHIPPED | OUTPATIENT
Start: 2023-06-29 | End: 2023-09-13

## 2023-07-08 DIAGNOSIS — I15.2 HYPERTENSION ASSOCIATED WITH DIABETES: ICD-10-CM

## 2023-07-08 DIAGNOSIS — E11.59 HYPERTENSION ASSOCIATED WITH DIABETES: ICD-10-CM

## 2023-07-10 RX ORDER — AMLODIPINE BESYLATE 5 MG/1
TABLET ORAL
Qty: 90 TABLET | Refills: 3 | Status: SHIPPED | OUTPATIENT
Start: 2023-07-10

## 2023-08-07 ENCOUNTER — HOSPITAL ENCOUNTER (INPATIENT)
Facility: HOSPITAL | Age: 58
LOS: 10 days | Discharge: NURSING, REHAB OR LONG-TERM CARE | DRG: 493 | End: 2023-08-19
Attending: EMERGENCY MEDICINE | Admitting: HOSPITALIST
Payer: OTHER GOVERNMENT

## 2023-08-07 DIAGNOSIS — T14.90XA TRAUMA: ICD-10-CM

## 2023-08-07 DIAGNOSIS — E11.9 TYPE 2 DIABETES MELLITUS WITHOUT COMPLICATION, WITHOUT LONG-TERM CURRENT USE OF INSULIN: ICD-10-CM

## 2023-08-07 DIAGNOSIS — S82.852A CLOSED TRIMALLEOLAR FRACTURE OF LEFT ANKLE, INITIAL ENCOUNTER: ICD-10-CM

## 2023-08-07 DIAGNOSIS — S82.852A TRIMALLEOLAR FRACTURE OF ANKLE, CLOSED, LEFT, INITIAL ENCOUNTER: Primary | ICD-10-CM

## 2023-08-07 DIAGNOSIS — T14.8XXA FRACTURE: ICD-10-CM

## 2023-08-07 DIAGNOSIS — S82.831A CLOSED FRACTURE OF DISTAL END OF RIGHT FIBULA, UNSPECIFIED FRACTURE MORPHOLOGY, INITIAL ENCOUNTER: ICD-10-CM

## 2023-08-07 DIAGNOSIS — Z01.810 PREOP CARDIOVASCULAR EXAM: ICD-10-CM

## 2023-08-07 DIAGNOSIS — R07.9 CHEST PAIN: ICD-10-CM

## 2023-08-07 LAB
25(OH)D3+25(OH)D2 SERPL-MCNC: 29 NG/ML (ref 30–96)
ALBUMIN SERPL BCP-MCNC: 3.2 G/DL (ref 3.5–5.2)
ALP SERPL-CCNC: 123 U/L (ref 55–135)
ALT SERPL W/O P-5'-P-CCNC: 12 U/L (ref 10–44)
ANION GAP SERPL CALC-SCNC: 10 MMOL/L (ref 8–16)
AST SERPL-CCNC: 16 U/L (ref 10–40)
BASOPHILS # BLD AUTO: 0.03 K/UL (ref 0–0.2)
BASOPHILS NFR BLD: 0.2 % (ref 0–1.9)
BILIRUB SERPL-MCNC: 0.2 MG/DL (ref 0.1–1)
BUN SERPL-MCNC: 21 MG/DL (ref 6–20)
CALCIUM SERPL-MCNC: 8.8 MG/DL (ref 8.7–10.5)
CHLORIDE SERPL-SCNC: 107 MMOL/L (ref 95–110)
CO2 SERPL-SCNC: 19 MMOL/L (ref 23–29)
CREAT SERPL-MCNC: 0.8 MG/DL (ref 0.5–1.4)
DIFFERENTIAL METHOD: ABNORMAL
EOSINOPHIL # BLD AUTO: 0.1 K/UL (ref 0–0.5)
EOSINOPHIL NFR BLD: 0.5 % (ref 0–8)
ERYTHROCYTE [DISTWIDTH] IN BLOOD BY AUTOMATED COUNT: 15.3 % (ref 11.5–14.5)
EST. GFR  (NO RACE VARIABLE): >60 ML/MIN/1.73 M^2
ESTIMATED AVG GLUCOSE: 123 MG/DL (ref 68–131)
GLUCOSE SERPL-MCNC: 119 MG/DL (ref 70–110)
HBA1C MFR BLD: 5.9 % (ref 4–5.6)
HCT VFR BLD AUTO: 32.6 % (ref 37–48.5)
HGB BLD-MCNC: 10.7 G/DL (ref 12–16)
IMM GRANULOCYTES # BLD AUTO: 0.05 K/UL (ref 0–0.04)
IMM GRANULOCYTES NFR BLD AUTO: 0.4 % (ref 0–0.5)
LYMPHOCYTES # BLD AUTO: 1.5 K/UL (ref 1–4.8)
LYMPHOCYTES NFR BLD: 11.9 % (ref 18–48)
MAGNESIUM SERPL-MCNC: 1.8 MG/DL (ref 1.6–2.6)
MCH RBC QN AUTO: 27 PG (ref 27–31)
MCHC RBC AUTO-ENTMCNC: 32.8 G/DL (ref 32–36)
MCV RBC AUTO: 82 FL (ref 82–98)
MONOCYTES # BLD AUTO: 0.5 K/UL (ref 0.3–1)
MONOCYTES NFR BLD: 3.8 % (ref 4–15)
NEUTROPHILS # BLD AUTO: 10.7 K/UL (ref 1.8–7.7)
NEUTROPHILS NFR BLD: 83.2 % (ref 38–73)
NRBC BLD-RTO: 0 /100 WBC
PHOSPHATE SERPL-MCNC: 2.8 MG/DL (ref 2.7–4.5)
PLATELET # BLD AUTO: 263 K/UL (ref 150–450)
PMV BLD AUTO: 8.7 FL (ref 9.2–12.9)
POTASSIUM SERPL-SCNC: 4.5 MMOL/L (ref 3.5–5.1)
PREALB SERPL-MCNC: 21 MG/DL (ref 20–43)
PROT SERPL-MCNC: 6.4 G/DL (ref 6–8.4)
RBC # BLD AUTO: 3.96 M/UL (ref 4–5.4)
SODIUM SERPL-SCNC: 136 MMOL/L (ref 136–145)
WBC # BLD AUTO: 12.89 K/UL (ref 3.9–12.7)

## 2023-08-07 PROCEDURE — 83735 ASSAY OF MAGNESIUM: CPT | Performed by: STUDENT IN AN ORGANIZED HEALTH CARE EDUCATION/TRAINING PROGRAM

## 2023-08-07 PROCEDURE — 63600175 PHARM REV CODE 636 W HCPCS: Performed by: EMERGENCY MEDICINE

## 2023-08-07 PROCEDURE — 99291 CRITICAL CARE FIRST HOUR: CPT

## 2023-08-07 PROCEDURE — 80053 COMPREHEN METABOLIC PANEL: CPT | Performed by: STUDENT IN AN ORGANIZED HEALTH CARE EDUCATION/TRAINING PROGRAM

## 2023-08-07 PROCEDURE — 63600175 PHARM REV CODE 636 W HCPCS

## 2023-08-07 PROCEDURE — 83036 HEMOGLOBIN GLYCOSYLATED A1C: CPT | Performed by: STUDENT IN AN ORGANIZED HEALTH CARE EDUCATION/TRAINING PROGRAM

## 2023-08-07 PROCEDURE — 82306 VITAMIN D 25 HYDROXY: CPT | Performed by: STUDENT IN AN ORGANIZED HEALTH CARE EDUCATION/TRAINING PROGRAM

## 2023-08-07 PROCEDURE — 25000003 PHARM REV CODE 250: Performed by: EMERGENCY MEDICINE

## 2023-08-07 PROCEDURE — 84134 ASSAY OF PREALBUMIN: CPT | Performed by: STUDENT IN AN ORGANIZED HEALTH CARE EDUCATION/TRAINING PROGRAM

## 2023-08-07 PROCEDURE — 96376 TX/PRO/DX INJ SAME DRUG ADON: CPT

## 2023-08-07 PROCEDURE — 96375 TX/PRO/DX INJ NEW DRUG ADDON: CPT

## 2023-08-07 PROCEDURE — 84100 ASSAY OF PHOSPHORUS: CPT | Performed by: STUDENT IN AN ORGANIZED HEALTH CARE EDUCATION/TRAINING PROGRAM

## 2023-08-07 PROCEDURE — 96374 THER/PROPH/DIAG INJ IV PUSH: CPT

## 2023-08-07 PROCEDURE — 85025 COMPLETE CBC W/AUTO DIFF WBC: CPT | Performed by: STUDENT IN AN ORGANIZED HEALTH CARE EDUCATION/TRAINING PROGRAM

## 2023-08-07 RX ORDER — PROPOFOL 10 MG/ML
VIAL (ML) INTRAVENOUS
Status: DISPENSED
Start: 2023-08-07 | End: 2023-08-08

## 2023-08-07 RX ORDER — FENTANYL CITRATE 50 UG/ML
50 INJECTION, SOLUTION INTRAMUSCULAR; INTRAVENOUS
Status: COMPLETED | OUTPATIENT
Start: 2023-08-07 | End: 2023-08-07

## 2023-08-07 RX ORDER — PROPOFOL 10 MG/ML
VIAL (ML) INTRAVENOUS CODE/TRAUMA/SEDATION MEDICATION
Status: COMPLETED | OUTPATIENT
Start: 2023-08-07 | End: 2023-08-07

## 2023-08-07 RX ORDER — OXYCODONE AND ACETAMINOPHEN 5; 325 MG/1; MG/1
1 TABLET ORAL EVERY 6 HOURS PRN
Status: DISCONTINUED | OUTPATIENT
Start: 2023-08-07 | End: 2023-08-10

## 2023-08-07 RX ORDER — OXYCODONE HYDROCHLORIDE 5 MG/1
5 TABLET ORAL
Status: COMPLETED | OUTPATIENT
Start: 2023-08-07 | End: 2023-08-07

## 2023-08-07 RX ORDER — PROPOFOL 10 MG/ML
200 VIAL (ML) INTRAVENOUS ONCE
Status: COMPLETED | OUTPATIENT
Start: 2023-08-07 | End: 2023-08-07

## 2023-08-07 RX ORDER — HYDROCODONE BITARTRATE AND ACETAMINOPHEN 5; 325 MG/1; MG/1
1 TABLET ORAL EVERY 6 HOURS PRN
Qty: 9 TABLET | Refills: 0 | Status: SHIPPED | OUTPATIENT
Start: 2023-08-07 | End: 2023-08-07 | Stop reason: CLARIF

## 2023-08-07 RX ADMIN — PROPOFOL 50 MG: 10 INJECTION, EMULSION INTRAVENOUS at 07:08

## 2023-08-07 RX ADMIN — FENTANYL CITRATE 50 MCG: 50 INJECTION INTRAMUSCULAR; INTRAVENOUS at 07:08

## 2023-08-07 RX ADMIN — OXYCODONE HYDROCHLORIDE 5 MG: 5 TABLET ORAL at 09:08

## 2023-08-07 RX ADMIN — PROPOFOL 100 MG: 10 INJECTION, EMULSION INTRAVENOUS at 07:08

## 2023-08-08 ENCOUNTER — ANESTHESIA EVENT (OUTPATIENT)
Dept: SURGERY | Facility: HOSPITAL | Age: 58
DRG: 493 | End: 2023-08-08
Payer: OTHER MISCELLANEOUS

## 2023-08-08 PROBLEM — I10 BENIGN ESSENTIAL HTN: Status: ACTIVE | Noted: 2023-08-08

## 2023-08-08 PROBLEM — S82.64XA CLOSED NONDISPLACED FRACTURE OF LATERAL MALLEOLUS OF RIGHT FIBULA: Status: ACTIVE | Noted: 2023-08-08

## 2023-08-08 PROBLEM — S82.61XA CLOSED DISPLACED FRACTURE OF LATERAL MALLEOLUS OF RIGHT FIBULA: Status: ACTIVE | Noted: 2023-08-08

## 2023-08-08 PROBLEM — Z01.810 PREOPERATIVE CARDIOVASCULAR EXAMINATION: Status: ACTIVE | Noted: 2023-08-08

## 2023-08-08 PROBLEM — S93.05XA CLOSED DISLOCATION OF LEFT ANKLE: Status: ACTIVE | Noted: 2023-08-08

## 2023-08-08 PROBLEM — S82.852A TRIMALLEOLAR FRACTURE OF ANKLE, CLOSED, LEFT, INITIAL ENCOUNTER: Status: ACTIVE | Noted: 2023-08-08

## 2023-08-08 LAB
ABO + RH BLD: NORMAL
ANION GAP SERPL CALC-SCNC: 11 MMOL/L (ref 8–16)
BACTERIA #/AREA URNS AUTO: ABNORMAL /HPF
BASOPHILS # BLD AUTO: 0.02 K/UL (ref 0–0.2)
BASOPHILS NFR BLD: 0.2 % (ref 0–1.9)
BILIRUB UR QL STRIP: NEGATIVE
BLD GP AB SCN CELLS X3 SERPL QL: NORMAL
BUN SERPL-MCNC: 19 MG/DL (ref 6–20)
CALCIUM SERPL-MCNC: 9.2 MG/DL (ref 8.7–10.5)
CHLORIDE SERPL-SCNC: 106 MMOL/L (ref 95–110)
CLARITY UR REFRACT.AUTO: ABNORMAL
CO2 SERPL-SCNC: 23 MMOL/L (ref 23–29)
COLOR UR AUTO: YELLOW
CREAT SERPL-MCNC: 0.9 MG/DL (ref 0.5–1.4)
DIFFERENTIAL METHOD: ABNORMAL
EOSINOPHIL # BLD AUTO: 0.1 K/UL (ref 0–0.5)
EOSINOPHIL NFR BLD: 0.6 % (ref 0–8)
ERYTHROCYTE [DISTWIDTH] IN BLOOD BY AUTOMATED COUNT: 15.2 % (ref 11.5–14.5)
EST. GFR  (NO RACE VARIABLE): >60 ML/MIN/1.73 M^2
GLUCOSE SERPL-MCNC: 97 MG/DL (ref 70–110)
GLUCOSE UR QL STRIP: NEGATIVE
HCT VFR BLD AUTO: 34.1 % (ref 37–48.5)
HGB BLD-MCNC: 10.9 G/DL (ref 12–16)
HGB UR QL STRIP: NEGATIVE
IMM GRANULOCYTES # BLD AUTO: 0.04 K/UL (ref 0–0.04)
IMM GRANULOCYTES NFR BLD AUTO: 0.4 % (ref 0–0.5)
INR PPP: 1 (ref 0.8–1.2)
KETONES UR QL STRIP: NEGATIVE
LEUKOCYTE ESTERASE UR QL STRIP: ABNORMAL
LYMPHOCYTES # BLD AUTO: 2.7 K/UL (ref 1–4.8)
LYMPHOCYTES NFR BLD: 24.1 % (ref 18–48)
MAGNESIUM SERPL-MCNC: 2.1 MG/DL (ref 1.6–2.6)
MCH RBC QN AUTO: 26.5 PG (ref 27–31)
MCHC RBC AUTO-ENTMCNC: 32 G/DL (ref 32–36)
MCV RBC AUTO: 83 FL (ref 82–98)
MICROSCOPIC COMMENT: ABNORMAL
MONOCYTES # BLD AUTO: 0.7 K/UL (ref 0.3–1)
MONOCYTES NFR BLD: 5.8 % (ref 4–15)
NEUTROPHILS # BLD AUTO: 7.8 K/UL (ref 1.8–7.7)
NEUTROPHILS NFR BLD: 68.9 % (ref 38–73)
NITRITE UR QL STRIP: POSITIVE
NRBC BLD-RTO: 0 /100 WBC
PH UR STRIP: 6 [PH] (ref 5–8)
PHOSPHATE SERPL-MCNC: 4.1 MG/DL (ref 2.7–4.5)
PLATELET # BLD AUTO: 277 K/UL (ref 150–450)
PMV BLD AUTO: 9 FL (ref 9.2–12.9)
POTASSIUM SERPL-SCNC: 4.2 MMOL/L (ref 3.5–5.1)
PROT UR QL STRIP: NEGATIVE
PROTHROMBIN TIME: 10.9 SEC (ref 9–12.5)
RBC # BLD AUTO: 4.12 M/UL (ref 4–5.4)
RBC #/AREA URNS AUTO: 2 /HPF (ref 0–4)
SODIUM SERPL-SCNC: 140 MMOL/L (ref 136–145)
SP GR UR STRIP: 1.01 (ref 1–1.03)
SPECIMEN OUTDATE: NORMAL
SQUAMOUS #/AREA URNS AUTO: 1 /HPF
URN SPEC COLLECT METH UR: ABNORMAL
WBC # BLD AUTO: 11.31 K/UL (ref 3.9–12.7)
WBC #/AREA URNS AUTO: 52 /HPF (ref 0–5)

## 2023-08-08 PROCEDURE — 93005 ELECTROCARDIOGRAM TRACING: CPT

## 2023-08-08 PROCEDURE — 85610 PROTHROMBIN TIME: CPT | Performed by: HOSPITALIST

## 2023-08-08 PROCEDURE — 87077 CULTURE AEROBIC IDENTIFY: CPT | Performed by: HOSPITALIST

## 2023-08-08 PROCEDURE — 84425 ASSAY OF VITAMIN B-1: CPT | Performed by: HOSPITALIST

## 2023-08-08 PROCEDURE — 63600175 PHARM REV CODE 636 W HCPCS: Performed by: STUDENT IN AN ORGANIZED HEALTH CARE EDUCATION/TRAINING PROGRAM

## 2023-08-08 PROCEDURE — 99223 1ST HOSP IP/OBS HIGH 75: CPT | Mod: ,,, | Performed by: HOSPITALIST

## 2023-08-08 PROCEDURE — 82607 VITAMIN B-12: CPT | Performed by: HOSPITALIST

## 2023-08-08 PROCEDURE — G0378 HOSPITAL OBSERVATION PER HR: HCPCS

## 2023-08-08 PROCEDURE — 25000003 PHARM REV CODE 250

## 2023-08-08 PROCEDURE — 93010 ELECTROCARDIOGRAM REPORT: CPT | Mod: ,,, | Performed by: INTERNAL MEDICINE

## 2023-08-08 PROCEDURE — 36415 COLL VENOUS BLD VENIPUNCTURE: CPT | Performed by: HOSPITALIST

## 2023-08-08 PROCEDURE — 85025 COMPLETE CBC W/AUTO DIFF WBC: CPT | Performed by: HOSPITALIST

## 2023-08-08 PROCEDURE — 83921 ORGANIC ACID SINGLE QUANT: CPT | Performed by: HOSPITALIST

## 2023-08-08 PROCEDURE — 25000003 PHARM REV CODE 250: Performed by: HOSPITALIST

## 2023-08-08 PROCEDURE — 81001 URINALYSIS AUTO W/SCOPE: CPT | Performed by: HOSPITALIST

## 2023-08-08 PROCEDURE — 25000003 PHARM REV CODE 250: Performed by: STUDENT IN AN ORGANIZED HEALTH CARE EDUCATION/TRAINING PROGRAM

## 2023-08-08 PROCEDURE — 83735 ASSAY OF MAGNESIUM: CPT | Performed by: HOSPITALIST

## 2023-08-08 PROCEDURE — 86900 BLOOD TYPING SEROLOGIC ABO: CPT | Performed by: HOSPITALIST

## 2023-08-08 PROCEDURE — 82525 ASSAY OF COPPER: CPT | Performed by: HOSPITALIST

## 2023-08-08 PROCEDURE — 99233 SBSQ HOSP IP/OBS HIGH 50: CPT | Mod: ,,, | Performed by: HOSPITALIST

## 2023-08-08 PROCEDURE — 99223 PR INITIAL HOSPITAL CARE,LEVL III: ICD-10-PCS | Mod: ,,, | Performed by: HOSPITALIST

## 2023-08-08 PROCEDURE — 80048 BASIC METABOLIC PNL TOTAL CA: CPT | Performed by: HOSPITALIST

## 2023-08-08 PROCEDURE — 87088 URINE BACTERIA CULTURE: CPT | Performed by: HOSPITALIST

## 2023-08-08 PROCEDURE — 82746 ASSAY OF FOLIC ACID SERUM: CPT | Performed by: HOSPITALIST

## 2023-08-08 PROCEDURE — 87186 SC STD MICRODIL/AGAR DIL: CPT | Performed by: HOSPITALIST

## 2023-08-08 PROCEDURE — 84100 ASSAY OF PHOSPHORUS: CPT | Performed by: HOSPITALIST

## 2023-08-08 PROCEDURE — 63600175 PHARM REV CODE 636 W HCPCS

## 2023-08-08 PROCEDURE — 84630 ASSAY OF ZINC: CPT | Performed by: HOSPITALIST

## 2023-08-08 PROCEDURE — 87086 URINE CULTURE/COLONY COUNT: CPT | Performed by: HOSPITALIST

## 2023-08-08 PROCEDURE — 86592 SYPHILIS TEST NON-TREP QUAL: CPT | Performed by: HOSPITALIST

## 2023-08-08 PROCEDURE — 93010 EKG 12-LEAD: ICD-10-PCS | Mod: ,,, | Performed by: INTERNAL MEDICINE

## 2023-08-08 PROCEDURE — 99233 PR SUBSEQUENT HOSPITAL CARE,LEVL III: ICD-10-PCS | Mod: ,,, | Performed by: HOSPITALIST

## 2023-08-08 RX ORDER — LORAZEPAM 2 MG/ML
2 INJECTION INTRAMUSCULAR ONCE
Status: COMPLETED | OUTPATIENT
Start: 2023-08-08 | End: 2023-08-08

## 2023-08-08 RX ORDER — SODIUM CHLORIDE 0.9 % (FLUSH) 0.9 %
10 SYRINGE (ML) INJECTION EVERY 12 HOURS PRN
Status: DISCONTINUED | OUTPATIENT
Start: 2023-08-08 | End: 2023-08-19 | Stop reason: HOSPADM

## 2023-08-08 RX ORDER — ACETAMINOPHEN 500 MG
1000 TABLET ORAL ONCE
Status: COMPLETED | OUTPATIENT
Start: 2023-08-08 | End: 2023-08-08

## 2023-08-08 RX ORDER — ESCITALOPRAM OXALATE 10 MG/1
10 TABLET ORAL DAILY
Status: DISCONTINUED | OUTPATIENT
Start: 2023-08-08 | End: 2023-08-19 | Stop reason: HOSPADM

## 2023-08-08 RX ORDER — MULTIVITAMIN
1 TABLET ORAL DAILY
COMMUNITY

## 2023-08-08 RX ORDER — IBUPROFEN 200 MG
16 TABLET ORAL
Status: DISCONTINUED | OUTPATIENT
Start: 2023-08-08 | End: 2023-08-19 | Stop reason: HOSPADM

## 2023-08-08 RX ORDER — LIDOCAINE HYDROCHLORIDE AND EPINEPHRINE 10; 10 MG/ML; UG/ML
20 INJECTION, SOLUTION INFILTRATION; PERINEURAL ONCE
Status: DISCONTINUED | OUTPATIENT
Start: 2023-08-08 | End: 2023-08-10

## 2023-08-08 RX ORDER — METHOCARBAMOL 500 MG/1
1000 TABLET, FILM COATED ORAL ONCE
Status: COMPLETED | OUTPATIENT
Start: 2023-08-08 | End: 2023-08-08

## 2023-08-08 RX ORDER — LIDOCAINE HYDROCHLORIDE 10 MG/ML
INJECTION, SOLUTION EPIDURAL; INFILTRATION; INTRACAUDAL; PERINEURAL
Status: COMPLETED
Start: 2023-08-08 | End: 2023-08-08

## 2023-08-08 RX ORDER — GLUCAGON 1 MG
1 KIT INJECTION
Status: DISCONTINUED | OUTPATIENT
Start: 2023-08-08 | End: 2023-08-19 | Stop reason: HOSPADM

## 2023-08-08 RX ORDER — POLYETHYLENE GLYCOL 3350 17 G/17G
17 POWDER, FOR SOLUTION ORAL DAILY
Status: DISCONTINUED | OUTPATIENT
Start: 2023-08-09 | End: 2023-08-19 | Stop reason: HOSPADM

## 2023-08-08 RX ORDER — LISINOPRIL 20 MG/1
40 TABLET ORAL DAILY
Status: DISCONTINUED | OUTPATIENT
Start: 2023-08-08 | End: 2023-08-11

## 2023-08-08 RX ORDER — NAPROXEN SODIUM 220 MG
220 TABLET ORAL NIGHTLY
Status: ON HOLD | COMMUNITY
End: 2023-08-18 | Stop reason: HOSPADM

## 2023-08-08 RX ORDER — LORAZEPAM 2 MG/ML
2 INJECTION INTRAMUSCULAR ONCE
Status: DISCONTINUED | OUTPATIENT
Start: 2023-08-08 | End: 2023-08-10

## 2023-08-08 RX ORDER — NALOXONE HCL 0.4 MG/ML
0.02 VIAL (ML) INJECTION
Status: DISCONTINUED | OUTPATIENT
Start: 2023-08-08 | End: 2023-08-19 | Stop reason: HOSPADM

## 2023-08-08 RX ORDER — MORPHINE SULFATE 4 MG/ML
4 INJECTION, SOLUTION INTRAMUSCULAR; INTRAVENOUS ONCE
Status: COMPLETED | OUTPATIENT
Start: 2023-08-08 | End: 2023-08-08

## 2023-08-08 RX ORDER — POLYETHYLENE GLYCOL 3350 17 G/17G
17 POWDER, FOR SOLUTION ORAL DAILY
Status: DISCONTINUED | OUTPATIENT
Start: 2023-08-08 | End: 2023-08-08

## 2023-08-08 RX ORDER — ZINC GLUCONATE 50 MG
50 TABLET ORAL DAILY
Status: ON HOLD | COMMUNITY
End: 2023-08-18 | Stop reason: HOSPADM

## 2023-08-08 RX ORDER — ONDANSETRON 2 MG/ML
4 INJECTION INTRAMUSCULAR; INTRAVENOUS EVERY 8 HOURS PRN
Status: DISCONTINUED | OUTPATIENT
Start: 2023-08-08 | End: 2023-08-19 | Stop reason: HOSPADM

## 2023-08-08 RX ORDER — METHOCARBAMOL 500 MG/1
1000 TABLET, FILM COATED ORAL EVERY 8 HOURS PRN
Status: DISCONTINUED | OUTPATIENT
Start: 2023-08-08 | End: 2023-08-10

## 2023-08-08 RX ORDER — TALC
6 POWDER (GRAM) TOPICAL NIGHTLY PRN
Status: DISCONTINUED | OUTPATIENT
Start: 2023-08-08 | End: 2023-08-19 | Stop reason: HOSPADM

## 2023-08-08 RX ORDER — IBUPROFEN 200 MG
24 TABLET ORAL
Status: DISCONTINUED | OUTPATIENT
Start: 2023-08-08 | End: 2023-08-19 | Stop reason: HOSPADM

## 2023-08-08 RX ORDER — SODIUM CHLORIDE 9 MG/ML
INJECTION, SOLUTION INTRAVENOUS CONTINUOUS
Status: DISCONTINUED | OUTPATIENT
Start: 2023-08-08 | End: 2023-08-10

## 2023-08-08 RX ORDER — AMOXICILLIN 250 MG
2 CAPSULE ORAL 2 TIMES DAILY PRN
Status: DISCONTINUED | OUTPATIENT
Start: 2023-08-08 | End: 2023-08-11

## 2023-08-08 RX ORDER — OXYCODONE HYDROCHLORIDE 10 MG/1
10 TABLET ORAL EVERY 6 HOURS PRN
Status: DISCONTINUED | OUTPATIENT
Start: 2023-08-08 | End: 2023-08-10

## 2023-08-08 RX ORDER — AMLODIPINE BESYLATE 5 MG/1
5 TABLET ORAL DAILY
Status: DISCONTINUED | OUTPATIENT
Start: 2023-08-08 | End: 2023-08-19 | Stop reason: HOSPADM

## 2023-08-08 RX ORDER — ACETAMINOPHEN 325 MG/1
650 TABLET ORAL EVERY 4 HOURS PRN
Status: DISCONTINUED | OUTPATIENT
Start: 2023-08-08 | End: 2023-08-10

## 2023-08-08 RX ORDER — SIMETHICONE 80 MG
1 TABLET,CHEWABLE ORAL 4 TIMES DAILY PRN
Status: DISCONTINUED | OUTPATIENT
Start: 2023-08-08 | End: 2023-08-19 | Stop reason: HOSPADM

## 2023-08-08 RX ADMIN — LORAZEPAM 2 MG: 2 INJECTION INTRAMUSCULAR; INTRAVENOUS at 01:08

## 2023-08-08 RX ADMIN — METHOCARBAMOL 1000 MG: 500 TABLET ORAL at 02:08

## 2023-08-08 RX ADMIN — METHOCARBAMOL 1000 MG: 500 TABLET ORAL at 07:08

## 2023-08-08 RX ADMIN — LIDOCAINE HYDROCHLORIDE: 10 SOLUTION INTRAVENOUS at 02:08

## 2023-08-08 RX ADMIN — LIDOCAINE HYDROCHLORIDE 50 MG: 10 INJECTION, SOLUTION EPIDURAL; INFILTRATION; INTRACAUDAL; PERINEURAL at 02:08

## 2023-08-08 RX ADMIN — OXYCODONE HYDROCHLORIDE 10 MG: 10 TABLET ORAL at 11:08

## 2023-08-08 RX ADMIN — ESCITALOPRAM OXALATE 10 MG: 5 TABLET, FILM COATED ORAL at 08:08

## 2023-08-08 RX ADMIN — METHOCARBAMOL 1000 MG: 500 TABLET ORAL at 04:08

## 2023-08-08 RX ADMIN — ACETAMINOPHEN 1000 MG: 500 TABLET ORAL at 07:08

## 2023-08-08 RX ADMIN — OXYCODONE HYDROCHLORIDE AND ACETAMINOPHEN 1 TABLET: 5; 325 TABLET ORAL at 08:08

## 2023-08-08 RX ADMIN — MORPHINE SULFATE 4 MG: 4 INJECTION INTRAVENOUS at 07:08

## 2023-08-08 RX ADMIN — OXYCODONE HYDROCHLORIDE 10 MG: 10 TABLET ORAL at 04:08

## 2023-08-08 RX ADMIN — SODIUM CHLORIDE: 9 INJECTION, SOLUTION INTRAVENOUS at 06:08

## 2023-08-08 RX ADMIN — OXYCODONE HYDROCHLORIDE AND ACETAMINOPHEN 1 TABLET: 5; 325 TABLET ORAL at 01:08

## 2023-08-08 NOTE — SUBJECTIVE & OBJECTIVE
Past Medical History:   Diagnosis Date    Anxiety     Guillain Barré syndrome 2009    full paralysis but no intubation; residual weakness and neuropathy to hands and feet - diagnosed by Neurologist back in 2009 and last seen by neurology in 2011    Hypertension     New onset type 2 diabetes mellitus 9/4/2019    Personal history of gastric bypass 9/4/2019       Past Surgical History:   Procedure Laterality Date    CHOLECYSTECTOMY  2009    COLONOSCOPY N/A 7/31/2018    Procedure: COLONOSCOPY;  Surgeon: Elpidio Fortune MD;  Location: [facility];  Service: Endoscopy;  Laterality: N/A;    GASTRIC BYPASS  2009    HYSTERECTOMY  1999       Review of patient's allergies indicates:   Allergen Reactions    Amoxicillin Hives    Penicillins Hives       No current facility-administered medications on file prior to encounter.     Current Outpatient Medications on File Prior to Encounter   Medication Sig    amLODIPine (NORVASC) 5 MG tablet TAKE 1 TABLET DAILY    EScitalopram oxalate (LEXAPRO) 10 MG tablet Take 1 tablet (10 mg total) by mouth once daily.    lisinopriL (PRINIVIL,ZESTRIL) 40 MG tablet Take 1 tablet (40 mg total) by mouth once daily.    tirzepatide (MOUNJARO) 5 mg/0.5 mL PnIj Inject 5 mg into the skin every 7 days.     Family History       Problem Relation (Age of Onset)    Cancer Brother    Dementia Father    Diabetes Mother, Father    Heart disease Mother, Father (60), Brother (56)    Hypertension Mother, Father, Brother, Brother    No Known Problems Daughter, Son    Pancreatic cancer Brother (54)    Stomach cancer Brother    Stroke Father          Tobacco Use    Smoking status: Never     Passive exposure: Never    Smokeless tobacco: Never   Substance and Sexual Activity    Alcohol use: Yes     Alcohol/week: 1.0 standard drink of alcohol     Types: 1 Drinks containing 0.5 oz of alcohol per week     Comment: every other weekend - 1 drink per occasion    Drug use: No    Sexual activity: Not Currently     Partners:  Male     Birth control/protection: Partner-Vasectomy     Review of Systems   Constitutional:  Negative for activity change, appetite change, chills, diaphoresis, fatigue and fever.   HENT:  Negative for congestion, dental problem, drooling, ear discharge, ear pain, facial swelling, hearing loss, mouth sores, nosebleeds, postnasal drip, rhinorrhea, sinus pressure, sneezing, sore throat, tinnitus, trouble swallowing and voice change.    Eyes:  Negative for photophobia, pain, discharge, redness, itching and visual disturbance.   Respiratory:  Negative for apnea, cough, choking, chest tightness, shortness of breath, wheezing and stridor.    Cardiovascular:  Negative for chest pain, palpitations and leg swelling.   Gastrointestinal:  Negative for abdominal distention, abdominal pain, anal bleeding, blood in stool, constipation, diarrhea, nausea, rectal pain and vomiting.   Endocrine: Negative for cold intolerance, heat intolerance, polydipsia, polyphagia and polyuria.   Genitourinary:  Negative for decreased urine volume, difficulty urinating, dyspareunia, dysuria, enuresis, flank pain, frequency, genital sores, hematuria, menstrual problem, pelvic pain, urgency, vaginal bleeding, vaginal discharge and vaginal pain.   Musculoskeletal:  Positive for arthralgias (bilateral ankle pain L > R) and joint swelling (bilateral ankle swelling L>R). Negative for back pain, gait problem, myalgias, neck pain and neck stiffness.   Skin:  Negative for color change, pallor, rash and wound.   Allergic/Immunologic: Negative for environmental allergies, food allergies and immunocompromised state.   Neurological:  Negative for dizziness, tremors, seizures, syncope, facial asymmetry, speech difficulty, weakness, light-headedness, numbness and headaches.   Hematological:  Negative for adenopathy. Does not bruise/bleed easily.   Psychiatric/Behavioral:  Negative for agitation, behavioral problems, confusion, decreased concentration, dysphoric  mood, hallucinations, self-injury, sleep disturbance and suicidal ideas. The patient is not nervous/anxious and is not hyperactive.      Objective:     Vital Signs (Most Recent):  Temp: 98.3 °F (36.8 °C) (08/08/23 0200)  Pulse: 81 (08/08/23 0200)  Resp: 16 (08/08/23 0439)  BP: (!) 117/58 (08/08/23 0200)  SpO2: 100 % (08/08/23 0200) Vital Signs (24h Range):  Temp:  [98.3 °F (36.8 °C)-98.4 °F (36.9 °C)] 98.3 °F (36.8 °C)  Pulse:  [76-93] 81  Resp:  [10-22] 16  SpO2:  [96 %-100 %] 100 %  BP: (112-156)/(58-85) 117/58     Weight: 126 kg (277 lb 12.5 oz)  Body mass index is 49.21 kg/m².     Physical Exam  Constitutional:       General: She is not in acute distress.     Appearance: She is well-developed. She is obese. She is not diaphoretic.   HENT:      Head: Normocephalic and atraumatic.      Right Ear: External ear normal.      Left Ear: External ear normal.      Nose: Nose normal.      Mouth/Throat:      Pharynx: No oropharyngeal exudate.   Eyes:      General: No scleral icterus.     Conjunctiva/sclera: Conjunctivae normal.      Pupils: Pupils are equal, round, and reactive to light.   Neck:      Thyroid: No thyromegaly.      Vascular: No JVD.      Trachea: No tracheal deviation.   Cardiovascular:      Rate and Rhythm: Normal rate and regular rhythm.      Heart sounds: Normal heart sounds. No murmur heard.     No gallop.   Pulmonary:      Effort: Pulmonary effort is normal. No respiratory distress.      Breath sounds: Normal breath sounds. No wheezing or rales.   Chest:      Chest wall: No tenderness.   Abdominal:      General: Bowel sounds are normal. There is no distension.      Palpations: Abdomen is soft. There is no mass.      Tenderness: There is no abdominal tenderness. There is no guarding or rebound.   Genitourinary:     Vagina: No vaginal discharge.   Musculoskeletal:         General: Swelling (swelling of bilateral ankles L >R) and tenderness (TTP over bilateral ankles.) present.      Cervical back: Neck  supple.      Comments: Short leg splint in place over left leg and ankle.   Unable to move left leg or ankle due to pain    Lymphadenopathy:      Cervical: No cervical adenopathy.   Skin:     General: Skin is warm and dry.      Capillary Refill: Capillary refill takes more than 3 seconds.      Coloration: Skin is not pale.      Findings: No erythema or rash.   Neurological:      Mental Status: She is alert and oriented to person, place, and time.      Cranial Nerves: No cranial nerve deficit.      Motor: No abnormal muscle tone.      Coordination: Coordination normal.      Deep Tendon Reflexes: Reflexes are normal and symmetric. Reflexes normal.      Comments: Mild tremors of upper extremities    Psychiatric:         Behavior: Behavior normal.         Thought Content: Thought content normal.         Judgment: Judgment normal.      Comments: Flat affect               CRANIAL NERVES     CN III, IV, VI   Pupils are equal, round, and reactive to light.       Significant Labs: All pertinent labs within the past 24 hours have been reviewed.  Recent Lab Results         08/07/23 1949        Albumin 3.2       Alkaline Phosphatase 123       ALT 12       Anion Gap 10       AST 16       Baso # 0.03       Basophil % 0.2       BILIRUBIN TOTAL 0.2  Comment: For infants and newborns, interpretation of results should be based  on gestational age, weight and in agreement with clinical  observations.    Premature Infant recommended reference ranges:  Up to 24 hours.............<8.0 mg/dL  Up to 48 hours............<12.0 mg/dL  3-5 days..................<15.0 mg/dL  6-29 days.................<15.0 mg/dL         BUN 21       Calcium 8.8       Chloride 107       CO2 19       Creatinine 0.8       Differential Method Automated       eGFR >60.0       Eos # 0.1       Eosinophil % 0.5       Estimated Avg Glucose 123       Glucose 119       Gran # (ANC) 10.7       Gran % 83.2       Hematocrit 32.6       Hemoglobin 10.7       Hemoglobin A1C  External 5.9  Comment: ADA Screening Guidelines:  5.7-6.4%  Consistent with prediabetes  >or=6.5%  Consistent with diabetes    High levels of fetal hemoglobin interfere with the HbA1C  assay. Heterozygous hemoglobin variants (HbS, HgC, etc)do  not significantly interfere with this assay.   However, presence of multiple variants may affect accuracy.         Immature Grans (Abs) 0.05  Comment: Mild elevation in immature granulocytes is non specific and   can be seen in a variety of conditions including stress response,   acute inflammation, trauma and pregnancy. Correlation with other   laboratory and clinical findings is essential.         Immature Granulocytes 0.4       Lymph # 1.5       Lymph % 11.9       Magnesium 1.8       MCH 27.0       MCHC 32.8       MCV 82       Mono # 0.5       Mono % 3.8       MPV 8.7       nRBC 0       Phosphorus 2.8       Platelets 263       Potassium 4.5       Prealbumin 21       PROTEIN TOTAL 6.4       RBC 3.96       RDW 15.3       Sodium 136       Vit D, 25-Hydroxy 29  Comment: Vitamin D deficiency.........<10 ng/mL                              Vitamin D insufficiency......10-29 ng/mL       Vitamin D sufficiency........> or equal to 30 ng/mL  Vitamin D toxicity............>100 ng/mL         WBC 12.89               Significant Imaging: I have reviewed all pertinent imaging results/findings within the past 24 hours.

## 2023-08-08 NOTE — ED NOTES
Received report and assumed care of pt.  Pt came into ED with left ankle deformity from a fall with r fibula fracture also.   Patient identifiers for Kalina Ryan checked and correct.    LOC: The patient is awake, alert and aware of environment with an appropriate affect, the patient is oriented x 4 and speaking appropriately.    APPEARANCE: Patient resting comfortably and in no acute distress, patient is clean and well groomed, patient's clothing is properly fastened.    SKIN: The skin is warm and dry, color consistent with ethnicity, patient has normal skin turgor and moist mucus membranes, skin intact, no breakdown or bruising noted.    MUSCULOSKELETAL: Patient moving all extremities well, no obvious swelling or deformities noted.    RESPIRATORY: Airway is open and patent, respirations are spontaneous and even, patient has a normal effort and rate.    CARDIAC: Patient has a normal rate and rhythm, no periphreal edema noted, capillary refill < 3 seconds.    ABDOMEN: Soft and non tender to palpation, no distention noted. Patient denies any nausea, vomiting, diarrhea, or constipation.     NEUROLOGIC: Eyes open spontaneously, PERRL, behavior appropriate to situation, follows commands, facial expression symmetrical, bilateral hand grasp equal and even, purposeful motor response noted, normal sensation in all extremities.     HEENT: No abnormalities noted. White sclera and pupils equal round and reactive to light. Denies headache, dizziness.     : Pt voids independently, denies dysuria, hematuria, frequency.

## 2023-08-08 NOTE — ED NOTES
LOC: The patient is awake, alert, and oriented to self, place, time, and situation. Pt is calm and cooperative. Affect is appropriate.  Speech is appropriate and clear.      APPEARANCE: Patient resting comfortably. Patient is clean and well groomed.     SKIN: The skin is warm and dry; color consistent with ethnicity.  Patient has normal skin turgor and moist mucus membranes.  Skin intact; no breakdown or bruising noted.      MUSCULOSKELETAL: Patient moving upper and lower extremities without difficulty, c/o bilateral leg pain.       RESPIRATORY: Airway is open and patent. Respirations spontaneous, even, easy, and non-labored.  Patient has a normal effort and rate.  No accessory muscle use noted. Denies cough.      CARDIAC:   No peripheral edema noted. No complaints of chest pain. Radial pulse regular.     ABDOMEN: Soft and non tender to palpation.  No distention noted. Pt denies abdominal pain; denies nausea, vomiting, diarrhea, or constipation. Patient is NPO at this time.      NEUROLOGIC: Eyes open spontaneously.  Behavior appropriate to situation.  Follows commands; facial expression symmetrical.  Purposeful motor response noted; normal sensation in all extremities.

## 2023-08-08 NOTE — ASSESSMENT & PLAN NOTE
-s/p closed reduction by orthopedic in ED with short leg splint placement   -will need operative fixation for definitive management of trimalleolar fracture by ortho

## 2023-08-08 NOTE — SUBJECTIVE & OBJECTIVE
Interval History: see above    Review of Systems   Constitutional:  Positive for activity change. Negative for appetite change and fever.   HENT:  Negative for trouble swallowing.    Respiratory:  Negative for cough and shortness of breath.    Cardiovascular:  Negative for chest pain and leg swelling.   Gastrointestinal:  Negative for abdominal pain, constipation, nausea and vomiting.   Genitourinary:  Negative for difficulty urinating and frequency.   Musculoskeletal:  Positive for arthralgias (bilat ankles) and gait problem. Negative for neck pain.   Skin:  Negative for rash.   Neurological:  Negative for numbness.   Psychiatric/Behavioral:  Negative for behavioral problems.      Objective:     Vital Signs (Most Recent):  Temp: 98 °F (36.7 °C) (08/08/23 0629)  Pulse: 76 (08/08/23 0629)  Resp: 16 (08/08/23 0439)  BP: (!) 101/55 (08/08/23 0629)  SpO2: 96 % (08/08/23 0629) Vital Signs (24h Range):  Temp:  [98 °F (36.7 °C)-98.4 °F (36.9 °C)] 98 °F (36.7 °C)  Pulse:  [76-93] 76  Resp:  [10-22] 16  SpO2:  [96 %-100 %] 96 %  BP: (101-156)/(55-85) 101/55     Weight: 126 kg (277 lb 12.5 oz)  Body mass index is 49.21 kg/m².  No intake or output data in the 24 hours ending 08/08/23 0722      Physical Exam  Constitutional:       General: She is not in acute distress.     Appearance: Normal appearance. She is not ill-appearing, toxic-appearing or diaphoretic.   HENT:      Head: Normocephalic and atraumatic.      Nose: Nose normal.      Mouth/Throat:      Mouth: Mucous membranes are moist.      Pharynx: Oropharynx is clear.   Eyes:      General: No scleral icterus.     Extraocular Movements: Extraocular movements intact.      Conjunctiva/sclera: Conjunctivae normal.      Pupils: Pupils are equal, round, and reactive to light.   Cardiovascular:      Rate and Rhythm: Normal rate and regular rhythm.      Pulses: Normal pulses.      Heart sounds: Normal heart sounds. No murmur heard.  Pulmonary:      Effort: Pulmonary effort is  normal. No respiratory distress.      Breath sounds: Normal breath sounds. No stridor. No wheezing, rhonchi or rales.   Chest:      Chest wall: No tenderness.   Abdominal:      General: Abdomen is flat. Bowel sounds are normal. There is no distension.      Palpations: Abdomen is soft.      Tenderness: There is no abdominal tenderness. There is no right CVA tenderness, guarding or rebound.   Musculoskeletal:         General: Swelling (left ankle) present. No tenderness, deformity or signs of injury. Normal range of motion.      Cervical back: Normal range of motion and neck supple. No rigidity or tenderness.      Right lower leg: No edema.      Left lower leg: No edema.   Skin:     General: Skin is warm and dry.      Coloration: Skin is not jaundiced.      Findings: No erythema or rash.   Neurological:      General: No focal deficit present.      Mental Status: She is alert and oriented to person, place, and time. Mental status is at baseline.      Motor: No weakness.      Gait: Gait abnormal.   Psychiatric:         Mood and Affect: Mood normal.         Behavior: Behavior normal.         Thought Content: Thought content normal.         Judgment: Judgment normal.             Significant Labs: All pertinent labs within the past 24 hours have been reviewed.  CBC:   Recent Labs   Lab 08/07/23 1949 08/08/23 0438   WBC 12.89* 11.31   HGB 10.7* 10.9*   HCT 32.6* 34.1*    277     CMP:   Recent Labs   Lab 08/07/23 1949 08/08/23 0438    140   K 4.5 4.2    106   CO2 19* 23   * 97   BUN 21* 19   CREATININE 0.8 0.9   CALCIUM 8.8 9.2   PROT 6.4  --    ALBUMIN 3.2*  --    BILITOT 0.2  --    ALKPHOS 123  --    AST 16  --    ALT 12  --    ANIONGAP 10 11       Significant Imaging: I have reviewed all pertinent imaging results/findings within the past 24 hours.

## 2023-08-08 NOTE — PROVIDER PROGRESS NOTES - EMERGENCY DEPT.
Encounter Date: 8/7/2023    ED Physician Progress Notes      ED Resident HAND-OFF NOTE:  Kalina Ryan is a 58 y.o. female who presented to the ED on 8/8/2023, patient C/O broken ankle. I assumed care of patient from off-going ED physician team patient pending xray of right ankle.    On my evaluation, Kalina Ryan appears well, hemodynamically stable and in NAD. Thus far, Kalina Ryan has received:  Medications   oxyCODONE-acetaminophen 5-325 mg per tablet 1 tablet (1 tablet Oral Given 8/8/23 0158)   sodium chloride 0.9% flush 10 mL (has no administration in time range)   naloxone 0.4 mg/mL injection 0.02 mg (has no administration in time range)   glucose chewable tablet 16 g (has no administration in time range)   glucose chewable tablet 24 g (has no administration in time range)   glucagon (human recombinant) injection 1 mg (has no administration in time range)   acetaminophen tablet 650 mg (has no administration in time range)   ondansetron injection 4 mg (has no administration in time range)   melatonin tablet 6 mg (has no administration in time range)   simethicone chewable tablet 80 mg (has no administration in time range)   amLODIPine tablet 5 mg (has no administration in time range)   EScitalopram oxalate tablet 10 mg (has no administration in time range)   lisinopriL tablet 40 mg (has no administration in time range)   oxyCODONE immediate release tablet Tab 10 mg (10 mg Oral Given 8/8/23 0439)   methocarbamoL tablet 1,000 mg (1,000 mg Oral Given 8/8/23 0440)   senna-docusate 8.6-50 mg per tablet 2 tablet (has no administration in time range)   0.9%  NaCl infusion ( Intravenous New Bag 8/8/23 0617)   polyethylene glycol packet 17 g (has no administration in time range)   propofol (DIPRIVAN) 10 mg/mL IVP (100 mg Intravenous Not Given 8/7/23 1930)   fentaNYL 50 mcg/mL injection 50 mcg (50 mcg Intravenous Given 8/7/23 1910)   propofol (DIPRIVAN) 10 mg/mL IVP (50 mg Intravenous Given 8/7/23 1926)    fentaNYL 50 mcg/mL injection 50 mcg (50 mcg Intravenous Given 8/7/23 1943)   oxyCODONE immediate release tablet 5 mg (5 mg Oral Given 8/7/23 2137)       BP (!) 117/58 (BP Location: Right arm, Patient Position: Lying)   Pulse 81   Temp 98.3 °F (36.8 °C) (Oral)   Resp 16   Wt 126 kg (277 lb 12.5 oz)   SpO2 100%   BMI 49.21 kg/m²         Disposition: I anticipate patient will admitted  ______________________  Pal Sheppard MD   Emergency Medicine Resident      UPDATE: Pt was admitted based on the fact that the rt sided xray revealed an avulsion fx of the right talus.  Pt is now considered non-ambulatory and requires admission for PT/OT evaluation.         :  Fracture  Trauma  Trimalleolar fracture of ankle, closed, left, initial encounter (Primary)  Closed fracture of distal end of right fibula, unspecified fracture morphology, initial encounter

## 2023-08-08 NOTE — ED NOTES
Pt AAOx4, resting comfortably in bed, NAD, respirations E/UL, updated on POC, wheels locked and in low position, call bell with in reach, Comfort positioning and restroom needs were addressed. Necessary items were placed with in reach and was advised when a reassessment would take place.

## 2023-08-08 NOTE — MEDICAL/APP STUDENT
[facility] Medicine Student   History and Physical  [facility] [place] HOSP MED K  08/08/2023  9:50 AM    SUBJECTIVE:     Chief Complaint:  Bilateral ankle pain    History of Present Illness:  Kalina Ryan is a 58 y.o. 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 The reason for her fall is she twisted her ankle, fell down, and had immediate pain. She thinks she also hit the back of her head during the fall. She denies LOC, chest pain, SOB, and seizures.     On this ED visit, she was found to have complex fracture dislocation of left ankle. A closed reduction was completed and she was placed in short splint in the ER. Received propofol, fentanyl and oxycodone.  Patient also endorsed weakness and pain of her right ankle, so an X-ray of right ankle was obtained, showing nondisplaced fracture of right lateral malleolus and right distal fibula.    On further history taking, patient reports progressive weakness of lower extremities since November 2022. Symptoms include difficultly getting up from sitting positions and trouble with taking steps. She states that weakness is different than her residual symptoms from GBS in 2009. She was last seen by Neurology in 2011. She is still ambulating without help and without use of assisted devices. She had a fall in May 2023 leading to right 3rd and 4th tarsal fractures. She attributes this fall to her ongoing weakness. This was conservatively managed.  For her diabetes, she is receiving mounjaro 5 mg injections weekly.       Review of Systems   Constitutional:  Negative for chills and fever.   HENT:  Negative for hearing loss.    Eyes:  Negative for blurred vision, double vision and photophobia.   Respiratory:  Negative for cough  and shortness of breath.    Cardiovascular:  Negative for chest pain, palpitations and leg swelling.   Gastrointestinal:  Negative for abdominal pain, nausea and vomiting.   Genitourinary:  Negative for dysuria, frequency and urgency.        3+ Leukocytes   Nitrite positive   Musculoskeletal:  Positive for back pain, falls, joint pain and neck pain.   Skin:  Negative for rash.   Neurological:  Positive for tremors and weakness. Negative for dizziness, sensory change and headaches.   Endo/Heme/Allergies:  Does not bruise/bleed easily.   Psychiatric/Behavioral:  Negative for depression and memory loss.        HISTORY:     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 - diagnosed by Neurologist back in  and last seen by neurology in     Hypertension     New onset type 2 diabetes mellitus 2019    Personal history of gastric bypass 2019       Past Surgical History:   Procedure Laterality Date    CHOLECYSTECTOMY      COLONOSCOPY N/A 2018    Procedure: COLONOSCOPY;  Surgeon: Elpidio Fortune MD;  Location: [facility];  Service: Endoscopy;  Laterality: N/A;    GASTRIC BYPASS      HYSTERECTOMY         Family History   Problem Relation Age of Onset    Heart disease Mother     Hypertension Mother     Diabetes Mother             Heart disease Father 60        Massive MI; DEFIB/ CABG    Diabetes Father             Hypertension Father     Dementia Father     Stroke Father     Pancreatic cancer Brother 54    Stomach cancer Brother     Cancer Brother             No Known Problems Daughter     No Known Problems Son     Heart disease Brother 56        maintain with medication - no surgery    Hypertension Brother     Hypertension Brother        Social History     Socioeconomic History    Marital status:    Occupational History    Occupation: work  at bank   Tobacco Use    Smoking status: Never     Passive exposure: Never    Smokeless tobacco: Never   Substance and Sexual Activity    Alcohol use: Yes     Alcohol/week: 1.0 standard drink of alcohol     Types: 1 Drinks containing 0.5 oz of alcohol per week     Comment: every other weekend - 1 drink per occasion    Drug use: No    Sexual activity: Not Currently     Partners: Male     Birth control/protection: Partner-Vasectomy     Social Determinants of Health     Financial Resource Strain: Low Risk  (1/13/2023)    Overall Financial Resource Strain (CARDIA)     Difficulty of Paying Living Expenses: Not very hard   Food Insecurity: No Food Insecurity (1/13/2023)    Hunger Vital Sign     Worried About Running Out of Food in the Last Year: Never true     Ran Out of Food in the Last Year: Never true   Transportation Needs: No Transportation Needs (1/13/2023)    PRAPARE - Transportation     Lack of Transportation (Medical): No     Lack of Transportation (Non-Medical): No   Physical Activity: Insufficiently Active (1/13/2023)    Exercise Vital Sign     Days of Exercise per Week: 1 day     Minutes of Exercise per Session: 30 min   Stress: No Stress Concern Present (1/13/2023)    [place] [place] [employer] - Occupational Stress Questionnaire     Feeling of Stress : Only a little   Social Connections: Unknown (1/13/2023)    Social Connection and Isolation Panel [NHANES]     Frequency of Communication with Friends and Family: Once a week     Frequency of Social Gatherings with Friends and Family: Once a week     Active Member of Clubs or Organizations: No     Attends Club or Organization Meetings: Never     Marital Status:    Housing Stability: Low Risk  (1/13/2023)    Housing Stability Vital Sign     Unable to Pay for Housing in the Last Year: No     Number of Places Lived in the Last Year: 1     Unstable Housing in the Last Year: No       MEDICATIONS & ALLERGIES:     No current facility-administered  medications on file prior to encounter.     Current Outpatient Medications on File Prior to Encounter   Medication Sig Dispense Refill    amLODIPine (NORVASC) 5 MG tablet TAKE 1 TABLET DAILY 90 tablet 3    EScitalopram oxalate (LEXAPRO) 10 MG tablet Take 1 tablet (10 mg total) by mouth once daily. 90 tablet 0    lisinopriL (PRINIVIL,ZESTRIL) 40 MG tablet Take 1 tablet (40 mg total) by mouth once daily. 90 tablet 0    tirzepatide (MOUNJARO) 5 mg/0.5 mL PnIj Inject 5 mg into the skin every 7 days. 4 pen 5     Review of patient's allergies indicates:   Allergen Reactions    Amoxicillin Hives    Penicillins Hives       OBJECTIVE:     Vital Signs Recent:  Temp: 98.1 °F (36.7 °C) (08/08/23 0746)  Pulse: 76 (08/08/23 0746)  Resp: 18 (08/08/23 0814)  BP: 102/60 (08/08/23 0746)  SpO2: 97 % (08/08/23 0746)  Oxygen Documentation:    Flow (L/min): 15           Device (Oxygen Therapy): room air         Vital Signs Range (Last 24H):  Temp:  [98 °F (36.7 °C)-98.4 °F (36.9 °C)]   Pulse:  [76-93]   Resp:  [10-22]   BP: (101-156)/(55-85)   SpO2:  [96 %-100 %]        Weight:  Body mass index is 49.21 kg/m².  Wt Readings from Last 3 Encounters:   08/07/23 126 kg (277 lb 12.5 oz)   06/12/23 122.5 kg (270 lb)   06/05/23 122.5 kg (270 lb)        Physical Exam  Constitutional:       Appearance: Normal appearance. She is obese.   HENT:      Head: Normocephalic and atraumatic.      Nose: Nose normal.      Mouth/Throat:      Mouth: Mucous membranes are moist.      Pharynx: Oropharynx is clear.   Eyes:      Extraocular Movements: Extraocular movements intact.      Pupils: Pupils are equal, round, and reactive to light.   Cardiovascular:      Rate and Rhythm: Normal rate and regular rhythm.      Pulses: Normal pulses.      Heart sounds: Normal heart sounds.   Pulmonary:      Effort: Pulmonary effort is normal.      Breath sounds: Normal breath sounds.   Abdominal:      General: Bowel sounds are normal.      Palpations: Abdomen is soft.    Musculoskeletal:         General: Swelling, tenderness and signs of injury present.      Cervical back: Normal range of motion and neck supple.      Comments: Left leg in short splint. Paraspinal muscle tenderness. No focal tenderness on palpation of vertebral spinous process.   Skin:     General: Skin is warm and dry.   Neurological:      General: No focal deficit present.      Mental Status: She is alert and oriented to person, place, and time.      Sensory: No sensory deficit.      Motor: Weakness present.   Psychiatric:         Mood and Affect: Mood normal.         Behavior: Behavior normal.         Sodium (mmol/L)   Date Value   08/08/2023 140   08/07/2023 136   04/14/2023 141     Potassium (mmol/L)   Date Value   08/08/2023 4.2   08/07/2023 4.5   04/14/2023 4.3     Chloride (mmol/L)   Date Value   08/08/2023 106   08/07/2023 107   04/14/2023 107     CO2 (mmol/L)   Date Value   08/08/2023 23   08/07/2023 19 (L)   04/14/2023 28     BUN (mg/dL)   Date Value   08/08/2023 19   08/07/2023 21 (H)   04/14/2023 27 (H)     Creatinine (mg/dL)   Date Value   08/08/2023 0.9   08/07/2023 0.8   04/14/2023 0.83     Glucose (mg/dL)   Date Value   08/08/2023 97   08/07/2023 119 (H)   04/14/2023 97     Calcium (mg/dL)   Date Value   08/08/2023 9.2   08/07/2023 8.8   04/14/2023 8.9     Magnesium   Date Value   08/08/2023 2.1 mg/dL   08/07/2023 1.8 mg/dL   08/03/2010 2.4 mg/dl     Phosphorus   Date Value   08/08/2023 4.1 mg/dL   08/07/2023 2.8 mg/dL   08/03/2010 3.4 mg/dl     Alkaline Phosphatase (U/L)   Date Value   08/07/2023 123   04/14/2023 116   01/13/2023 119     ALT (U/L)   Date Value   08/07/2023 12   04/14/2023 17   01/13/2023 25     AST (U/L)   Date Value   08/07/2023 16   04/14/2023 20   01/13/2023 25     Albumin (g/dL)   Date Value   08/07/2023 3.2 (L)   04/14/2023 3.8   01/13/2023 4.0     Total Protein (g/dL)   Date Value   08/07/2023 6.4   04/14/2023 7.0   01/13/2023 7.1     Total Bilirubin (mg/dL)   Date Value    08/07/2023 0.2   04/14/2023 0.4   01/13/2023 0.4     INR (no units)   Date Value   08/08/2023 1.0   12/02/2009 1.0       WBC (K/uL)   Date Value   08/08/2023 11.31   08/07/2023 12.89 (H)   10/14/2022 7.74     Hemoglobin (g/dL)   Date Value   08/08/2023 10.9 (L)   08/07/2023 10.7 (L)   10/14/2022 10.9 (L)     Platelets (K/uL)   Date Value   08/08/2023 277   08/07/2023 263   10/14/2022 294       Diagnostic Results:  Narrative & Impression  EXAMINATION:  CT ANKLE (INCLUDING HINDFOOT) WITHOUT CONTRAST LEFT; CT 3D RECON WITH INDEPENDENT WS     CLINICAL HISTORY:  Fracture, ankle;2mm cuts with 3d recons;; fx;     TECHNIQUE:  Axial images of the left ankle were obtained.  Coronal, sagittal and 3D reconstructions were included.     COMPARISON:  Radiographs 08/07/2023     FINDINGS:  Status post closed reduction with splinting material surrounding the ankle.     Redemonstration of communicated fracture of the distal fibula with mild displacement.     Additional fractures of medial malleolus and distal anterior and posterior tibia are noted.     There is posterior displacement of the posterior tibial fracture.     Tiny sliver like calcifications or ossifications projecting about the medial aspect of the distal tibia may represent tiny cortical avulsion fractures.     There is diffuse soft tissue swelling at the level of the ankle joint, predominantly overlying medial and lateral malleoli.  No subcutaneous emphysema.  Muscle compartments are unremarkable.     Degenerative cystic changes within talus, 1st and 2nd cuneiforms.     Impression:     Communicated fracture of the distal fibula with mild displacement.     Additional fractures in the medial malleolus, distal anterior and posterior tibia.  Posterior displacement of the posterior tibial fracture.  Tiny sliver-like distal tibial medial cortical avulsion fractures are also suggested.     The previously demonstrated tibiotalar dislocation has been grossly reduced, with some  residual joint widening suggested in the lateral aspect of the tibiotalar articulation.     Not fully detailed above: There is some subtle eccentric widening of the calcaneocuboid, talonavicular, and navicular-cuneiform articulations, suspicious for underlying intertarsal ligamentous injuries.     Partially visualized external splint.     Electronically signed by resident: Hazel Lozano  Date:                                            08/07/2023  Time:                                           23:40     Electronically signed by: Terrell Hannah  Date:                                            08/08/2023  Time:                                           00:28       ASSESSMENT -- PLAN:       Active Hospital Problems    Diagnosis  POA    *Trimalleolar fracture of ankle, closed, left, initial encounter [S82.852A]  Yes    Benign essential HTN [I10]  Yes    Preoperative cardiovascular examination [Z01.810]  Not Applicable    Closed nondisplaced fracture of lateral malleolus of right fibula [S82.64XA]  Yes    Closed dislocation of left ankle [S93.05XA]  Yes    Type 2 diabetes mellitus without complication, without long-term current use of insulin [E11.9]  Yes    Class 3 severe obesity due to excess calories with serious comorbidity and body mass index (BMI) of 45.0 to 49.9 in adult [E66.01, Z68.42]  Not Applicable    Anxiety [F41.9]  Yes    History of Guillain-[place] syndrome [Z86.69]  Not Applicable     full paralysis but no intubation; residual weakness and neuropathy to hands and feet - diagnosed by Neurologist back in 2009 and last seen by neurology in 2011        Resolved Hospital Problems   No resolved problems to display.        #Trimalleolar fracture of ankle, closed, left, initial encounter         59 y/o female who sustained a fall down 4 steps going downstairs at work. She was in immediate pain and noted deformity of her left ankle. EMS was called and she was brought into ER. Xray showed complex fracture of  L. Ankle, s/p closed reduction and short splint placement in the ER. Sensation intact of lower extremities. Can wiggle toes.     --S/p closed reduction of left ankle by ortho in ER with short leg splint placement  --Ortho consulted. Definitive operative treatment for trimalleolar fracture  --No history of cardiac problems. No strokes. Denies chest pain or SOB  --Multimodal pain control      #Closed dislocation of left ankle  --S/p closed reduction by ortho in ER with short leg splint placement.  --neurovascular intact  --Multimodal pain control    #Closed nondisplaced fracture of lateral malleolus of right fibula  --X-ray with nondisplaced lateral malleolus fracture of right ankle  --Further management per Ortho  --Multimodal pain control    #History of Guillan-Hayes syndrome  #Neuropathy  --Per patient, history of having full paralysis in 2009 due to GBS. Was not intubated. Seen neurology last in 2011 (d/c for improvement)  --Residual symptoms include lower extremities weakness and neuropathy  --Ambulates at home without assisting devices  --s/p Gastric Bypass. Check Vitamin B12, Folate, thiamine  levels for other causes of neuropathy    #Lower extremities weakness  --Per pt, has been progressively worsened since Nov 2022.   --Sustained fall in May 2023 leading to right toe fractures  --Consider Neuro consult for progressive weakness and given hx of GBS in 2009    #T2DM  --S/p gastric bypass surgery in 2009  --On mounjaro 5 mg injections weekly at home  --Hgb A1c = 5.9     #HTN  --Chronic and well controlled  --On lisinopril 40 mg    #Anxiety  --No signs or symptoms of acute anxiety  --Continue home lexapro      Discharge planning:   LIVIER: 8/10/2023    Code Status: Full Code   Is the patient medically ready for discharge?: No    Reason for patient still in hospital (select all that apply): Treatment  Discharge Plan A: Home        An To, M3  [facility] [employer]

## 2023-08-08 NOTE — DISCHARGE INSTRUCTIONS
Diagnosis: Ankle fracture    - Splint was placed for support    A splint is like a removable cast. It helps your broken bone heal by holding it in place. Take good care of your splint. A damaged splint can keep your injury from healing well. If your splint becomes damaged or loses its shape, you may need to replace it.     Home Care Instructions include:  - Elevate (boost) your broken bone to the height of your hip when sitting or lying down to reduce swelling  - Continue taking your home medications as prescribed  - Exercise the nearby joints not kept still by the splint. For example, if you have a long leg splint, exercise your hip joint and your toes. If you have an arm splint, exercise your shoulder, elbow, thumb, and fingers.    Take ibuprofen (also called Advil, Motrin) for your pain. This medicine is available over-the-counter in 200 mg tablets.  - You may take 600 mg every 6 hours, or 800 mg every 8 hours as needed   - Do not take more than this amount, as it can cause kidney problems, bleeding in your stomach, and other serious problems.   - Do not also take naproxen (Aleve) at the same time or on the same day  - If you have heart problems or uncontrolled high blood pressure, you should not take ibuprofen for more than 3 days without discussing with your doctor    * Please take Norco for pain that is refractory to ibuprofen.    Splint care:  - Do not remove, damage, or cut the splint.  - Do not bear weight on your splint. Do not walk on your splint.  - Do not get the splint wet. When you shower or bathe, cover the splint and hold the splint outside the tub or shower. Protect it with a large plastic bag closed at the top end with a rubber band. Use two layers of plastic to keep the splint dry.  - Keep the splint clean and away from dirt.  - Do not expose your splint to heat, space heaters, open flames, or prolonged sunlight.    Follow-up plan:  - Follow-up with the orthopedic surgeon. Please call for an  appointment.  - Follow-up with primary care doctor as needed  - Return to activity as directed by your orthopedic surgeon    Return to the Emergency Department immediately if:   - Splint feels too tight or too loose  - Severe or worsening pain  - Tingling or numbness in the affected area  - Swelling, coldness, paleness, or blue-gray color in the fingers or toes  - Splint is damaged, cracked, or has rough edges that hurt  - You notice pressure sores or blisters

## 2023-08-08 NOTE — ED NOTES
Patient OK to eat now per Dr. Mckeon with Ortho.  No surgery scheduled today.  Will follow up with plan of care.

## 2023-08-08 NOTE — ED TRIAGE NOTES
57 y/o F presents to ER with c/c L ankle deformity. Pt fell from 5-6 at standing height, does not remember if she hit or head or LOC. No contusions, lacerations, or bleeding to head. Pt arrives with cardboard splint, + PMS to extremity. L ankle deformity noted without skin broken.

## 2023-08-08 NOTE — ASSESSMENT & PLAN NOTE
Body mass index is 49.21 kg/m². Morbid obesity complicates all aspects of disease management from diagnostic modalities to treatment. Weight loss encouraged and health benefits explained to patient.

## 2023-08-08 NOTE — ASSESSMENT & PLAN NOTE
"Patient's FSGs are controlled on current medication regimen.  Last A1c reviewed-   Lab Results   Component Value Date    HGBA1C 5.9 (H) 08/07/2023     Most recent fingerstick glucose reviewed- No results for input(s): "POCTGLUCOSE" in the last 24 hours.  Current correctional scale  Low  Maintain anti-hyperglycemic dose as follows-   Antihyperglycemics (From admission, onward)    None        -on mounjaro injections weekly at home   "

## 2023-08-08 NOTE — HPI
Kalina Ryan is a 58 y.o. 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 Patient states she twisted her ankle and fell down 3 step while going down the stairs at work. She had immediate pain and inability to bear weight. She noticed her left deformity and were screaming in pain. He coworker called EMS. Denies head injury or LOC. Denies prior injuries or surgeries to the left ankle. She is not on blood thinners. Denies other MSK pains or paresthesias. She walks without assistive devices at baseline. She lives at home with her .     ED course: afebrile and vitals stable. X-ray showed left ankle trimalleolar fracture and dislocation, and right ankle lateral malleolus fx. Patient seen by orthopedic and L ankle was Reduced and splinted in ER placed in short leg splint. Patient received profopol, fenatnyl and oxycodone in ED during conscious sedation for closed reduction of left ankle fracture.     During my interview patient is awake, conversant and not in distress. She reports pain and spasm in both ankles with right side hurting more than left. She denies tobacco, alcohol or other illicit drug use. She is able to ambulate few blocks at baseline without chest pain or SOB.

## 2023-08-08 NOTE — PHARMACY MED REC
"Admission Medication History     The home medication history was taken by Marquez Lambert.    You may go to "Admission" then "Reconcile Home Medications" tabs to review and/or act upon these items.     The home medication list has been updated by the Pharmacy department.   Please read ALL comments highlighted in yellow.   Please address this information as you see fit.    Feel free to contact us if you have any questions or require assistance.      Medications listed below were obtained from: Patient  Current Outpatient Medications on File Prior to Encounter   Medication Sig    amLODIPine (NORVASC) 5 MG tablet   Take 1 tablet by mouth once daily.    cetirizine HCl (CETIRIZINE ORAL)   Take 1 tablet by mouth once daily.    CYANOCOBALAMIN, VITAMIN B-12, ORAL   Take 1 tablet by mouth once daily.    EScitalopram oxalate (LEXAPRO) 10 MG tablet   Take 1 tablet (10 mg total) by mouth once daily.    lisinopriL (PRINIVIL,ZESTRIL) 40 MG tablet   Take 1 tablet (40 mg total) by mouth once daily.    MELATONIN ORAL Take 1 tablet by mouth every evening.      multivitamin (ONE DAILY MULTIVITAMIN) per tablet   Take 1 tablet by mouth once daily.    naproxen sodium (ALEVE) 220 MG tablet   Take 220 mg by mouth every evening.    tirzepatide (MOUNJARO) 5 mg/0.5 mL PnIj   Inject 5 mg into the skin every 7 days.    zinc gluconate 50 mg tablet Take 50 mg by mouth once daily.     Potential issues to be addressed PRIOR TO DISCHARGE  Patient requested refills for the following medications: (MOUNJARO)              Marquez Lambert  EXT [id number]                  .          "

## 2023-08-08 NOTE — ED PROVIDER NOTES
Encounter Date: 2023       History     Chief Complaint   Patient presents with    Ankle Deformity     Fell from standing height, denies hitting head and LOC. Arrives with L ankle in cardboard splint. Ankle has deformity and + pulses. Rates pain at 10/10     Kalina Ryan is a 58 y.o. female with PMH of hypertension, type 2 diabetes, [place] [facility] [place].  Patient states that she fell down 5 steps and landed on her left ankle.  She denies loss of consciousness, did not hit her head or neck.  Patient arriving with obvious deformity to her left ankle.  No DP pulse but it is dopplerable.  Left foot is dusky.        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 - diagnosed by Neurologist back in  and last seen by neurology in     Hypertension     New onset type 2 diabetes mellitus 2019    Personal history of gastric bypass 2019     Past Surgical History:   Procedure Laterality Date    CHOLECYSTECTOMY      COLONOSCOPY N/A 2018    Procedure: COLONOSCOPY;  Surgeon: Elpidio Fortune MD;  Location: [facility];  Service: Endoscopy;  Laterality: N/A;    GASTRIC BYPASS      HYSTERECTOMY       Family History   Problem Relation Age of Onset    Heart disease Mother     Hypertension Mother     Diabetes Mother             Heart disease Father 60        Massive MI; DEFIB/ CABG    Diabetes Father             Hypertension Father     Dementia Father     Stroke Father     Pancreatic cancer Brother 54    Stomach cancer Brother     Cancer Brother             No Known Problems Daughter     No Known Problems Son     Heart disease Brother 56        maintain with medication - no surgery    Hypertension Brother     Hypertension  Brother      Social History     Tobacco Use    Smoking status: Never     Passive exposure: Never    Smokeless tobacco: Never   Substance Use Topics    Alcohol use: Yes     Alcohol/week: 1.0 standard drink of alcohol     Types: 1 Drinks containing 0.5 oz of alcohol per week     Comment: every other weekend - 1 drink per occasion    Drug use: No     Review of Systems    Physical Exam     Initial Vitals [08/07/23 1821]   BP Pulse Resp Temp SpO2   (!) 156/82 76 18 98.4 °F (36.9 °C) 100 %      MAP       --         Physical Exam    Nursing note and vitals reviewed.  Constitutional: Vital signs are normal. She appears well-developed and well-nourished. Airway: Normal. Breathing: Normal. Circulation: Normal. Pulses:Radial palpable. She is cooperative.  Non-toxic appearance. She does not appear ill. No distress.   HENT:   Head: Normocephalic and atraumatic.   Nose: Nose abnormal.   Mouth/Throat: Mucous membranes are normal. Mucous membranes are not dry.   Eyes: Pupils: Normal pupils. Conjunctivae are normal. Pupils are equal, round, and reactive to light.   Slit lamp exam:       The right eye shows no corneal abrasion.        The left eye shows no corneal abrasion.   Neck: Trachea normal and phonation normal. No tracheal deviation present.   Normal range of motion.  Cardiovascular:  Normal rate, regular rhythm, normal heart sounds, intact distal pulses and normal pulses.     Exam reveals no gallop, no S3, no S4 and no friction rub.       No murmur heard.  Pulmonary/Chest: Breath sounds normal. She has no wheezes. She has no rhonchi. She has no rales.   Abdominal: Abdomen is soft. Bowel sounds are normal. She exhibits no distension. There is no abdominal tenderness. The pelvis is stable. There is no rebound and no guarding.   Musculoskeletal:         General: Normal range of motion.      Right upper arm: No swelling, deformity or bony tenderness.      Left upper arm: No swelling, deformity or bony tenderness.      Right  forearm: No swelling, deformity or bony tenderness.      Left forearm: No swelling, deformity or bony tenderness.      Cervical back: Normal range of motion. No deformity or bony tenderness. Normal.      Thoracic back: Normal. No deformity or bony tenderness.      Lumbar back: Normal. No deformity or bony tenderness.      Right upper leg: No swelling, deformity or bony tenderness.      Left upper leg: No swelling, deformity or bony tenderness.      Right lower leg: No swelling, deformity or bony tenderness. No edema.      Left lower leg: Deformity and bony tenderness present. No swelling.      Right ankle: No swelling or deformity. Tenderness present over the medial malleolus.      Left ankle: Swelling and deformity present. Tenderness present over the medial malleolus.        Legs:      Neurological: She is alert and oriented to person, place, and time. She has normal strength.   Skin: Skin is warm, dry and intact. Capillary refill takes less than 2 seconds.   Psychiatric: She has a normal mood and affect. Her speech is normal.         ED Course   Procedural Sedation        Date/Time: 8/7/2023 5:45 PM    Performed by: Luly Lorenzo MD  Authorized by: Bonnie Downing MD  Consent Done: Yes  Consent: Verbal consent obtained. Written consent obtained.  Risks and benefits: risks, benefits and alternatives were discussed  Patient understanding: patient states understanding of the procedure being performed  Patient identity confirmed: name  ASA Class: Class 2 - Mild Illness without functional impairment.  Mallampati Score: Class 2 - Visualization of the soft palate, fauces, and uvula.   NPO STATUS:  Date/Time of last solid: 8/7/2023 3:43 PM    Equipment: on cardiac monitor., on BP monitor., on CO2 monitor., airway equipment available., suction available. and on supplemental oxygen.     Sedation type: moderate (conscious) sedation    Sedatives: propofol  Sedation start date/time: 8/7/2023 7:26 PM  Vitals: Vital signs  were monitored during sedation.  Complications: No complications.         Labs Reviewed   CBC W/ AUTO DIFFERENTIAL - Abnormal; Notable for the following components:       Result Value    WBC 12.89 (*)     RBC 3.96 (*)     Hemoglobin 10.7 (*)     Hematocrit 32.6 (*)     RDW 15.3 (*)     MPV 8.7 (*)     Gran # (ANC) 10.7 (*)     Immature Grans (Abs) 0.05 (*)     Gran % 83.2 (*)     Lymph % 11.9 (*)     Mono % 3.8 (*)     All other components within normal limits   COMPREHENSIVE METABOLIC PANEL - Abnormal; Notable for the following components:    CO2 19 (*)     Glucose 119 (*)     BUN 21 (*)     Albumin 3.2 (*)     All other components within normal limits   HEMOGLOBIN A1C - Abnormal; Notable for the following components:    Hemoglobin A1C 5.9 (*)     All other components within normal limits   VITAMIN D - Abnormal; Notable for the following components:    Vit D, 25-Hydroxy 29 (*)     All other components within normal limits   PREALBUMIN   MAGNESIUM   PHOSPHORUS          Imaging Results              CT Ankle (Including Hindfoot) Without Contrast Left (In process)  Result time 08/08/23 00:13:43                     CT 3D RECON WITH INDEPENDENT WS (In process)                       X-Ray Foot Complete Right (Final result)  Result time 08/07/23 23:10:09      Final result by Terrell Hannah MD (08/07/23 23:10:09)                   Impression:      One view suggest a tiny cortical avulsion fracture from the anterior superior surface of the distal talus, near the talonavicular joint.    A nondisplaced fracture is questioned in the right distal fibula/lateral malleolus.    Correlate clinically, including for any corresponding pain or point tenderness at these suspected fractures sites.    Intra-articular fracture at the base of the right 5th metatarsal, likely a pseudo Barba fracture.    Soft tissue swelling about the right ankle and right foot.    Additional findings as detailed above.    This report was flagged in Epic as  abnormal.    Electronically signed by resident: Hazel Lozano  Date:    08/07/2023  Time:    22:31    Electronically signed by: Terrell Hannah  Date:    08/07/2023  Time:    23:10               Narrative:    EXAMINATION:  XR ANKLE COMPLETE 3 VIEW RIGHT; XR FOOT COMPLETE 3 VIEW RIGHT    CLINICAL HISTORY:  Injury, unspecified, initial encounter    TECHNIQUE:  AP, lateral, and oblique images of the right ankle were performed.    AP, lateral, and oblique views of both feet were performed.    COMPARISON:  Radiograph 03/05/2010    FINDINGS:  Nondisplaced lateral malleolus fracture is questioned.    Medial malleolus appears intact.    The lateral radiograph of the ankle suggests a small cortical avulsion fracture along the distal anterior superior surface of the talus, near the talonavicular joint.  On the lateral view of the right foot, this fracture line is not well visualized, likely because of differences in projection or patient positioning.    Oblique nondisplaced fracture of the 5th metatarsal base, involving the articular surface suggestive of pseudo Barba fracture.    Degenerative changes of the talonavicular joint.  Otherwise alignment and joint spaces preserved.  No bone destructive process.  No joint effusion.  No radiopaque foreign body.    Right hallux valgus, metatarsus varus, small bunion, and mild 1st MTP DJD.    Partially visualized 13 mm os trigonum and 20 mm accessory navicular.                                        X-Ray Ankle Complete Right (Final result)  Result time 08/07/23 23:10:09      Final result by Terrell Hannah MD (08/07/23 23:10:09)                   Impression:      One view suggest a tiny cortical avulsion fracture from the anterior superior surface of the distal talus, near the talonavicular joint.    A nondisplaced fracture is questioned in the right distal fibula/lateral malleolus.    Correlate clinically, including for any corresponding pain or point tenderness at these  suspected fractures sites.    Intra-articular fracture at the base of the right 5th metatarsal, likely a pseudo Barba fracture.    Soft tissue swelling about the right ankle and right foot.    Additional findings as detailed above.    This report was flagged in Epic as abnormal.    Electronically signed by resident: Hazel Lozano  Date:    08/07/2023  Time:    22:31    Electronically signed by: Terrell Hannah  Date:    08/07/2023  Time:    23:10               Narrative:    EXAMINATION:  XR ANKLE COMPLETE 3 VIEW RIGHT; XR FOOT COMPLETE 3 VIEW RIGHT    CLINICAL HISTORY:  Injury, unspecified, initial encounter    TECHNIQUE:  AP, lateral, and oblique images of the right ankle were performed.    AP, lateral, and oblique views of both feet were performed.    COMPARISON:  Radiograph 03/05/2010    FINDINGS:  Nondisplaced lateral malleolus fracture is questioned.    Medial malleolus appears intact.    The lateral radiograph of the ankle suggests a small cortical avulsion fracture along the distal anterior superior surface of the talus, near the talonavicular joint.  On the lateral view of the right foot, this fracture line is not well visualized, likely because of differences in projection or patient positioning.    Oblique nondisplaced fracture of the 5th metatarsal base, involving the articular surface suggestive of pseudo Barba fracture.    Degenerative changes of the talonavicular joint.  Otherwise alignment and joint spaces preserved.  No bone destructive process.  No joint effusion.  No radiopaque foreign body.    Right hallux valgus, metatarsus varus, small bunion, and mild 1st MTP DJD.    Partially visualized 13 mm os trigonum and 20 mm accessory navicular.                                       X-Ray Ankle Complete Left (Final result)  Result time 08/07/23 20:53:06      Final result by Shaji Ceballos MD (08/07/23 20:53:06)                   Impression:      1. Interval reduction of distal tibial and fibular  fractures as described, CT can be performed for further evaluation as warranted.      Electronically signed by: Shaji Ceballos MD  Date:    08/07/2023  Time:    20:53               Narrative:    EXAMINATION:  XR ANKLE COMPLETE 3 VIEW LEFT; XR TIBIA FIBULA 2 VIEW LEFT    CLINICAL HISTORY:  Other injury of unspecified body region, initial encounter    TECHNIQUE:  AP, lateral and oblique views of the left ankle were performed.  Four views left tibia fibula.    COMPARISON:  08/07/2023    FINDINGS:  Three views left ankle, four views left tibia fibula.    Since the previous examination, closed reduction has been performed and splinting material applied.  Alignment is now essentially anatomic.  Comminuted displaced fractures are noted of the distal fibula.  There is fracture of the medial malleolus.  The ankle mortise appears intact.  There is fracture involving the anterior aspect of the tibia.  Allowing for artifact, the posterior tibial cortex appears intact although artifact limits evaluation.  The knee is intact.  The proximal aspects of the tibia and fibula are intact.  No large knee joint effusion.                                       X-Ray Tibia Fibula 2 View Left (Final result)  Result time 08/07/23 20:53:06      Final result by Shaji Ceballos MD (08/07/23 20:53:06)                   Impression:      1. Interval reduction of distal tibial and fibular fractures as described, CT can be performed for further evaluation as warranted.      Electronically signed by: Shaji Ceballos MD  Date:    08/07/2023  Time:    20:53               Narrative:    EXAMINATION:  XR ANKLE COMPLETE 3 VIEW LEFT; XR TIBIA FIBULA 2 VIEW LEFT    CLINICAL HISTORY:  Other injury of unspecified body region, initial encounter    TECHNIQUE:  AP, lateral and oblique views of the left ankle were performed.  Four views left tibia fibula.    COMPARISON:  08/07/2023    FINDINGS:  Three views left ankle, four views left tibia fibula.    Since  the previous examination, closed reduction has been performed and splinting material applied.  Alignment is now essentially anatomic.  Comminuted displaced fractures are noted of the distal fibula.  There is fracture of the medial malleolus.  The ankle mortise appears intact.  There is fracture involving the anterior aspect of the tibia.  Allowing for artifact, the posterior tibial cortex appears intact although artifact limits evaluation.  The knee is intact.  The proximal aspects of the tibia and fibula are intact.  No large knee joint effusion.                                       X-Ray Foot Complete Left (Final result)  Result time 08/07/23 19:35:31      Final result by Shaji Ceballos MD (08/07/23 19:35:31)                   Impression:      1. Complex fracture dislocation of the distal tibia and fibula/ankle.  Please see above.  Follow-up radiography is recommended once closed reduction has been performed for more detailed evaluation, particularly involving the distal tibial fractures.      Electronically signed by: Shaji Ceballos MD  Date:    08/07/2023  Time:    19:35               Narrative:    EXAMINATION:  XR FOOT COMPLETE 3 VIEW LEFT; XR ANKLE COMPLETE 3 VIEW LEFT; XR TIBIA FIBULA 2 VIEW LEFT    CLINICAL HISTORY:  left ankle injury;.    TECHNIQUE:  AP, lateral and oblique views of the left foot were performed.  Three views left ankle.  Four views left tibia fibula.    COMPARISON:  Foot radiograph 05/08/2023    FINDINGS:  Two views left foot, three views left ankle, four views left tibia fibula.    There are healing fractures involving the distal aspects of the 3rd and 4th proximal phalanges.  There is diffuse edema about the foot.  There are acute displaced comminuted fractures involving the distal aspect of the fibula as well as of the lateral malleolus.  There is tibiotalar dislocation.  The talar dome appears intact.  There is fracture involving the anterior aspect of the tibia.  The proximal  aspects of the tibia and fibula are intact.  The knee is intact.                                       X-Ray Ankle Complete Left (Final result)  Result time 08/07/23 19:35:31      Final result by Shaji Ceballos MD (08/07/23 19:35:31)                   Impression:      1. Complex fracture dislocation of the distal tibia and fibula/ankle.  Please see above.  Follow-up radiography is recommended once closed reduction has been performed for more detailed evaluation, particularly involving the distal tibial fractures.      Electronically signed by: Shaji Ceballos MD  Date:    08/07/2023  Time:    19:35               Narrative:    EXAMINATION:  XR FOOT COMPLETE 3 VIEW LEFT; XR ANKLE COMPLETE 3 VIEW LEFT; XR TIBIA FIBULA 2 VIEW LEFT    CLINICAL HISTORY:  left ankle injury;.    TECHNIQUE:  AP, lateral and oblique views of the left foot were performed.  Three views left ankle.  Four views left tibia fibula.    COMPARISON:  Foot radiograph 05/08/2023    FINDINGS:  Two views left foot, three views left ankle, four views left tibia fibula.    There are healing fractures involving the distal aspects of the 3rd and 4th proximal phalanges.  There is diffuse edema about the foot.  There are acute displaced comminuted fractures involving the distal aspect of the fibula as well as of the lateral malleolus.  There is tibiotalar dislocation.  The talar dome appears intact.  There is fracture involving the anterior aspect of the tibia.  The proximal aspects of the tibia and fibula are intact.  The knee is intact.                                       X-Ray Tibia Fibula 2 View Left (Final result)  Result time 08/07/23 19:35:31      Final result by Shaji Ceballos MD (08/07/23 19:35:31)                   Impression:      1. Complex fracture dislocation of the distal tibia and fibula/ankle.  Please see above.  Follow-up radiography is recommended once closed reduction has been performed for more detailed evaluation, particularly  involving the distal tibial fractures.      Electronically signed by: Shaji Ceballos MD  Date:    08/07/2023  Time:    19:35               Narrative:    EXAMINATION:  XR FOOT COMPLETE 3 VIEW LEFT; XR ANKLE COMPLETE 3 VIEW LEFT; XR TIBIA FIBULA 2 VIEW LEFT    CLINICAL HISTORY:  left ankle injury;.    TECHNIQUE:  AP, lateral and oblique views of the left foot were performed.  Three views left ankle.  Four views left tibia fibula.    COMPARISON:  Foot radiograph 05/08/2023    FINDINGS:  Two views left foot, three views left ankle, four views left tibia fibula.    There are healing fractures involving the distal aspects of the 3rd and 4th proximal phalanges.  There is diffuse edema about the foot.  There are acute displaced comminuted fractures involving the distal aspect of the fibula as well as of the lateral malleolus.  There is tibiotalar dislocation.  The talar dome appears intact.  There is fracture involving the anterior aspect of the tibia.  The proximal aspects of the tibia and fibula are intact.  The knee is intact.                                       Medications   oxyCODONE-acetaminophen 5-325 mg per tablet 1 tablet (has no administration in time range)   propofol (DIPRIVAN) 10 mg/mL IVP (100 mg Intravenous Not Given 8/7/23 1930)   fentaNYL 50 mcg/mL injection 50 mcg (50 mcg Intravenous Given 8/7/23 1910)   propofol (DIPRIVAN) 10 mg/mL IVP (50 mg Intravenous Given 8/7/23 1926)   fentaNYL 50 mcg/mL injection 50 mcg (50 mcg Intravenous Given 8/7/23 1943)   oxyCODONE immediate release tablet 5 mg (5 mg Oral Given 8/7/23 2137)     Medical Decision Making:   Initial Assessment:   58-year-old female presenting with left ankle deformity and concern for vascular compromise.  Initially, patient is hypertensive but in significant pain.  Differential Diagnosis:   Ankle fracture/dislocation, concern for vascular compromise.  Clinical Tests:   Lab Tests: Ordered and Reviewed  The following lab test(s) were  unremarkable: CBC and CMP  Radiological Study: Ordered and Reviewed  Medical Tests: Ordered and Reviewed  ED Management:  Patient presents with obvious ankle deformity with concern for vascular compromise.  Procedural sedation was emergently done consent patient.  Procedural sedation conducted as noted above.  Orthopedic surgery at bedside to conduct reduction/splint.    Treatments in the emergency department include fentanyl for pain control.      Postreduction films showing successful reduction of the left ankle.  Tertiary exam revealing right ankle tenderness, will obtain films of the right ankle to assess for fracture.    Right ankle films showing distal fibula fracture.  Patient unable to ambulate due to bilateral ankle fractures.  Discussed patient's case with Hospital Medicine who agreed to admit patient to their service for further management with orthopedics consult..                 ED Course as of 08/08/23 0018   Mon Aug 07, 2023   2056 X-Ray Ankle Complete Left  Successful reduction of closed ankle fracture. [ES]      ED Course User Index  [ES] Luly Lorenzo MD                 Clinical Impression:   Final diagnoses:  [T14.8XXA] Fracture  [T14.90XA] Trauma  [S82.852A] Trimalleolar fracture of ankle, closed, left, initial encounter (Primary)  [S82.831A] Closed fracture of distal end of right fibula, unspecified fracture morphology, initial encounter        ED Disposition Condition    Observation                 Luly Lorenzo MD  Resident  08/08/23 0019

## 2023-08-08 NOTE — ED NOTES
Patient identifiers for Kalian Ryan 58 y.o. female checked and correct.  Chief Complaint   Patient presents with    Ankle Deformity     Fell from standing height, denies hitting head and LOC. Arrives with L ankle in cardboard splint. Ankle has deformity and + pulses. Rates pain at 10/10     Past Medical History:   Diagnosis Date    Anxiety     Guillain Barré syndrome 2009    full paralysis but no intubation; residual weakness and neuropathy to hands and feet - diagnosed by Neurologist back in 2009 and last seen by neurology in 2011    Hypertension     New onset type 2 diabetes mellitus 9/4/2019    Personal history of gastric bypass 9/4/2019     Allergies reported:   Review of patient's allergies indicates:   Allergen Reactions    Amoxicillin Hives    Penicillins Hives         LOC: Patient is awake, alert, and aware of environment with an appropriate affect. Patient is oriented x 4 and speaking appropriately.  APPEARANCE: Patient resting comfortably and in no acute distress. Patient is clean and well groomed, patient's clothing is properly fastened.  HEENT: - JVD, + midline trach  SKIN: The skin is warm and dry. Patient has normal skin turgor and moist mucus membranes.   MUSKULOSKELETAL: Patient is moving all extremities. Pulses intact. L ankle in cardboard splint, + PMS to extremity.  RESPIRATORY: Airway is open and patent. Respirations are spontaneous and non-labored with normal effort and rate.= CBBS  CARDIAC: Patient has a normal rate and rhythm. 80 on cardiac monitor. No peripheral edema noted.   ABDOMEN: No distention noted. Soft and non-tender upon palpation.  NEUROLOGICAL: pupils 4 mm, PERRL. Facial expression is symmetrical. Hand grasps are equal bilaterally. Normal sensation in all extremities when touched with finger.

## 2023-08-08 NOTE — ASSESSMENT & PLAN NOTE
-X-ray showed nondisplaced lateral malleolar fracture of right ankle   -further management per orthopedic   -multimodal pain control

## 2023-08-08 NOTE — PROGRESS NOTES
[facility] - Emergency Dept  [facility] Medicine  Progress Note    Patient Name: Kalina Ryan  MRN: [id number]  Patient Class: OP- Observation   Admission Date: 8/7/2023  Length of Stay: 0 days  Attending Physician: Nani Hood MD  Primary Care Provider: Nany Hoff NP        Subjective:     Principal Problem:Trimalleolar fracture of ankle, closed, left, initial encounter        HPI:  Kalina Ryan is a 58 y.o. 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 Patient states she twisted her ankle and fell down 3 step while going down the stairs at work. She had immediate pain and inability to bear weight. She noticed her left deformity and were screaming in pain. He coworker called EMS. Denies head injury or LOC. Denies prior injuries or surgeries to the left ankle. She is not on blood thinners. Denies other MSK pains or paresthesias. She walks without assistive devices at baseline. She lives at home with her .     ED course: afebrile and vitals stable. X-ray showed left ankle trimalleolar fracture and dislocation, and right ankle lateral malleolus fx. Patient seen by orthopedic and L ankle was Reduced and splinted in ER placed in short leg splint. Patient received profopol, fenatnyl and oxycodone in ED during conscious sedation for closed reduction of left ankle fracture.     During my interview patient is awake, conversant and not in distress. She reports pain and spasm in both ankles with right side hurting more than left. She denies tobacco, alcohol or other illicit drug use. She is able to ambulate few blocks at baseline without chest pain or SOB.           Overview/Hospital Course:  8/8-  ortho planning on OR - not sure when.  NWB LLE.   Progressive weakness noted in the LEs. Hs of [place] and gastric bypass  surgery. Check vitamin levels (B 12, folate and thiamine) and consult Neurology.  XR of the L ankle show lateral dislocation of the ankle trimalleolar ankle fracture  XR of the R ankle shows possible lateral malleolus fx  XR of the R foot shows base of 5th metatarsal fx      Interval History: see above    Review of Systems   Constitutional:  Positive for activity change. Negative for appetite change and fever.   HENT:  Negative for trouble swallowing.    Respiratory:  Negative for cough and shortness of breath.    Cardiovascular:  Negative for chest pain and leg swelling.   Gastrointestinal:  Negative for abdominal pain, constipation, nausea and vomiting.   Genitourinary:  Negative for difficulty urinating and frequency.   Musculoskeletal:  Positive for arthralgias (bilat ankles) and gait problem. Negative for neck pain.   Skin:  Negative for rash.   Neurological:  Negative for numbness.   Psychiatric/Behavioral:  Negative for behavioral problems.      Objective:     Vital Signs (Most Recent):  Temp: 98 °F (36.7 °C) (08/08/23 0629)  Pulse: 76 (08/08/23 0629)  Resp: 16 (08/08/23 0439)  BP: (!) 101/55 (08/08/23 0629)  SpO2: 96 % (08/08/23 0629) Vital Signs (24h Range):  Temp:  [98 °F (36.7 °C)-98.4 °F (36.9 °C)] 98 °F (36.7 °C)  Pulse:  [76-93] 76  Resp:  [10-22] 16  SpO2:  [96 %-100 %] 96 %  BP: (101-156)/(55-85) 101/55     Weight: 126 kg (277 lb 12.5 oz)  Body mass index is 49.21 kg/m².  No intake or output data in the 24 hours ending 08/08/23 0722      Physical Exam  Constitutional:       General: She is not in acute distress.     Appearance: Normal appearance. She is not ill-appearing, toxic-appearing or diaphoretic.   HENT:      Head: Normocephalic and atraumatic.      Nose: Nose normal.      Mouth/Throat:      Mouth: Mucous membranes are moist.      Pharynx: Oropharynx is clear.   Eyes:      General: No scleral icterus.     Extraocular Movements: Extraocular movements intact.      Conjunctiva/sclera:  Conjunctivae normal.      Pupils: Pupils are equal, round, and reactive to light.   Cardiovascular:      Rate and Rhythm: Normal rate and regular rhythm.      Pulses: Normal pulses.      Heart sounds: Normal heart sounds. No murmur heard.  Pulmonary:      Effort: Pulmonary effort is normal. No respiratory distress.      Breath sounds: Normal breath sounds. No stridor. No wheezing, rhonchi or rales.   Chest:      Chest wall: No tenderness.   Abdominal:      General: Abdomen is flat. Bowel sounds are normal. There is no distension.      Palpations: Abdomen is soft.      Tenderness: There is no abdominal tenderness. There is no right CVA tenderness, guarding or rebound.   Musculoskeletal:         General: Swelling (left ankle) present. No tenderness, deformity or signs of injury. Normal range of motion.      Cervical back: Normal range of motion and neck supple. No rigidity or tenderness.      Right lower leg: No edema.      Left lower leg: No edema.   Skin:     General: Skin is warm and dry.      Coloration: Skin is not jaundiced.      Findings: No erythema or rash.   Neurological:      General: No focal deficit present.      Mental Status: She is alert and oriented to person, place, and time. Mental status is at baseline.      Motor: No weakness.      Gait: Gait abnormal.   Psychiatric:         Mood and Affect: Mood normal.         Behavior: Behavior normal.         Thought Content: Thought content normal.         Judgment: Judgment normal.             Significant Labs: All pertinent labs within the past 24 hours have been reviewed.  CBC:   Recent Labs   Lab 08/07/23 1949 08/08/23 0438   WBC 12.89* 11.31   HGB 10.7* 10.9*   HCT 32.6* 34.1*    277     CMP:   Recent Labs   Lab 08/07/23 1949 08/08/23 0438    140   K 4.5 4.2    106   CO2 19* 23   * 97   BUN 21* 19   CREATININE 0.8 0.9   CALCIUM 8.8 9.2   PROT 6.4  --    ALBUMIN 3.2*  --    BILITOT 0.2  --    ALKPHOS 123  --    AST 16  --     ALT 12  --    ANIONGAP 10 11       Significant Imaging: I have reviewed all pertinent imaging results/findings within the past 24 hours.      Assessment/Plan:      * Trimalleolar fracture of ankle, closed, left, initial encounter  -s/p fall of 3 steps while going down the stairs   -imaging showed left ankle trimalleolar fracture and dislocation, and right ankle lateral malleolus fx.   -Patient seen by orthopedic surgery and L ankle was Reduced and splinted in ER placed in short leg splint.    -Patient will require operative treatment for definitive management per orthopedic   -neurovascular intact   -no active cardiac issue and denies chest pain or SOB  -will check EKG   -no further cardiac testing warranted prior to surgery     Perioperative Risk Assessment:  Patient is at low risk for perioperative cardiopulmonary complications.  Recommend proceed to surgery as planned. We will follow with you during the perioperative period  to manage any active medical issues and prevent postoperative complications.    Perioperative Management Recommendations:    -npo, mIVF and multimodal pain control   -patient not on beta blocker or blood thinner at home   -preop DVT PPX with SCDs  -post op incentive spirometry     Closed dislocation of left ankle  -s/p closed reduction by orthopedic in ED with short leg splint placement   -will need operative fixation for definitive management of trimalleolar fracture by ortho       Closed nondisplaced fracture of lateral malleolus of right fibula  -X-ray showed nondisplaced lateral malleolar fracture of right ankle   -further management per orthopedic   -multimodal pain control       Benign essential HTN  -chronic and controlled  -continue home amlodipine and lisinopril       History of Guillain-[place]vine syndrome  -remote history with mild residual lower extremity weakness   -ambulates w/o assisting device   - pt reports progressive weakness over par 3 months. Recent toe fractures from a FALL  "in May, and now a second fall with bilat ankle fractures  - will consult neurology. Check vitamin levels.      Type 2 diabetes mellitus without complication, without long-term current use of insulin  Patient's FSGs are controlled on current medication regimen.  Last A1c reviewed-   Lab Results   Component Value Date    HGBA1C 5.9 (H) 08/07/2023     Most recent fingerstick glucose reviewed- No results for input(s): "POCTGLUCOSE" in the last 24 hours.  Current correctional scale  Low  Maintain anti-hyperglycemic dose as follows-   Antihyperglycemics (From admission, onward)    None        -on mounjaro injections weekly at home     Anxiety  -continue home lexapro       Class 3 severe obesity due to excess calories with serious comorbidity and body mass index (BMI) of 45.0 to 49.9 in adult  Body mass index is 49.21 kg/m². Morbid obesity complicates all aspects of disease management from diagnostic modalities to treatment. Weight loss encouraged and health benefits explained to patient.         Preoperative cardiovascular examination  -see above under trimalleolar ankle fracture       VTE Risk Mitigation (From admission, onward)         Ordered     IP VTE HIGH RISK PATIENT  Once         08/08/23 0036     Place sequential compression device  Until discontinued         08/08/23 0036                Discharge Planning   LIVIER: 8/10/2023     Code Status: Full Code   Is the patient medically ready for discharge?: No    Reason for patient still in hospital (select all that apply): Patient trending condition                     Nani Hood MD  Department of Hospital Medicine   [facility] - Emergency Dept    "

## 2023-08-08 NOTE — CONSULTS
Orthopedic Surgery  Consult Note    Kalina Ryan  2023    CC: left ankle fracture dislocation    HPI: Kalina Ryan is a 58 y.o. female with PMHx significant for DM (A1c 5.9), diabetic neuropathy, Guillain barre syndrome  who presents with left ankle pain after a fall from 5 feet. Patient states she twisted her ankle and had immediate pain and inability to bear weight. Denies head injury or LOC. Denies prior injuries or surgeries to the left ankle. She is not on blood thinners. Denies other MSK pains or paresthesias. She walks without assistive devices at baseline. She lives at home with her .       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 - diagnosed by Neurologist back in  and last seen by neurology in     Hypertension     New onset type 2 diabetes mellitus 2019    Personal history of gastric bypass 2019     Past Surgical History:   Procedure Laterality Date    CHOLECYSTECTOMY      COLONOSCOPY N/A 2018    Procedure: COLONOSCOPY;  Surgeon: Elpidio Fortune MD;  Location: [facility];  Service: Endoscopy;  Laterality: N/A;    GASTRIC BYPASS      HYSTERECTOMY       Family History   Problem Relation Age of Onset    Heart disease Mother     Hypertension Mother     Diabetes Mother             Heart disease Father 60        Massive MI; DEFIB/ CABG    Diabetes Father             Hypertension Father     Dementia Father     Stroke Father     Pancreatic cancer Brother 54    Stomach cancer Brother     Cancer Brother             No Known Problems Daughter     No Known Problems Son     Heart disease Brother 56        maintain with medication - no surgery    Hypertension Brother     Hypertension Brother      Social History     Socioeconomic History    Marital status:    Occupational History    Occupation: work at  bank   Tobacco Use    Smoking status: Never     Passive exposure: Never    Smokeless tobacco: Never   Substance and Sexual Activity    Alcohol use: Yes     Alcohol/week: 1.0 standard drink of alcohol     Types: 1 Drinks containing 0.5 oz of alcohol per week     Comment: every other weekend - 1 drink per occasion    Drug use: No    Sexual activity: Not Currently     Partners: Male     Birth control/protection: Partner-Vasectomy     Social Determinants of Health     Financial Resource Strain: Low Risk  (1/13/2023)    Overall Financial Resource Strain (CARDIA)     Difficulty of Paying Living Expenses: Not very hard   Food Insecurity: No Food Insecurity (1/13/2023)    Hunger Vital Sign     Worried About Running Out of Food in the Last Year: Never true     Ran Out of Food in the Last Year: Never true   Transportation Needs: No Transportation Needs (1/13/2023)    PRAPARE - Transportation     Lack of Transportation (Medical): No     Lack of Transportation (Non-Medical): No   Physical Activity: Insufficiently Active (1/13/2023)    Exercise Vital Sign     Days of Exercise per Week: 1 day     Minutes of Exercise per Session: 30 min   Stress: No Stress Concern Present (1/13/2023)    [place] [employer] - Occupational Stress Questionnaire     Feeling of Stress : Only a little   Social Connections: Unknown (1/13/2023)    Social Connection and Isolation Panel [NHANES]     Frequency of Communication with Friends and Family: Once a week     Frequency of Social Gatherings with Friends and Family: Once a week     Active Member of Clubs or Organizations: No     Attends Club or Organization Meetings: Never     Marital Status:    Housing Stability: Low Risk  (1/13/2023)    Housing Stability Vital Sign     Unable to Pay for Housing in the Last Year: No     Number of Places Lived in the Last Year: 1     Unstable Housing in the Last Year: No     No current facility-administered medications on file prior to  encounter.     Current Outpatient Medications on File Prior to Encounter   Medication Sig    amLODIPine (NORVASC) 5 MG tablet TAKE 1 TABLET DAILY    EScitalopram oxalate (LEXAPRO) 10 MG tablet Take 1 tablet (10 mg total) by mouth once daily.    lisinopriL (PRINIVIL,ZESTRIL) 40 MG tablet Take 1 tablet (40 mg total) by mouth once daily.    tirzepatide (MOUNJARO) 5 mg/0.5 mL PnIj Inject 5 mg into the skin every 7 days.         Review of Systems:  Constitutional: Denies fever/chills  Neurological: Denies numbness/tingling (any exceptions noted in orthopaedic exam)   Psychiatric/Behavioral: Denies change in normal mood  Eyes: Denies change in vision  Cardiovascular: Denies chest pain  Respiratory: Denies shortness of breath  Hematologic/Lymphatic: Denies easy bleeding/bruising   Skin: Denies new rash or skin lesions   Gastrointestinal: Denies nausea/vomitting/diarrhea, change in bowel habits, abdominal pain   Allergic/Immunologic: Denies adverse reactions to current medications  Musculoskeletal: see HPI      Physical Exam:    Temp:  [98.4 °F (36.9 °C)] 98.4 °F (36.9 °C)  Pulse:  [76-93] 81  Resp:  [12-22] 12  SpO2:  [96 %-100 %] 99 %  BP: (112-156)/(58-85) 112/60    Vitals: Afebrile.  Vital signs stable.  General: No acute distress.  HEENT: Normocephalic. Atraumatic. Sclera anicteric. No tracheal deviation.  Cardio: Regular rate.  Chest: No increased work of breathing.  Abdominal: Nondistended.  Extremities: No cyanosis.  No clubbing.    Skin: No generalized rash.  Neuro: Awake. Alert. Oriented to person, place, time, and situation.  Psych: Normal appearance. Cooperative.  Appropriate mood.  Appropriate affect.     MSK:  RUE:  - Skin intact throughout, no open wounds  - No swelling  - No ecchymosis, erythema, or signs of cellulitis  - NonTTP throughout  - AROM and PROM of the shoulder, elbow, wrist, and hand intact without pain  - Axillary/AIN/PIN/Radial/Median/Ulnar Nerves assessed in isolation without deficit  - SILT  throughout  - Compartments soft  - Radial artery palpated   - Capillary Refill <3s    LUE:  - Skin intact throughout, no open wounds  - No swelling  - No ecchymosis, erythema, or signs of cellulitis  - NonTTP throughout  - AROM and PROM of the shoulder, elbow, wrist, and hand intact without pain  - Axillary/AIN/PIN/Radial/Median/Ulnar Nerves assessed in isolation without deficit  - SILT throughout  - Compartments soft  - Radial artery palpated   - Capillary Refill <3s    RLE:  - Skin intact throughout, no open wounds  - Mild swelling about the ankle  - No ecchymosis, erythema, or signs of cellulitis  - NonTTP throughout  - AROM and PROM of the hip and knee intact without pain  - Pain with ROM of the ankle and foot  - TA/EHL/Gastroc/FHL assessed in isolation without deficit  - SILT throughout  - Compartments soft  - DP and PT palpated  - Capillary Refill <3s  - Negative Log roll  - Negative [facility]    LLE:  - Mild abrasion over lateral aspect of proximal leg  - Moderate swelling and obvious deformity of left ankle  - No ecchymosis, erythema, or signs of cellulitis  - Very TTP about the ankle  - AROM and PROM of the hip, knee, and foot intact without pain  - ROM of the ankle deferred due to known fracture dislocation  - TA/EHL/Gastroc/FHL assessed in isolation without deficit  - SILT throughout  - Compartments soft  - DP and PT palpated  - Capillary Refill <3s  - Negative Log roll  - Negative [facility]    Spine/pelvis/axial body:  No tenderness to palpation of cervical, thoracic, or lumbar spine  No pain with compression of pelvis  No chest wall or abdominal tenderness  No decubitus ulcers      Diagnostic Results: XR of the L ankle show lateral dislocation of the ankle trimalleolar ankle fracture  XR of the R ankle shows possible lateral malleolus fx  XR of the R foot shows base of 5th metatarsal fx    Assessment/Plan:  Kalina Ryan is a 58 y.o. female with left ankle trimalleolar fracture and dislocation  and right ankle lateral malleolus fx.  Patient's L ankle was Reduced and splinted in ER placed in short leg splint. Patient will require operative treatment for definitive management. Patient will be admitted due to bilateral ankle fx. Patient will remain NWB LLE. Discscussed risks, benefits, alternatives to surgery with patient. She understands the associated risk factors and elects to proceed with surgery. No guarantees were made. All questions were answered to patient's satisfaction. Consents signed in the ED and preoperative labs ordered. CT ordered for preoperative planning.    Procedure Note: Left ankle reduction  Patient was explained risks, benefits, and alternatives to treatment and verbalized consent to proceed. Conscious sedation was performed by ED physicians. Fracture was reduced under fluoroscopy. Short leg splint with stirrups was applied in typical fashion. Post-reduction films were performed and confirmed adequate reduction    Kali Mckeon MD  Orthopedic Surgery Resident  08/07/2023

## 2023-08-08 NOTE — H&P
[facility]  [facility]  History & Physical    Patient Name: Kalina Ryan  MRN: [id number]  Patient Class: OP- Observation  Admission Date: 8/7/2023  Attending Physician: Nani Hood MD   Primary Care Provider: Nany Hoff NP         Patient information was obtained from patient and ER records.     Subjective:     Principal Problem:Trimalleolar fracture of ankle, closed, left, initial encounter    Chief Complaint:   Chief Complaint   Patient presents with    Ankle Deformity     Fell from standing height, denies hitting head and LOC. Arrives with L ankle in cardboard splint. Ankle has deformity and + pulses. Rates pain at 10/10        HPI: Kalina Ryan is a 58 y.o. 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 Patient states she twisted her ankle and fell down 3 step while going down the stairs at work. She had immediate pain and inability to bear weight. She noticed her left deformity and were screaming in pain. He coworker called EMS. Denies head injury or LOC. Denies prior injuries or surgeries to the left ankle. She is not on blood thinners. Denies other MSK pains or paresthesias. She walks without assistive devices at baseline. She lives at home with her .     ED course: afebrile and vitals stable. X-ray showed left ankle trimalleolar fracture and dislocation, and right ankle lateral malleolus fx. Patient seen by orthopedic and L ankle was Reduced and splinted in ER placed in short leg splint. Patient received profopol, fenatnyl and oxycodone in ED during conscious sedation for closed reduction of left ankle fracture.     During my interview patient is awake, conversant and not in distress. She reports pain and spasm in both ankles with right side hurting more than left. She denies tobacco,  alcohol or other illicit drug use. She is able to ambulate few blocks at baseline without chest pain or SOB.           Past Medical History:   Diagnosis Date    Anxiety     Guillain Barré syndrome 2009    full paralysis but no intubation; residual weakness and neuropathy to hands and feet - diagnosed by Neurologist back in 2009 and last seen by neurology in 2011    Hypertension     New onset type 2 diabetes mellitus 9/4/2019    Personal history of gastric bypass 9/4/2019       Past Surgical History:   Procedure Laterality Date    CHOLECYSTECTOMY  2009    COLONOSCOPY N/A 7/31/2018    Procedure: COLONOSCOPY;  Surgeon: Elpidio Fortune MD;  Location: [facility];  Service: Endoscopy;  Laterality: N/A;    GASTRIC BYPASS  2009    HYSTERECTOMY  1999       Review of patient's allergies indicates:   Allergen Reactions    Amoxicillin Hives    Penicillins Hives       No current facility-administered medications on file prior to encounter.     Current Outpatient Medications on File Prior to Encounter   Medication Sig    amLODIPine (NORVASC) 5 MG tablet TAKE 1 TABLET DAILY    EScitalopram oxalate (LEXAPRO) 10 MG tablet Take 1 tablet (10 mg total) by mouth once daily.    lisinopriL (PRINIVIL,ZESTRIL) 40 MG tablet Take 1 tablet (40 mg total) by mouth once daily.    tirzepatide (MOUNJARO) 5 mg/0.5 mL PnIj Inject 5 mg into the skin every 7 days.     Family History       Problem Relation (Age of Onset)    Cancer Brother    Dementia Father    Diabetes Mother, Father    Heart disease Mother, Father (60), Brother (56)    Hypertension Mother, Father, Brother, Brother    No Known Problems Daughter, Son    Pancreatic cancer Brother (54)    Stomach cancer Brother    Stroke Father          Tobacco Use    Smoking status: Never     Passive exposure: Never    Smokeless tobacco: Never   Substance and Sexual Activity    Alcohol use: Yes     Alcohol/week: 1.0 standard drink of alcohol     Types: 1 Drinks containing 0.5 oz of  alcohol per week     Comment: every other weekend - 1 drink per occasion    Drug use: No    Sexual activity: Not Currently     Partners: Male     Birth control/protection: Partner-Vasectomy     Review of Systems   Constitutional:  Negative for activity change, appetite change, chills, diaphoresis, fatigue and fever.   HENT:  Negative for congestion, dental problem, drooling, ear discharge, ear pain, facial swelling, hearing loss, mouth sores, nosebleeds, postnasal drip, rhinorrhea, sinus pressure, sneezing, sore throat, tinnitus, trouble swallowing and voice change.    Eyes:  Negative for photophobia, pain, discharge, redness, itching and visual disturbance.   Respiratory:  Negative for apnea, cough, choking, chest tightness, shortness of breath, wheezing and stridor.    Cardiovascular:  Negative for chest pain, palpitations and leg swelling.   Gastrointestinal:  Negative for abdominal distention, abdominal pain, anal bleeding, blood in stool, constipation, diarrhea, nausea, rectal pain and vomiting.   Endocrine: Negative for cold intolerance, heat intolerance, polydipsia, polyphagia and polyuria.   Genitourinary:  Negative for decreased urine volume, difficulty urinating, dyspareunia, dysuria, enuresis, flank pain, frequency, genital sores, hematuria, menstrual problem, pelvic pain, urgency, vaginal bleeding, vaginal discharge and vaginal pain.   Musculoskeletal:  Positive for arthralgias (bilateral ankle pain L > R) and joint swelling (bilateral ankle swelling L>R). Negative for back pain, gait problem, myalgias, neck pain and neck stiffness.   Skin:  Negative for color change, pallor, rash and wound.   Allergic/Immunologic: Negative for environmental allergies, food allergies and immunocompromised state.   Neurological:  Negative for dizziness, tremors, seizures, syncope, facial asymmetry, speech difficulty, weakness, light-headedness, numbness and headaches.   Hematological:  Negative for adenopathy. Does not  bruise/bleed easily.   Psychiatric/Behavioral:  Negative for agitation, behavioral problems, confusion, decreased concentration, dysphoric mood, hallucinations, self-injury, sleep disturbance and suicidal ideas. The patient is not nervous/anxious and is not hyperactive.      Objective:     Vital Signs (Most Recent):  Temp: 98.3 °F (36.8 °C) (08/08/23 0200)  Pulse: 81 (08/08/23 0200)  Resp: 16 (08/08/23 0439)  BP: (!) 117/58 (08/08/23 0200)  SpO2: 100 % (08/08/23 0200) Vital Signs (24h Range):  Temp:  [98.3 °F (36.8 °C)-98.4 °F (36.9 °C)] 98.3 °F (36.8 °C)  Pulse:  [76-93] 81  Resp:  [10-22] 16  SpO2:  [96 %-100 %] 100 %  BP: (112-156)/(58-85) 117/58     Weight: 126 kg (277 lb 12.5 oz)  Body mass index is 49.21 kg/m².     Physical Exam  Constitutional:       General: She is not in acute distress.     Appearance: She is well-developed. She is obese. She is not diaphoretic.   HENT:      Head: Normocephalic and atraumatic.      Right Ear: External ear normal.      Left Ear: External ear normal.      Nose: Nose normal.      Mouth/Throat:      Pharynx: No oropharyngeal exudate.   Eyes:      General: No scleral icterus.     Conjunctiva/sclera: Conjunctivae normal.      Pupils: Pupils are equal, round, and reactive to light.   Neck:      Thyroid: No thyromegaly.      Vascular: No JVD.      Trachea: No tracheal deviation.   Cardiovascular:      Rate and Rhythm: Normal rate and regular rhythm.      Heart sounds: Normal heart sounds. No murmur heard.     No gallop.   Pulmonary:      Effort: Pulmonary effort is normal. No respiratory distress.      Breath sounds: Normal breath sounds. No wheezing or rales.   Chest:      Chest wall: No tenderness.   Abdominal:      General: Bowel sounds are normal. There is no distension.      Palpations: Abdomen is soft. There is no mass.      Tenderness: There is no abdominal tenderness. There is no guarding or rebound.   Genitourinary:     Vagina: No vaginal discharge.   Musculoskeletal:          General: Swelling (swelling of bilateral ankles L >R) and tenderness (TTP over bilateral ankles.) present.      Cervical back: Neck supple.      Comments: Short leg splint in place over left leg and ankle.   Unable to move left leg or ankle due to pain    Lymphadenopathy:      Cervical: No cervical adenopathy.   Skin:     General: Skin is warm and dry.      Capillary Refill: Capillary refill takes more than 3 seconds.      Coloration: Skin is not pale.      Findings: No erythema or rash.   Neurological:      Mental Status: She is alert and oriented to person, place, and time.      Cranial Nerves: No cranial nerve deficit.      Motor: No abnormal muscle tone.      Coordination: Coordination normal.      Deep Tendon Reflexes: Reflexes are normal and symmetric. Reflexes normal.      Comments: Mild tremors of upper extremities    Psychiatric:         Behavior: Behavior normal.         Thought Content: Thought content normal.         Judgment: Judgment normal.      Comments: Flat affect               CRANIAL NERVES     CN III, IV, VI   Pupils are equal, round, and reactive to light.       Significant Labs: All pertinent labs within the past 24 hours have been reviewed.  Recent Lab Results         08/07/23 1949        Albumin 3.2       Alkaline Phosphatase 123       ALT 12       Anion Gap 10       AST 16       Baso # 0.03       Basophil % 0.2       BILIRUBIN TOTAL 0.2  Comment: For infants and newborns, interpretation of results should be based  on gestational age, weight and in agreement with clinical  observations.    Premature Infant recommended reference ranges:  Up to 24 hours.............<8.0 mg/dL  Up to 48 hours............<12.0 mg/dL  3-5 days..................<15.0 mg/dL  6-29 days.................<15.0 mg/dL         BUN 21       Calcium 8.8       Chloride 107       CO2 19       Creatinine 0.8       Differential Method Automated       eGFR >60.0       Eos # 0.1       Eosinophil % 0.5       Estimated Avg  Glucose 123       Glucose 119       Gran # (ANC) 10.7       Gran % 83.2       Hematocrit 32.6       Hemoglobin 10.7       Hemoglobin A1C External 5.9  Comment: ADA Screening Guidelines:  5.7-6.4%  Consistent with prediabetes  >or=6.5%  Consistent with diabetes    High levels of fetal hemoglobin interfere with the HbA1C  assay. Heterozygous hemoglobin variants (HbS, HgC, etc)do  not significantly interfere with this assay.   However, presence of multiple variants may affect accuracy.         Immature Grans (Abs) 0.05  Comment: Mild elevation in immature granulocytes is non specific and   can be seen in a variety of conditions including stress response,   acute inflammation, trauma and pregnancy. Correlation with other   laboratory and clinical findings is essential.         Immature Granulocytes 0.4       Lymph # 1.5       Lymph % 11.9       Magnesium 1.8       MCH 27.0       MCHC 32.8       MCV 82       Mono # 0.5       Mono % 3.8       MPV 8.7       nRBC 0       Phosphorus 2.8       Platelets 263       Potassium 4.5       Prealbumin 21       PROTEIN TOTAL 6.4       RBC 3.96       RDW 15.3       Sodium 136       Vit D, 25-Hydroxy 29  Comment: Vitamin D deficiency.........<10 ng/mL                              Vitamin D insufficiency......10-29 ng/mL       Vitamin D sufficiency........> or equal to 30 ng/mL  Vitamin D toxicity............>100 ng/mL         WBC 12.89               Significant Imaging: I have reviewed all pertinent imaging results/findings within the past 24 hours.    Assessment/Plan:       Active cardiac issues? No   History of CAD/ischemic heart disease? No   History of cerebrovascular disease? No   History of compensated heart failure? No   Type 2 diabetes requiring insulin? No   Serum Creatinine > 2? No   Total cardiac risk factors 0     Functional mets > 4 METS     < 4* METs -unable to walk > 2 blocks on level ground without stopping due to symptoms  - eating, dressing, toileting, walking indoors,  light housework. POOR   > 4* METs -climbing > 1 flight of stairs without stopping  -walking up hill > 1-2 blocks  -scrubbing floors  -moving furniture  - golf, bowling, dancing or tennis  -running short distance MODERATE to EXCELLENT   * performance of any one of the activities         * Trimalleolar fracture of ankle, closed, left, initial encounter  -s/p fall of 3 steps while going down the stairs   -imaging showed left ankle trimalleolar fracture and dislocation, and right ankle lateral malleolus fx.   -Patient seen by orthopedic surgery and L ankle was Reduced and splinted in ER placed in short leg splint.    -Patient will require operative treatment for definitive management per orthopedic   -neurovascular intact   -no active cardiac issue and denies chest pain or SOB  -will check EKG   -no further cardiac testing warranted prior to surgery     Perioperative Risk Assessment:  Patient is at low risk for perioperative cardiopulmonary complications.  Recommend proceed to surgery as planned. We will follow with you during the perioperative period  to manage any active medical issues and prevent postoperative complications.    Perioperative Management Recommendations:    -npo, mIVF and multimodal pain control   -patient not on beta blocker or blood thinner at home   -preop DVT PPX with SCDs  -post op incentive spirometry     Preoperative cardiovascular examination  -see above under trimalleolar ankle fracture       Closed nondisplaced fracture of lateral malleolus of right fibula  -X-ray showed nondisplaced lateral malleolar fracture of right ankle   -further management per orthopedic   -multimodal pain control       Closed dislocation of left ankle  -s/p closed reduction by orthopedic in ED with short leg splint placement   -will need operative fixation for definitive management of trimalleolar fracture by ortho       Benign essential HTN  -chronic and controlled  -continue home amlodipine and lisinopril  "      History of Guillain-[place] syndrome  -remote history with mild residual lower extremity weakness   -ambulates w/o assisting device       Class 3 severe obesity due to excess calories with serious comorbidity and body mass index (BMI) of 45.0 to 49.9 in adult  Body mass index is 49.21 kg/m². Morbid obesity complicates all aspects of disease management from diagnostic modalities to treatment. Weight loss encouraged and health benefits explained to patient.         Type 2 diabetes mellitus without complication, without long-term current use of insulin  Patient's FSGs are controlled on current medication regimen.  Last A1c reviewed-   Lab Results   Component Value Date    HGBA1C 5.9 (H) 08/07/2023     Most recent fingerstick glucose reviewed- No results for input(s): "POCTGLUCOSE" in the last 24 hours.  Current correctional scale  Low  Maintain anti-hyperglycemic dose as follows-   Antihyperglycemics (From admission, onward)    None        -on mounjaro injections weekly at home     Anxiety  -continue home lexapro       VTE Risk Mitigation (From admission, onward)         Ordered     IP VTE HIGH RISK PATIENT  Once         08/08/23 0036     Place sequential compression device  Until discontinued         08/08/23 0036                 Joselito Orourke DO  Department of Hospital Medicine  [facility]  "

## 2023-08-08 NOTE — ASSESSMENT & PLAN NOTE
-remote history with mild residual lower extremity weakness   -ambulates w/o assisting device   - pt reports progressive weakness over par 3 months. Recent toe fractures from a FALL in May, and now a second fall with bilat ankle fractures  - will consult neurology. Check vitamin levels.

## 2023-08-08 NOTE — HOSPITAL COURSE
Patient admitted with closed left trimalleolar ankle fracture. Ortho consulted and taken to OR on 8/9 and had external fixator placed to left ankle. Post-op, patient non weight bearing to left lower extremity. Patient to elevate and ce left ankle to help with swelling. Needs improvement in left ankle swelling prior to proceeding with definitive fixation surgery to left ankle. Patient to remain in hospital until definitive fixation surgery. Neurology consulted for progressive bilateral leg weakness in patient with previous diagnosis of Guillain [place] and CIDP. Neurology noted likely CIDP and recommended to check vitamin levels (B12, folate, copper, zinc, and thiamine). Neurology noted patient with history of CIDP and received steroids and IVIG in past an used to see Dr. Moses but no treatment since 2012. She has self-contained episodes in which she did not seek medical attention. These symptoms are similar. Want to avoid steroids now with planned surgery. Follow-up with neuromuscular Neurologist for possible IVIG as outpatient upon discharge. If any change in status, reconsult Neurology. Patient also noted on admit to have closed right ankle lateral malleolus fracture and closed right 5th metatarsal fractures but Ortho states no surgery needed and okay for patient to weight bear as tolerated to right lower extremity and to use tall walking boot to right leg when ambulating.

## 2023-08-08 NOTE — ASSESSMENT & PLAN NOTE
-s/p fall of 3 steps while going down the stairs   -imaging showed left ankle trimalleolar fracture and dislocation, and right ankle lateral malleolus fx.   -Patient seen by orthopedic surgery and L ankle was Reduced and splinted in ER placed in short leg splint.    -Patient will require operative treatment for definitive management per orthopedic   -neurovascular intact   -no active cardiac issue and denies chest pain or SOB  -will check EKG   -no further cardiac testing warranted prior to surgery     Perioperative Risk Assessment:  Patient is at low risk for perioperative cardiopulmonary complications.  Recommend proceed to surgery as planned. We will follow with you during the perioperative period  to manage any active medical issues and prevent postoperative complications.    Perioperative Management Recommendations:    -npo, mIVF and multimodal pain control   -patient not on beta blocker or blood thinner at home   -preop DVT PPX with SCDs  -post op incentive spirometry

## 2023-08-09 ENCOUNTER — ANESTHESIA (OUTPATIENT)
Dept: SURGERY | Facility: HOSPITAL | Age: 58
DRG: 493 | End: 2023-08-09
Payer: OTHER MISCELLANEOUS

## 2023-08-09 PROBLEM — R29.898 WEAKNESS OF BOTH LOWER EXTREMITIES: Status: ACTIVE | Noted: 2023-08-09

## 2023-08-09 PROBLEM — Z80.0 FAMILY HISTORY OF COLON CANCER REQUIRING SCREENING COLONOSCOPY: Status: RESOLVED | Noted: 2018-07-31 | Resolved: 2023-08-09

## 2023-08-09 PROBLEM — S92.354D CLOSED NONDISPLACED FRACTURE OF FIFTH METATARSAL BONE OF RIGHT FOOT WITH ROUTINE HEALING: Status: ACTIVE | Noted: 2023-08-09

## 2023-08-09 PROBLEM — Z01.810 PREOPERATIVE CARDIOVASCULAR EXAMINATION: Status: RESOLVED | Noted: 2023-08-08 | Resolved: 2023-08-09

## 2023-08-09 PROBLEM — F32.0 CURRENT MILD EPISODE OF MAJOR DEPRESSIVE DISORDER WITHOUT PRIOR EPISODE: Status: RESOLVED | Noted: 2022-10-18 | Resolved: 2023-08-09

## 2023-08-09 PROBLEM — S82.852D CLOSED TRIMALLEOLAR FRACTURE OF LEFT ANKLE WITH ROUTINE HEALING: Status: ACTIVE | Noted: 2023-08-08

## 2023-08-09 LAB
ANION GAP SERPL CALC-SCNC: 7 MMOL/L (ref 8–16)
BASOPHILS # BLD AUTO: 0.02 K/UL (ref 0–0.2)
BASOPHILS NFR BLD: 0.2 % (ref 0–1.9)
BUN SERPL-MCNC: 16 MG/DL (ref 6–20)
CALCIUM SERPL-MCNC: 8.7 MG/DL (ref 8.7–10.5)
CHLORIDE SERPL-SCNC: 110 MMOL/L (ref 95–110)
CO2 SERPL-SCNC: 22 MMOL/L (ref 23–29)
CREAT SERPL-MCNC: 0.8 MG/DL (ref 0.5–1.4)
DIFFERENTIAL METHOD: ABNORMAL
EOSINOPHIL # BLD AUTO: 0.2 K/UL (ref 0–0.5)
EOSINOPHIL NFR BLD: 1.7 % (ref 0–8)
ERYTHROCYTE [DISTWIDTH] IN BLOOD BY AUTOMATED COUNT: 15.9 % (ref 11.5–14.5)
EST. GFR  (NO RACE VARIABLE): >60 ML/MIN/1.73 M^2
FOLATE SERPL-MCNC: 8.7 NG/ML (ref 4–24)
GLUCOSE SERPL-MCNC: 87 MG/DL (ref 70–110)
HCT VFR BLD AUTO: 33.7 % (ref 37–48.5)
HGB BLD-MCNC: 10.4 G/DL (ref 12–16)
IMM GRANULOCYTES # BLD AUTO: 0.02 K/UL (ref 0–0.04)
IMM GRANULOCYTES NFR BLD AUTO: 0.2 % (ref 0–0.5)
LYMPHOCYTES # BLD AUTO: 2 K/UL (ref 1–4.8)
LYMPHOCYTES NFR BLD: 23.5 % (ref 18–48)
MAGNESIUM SERPL-MCNC: 2.1 MG/DL (ref 1.6–2.6)
MCH RBC QN AUTO: 26.7 PG (ref 27–31)
MCHC RBC AUTO-ENTMCNC: 30.9 G/DL (ref 32–36)
MCV RBC AUTO: 87 FL (ref 82–98)
MONOCYTES # BLD AUTO: 0.6 K/UL (ref 0.3–1)
MONOCYTES NFR BLD: 6.7 % (ref 4–15)
NEUTROPHILS # BLD AUTO: 5.8 K/UL (ref 1.8–7.7)
NEUTROPHILS NFR BLD: 67.7 % (ref 38–73)
NRBC BLD-RTO: 0 /100 WBC
PHOSPHATE SERPL-MCNC: 4.3 MG/DL (ref 2.7–4.5)
PLATELET # BLD AUTO: 289 K/UL (ref 150–450)
PMV BLD AUTO: 8.9 FL (ref 9.2–12.9)
POCT GLUCOSE: 142 MG/DL (ref 70–110)
POCT GLUCOSE: 143 MG/DL (ref 70–110)
POCT GLUCOSE: 96 MG/DL (ref 70–110)
POTASSIUM SERPL-SCNC: 4.7 MMOL/L (ref 3.5–5.1)
RBC # BLD AUTO: 3.89 M/UL (ref 4–5.4)
RPR SER QL: NORMAL
SODIUM SERPL-SCNC: 139 MMOL/L (ref 136–145)
VIT B12 SERPL-MCNC: 1107 PG/ML (ref 210–950)
WBC # BLD AUTO: 8.64 K/UL (ref 3.9–12.7)

## 2023-08-09 PROCEDURE — 99233 SBSQ HOSP IP/OBS HIGH 50: CPT | Mod: ,,, | Performed by: STUDENT IN AN ORGANIZED HEALTH CARE EDUCATION/TRAINING PROGRAM

## 2023-08-09 PROCEDURE — 83735 ASSAY OF MAGNESIUM: CPT | Performed by: HOSPITALIST

## 2023-08-09 PROCEDURE — D9220A PRA ANESTHESIA: Mod: CRNA,,, | Performed by: NURSE ANESTHETIST, CERTIFIED REGISTERED

## 2023-08-09 PROCEDURE — C1713 ANCHOR/SCREW BN/BN,TIS/BN: HCPCS | Performed by: ORTHOPAEDIC SURGERY

## 2023-08-09 PROCEDURE — 37000009 HC ANESTHESIA EA ADD 15 MINS: Performed by: ORTHOPAEDIC SURGERY

## 2023-08-09 PROCEDURE — D9220A PRA ANESTHESIA: Mod: ANES,,, | Performed by: ANESTHESIOLOGY

## 2023-08-09 PROCEDURE — 99900035 HC TECH TIME PER 15 MIN (STAT)

## 2023-08-09 PROCEDURE — 37000008 HC ANESTHESIA 1ST 15 MINUTES: Performed by: ORTHOPAEDIC SURGERY

## 2023-08-09 PROCEDURE — 94761 N-INVAS EAR/PLS OXIMETRY MLT: CPT

## 2023-08-09 PROCEDURE — 82962 GLUCOSE BLOOD TEST: CPT | Performed by: ORTHOPAEDIC SURGERY

## 2023-08-09 PROCEDURE — 36415 COLL VENOUS BLD VENIPUNCTURE: CPT

## 2023-08-09 PROCEDURE — 64445 NJX AA&/STRD SCIATIC NRV IMG: CPT

## 2023-08-09 PROCEDURE — 25000003 PHARM REV CODE 250

## 2023-08-09 PROCEDURE — 11000001 HC ACUTE MED/SURG PRIVATE ROOM

## 2023-08-09 PROCEDURE — 63600175 PHARM REV CODE 636 W HCPCS: Performed by: ANESTHESIOLOGY

## 2023-08-09 PROCEDURE — 85025 COMPLETE CBC W/AUTO DIFF WBC: CPT | Performed by: HOSPITALIST

## 2023-08-09 PROCEDURE — 99223 1ST HOSP IP/OBS HIGH 75: CPT | Mod: 57,,, | Performed by: ORTHOPAEDIC SURGERY

## 2023-08-09 PROCEDURE — D9220A PRA ANESTHESIA: ICD-10-PCS | Mod: ANES,,, | Performed by: ANESTHESIOLOGY

## 2023-08-09 PROCEDURE — 63600175 PHARM REV CODE 636 W HCPCS

## 2023-08-09 PROCEDURE — D9220A PRA ANESTHESIA: ICD-10-PCS | Mod: CRNA,,, | Performed by: NURSE ANESTHETIST, CERTIFIED REGISTERED

## 2023-08-09 PROCEDURE — 63600175 PHARM REV CODE 636 W HCPCS: Performed by: NURSE ANESTHETIST, CERTIFIED REGISTERED

## 2023-08-09 PROCEDURE — 36000708 HC OR TIME LEV III 1ST 15 MIN: Performed by: ORTHOPAEDIC SURGERY

## 2023-08-09 PROCEDURE — 27201423 OPTIME MED/SURG SUP & DEVICES STERILE SUPPLY: Performed by: ORTHOPAEDIC SURGERY

## 2023-08-09 PROCEDURE — 64447 LEFT SAPHENOUS SINGLE SHOT: ICD-10-PCS | Mod: 59,LT,, | Performed by: ANESTHESIOLOGY

## 2023-08-09 PROCEDURE — 20690 APPL UNIPLN UNI EXT FIXJ SYS: CPT | Mod: 51,,, | Performed by: ORTHOPAEDIC SURGERY

## 2023-08-09 PROCEDURE — 64445 LEFT POPLITEAL SINGLE SHOT: ICD-10-PCS | Mod: 59,LT,, | Performed by: ANESTHESIOLOGY

## 2023-08-09 PROCEDURE — 80048 BASIC METABOLIC PNL TOTAL CA: CPT | Performed by: HOSPITALIST

## 2023-08-09 PROCEDURE — 99233 PR SUBSEQUENT HOSPITAL CARE,LEVL III: ICD-10-PCS | Mod: ,,, | Performed by: HOSPITALIST

## 2023-08-09 PROCEDURE — 71000015 HC POSTOP RECOV 1ST HR: Performed by: ORTHOPAEDIC SURGERY

## 2023-08-09 PROCEDURE — 64447 NJX AA&/STRD FEMORAL NRV IMG: CPT

## 2023-08-09 PROCEDURE — 99233 SBSQ HOSP IP/OBS HIGH 50: CPT | Mod: ,,, | Performed by: HOSPITALIST

## 2023-08-09 PROCEDURE — 99223 PR INITIAL HOSPITAL CARE,LEVL III: ICD-10-PCS | Mod: 57,,, | Performed by: ORTHOPAEDIC SURGERY

## 2023-08-09 PROCEDURE — 71000033 HC RECOVERY, INTIAL HOUR: Performed by: ORTHOPAEDIC SURGERY

## 2023-08-09 PROCEDURE — 99233 PR SUBSEQUENT HOSPITAL CARE,LEVL III: ICD-10-PCS | Mod: ,,, | Performed by: STUDENT IN AN ORGANIZED HEALTH CARE EDUCATION/TRAINING PROGRAM

## 2023-08-09 PROCEDURE — 64447 NJX AA&/STRD FEMORAL NRV IMG: CPT | Mod: 59,LT,, | Performed by: ANESTHESIOLOGY

## 2023-08-09 PROCEDURE — 25000003 PHARM REV CODE 250: Performed by: HOSPITALIST

## 2023-08-09 PROCEDURE — 27818 PR CLOSED RX TRIMALLEOLAR FX,MANIP: ICD-10-PCS | Mod: LT,,, | Performed by: ORTHOPAEDIC SURGERY

## 2023-08-09 PROCEDURE — 36000709 HC OR TIME LEV III EA ADD 15 MIN: Performed by: ORTHOPAEDIC SURGERY

## 2023-08-09 PROCEDURE — 64445 NJX AA&/STRD SCIATIC NRV IMG: CPT | Mod: 59,LT,, | Performed by: ANESTHESIOLOGY

## 2023-08-09 PROCEDURE — 84100 ASSAY OF PHOSPHORUS: CPT | Performed by: HOSPITALIST

## 2023-08-09 PROCEDURE — 27000221 HC OXYGEN, UP TO 24 HOURS

## 2023-08-09 PROCEDURE — 94010 BREATHING CAPACITY TEST: CPT

## 2023-08-09 PROCEDURE — 84446 ASSAY OF VITAMIN E: CPT

## 2023-08-09 PROCEDURE — 25000003 PHARM REV CODE 250: Performed by: NURSE ANESTHETIST, CERTIFIED REGISTERED

## 2023-08-09 PROCEDURE — 94150 VITAL CAPACITY TEST: CPT

## 2023-08-09 PROCEDURE — 20690 PR APPLY BONE UNIPLANE,EXT FIX DEV: ICD-10-PCS | Mod: 51,,, | Performed by: ORTHOPAEDIC SURGERY

## 2023-08-09 PROCEDURE — 27818 TREATMENT OF ANKLE FRACTURE: CPT | Mod: LT,,, | Performed by: ORTHOPAEDIC SURGERY

## 2023-08-09 DEVICE — SCREW SCHANZ BONE 5X150 EX FIX: Type: IMPLANTABLE DEVICE | Site: LEG | Status: FUNCTIONAL

## 2023-08-09 DEVICE — ROD CARBON FIBER 11MM X 300MM: Type: IMPLANTABLE DEVICE | Site: LEG | Status: FUNCTIONAL

## 2023-08-09 DEVICE — IMPLANTABLE DEVICE: Type: IMPLANTABLE DEVICE | Site: LEG | Status: FUNCTIONAL

## 2023-08-09 DEVICE — CLAMP LG 4 POSITION MULTI-PIN: Type: IMPLANTABLE DEVICE | Site: LEG | Status: FUNCTIONAL

## 2023-08-09 DEVICE — SCREW BONE EF 30MM 4X100MM SS: Type: IMPLANTABLE DEVICE | Site: LEG | Status: FUNCTIONAL

## 2023-08-09 DEVICE — PIN TRANFX EXFIX 6X225: Type: IMPLANTABLE DEVICE | Site: LEG | Status: FUNCTIONAL

## 2023-08-09 DEVICE — CLAMP COMBINATION / LG EXT FIX: Type: IMPLANTABLE DEVICE | Site: LEG | Status: FUNCTIONAL

## 2023-08-09 DEVICE — CLAMP EXT FIX COMBO MDM 8-11MM: Type: IMPLANTABLE DEVICE | Site: LEG | Status: FUNCTIONAL

## 2023-08-09 RX ORDER — ROCURONIUM BROMIDE 10 MG/ML
INJECTION, SOLUTION INTRAVENOUS
Status: DISCONTINUED | OUTPATIENT
Start: 2023-08-09 | End: 2023-08-09

## 2023-08-09 RX ORDER — BUPIVACAINE HYDROCHLORIDE 2.5 MG/ML
INJECTION, SOLUTION EPIDURAL; INFILTRATION; INTRACAUDAL
Status: COMPLETED | OUTPATIENT
Start: 2023-08-09 | End: 2023-08-09

## 2023-08-09 RX ORDER — CHOLECALCIFEROL (VITAMIN D3) 25 MCG
1000 TABLET ORAL DAILY
Status: DISCONTINUED | OUTPATIENT
Start: 2023-08-09 | End: 2023-08-19 | Stop reason: HOSPADM

## 2023-08-09 RX ORDER — LIDOCAINE HYDROCHLORIDE 20 MG/ML
INJECTION INTRAVENOUS
Status: DISCONTINUED | OUTPATIENT
Start: 2023-08-09 | End: 2023-08-09

## 2023-08-09 RX ORDER — FENTANYL CITRATE 50 UG/ML
INJECTION, SOLUTION INTRAMUSCULAR; INTRAVENOUS
Status: DISCONTINUED | OUTPATIENT
Start: 2023-08-09 | End: 2023-08-09

## 2023-08-09 RX ORDER — PROPOFOL 10 MG/ML
VIAL (ML) INTRAVENOUS
Status: DISCONTINUED | OUTPATIENT
Start: 2023-08-09 | End: 2023-08-09

## 2023-08-09 RX ORDER — DIPHENHYDRAMINE HYDROCHLORIDE 50 MG/ML
INJECTION INTRAMUSCULAR; INTRAVENOUS
Status: DISCONTINUED | OUTPATIENT
Start: 2023-08-09 | End: 2023-08-09

## 2023-08-09 RX ORDER — CEFAZOLIN SODIUM 1 G/3ML
INJECTION, POWDER, FOR SOLUTION INTRAMUSCULAR; INTRAVENOUS
Status: DISCONTINUED | OUTPATIENT
Start: 2023-08-09 | End: 2023-08-09

## 2023-08-09 RX ORDER — NAPROXEN SODIUM 220 MG/1
81 TABLET, FILM COATED ORAL 2 TIMES DAILY
Status: DISCONTINUED | OUTPATIENT
Start: 2023-08-09 | End: 2023-08-19 | Stop reason: HOSPADM

## 2023-08-09 RX ORDER — HYDROMORPHONE HYDROCHLORIDE 1 MG/ML
0.5 INJECTION, SOLUTION INTRAMUSCULAR; INTRAVENOUS; SUBCUTANEOUS EVERY 5 MIN PRN
Status: DISCONTINUED | OUTPATIENT
Start: 2023-08-09 | End: 2023-08-09 | Stop reason: HOSPADM

## 2023-08-09 RX ORDER — ONDANSETRON 2 MG/ML
INJECTION INTRAMUSCULAR; INTRAVENOUS
Status: DISCONTINUED | OUTPATIENT
Start: 2023-08-09 | End: 2023-08-09

## 2023-08-09 RX ORDER — MIDAZOLAM HYDROCHLORIDE 5 MG/ML
INJECTION INTRAMUSCULAR; INTRAVENOUS
Status: DISCONTINUED | OUTPATIENT
Start: 2023-08-09 | End: 2023-08-09

## 2023-08-09 RX ORDER — PHENYLEPHRINE HYDROCHLORIDE 10 MG/ML
INJECTION INTRAVENOUS
Status: DISCONTINUED | OUTPATIENT
Start: 2023-08-09 | End: 2023-08-09

## 2023-08-09 RX ADMIN — DIPHENHYDRAMINE HYDROCHLORIDE 25 MG: 50 INJECTION, SOLUTION INTRAMUSCULAR; INTRAVENOUS at 10:08

## 2023-08-09 RX ADMIN — ROCURONIUM BROMIDE 50 MG: 10 INJECTION INTRAVENOUS at 10:08

## 2023-08-09 RX ADMIN — BUPIVACAINE HYDROCHLORIDE 30 ML: 2.5 INJECTION, SOLUTION EPIDURAL; INFILTRATION; INTRACAUDAL; PERINEURAL at 09:08

## 2023-08-09 RX ADMIN — PROPOFOL 200 MG: 10 INJECTION, EMULSION INTRAVENOUS at 10:08

## 2023-08-09 RX ADMIN — ACETAMINOPHEN 650 MG: 325 TABLET ORAL at 09:08

## 2023-08-09 RX ADMIN — VANCOMYCIN HYDROCHLORIDE 1000 MG: 1 INJECTION, POWDER, LYOPHILIZED, FOR SOLUTION INTRAVENOUS at 08:08

## 2023-08-09 RX ADMIN — MIDAZOLAM 2 MG: 5 INJECTION INTRAMUSCULAR; INTRAVENOUS at 09:08

## 2023-08-09 RX ADMIN — OXYCODONE HYDROCHLORIDE 10 MG: 10 TABLET ORAL at 05:08

## 2023-08-09 RX ADMIN — BUPIVACAINE HYDROCHLORIDE 20 ML: 2.5 INJECTION, SOLUTION EPIDURAL; INFILTRATION; INTRACAUDAL at 09:08

## 2023-08-09 RX ADMIN — ASPIRIN 81 MG 81 MG: 81 TABLET ORAL at 09:08

## 2023-08-09 RX ADMIN — ASPIRIN 81 MG 81 MG: 81 TABLET ORAL at 12:08

## 2023-08-09 RX ADMIN — PHENYLEPHRINE HYDROCHLORIDE 100 MCG: 10 INJECTION INTRAVENOUS at 10:08

## 2023-08-09 RX ADMIN — Medication 6 MG: at 09:08

## 2023-08-09 RX ADMIN — METHOCARBAMOL 1000 MG: 500 TABLET ORAL at 05:08

## 2023-08-09 RX ADMIN — OXYCODONE HYDROCHLORIDE 10 MG: 10 TABLET ORAL at 11:08

## 2023-08-09 RX ADMIN — LIDOCAINE HYDROCHLORIDE 100 MG: 20 INJECTION INTRAVENOUS at 10:08

## 2023-08-09 RX ADMIN — SODIUM CHLORIDE: 0.9 INJECTION, SOLUTION INTRAVENOUS at 10:08

## 2023-08-09 RX ADMIN — FENTANYL CITRATE 100 MCG: 50 INJECTION, SOLUTION INTRAMUSCULAR; INTRAVENOUS at 10:08

## 2023-08-09 RX ADMIN — CEFAZOLIN 2 G: 2 INJECTION, POWDER, FOR SOLUTION INTRAMUSCULAR; INTRAVENOUS at 05:08

## 2023-08-09 RX ADMIN — FENTANYL CITRATE 100 MCG: 50 INJECTION, SOLUTION INTRAMUSCULAR; INTRAVENOUS at 09:08

## 2023-08-09 RX ADMIN — ONDANSETRON 4 MG: 2 INJECTION INTRAMUSCULAR; INTRAVENOUS at 10:08

## 2023-08-09 RX ADMIN — LISINOPRIL 40 MG: 10 TABLET ORAL at 12:08

## 2023-08-09 RX ADMIN — SUGAMMADEX 200 MG: 100 INJECTION, SOLUTION INTRAVENOUS at 10:08

## 2023-08-09 RX ADMIN — AMLODIPINE BESYLATE 5 MG: 5 TABLET ORAL at 12:08

## 2023-08-09 RX ADMIN — CEFAZOLIN 2 G: 330 INJECTION, POWDER, FOR SOLUTION INTRAMUSCULAR; INTRAVENOUS at 10:08

## 2023-08-09 NOTE — PROGRESS NOTES
[facility]  Orthopedics  Progress Note    Patient Name: Kalina Ryan  MRN: [id number]  Admission Date: 8/7/2023  Hospital Length of Stay: 0 days  Attending Provider: Nani Hood MD  Primary Care Provider: Nany Hoff NP  Follow-up For: Procedure(s) (LRB):  APPLICATION, EXTERNAL FIXATION DEVICE left ankle, C-arm clock side, synthes (Left)    Post-Operative Day:    Subjective:     Principal Problem:Trimalleolar fracture of ankle, closed, left, initial encounter    Principal Orthopedic Problem: same as above    Interval History: NAEO. VSS. Pain well controlled. Plan for Ex fix today. NPO since midnight.    Review of patient's allergies indicates:   Allergen Reactions    Amoxicillin Hives    Penicillins Hives       Current Facility-Administered Medications   Medication    0.9%  NaCl infusion    acetaminophen tablet 650 mg    amLODIPine tablet 5 mg    EScitalopram oxalate tablet 10 mg    glucagon (human recombinant) injection 1 mg    glucose chewable tablet 16 g    glucose chewable tablet 24 g    LIDOcaine-EPINEPHrine 1%-1:100,000 injection 20 mL    LIDOcaine-EPINEPHrine 1%-1:100,000 injection 20 mL    lisinopriL tablet 40 mg    LORazepam injection 2 mg    melatonin tablet 6 mg    methocarbamoL tablet 1,000 mg    naloxone 0.4 mg/mL injection 0.02 mg    ondansetron injection 4 mg    oxyCODONE immediate release tablet Tab 10 mg    oxyCODONE-acetaminophen 5-325 mg per tablet 1 tablet    polyethylene glycol packet 17 g    senna-docusate 8.6-50 mg per tablet 2 tablet    simethicone chewable tablet 80 mg    sodium chloride 0.9% flush 10 mL     Objective:     Vital Signs (Most Recent):  Temp: 97.4 °F (36.3 °C) (08/09/23 0514)  Pulse: 98 (08/09/23 0514)  Resp: 16 (08/09/23 0552)  BP: 124/63 (08/09/23 0514)  SpO2: 100 % (08/09/23 0514) Vital Signs (24h Range):  Temp:  [96.5 °F (35.8 °C)-98.4 °F (36.9 °C)] 97.4 °F (36.3 °C)  Pulse:  [76-98] 98  Resp:  [16-18] 16  SpO2:  [93 %-100 %] 100  %  BP: (102-126)/(53-73) 124/63     Weight: 126 kg (277 lb 12.5 oz)     Body mass index is 49.21 kg/m².      Intake/Output Summary (Last 24 hours) at 8/9/2023 0741  Last data filed at 8/8/2023 1331  Gross per 24 hour   Intake 541.73 ml   Output --   Net 541.73 ml        Ortho/SPM Exam  LLE:   Splint in place, cdi  Cap refill <2s  Able to wiggle toes    RLE:   Pain with ROM of the ankle  TTP over 5th metatarsal base  SILT       Significant Labs: All pertinent labs within the past 24 hours have been reviewed.    Significant Imaging: I have reviewed and interpreted all pertinent imaging results/findings.    Assessment/Plan:     * Trimalleolar fracture of ankle, closed, left, initial encounter  Kalina Ryan is a 58 y.o. female with left ankle trimalleolar fracture and dislocation and right ankle lateral malleolus fx and base of 5th MT fx.     Pain: MM  NWB LLE  NPO since midnight  Plan for ex fix today               Kali Mckeon MD  Orthopedics  [facility] - Surgery

## 2023-08-09 NOTE — NURSING TRANSFER
Nursing Transfer Note      8/9/2023   12:35 PM  Reason patient is being transferred: post-procedure    Transfer To: 530    Transfer via bed    Transfer with 2 L NC to O2    Transported by PCTs  Medicines sent: none    Any special needs or follow-up needed: routine    Patient belongings transferred with patient: No  Chart send with patient: Yes    Notified: spouse

## 2023-08-09 NOTE — ASSESSMENT & PLAN NOTE
"-remote history with mild residual lower extremity weakness   -ambulates w/o assisting device   - pt reports progressive weakness over par 3 months. Recent toe fractures from a FALL in May, and now a second fall with bilat ankle fractures  - will consult neurology. Check vitamin levels. Check heavy metals.  -  Baseline Respiratory  ordered in case of future change. Do not suspect active Guillain [place].  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 Moses, no treatment since 2012. She has self-contained episodes in which she did not seek medical attention. These symptoms are similar. Want to avoid steroids now w planned surgery.   - f/u Neuromuscular Neurologist for  possible IVIG as outpt upon discharge.  -  If any change in status, reconsult Neurology.      "

## 2023-08-09 NOTE — ANESTHESIA POSTPROCEDURE EVALUATION
Anesthesia Post Evaluation    Patient: Kalina Ryan    Procedure(s) Performed: Procedure(s) (LRB):  APPLICATION, EXTERNAL FIXATION DEVICE left ankle, C-arm clock side, synthes (Left)  CLOSED REDUCTION, FRACTURE, ANKLE, TRIMALLEOLAR (Left)    Final Anesthesia Type: general      Patient location during evaluation: PACU  Patient participation: Yes- Able to Participate  Level of consciousness: awake and alert  Post-procedure vital signs: reviewed and stable  Pain management: adequate  Airway patency: patent    PONV status at discharge: No PONV  Anesthetic complications: no      Cardiovascular status: blood pressure returned to baseline  Respiratory status: unassisted  Hydration status: euvolemic  Follow-up not needed.          Vitals Value Taken Time   /72 08/09/23 1327   Temp 36.4 °C (97.6 °F) 08/09/23 1327   Pulse 78 08/09/23 1327   Resp 17 08/09/23 1327   SpO2 95 % 08/09/23 1327         Event Time   Out of Recovery 08/09/2023 12:00:00         Pain/Morena Score: Pain Rating Prior to Med Admin: 7 (8/9/2023  5:52 AM)  Pain Rating Post Med Admin: 4 (8/8/2023  8:02 PM)  Morena Score: 9 (8/9/2023 12:00 PM)

## 2023-08-09 NOTE — ASSESSMENT & PLAN NOTE
Patient's FSGs are controlled on current medication regimen.  Last A1c reviewed-   Lab Results   Component Value Date    HGBA1C 5.9 (H) 08/07/2023     Most recent fingerstick glucose reviewed-   Recent Labs   Lab 08/09/23  0901   POCTGLUCOSE 96     Current correctional scale  Low  Maintain anti-hyperglycemic dose as follows-   Antihyperglycemics (From admission, onward)    None        -on mounjaro injections weekly at home    no

## 2023-08-09 NOTE — TRANSFER OF CARE
"Anesthesia Transfer of Care Note    Patient: Kalian Ryan    Procedure(s) Performed: Procedure(s) (LRB):  APPLICATION, EXTERNAL FIXATION DEVICE left ankle, C-arm clock side, synthes (Left)  CLOSED REDUCTION, FRACTURE, ANKLE, TRIMALLEOLAR (Left)    Patient location: PACU    Anesthesia Type: general    Transport from OR: Transported from OR on 6-10 L/min O2 by face mask with adequate spontaneous ventilation    Post pain: adequate analgesia    Post assessment: no apparent anesthetic complications and tolerated procedure well    Post vital signs: stable    Level of consciousness: awake    Nausea/Vomiting: no nausea/vomiting    Complications: none    Transfer of care protocol was followed      Last vitals:   Visit Vitals  BP (!) 166/77 (BP Location: Left arm, Patient Position: Lying)   Pulse 97   Temp 36.6 °C (97.9 °F) (Temporal)   Resp 18   Ht 5' 3" (1.6 m)   Wt 126 kg (277 lb 12.5 oz)   SpO2 100%   Breastfeeding No   BMI 49.21 kg/m²     "

## 2023-08-09 NOTE — PLAN OF CARE
Chart reviewed. Preop nursing care completed per orders. Safe surgery checklist complete. Family at bedside. Call bell within reach. Instructed pt to call for assistance.

## 2023-08-09 NOTE — ASSESSMENT & PLAN NOTE
-s/p closed reduction by orthopedic in ED with short leg splint placement   -will need operative fixation for definitive management of trimalleolar fracture by ortho   -OR 8/9 for external fixation device. NWB    8/9- , Caution needed when bearing weight on the right. She is obese.  She is very week. High risk of FALLS.  Neuro eval in progress. She will be difficult to manage, unable to ambulate. Pt to stay in hospital to monitor ex fixation devise until definitive surgery.

## 2023-08-09 NOTE — HPI
Ms. 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 Has been progressively worsening reportedly for several months, complicated by multiple falls. She is currently hospitalized due to a fall down several stairs during which she sustained fractures to both the left and right ankles. Left ankle fracture was associated with dislocation, reduced per orthopedics in the ER and headed to OR w/ortho today for external fixation.    Had extensive discussion with patient at bedside today regarding her neurologic history. She states that she was diagnosed w/CIDP in 2009. Received 4-5 rounds of IVIG treatment, and subsequently steroids. Previously followed with Dr. Moses. Says that she hasn't seen neurology, and hasn't received any form of treatment since 2012. Was in inpatient rehabilitation following hospitalization for initial presentation, however states that she regained full function. Says that she intermittently would have worsening of weakness, however each of these episodes were self-contained, and thus did not prompt her to seek medical attention. Over the last eight months, lower extremity weakness has been progressively worsening, leading to several falls, and culminating with her current presentation. Weakness began worsening eight months ago, affecting the proximal (thigh/hamstring) and distal (ankle/foot) in a symmetric fashion: associated with intermittent numbness and tingling of the distal upper and lower extremities. She denies bulbar symptoms (facial weakness, difficulty managing secretions, voice change) or cardiac/respiratory symptoms.

## 2023-08-09 NOTE — ANESTHESIA PROCEDURE NOTES
Intubation    Date/Time: 8/9/2023 10:23 AM    Performed by: Maribel Metz CRNA  Authorized by: John Gambino MD    Intubation:     Induction:  Rapid sequence induction    Intubated:  Postinduction    Mask Ventilation:  Not attempted    Attempts:  1    Attempted By:  Student    Method of Intubation:  Video laryngoscopy    Blade:  Esha 3    Laryngeal View Grade: Grade I - full view of cords      Difficult Airway Encountered?: No      Complications:  None    Airway Device:  Oral endotracheal tube    Airway Device Size:  7.5    Style/Cuff Inflation:  Cuffed (inflated to minimal occlusive pressure)    Tube secured:  22    Secured at:  The lips    Placement Verified By:  Capnometry    Complicating Factors:  Obesity and short neck    Findings Post-Intubation:  BS equal bilateral and atraumatic/condition of teeth unchanged

## 2023-08-09 NOTE — OP NOTE
OP NOTE    DOS:  08/09/2023    Preop Dx: Closed left trimalleolar ankle fracture dislocation    Postop Dx: Closed left trimalleolar ankle fracture dislocation    Procedure: Spanning external fixation left ankle - 20690    Closed treatment of left ankle trimalleolar fracture dislocation with manipulation under anesthesia - 25654    Surgeon: Kali Santoyo M.D.    Asst:  Kali Mckeon M.D    Anesthesia: GETA    EBL:  Minimal    IVF:  500 cc crystalloid    Implants: Synthes large ex fix    Specimens: None    Findings: Stable alignment    Dispo:  To PACU extubated/stable       Indications for Procedure:      58-year-old female fell from a height sustaining a left trimalleolar ankle fracture dislocation and a right pseudo Barba fracture.  She is a history of Guillain-[place] and diabetes.  She has some contracture secondary to Guillain-[place] and closed reduction attempts were not successful in maintaining good alignment.  I am taking her to the operating room for spanning external fixation pending soft tissue envelope being amenable to definitive fixation.  The risks, benefits and alternatives to surgery discussed the patient prior to going to the operating room.  Informed consent was obtained.    Procedure in Detail:    Patient was identified in preoperative holding area and the site was marked.  Regional analgesia was administered.  Patient was wheeled to the operating room placed on the operating table in supine position.  General anesthesia was induced and preoperative antibiotics were administered.  Left lower extremity was bumped.  This was then prepped and draped in sterile fashion after removal of the splint.  A time-out was undertaken to confirm patient, side, site, surgery, surgeon and administration of preoperative antibiotics to include vanc and Ancef.  All agreed we proceeded.      I flexed the knee up and marked out the area of the base of the 1st metatarsal.  I made a small stab incision and placed a 4 mm  pin in good position.  I then brought the leg back out into extension.  Two drill holes were made in line with the 4 pin clamp and 2 half Shantz pins placed confirming the position under fluoroscopy.  A 4 pin clamp was then placed.  A small stab incision was made on the medial heel and a centrally threaded calcaneal pin was placed in the sharp tip cut off with a spine varun cutter.      At this point I built a lateral bar construct 1st after manually reducing the ankle joint.  After confirming good position I then built a medial bar construct with the foot in neutral dorsiflexion.  I then tied the forefoot into the lateral bar with another bar.  I checked position of the reduction under fluoroscopy and had a well reduced ankle mortise and fractures.  All the clamps were finally tightened.      Pin sites were overwrapped with Hibiclens soap scrub sponges and Kerlix.  All instrument and sponge counts were reported correct at the end of the case.  There were no complications.  The patient was extubated, awakened and taken to recovery room stable condition.      Plan the patient:    I will allow her weight-bearing as tolerated in the right lower extremity.  Left lower extremity we will keep her iced and elevated and returned to the operating room for definitive fixation when her soft tissue envelope is more amenable.  Multimodal pain management limiting narcotics.    Klai Santoyo MD

## 2023-08-09 NOTE — ASSESSMENT & PLAN NOTE
Patient reports that she has a history of CIDP. Starting in 2009, received IVIG 4-5 rounds of treatment, and steroids. Had followed with Dr. Moses in the past, but states that she has neither seen a neurologist nor undergone additional treatment since 2012. Has had several episodes of weakness from then until current presentation, but describes these as self-limited and she did not seek medical attention. Starting 8 months ago, has experienced progressively worsening lower extremity weakness, bilateral and symmetric, both proximal and distal (ankle and foot) as well as intermittent distal upper/lower extremity numbness and tingling. This has resulted in several falls, the most recent of which resulted in ankle fracture that led her to present to [facility] [place] [facility]. Went to the OR today w/orthopedic surgery.     Remains stable from hemodynamic and respiratory standpoint. Would prefer to avoid IVIG at this time due to increased risk of thrombosis, especially in setting of fracture requiring orthopedic surgery and likely poor mobility in the short-term. Would also prefer to avoid steroids if possible given adverse impact on healing. Patient should reestablish care with neuromuscular neurology outpatient. Will need EMG, and IVIG vs other treatment may be considered at that time. If clinical status worsens during this hospital stay, may reconsider the above.    Plan  -hold off on IVIG/steroids for now  -outpatient follow up with neuromuscular neurology  ->will contact clinic scheduling staff to arrange  -please contact neurology if clinical status worsens

## 2023-08-09 NOTE — ANESTHESIA PREPROCEDURE EVALUATION
[facility]  Anesthesia Pre-Operative Evaluation   08/08/2023        Kalina Ryan, 1965  [id number]  Procedure(s) (LRB):  APPLICATION, EXTERNAL FIXATION DEVICE left ankle, C-arm clock side, synthes (Left)    Subjective    Kalina Ryan is a 58 y.o. female w/ a significant PMHx of HTN, anxiety, DM on GLP-1 agonist, morbid obesity BMI 49,diabetic neuropathy, Guillain-[place] syndrome with mild residual weakness of lower extremities admitted with left ankle trimalleolar fracture and dislocation and right ankle lateral malleolus fx s/p reduction and splint. Operative treatment for definitive management.    Patient now presents for above procedure(s).     Patient is on a weekly GLP-1 agonist/Ozempic and she last took it on Sunday. Patient was informed of increased risk of aspiration and likely intubation for procedure.    Prev Airway: None documented.    LDA:        Peripheral IV - Single Lumen 08/07/23 1823 20 G Posterior;Right Forearm (Active)   Site Assessment Dry;Clean;Intact 08/08/23 1710   Extremity Assessment Distal to IV No abnormal discoloration;No redness;No swelling;No warmth 08/08/23 1710   Line Status Infusing 08/08/23 1710   Dressing Status Clean;Dry;Intact 08/08/23 1710   Number of days: 1            Peripheral IV - Single Lumen 08/07/23 1909 18 G Left Antecubital (Active)   Site Assessment Clean;Dry;Intact 08/08/23 1710   Extremity Assessment Distal to IV No abnormal discoloration 08/08/23 1710   Line Status Saline locked 08/08/23 1710   Dressing Status Clean;Dry;Intact 08/08/23 1710   Dressing Intervention Integrity maintained 08/08/23 1710   Number of days: 1       Female External Urinary Catheter 08/07/23 2136 (Active)   Number of days: 0       Drips:    sodium chloride 0.9% 75 mL/hr at 08/08/23 1331       Patient Active Problem List   Diagnosis    Anxiety    Family history of colon cancer requiring screening colonoscopy    Hypertension associated with diabetes     Personal history of gastric bypass    Type 2 diabetes mellitus without complication, without long-term current use of insulin    Class 3 severe obesity due to excess calories with serious comorbidity and body mass index (BMI) of 45.0 to 49.9 in adult    History of Guillain-[place] syndrome    Reactive depression    Peripheral neuropathy    Current mild episode of major depressive disorder without prior episode    Benign essential HTN    Trimalleolar fracture of ankle, closed, left, initial encounter    Preoperative cardiovascular examination    Closed displaced fracture of lateral malleolus of right fibula    Closed nondisplaced fracture of lateral malleolus of right fibula    Closed dislocation of left ankle       Review of patient's allergies indicates:   Allergen Reactions    Amoxicillin Hives    Penicillins Hives       Current Inpatient Medications:    amLODIPine  5 mg Oral Daily    EScitalopram oxalate  10 mg Oral Daily    LIDOcaine-EPINEPHrine 1%-1:100,000  20 mL Intradermal Once    LIDOcaine-EPINEPHrine 1%-1:100,000  20 mL Intradermal Once    lisinopriL  40 mg Oral Daily    lorazepam  2 mg Intravenous Once    [START ON 8/9/2023] polyethylene glycol  17 g Oral Daily       No current facility-administered medications on file prior to encounter.     Current Outpatient Medications on File Prior to Encounter   Medication Sig Dispense Refill    amLODIPine (NORVASC) 5 MG tablet TAKE 1 TABLET DAILY 90 tablet 3    cetirizine HCl (CETIRIZINE ORAL) Take 1 tablet by mouth once daily.      CYANOCOBALAMIN, VITAMIN B-12, ORAL Take 1 tablet by mouth once daily.      EScitalopram oxalate (LEXAPRO) 10 MG tablet Take 1 tablet (10 mg total) by mouth once daily. 90 tablet 0    lisinopriL (PRINIVIL,ZESTRIL) 40 MG tablet Take 1 tablet (40 mg total) by mouth once daily. 90 tablet 0    MELATONIN ORAL Take 1 tablet by mouth every evening.      multivitamin (ONE DAILY MULTIVITAMIN) per tablet Take 1 tablet by  mouth once daily.      naproxen sodium (ALEVE) 220 MG tablet Take 220 mg by mouth every evening.      tirzepatide (MOUNJARO) 5 mg/0.5 mL PnIj Inject 5 mg into the skin every 7 days. 4 pen 5    zinc gluconate 50 mg tablet Take 50 mg by mouth once daily.         Past Surgical History:   Procedure Laterality Date    CHOLECYSTECTOMY  2009    COLONOSCOPY N/A 7/31/2018    Procedure: COLONOSCOPY;  Surgeon: Elpidio Fortune MD;  Location: [facility];  Service: Endoscopy;  Laterality: N/A;    GASTRIC BYPASS  2009    HYSTERECTOMY  1999       Social History:  Tobacco Use: Low Risk  (8/7/2023)    Patient History     Smoking Tobacco Use: Never     Smokeless Tobacco Use: Never     Passive Exposure: Never       Alcohol Use: Not At Risk (1/13/2023)    AUDIT-C     Frequency of Alcohol Consumption: 2-4 times a month     Average Number of Drinks: 1 or 2     Frequency of Binge Drinking: Never       Objective    Vital Signs Range:  BMI Readings from Last 1 Encounters:   08/07/23 49.21 kg/m²       Temp:  [35.8 °C (96.5 °F)-36.8 °C (98.2 °F)]   Pulse:  [79-94]   Resp:  [16-18]   BP: (110-121)/(53-73)   SpO2:  [93 %-100 %]        Significant Labs:        Component Value Date/Time    WBC 11.31 08/08/2023 0438    HGB 10.9 (L) 08/08/2023 0438    HCT 34.1 (L) 08/08/2023 0438     08/08/2023 0438     08/08/2023 0438    K 4.2 08/08/2023 0438     08/08/2023 0438    CO2 23 08/08/2023 0438    GLU 97 08/08/2023 0438    BUN 19 08/08/2023 0438    CREATININE 0.9 08/08/2023 0438    MG 2.1 08/08/2023 0438    PHOS 4.1 08/08/2023 0438    CALCIUM 9.2 08/08/2023 0438    ALBUMIN 3.2 (L) 08/07/2023 1949    PROT 6.4 08/07/2023 1949    ALKPHOS 123 08/07/2023 1949    BILITOT 0.2 08/07/2023 1949    AST 16 08/07/2023 1949    ALT 12 08/07/2023 1949    INR 1.0 08/08/2023 0615    HGBA1C 5.9 (H) 08/07/2023 1949        Please see Results Review for additional labs.     Diagnostic Studies: All relevant studies, reviewed.      EKG:    Results for orders placed or performed during the hospital encounter of 08/07/23   EKG 12-lead    Collection Time: 08/08/23  6:15 AM    Narrative    Test Reason : Z01.810,    Vent. Rate : 077 BPM     Atrial Rate : 077 BPM     P-R Int : 148 ms          QRS Dur : 082 ms      QT Int : 394 ms       P-R-T Axes : 026 -08 013 degrees     QTc Int : 445 ms    Normal sinus rhythm  Low voltage QRS  Low anterior forces- Cannot rule out Anterior infarct ,age undetermined but  doubt possibly lead placement  Abnormal ECG  When compared with ECG of 17-NOV-2009 13:02,  QRS voltage has decreased  Anterior forces are less  Confirmed by Dong FAULKNER MD ([id number]) on 8/8/2023 7:44:03 AM    Referred By: JAYANT   SELF           Confirmed By:Dong FAULKNER MD       ECHO:  No results found for this or any previous visit.        Pre-op Assessment    I have reviewed the Patient Summary Reports.     I have reviewed the Nursing Notes. I have reviewed the NPO Status.   I have reviewed the Medications.     Review of Systems  Anesthesia Hx:  No problems with previous Anesthesia  Denies Family Hx of Anesthesia complications.   Denies Personal Hx of Anesthesia complications.   Social:  Non-Smoker    Cardiovascular:   Exercise tolerance: good Hypertension    Pulmonary:  Pulmonary Normal    Renal/:  Renal/ Normal     Hepatic/GI:   Bowel Prep.    Neurological:   Neuromuscular Disease, Hx guillian barre   Endocrine:  Endocrine Normal        Physical Exam  General: Well nourished, Cooperative, Alert and Oriented    Airway:  Mallampati: III   Mouth Opening: Normal  TM Distance: Normal  Tongue: Normal  Neck ROM: Normal ROM  Neck: Girth Increased    Dental:  Intact        Anesthesia Plan  Type of Anesthesia, risks & benefits discussed:    Anesthesia Type: Regional, Gen ETT, Gen Natural Airway  Intra-op Monitoring Plan: Standard ASA Monitors  Post Op Pain Control Plan: multimodal analgesia, IV/PO Opioids PRN and peripheral nerve block  Induction:  IV and  rapid sequence  Informed Consent: Informed consent signed with the Patient and all parties understand the risks and agree with anesthesia plan.  All questions answered.   ASA Score: 3  Day of Surgery Review of History & Physical: H&P Update referred to the surgeon/provider.    Ready For Surgery From Anesthesia Perspective.     .

## 2023-08-09 NOTE — PLAN OF CARE
[facility] - [facility]  Initial Discharge Assessment       Primary Care Provider: Nany Hoff NP    Admission Diagnosis: Fracture [T14.8XXA]  Trauma [T14.90XA]  Preop cardiovascular exam [Z01.810]  Chest pain [R07.9]  Trimalleolar fracture of ankle, closed, left, initial encounter [Z28.824S]  Closed fracture of distal end of right fibula, unspecified fracture morphology, initial encounter [A54.469Z]    Admission Date: 8/7/2023  Expected Discharge Date: 8/14/2023    Transition of Care Barriers: None    Payor:  / Plan:  [employer] / Product Type: Government /     Extended Emergency Contact Information  Primary Emergency Contact: Kian Ryan  Address: [address] [place] [address] [facility]  Home Phone: [phone]  Mobile Phone: [phone]  Relation: Spouse    Discharge Plan A: Rehab  Discharge Plan B: Skilled Nursing Facility      [id number] [place] - [address] [facility] [address]  [address] [address] [address]  Phone: [phone] Fax: [phone]     Pharmacy - [address]  [address] [place], [place] [address]  [facility] [address]  Phone: [phone] Fax: [phone]    [facility] - [place], [place] - [facility]  [address] [facility] [address]  Phone: [phone] Fax: [phone]      Initial Assessment (most recent)       Adult Discharge Assessment - 08/09/23 1315          Discharge Assessment    Assessment Type Discharge Planning Assessment     Confirmed/corrected address, phone number and insurance Yes     Confirmed Demographics Correct on Facesheet     Source of Information family   spouse    Communicated LIVIER with patient/caregiver Yes     People in Home spouse     Do you expect to return to your current living situation? Yes     Do you have help at home or someone to help you manage your care at home? Yes     Who are your caregiver(s) and their phone number(s)? spouse     Home Layout Able to live on  1st floor     Equipment Currently Used at Home none     Do you currently have service(s) that help you manage your care at home? No     Do you take prescription medications? Yes     Do you have prescription coverage? Yes     Do you have any problems affording any of your prescribed medications? No     Is the patient taking medications as prescribed? yes     How do you get to doctors appointments? car, drives self     Are you on dialysis? No     Do you take coumadin? No     Discharge Plan A Rehab     Discharge Plan B Skilled Nursing Facility     Discharge Plan discussed with: Spouse/sig other     Transition of Care Barriers None                   Patient lives with spouse in a single story home with small threshold. Patient/spouse amenable to therapy recommendations. Recommendations pending evaluation.

## 2023-08-09 NOTE — ANESTHESIA PROCEDURE NOTES
Left popliteal single shot    Patient location during procedure: pre-op   Block not for primary anesthetic.  Reason for block: at surgeon's request and post-op pain management   Post-op Pain Location: Left leg   Start time: 8/9/2023 9:49 AM  Timeout: 8/9/2023 9:48 AM   End time: 8/9/2023 9:56 AM    Staffing  Authorizing Provider: Shaji Rosen MD  Performing Provider: Jacoby Luna MD    Staffing  Performed by: Jacoby Luna MD  Authorized by: Shaji Rosen MD    Preanesthetic Checklist  Completed: patient identified, IV checked, site marked, risks and benefits discussed, surgical consent, monitors and equipment checked, pre-op evaluation and timeout performed  Peripheral Block  Patient position: supine  Prep: ChloraPrep  Patient monitoring: heart rate, cardiac monitor, continuous pulse ox, continuous capnometry and frequent blood pressure checks  Block type: popliteal  Laterality: left  Injection technique: single shot  Needle  Needle type: Stimuplex   Needle gauge: 20 G  Needle length: 4 in  Needle localization: anatomical landmarks and ultrasound guidance   -ultrasound image captured on disc.  Assessment  Injection assessment: negative aspiration, negative parasthesia and local visualized surrounding nerve  Paresthesia pain: none  Heart rate change: no  Slow fractionated injection: yes  Pain Tolerance: comfortable throughout block and no complaints  Medications:    Medications: bupivacaine (pf) (MARCAINE) injection 0.25% - Perineural   30 mL - 8/9/2023 9:55:00 AM    Additional Notes  VSS.  [facility] RN monitoring vitals throughout procedure.  Patient tolerated procedure well.     Left Popliteal single injections. 30 mL 0.25% Bupivacaine w/ epinephrine [id number]

## 2023-08-09 NOTE — PT/OT/SLP PROGRESS
Occupational Therapy      Patient Name:  Kalina Ryan   MRN:  [id number]    Patient not seen today secondary to  . Orders acknowledged. Will follow-up tomorrow. Nerve block still in place.    8/9/2023

## 2023-08-09 NOTE — ASSESSMENT & PLAN NOTE
-s/p fall of 3 steps while going down the stairs   -imaging showed left ankle trimalleolar fracture and dislocation, and right ankle lateral malleolus fx.   -Patient seen by orthopedic surgery and L ankle was Reduced and splinted in ER placed in short leg splint.    -Patient will require operative treatment for definitive management per orthopedic   -neurovascular intact   -no active cardiac issue and denies chest pain or SOB  -will check EKG   -no further cardiac testing warranted prior to surgery     Perioperative Risk Assessment:  Patient is at low risk for perioperative cardiopulmonary complications.  Recommend proceed to surgery as planned. We will follow with you during the perioperative period  to manage any active medical issues and prevent postoperative complications.    Perioperative Management Recommendations:    -npo, mIVF and multimodal pain control   -patient not on beta blocker or blood thinner at home   -preop DVT PPX with SCDs  -post op incentive spirometry     OR 8/9 for external fixation device.

## 2023-08-09 NOTE — ANESTHESIA PROCEDURE NOTES
Left saphenous single shot    Patient location during procedure: pre-op   Block not for primary anesthetic.  Reason for block: at surgeon's request and post-op pain management   Post-op Pain Location: Left Leg   Start time: 8/9/2023 9:55 AM  Timeout: 8/9/2023 9:49 AM   End time: 8/9/2023 9:58 AM    Staffing  Authorizing Provider: Shaji Rosen MD  Performing Provider: Jacoby Luna MD    Staffing  Performed by: Jacoby Luna MD  Authorized by: Shaji Rosen MD    Preanesthetic Checklist  Completed: patient identified, IV checked, site marked, risks and benefits discussed, surgical consent, monitors and equipment checked, pre-op evaluation and timeout performed  Peripheral Block  Patient position: supine  Prep: ChloraPrep  Patient monitoring: heart rate, cardiac monitor, continuous pulse ox, continuous capnometry and frequent blood pressure checks  Block type: saphenous  Laterality: left  Injection technique: single shot  Needle  Needle type: Stimuplex   Needle gauge: 20 G  Needle length: 4 in  Needle localization: anatomical landmarks and ultrasound guidance   -ultrasound image captured on disc.  Assessment  Injection assessment: negative aspiration, negative parasthesia and local visualized surrounding nerve  Paresthesia pain: none  Heart rate change: no  Slow fractionated injection: yes  Pain Tolerance: comfortable throughout block and no complaints  Medications:    Medications: bupivacaine (pf) (MARCAINE) injection 0.25% - Perineural   20 mL - 8/9/2023 9:56:00 AM    Additional Notes  VSS.  [facility] RN monitoring vitals throughout procedure.  Patient tolerated procedure well.  Left Saphenous single shot. 20 mL of 0.25% Bupivacaine with epi [id number]

## 2023-08-09 NOTE — ASSESSMENT & PLAN NOTE
Kalina Ryan is a 58 y.o. female with left ankle trimalleolar fracture and dislocation and right ankle lateral malleolus fx and base of 5th MT fx.     Pain: MM  NWB LLE  NPO since midnight  Plan for ex fix today

## 2023-08-09 NOTE — PROGRESS NOTES
[facility] - [facility] ([facility])  [facility]  Progress Note    Patient Name: Kalina Ryan  MRN: [id number]  Patient Class: IP- Inpatient   Admission Date: 8/7/2023  Length of Stay: 0 days  Attending Physician: Nani Hood MD  Primary Care Provider: Nany Hoff NP        Subjective:     Principal Problem:Closed trimalleolar fracture of left ankle        HPI:  Kalina Rayn is a 58 y.o. 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 Patient states she twisted her ankle and fell down 3 step while going down the stairs at work. She had immediate pain and inability to bear weight. She noticed her left deformity and were screaming in pain. He coworker called EMS. Denies head injury or LOC. Denies prior injuries or surgeries to the left ankle. She is not on blood thinners. Denies other MSK pains or paresthesias. She walks without assistive devices at baseline. She lives at home with her .     ED course: afebrile and vitals stable. X-ray showed left ankle trimalleolar fracture and dislocation, and right ankle lateral malleolus fx. Patient seen by orthopedic and L ankle was Reduced and splinted in ER placed in short leg splint. Patient received profopol, fenatnyl and oxycodone in ED during conscious sedation for closed reduction of left ankle fracture.     During my interview patient is awake, conversant and not in distress. She reports pain and spasm in both ankles with right side hurting more than left. She denies tobacco, alcohol or other illicit drug use. She is able to ambulate few blocks at baseline without chest pain or SOB.           Overview/Hospital Course:  8/8-  ortho planning on OR - next week after swelling diminishes.  NWB LLE.  Progressive weakness noted in the LEs. Hs of Guillian [place] and gastric bypass  surgery. Check vitamin levels (B 12, folate, copper, zinc, and thiamine)  rpr, and consult Neurology.  XR of the L ankle show lateral dislocation of the ankle trimalleolar ankle fracture  XR of the R ankle shows possible lateral malleolus fx  XR of the R foot shows base of 5th metatarsal fx    8/9- OR 8/9 for external fixation device. NWB on the LLE, Caution needed when bearing weight on the right. She is obese and has Right fracture and left Dislocation. She is very week. High risk of FALLS.  Neuro eval in progress. She will be difficult to manage, unable to ambulate. Pt to stay in hospital to monitor ex fixation devise until definitive surgery.     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 Moses, no treatment since 2012. She has self-contained episodes in which she did not seek medical attention. These symptoms are similar. Want to avoid steroids now w planned surgery.  f/u Neuromuscular Neurologist for  possible IVIG as outpt upon discharge. If any change in status, reconsult Neurology.      Interval History: see above    Review of Systems   Constitutional:  Positive for activity change. Negative for appetite change and fever.   HENT:  Negative for trouble swallowing.    Respiratory:  Negative for cough and shortness of breath.    Cardiovascular:  Negative for chest pain and leg swelling.   Gastrointestinal:  Negative for abdominal pain, constipation, nausea and vomiting.   Genitourinary:  Negative for difficulty urinating and frequency.   Musculoskeletal:  Positive for arthralgias (bilat ankles) and gait problem. Negative for neck pain.   Skin:  Negative for rash.   Neurological:  Negative for numbness.   Psychiatric/Behavioral:  Negative for behavioral problems.      Objective:     Vital Signs (Most Recent):  Temp: 98.2 °F (36.8 °C) (08/09/23 0836)  Pulse: 87 (08/09/23 0836)  Resp: 20 (08/09/23 0836)  BP: 138/71 (08/09/23 0836)  SpO2: 100 % (2L oxygen) (08/09/23  0836) Vital Signs (24h Range):  Temp:  [96.5 °F (35.8 °C)-98.4 °F (36.9 °C)] 98.2 °F (36.8 °C)  Pulse:  [79-98] 87  Resp:  [16-20] 20  SpO2:  [93 %-100 %] 100 %  BP: (110-163)/(53-75) 138/71     Weight: 126 kg (277 lb 12.5 oz)  Body mass index is 49.21 kg/m².    Intake/Output Summary (Last 24 hours) at 8/9/2023 1042  Last data filed at 8/8/2023 1331  Gross per 24 hour   Intake 541.73 ml   Output --   Net 541.73 ml         Physical Exam  Constitutional:       General: She is not in acute distress.     Appearance: Normal appearance. She is not ill-appearing, toxic-appearing or diaphoretic.   HENT:      Head: Normocephalic and atraumatic.      Nose: Nose normal.      Mouth/Throat:      Mouth: Mucous membranes are moist.      Pharynx: Oropharynx is clear.   Eyes:      General: No scleral icterus.     Extraocular Movements: Extraocular movements intact.      Conjunctiva/sclera: Conjunctivae normal.      Pupils: Pupils are equal, round, and reactive to light.   Cardiovascular:      Rate and Rhythm: Normal rate and regular rhythm.      Pulses: Normal pulses.      Heart sounds: Normal heart sounds. No murmur heard.  Pulmonary:      Effort: Pulmonary effort is normal. No respiratory distress.      Breath sounds: Normal breath sounds. No stridor. No wheezing, rhonchi or rales.   Chest:      Chest wall: No tenderness.   Abdominal:      General: Abdomen is flat. Bowel sounds are normal. There is no distension.      Palpations: Abdomen is soft.      Tenderness: There is no abdominal tenderness. There is no right CVA tenderness, guarding or rebound.   Musculoskeletal:         General: Swelling (left ankle) present. No tenderness, deformity or signs of injury. Normal range of motion.      Cervical back: Normal range of motion and neck supple. No rigidity or tenderness.      Right lower leg: No edema.      Left lower leg: No edema.      Comments: Left ankle external fixation device   Skin:     General: Skin is warm and dry.       Coloration: Skin is not jaundiced.      Findings: No erythema or rash.   Neurological:      General: No focal deficit present.      Mental Status: She is alert and oriented to person, place, and time. Mental status is at baseline.      Motor: No weakness.      Gait: Gait abnormal.   Psychiatric:         Mood and Affect: Mood normal.         Behavior: Behavior normal.         Thought Content: Thought content normal.         Judgment: Judgment normal.             Significant Labs: All pertinent labs within the past 24 hours have been reviewed.  CBC:   Recent Labs   Lab 08/07/23 1949 08/08/23 0438 08/09/23 0323   WBC 12.89* 11.31 8.64   HGB 10.7* 10.9* 10.4*   HCT 32.6* 34.1* 33.7*    277 289       CMP:   Recent Labs   Lab 08/07/23 1949 08/08/23 0438 08/09/23 0323    140 139   K 4.5 4.2 4.7    106 110   CO2 19* 23 22*   * 97 87   BUN 21* 19 16   CREATININE 0.8 0.9 0.8   CALCIUM 8.8 9.2 8.7   PROT 6.4  --   --    ALBUMIN 3.2*  --   --    BILITOT 0.2  --   --    ALKPHOS 123  --   --    AST 16  --   --    ALT 12  --   --    ANIONGAP 10 11 7*         Significant Imaging: I have reviewed all pertinent imaging results/findings within the past 24 hours.      Assessment/Plan:      * Closed trimalleolar fracture of left ankle  -s/p fall of 3 steps while going down the stairs   -imaging showed left ankle trimalleolar fracture and dislocation, and right ankle lateral malleolus fx.   -Patient seen by orthopedic surgery and L ankle was Reduced and splinted in ER placed in short leg splint.    -Patient will require operative treatment for definitive management per orthopedic   -neurovascular intact   -no active cardiac issue and denies chest pain or SOB  -will check EKG   -no further cardiac testing warranted prior to surgery     Perioperative Risk Assessment:  Patient is at low risk for perioperative cardiopulmonary complications.  Recommend proceed to surgery as planned. We will follow with you  "during the perioperative period  to manage any active medical issues and prevent postoperative complications.    Perioperative Management Recommendations:    -npo, mIVF and multimodal pain control   -patient not on beta blocker or blood thinner at home   -preop DVT PPX with SCDs  -post op incentive spirometry     OR 8/9 for external fixation device.    Closed dislocation of left ankle  -s/p closed reduction by orthopedic in ED with short leg splint placement   -will need operative fixation for definitive management of trimalleolar fracture by ortho   -OR 8/9 for external fixation device. NWB    8/9- , Caution needed when bearing weight on the right. She is obese.  She is very week. High risk of FALLS.  Neuro eval in progress. She will be difficult to manage, unable to ambulate. Pt to stay in hospital to monitor ex fixation devise until definitive surgery.       Benign essential HTN  -chronic and controlled  -continue home amlodipine and lisinopril       History of Guillain-[place] syndrome  -remote history with mild residual lower extremity weakness   -ambulates w/o assisting device   - pt reports progressive weakness over par 3 months. Recent toe fractures from a FALL in May, and now a second fall with bilat ankle fractures  - will consult neurology. Check vitamin levels. Check heavy metals.  -  Baseline Respiratory  ordered in case of future change. Do not suspect active Guillain [place].  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 Moses, no treatment since 2012. She has self-contained episodes in which she did not seek medical attention. These symptoms are similar. Want to avoid steroids now w planned surgery.   - f/u Neuromuscular Neurologist for  possible IVIG as outpt upon discharge.  -  If any change in status, reconsult Neurology.        Type 2 diabetes mellitus without complication, without long-term current use of insulin  Patient's FSGs are controlled on current " medication regimen.  Last A1c reviewed-   Lab Results   Component Value Date    HGBA1C 5.9 (H) 08/07/2023     Most recent fingerstick glucose reviewed-   Recent Labs   Lab 08/09/23  0901   POCTGLUCOSE 96     Current correctional scale  Low  Maintain anti-hyperglycemic dose as follows-   Antihyperglycemics (From admission, onward)    None        -on mounjaro injections weekly at home     Anxiety  -continue home lexapro       Class 3 severe obesity due to excess calories with serious comorbidity and body mass index (BMI) of 45.0 to 49.9 in adult  Body mass index is 49.21 kg/m². Morbid obesity complicates all aspects of disease management from diagnostic modalities to treatment. Weight loss encouraged and health benefits explained to patient.         Preoperative cardiovascular examination  -see above under trimalleolar ankle fracture       Weakness of both lower extremities        VTE Risk Mitigation (From admission, onward)         Ordered     IP VTE HIGH RISK PATIENT  Once         08/08/23 0036     Place sequential compression device  Until discontinued         08/08/23 0036                Discharge Planning   LIVIER: 8/14/2023     Code Status: Full Code   Is the patient medically ready for discharge?: No    Reason for patient still in hospital (select all that apply): Patient trending condition  Discharge Plan A: Rehab          Nani Hood MD  Department of Hospital Medicine   [facility] - Surgery ([facility])

## 2023-08-09 NOTE — SUBJECTIVE & OBJECTIVE
Past Medical History:   Diagnosis Date    Anxiety     Guillain Barré syndrome 2009    full paralysis but no intubation; residual weakness and neuropathy to hands and feet - diagnosed by Neurologist back in 2009 and last seen by neurology in 2011    Hypertension     New onset type 2 diabetes mellitus 9/4/2019    Personal history of gastric bypass 9/4/2019       Past Surgical History:   Procedure Laterality Date    CHOLECYSTECTOMY  2009    COLONOSCOPY N/A 7/31/2018    Procedure: COLONOSCOPY;  Surgeon: Elpidio Fortune MD;  Location: [facility];  Service: Endoscopy;  Laterality: N/A;    GASTRIC BYPASS  2009    HYSTERECTOMY  1999       Review of patient's allergies indicates:   Allergen Reactions    Amoxicillin Hives    Penicillins Hives       No current facility-administered medications on file prior to encounter.     Current Outpatient Medications on File Prior to Encounter   Medication Sig    amLODIPine (NORVASC) 5 MG tablet TAKE 1 TABLET DAILY    cetirizine HCl (CETIRIZINE ORAL) Take 1 tablet by mouth once daily.    CYANOCOBALAMIN, VITAMIN B-12, ORAL Take 1 tablet by mouth once daily.    EScitalopram oxalate (LEXAPRO) 10 MG tablet Take 1 tablet (10 mg total) by mouth once daily.    lisinopriL (PRINIVIL,ZESTRIL) 40 MG tablet Take 1 tablet (40 mg total) by mouth once daily.    MELATONIN ORAL Take 1 tablet by mouth every evening.    multivitamin (ONE DAILY MULTIVITAMIN) per tablet Take 1 tablet by mouth once daily.    naproxen sodium (ALEVE) 220 MG tablet Take 220 mg by mouth every evening.    tirzepatide (MOUNJARO) 5 mg/0.5 mL PnIj Inject 5 mg into the skin every 7 days.    zinc gluconate 50 mg tablet Take 50 mg by mouth once daily.     Family History       Problem Relation (Age of Onset)    Cancer Brother    Dementia Father    Diabetes Mother, Father    Heart disease Mother, Father (60), Brother (56)    Hypertension Mother, Father, Brother, Brother    No Known Problems Daughter, Son    Pancreatic cancer Brother  (54)    Stomach cancer Brother    Stroke Father          Tobacco Use    Smoking status: Never     Passive exposure: Never    Smokeless tobacco: Never   Substance and Sexual Activity    Alcohol use: Yes     Alcohol/week: 1.0 standard drink of alcohol     Types: 1 Drinks containing 0.5 oz of alcohol per week     Comment: every other weekend - 1 drink per occasion    Drug use: No    Sexual activity: Not Currently     Partners: Male     Birth control/protection: Partner-Vasectomy     Review of Systems   Respiratory:  Negative for shortness of breath.    Cardiovascular:  Negative for chest pain and palpitations.   Neurological:  Positive for weakness and numbness. Negative for dizziness, tremors, seizures, syncope, facial asymmetry, speech difficulty, light-headedness and headaches.   Psychiatric/Behavioral:  Negative for agitation, behavioral problems and confusion.      Objective:     Vital Signs (Most Recent):  Temp: 97.9 °F (36.6 °C) (08/09/23 1543)  Pulse: 95 (08/09/23 1543)  Resp: 17 (08/09/23 1543)  BP: (!) 92/52 (08/09/23 1543)  SpO2: 99 % (08/09/23 1543) Vital Signs (24h Range):  Temp:  [96.5 °F (35.8 °C)-98.4 °F (36.9 °C)] 97.9 °F (36.6 °C)  Pulse:  [78-98] 95  Resp:  [15-20] 17  SpO2:  [87 %-100 %] 99 %  BP: ()/(52-77) 92/52     Weight: 126 kg (277 lb 12.5 oz)  Body mass index is 49.21 kg/m².     Physical Exam   Neurological Exam:  MENTAL STATUS  Level of consciousness: alert  Orientation: oriented to person, place, time, situation  Attention: normal.   Concentration: normal.  Speech: normal    CRANIAL NERVES  CN II: visual fields full to confrontation  CN III, IV, VI: PERRL, EOMI  CN V: facial sensation intact, muscles of mastication intact  CN VII: facial expression symmetric and full  CN IX, X: symmetric palate elevation; phonation normal  CN XI: shoulder shrug and head turn intact bilaterally  CN XII: tongue midline, no deviation upon protrusion, no atrophy    MOTOR EXAM  Muscle bulk: normal  Muscle  tone: normal  Pronator drift: absent    Strength - Upper Extremities   Arm abduction Elbow flexion Elbow extension Wrist flexion Wrist extension Finger abduction   Right 5/5 5/5 5/5 5/5 5/5 5/5   Left 5/5 5/5 5/5 5/5 5/5 5/5     Strength - Lower Extremities  Deferred, given fractures and orthopedic surgery today.    REFLEXES   Biceps Triceps Brachioradialis Patellar   Right +1 +1 +1 +1   Left +1 +1 +1 Deferred     Planter reflex: deferred.  Clonus: deferred.    SENSORY EXAM  Light touch: intact throughout right lower extremity, bilateral upper extremities  ->left lower extremity deferred due to recent surgery and presence of nerve block   Vibration: intact throughout bilateral upper extremities; slightly diminished in the right lower extremity distal to the knee  ->left lower extremity deferred due to recent surgery and presence of nerve block  Pinprick: intact throughout right lower extremity, bilateral upper extremities  ->left lower extremity deferred due to recent surgery and presence of nerve block     COORDINATION  Tremor: none  Gait: deferred  Romberg's sign: deferred           Significant Labs: All pertinent lab results from the past 24 hours have been reviewed.    Significant Imaging: I have reviewed all pertinent imaging results/findings within the past 24 hours.

## 2023-08-09 NOTE — SUBJECTIVE & OBJECTIVE
Interval History: see above    Review of Systems   Constitutional:  Positive for activity change. Negative for appetite change and fever.   HENT:  Negative for trouble swallowing.    Respiratory:  Negative for cough and shortness of breath.    Cardiovascular:  Negative for chest pain and leg swelling.   Gastrointestinal:  Negative for abdominal pain, constipation, nausea and vomiting.   Genitourinary:  Negative for difficulty urinating and frequency.   Musculoskeletal:  Positive for arthralgias (bilat ankles) and gait problem. Negative for neck pain.   Skin:  Negative for rash.   Neurological:  Negative for numbness.   Psychiatric/Behavioral:  Negative for behavioral problems.      Objective:     Vital Signs (Most Recent):  Temp: 98.2 °F (36.8 °C) (08/09/23 0836)  Pulse: 87 (08/09/23 0836)  Resp: 20 (08/09/23 0836)  BP: 138/71 (08/09/23 0836)  SpO2: 100 % (2L oxygen) (08/09/23 0836) Vital Signs (24h Range):  Temp:  [96.5 °F (35.8 °C)-98.4 °F (36.9 °C)] 98.2 °F (36.8 °C)  Pulse:  [79-98] 87  Resp:  [16-20] 20  SpO2:  [93 %-100 %] 100 %  BP: (110-163)/(53-75) 138/71     Weight: 126 kg (277 lb 12.5 oz)  Body mass index is 49.21 kg/m².    Intake/Output Summary (Last 24 hours) at 8/9/2023 1042  Last data filed at 8/8/2023 1331  Gross per 24 hour   Intake 541.73 ml   Output --   Net 541.73 ml         Physical Exam  Constitutional:       General: She is not in acute distress.     Appearance: Normal appearance. She is not ill-appearing, toxic-appearing or diaphoretic.   HENT:      Head: Normocephalic and atraumatic.      Nose: Nose normal.      Mouth/Throat:      Mouth: Mucous membranes are moist.      Pharynx: Oropharynx is clear.   Eyes:      General: No scleral icterus.     Extraocular Movements: Extraocular movements intact.      Conjunctiva/sclera: Conjunctivae normal.      Pupils: Pupils are equal, round, and reactive to light.   Cardiovascular:      Rate and Rhythm: Normal rate and regular rhythm.      Pulses: Normal  pulses.      Heart sounds: Normal heart sounds. No murmur heard.  Pulmonary:      Effort: Pulmonary effort is normal. No respiratory distress.      Breath sounds: Normal breath sounds. No stridor. No wheezing, rhonchi or rales.   Chest:      Chest wall: No tenderness.   Abdominal:      General: Abdomen is flat. Bowel sounds are normal. There is no distension.      Palpations: Abdomen is soft.      Tenderness: There is no abdominal tenderness. There is no right CVA tenderness, guarding or rebound.   Musculoskeletal:         General: Swelling (left ankle) present. No tenderness, deformity or signs of injury. Normal range of motion.      Cervical back: Normal range of motion and neck supple. No rigidity or tenderness.      Right lower leg: No edema.      Left lower leg: No edema.      Comments: Left ankle external fixation device   Skin:     General: Skin is warm and dry.      Coloration: Skin is not jaundiced.      Findings: No erythema or rash.   Neurological:      General: No focal deficit present.      Mental Status: She is alert and oriented to person, place, and time. Mental status is at baseline.      Motor: No weakness.      Gait: Gait abnormal.   Psychiatric:         Mood and Affect: Mood normal.         Behavior: Behavior normal.         Thought Content: Thought content normal.         Judgment: Judgment normal.             Significant Labs: All pertinent labs within the past 24 hours have been reviewed.  CBC:   Recent Labs   Lab 08/07/23 1949 08/08/23 0438 08/09/23 0323   WBC 12.89* 11.31 8.64   HGB 10.7* 10.9* 10.4*   HCT 32.6* 34.1* 33.7*    277 289       CMP:   Recent Labs   Lab 08/07/23 1949 08/08/23 0438 08/09/23  0323    140 139   K 4.5 4.2 4.7    106 110   CO2 19* 23 22*   * 97 87   BUN 21* 19 16   CREATININE 0.8 0.9 0.8   CALCIUM 8.8 9.2 8.7   PROT 6.4  --   --    ALBUMIN 3.2*  --   --    BILITOT 0.2  --   --    ALKPHOS 123  --   --    AST 16  --   --    ALT 12  --   --     ANIONGAP 10 11 7*         Significant Imaging: I have reviewed all pertinent imaging results/findings within the past 24 hours.

## 2023-08-09 NOTE — CONSULTS
[facility]  Neurology  Consult Note    Patient Name: Kalina Ryan  MRN: [id number]  Admission Date: 8/7/2023  Hospital Length of Stay: 0 days  Code Status: Full Code   Attending Provider: Nani Hood MD   Consulting Provider: Lionel Allen DO  Primary Care Physician: Nany Hoff NP  Principal Problem:Closed trimalleolar fracture of left ankle    Inpatient consult to Neurology  Consult performed by: Lionel Allen DO  Consult ordered by: Nani Hood MD         Subjective:     Chief Complaint:  Bilateral lower extremity weakness     HPI:   Ms. 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 Has been progressively worsening reportedly for several months, complicated by multiple falls. She is currently hospitalized due to a fall down several stairs during which she sustained fractures to both the left and right ankles. Left ankle fracture was associated with dislocation, reduced per orthopedics in the ER and headed to OR w/ortho today for external fixation.    Had extensive discussion with patient at bedside today regarding her neurologic history. She states that she was diagnosed w/CIDP in 2009. Received 4-5 rounds of IVIG treatment, and subsequently steroids. Previously followed with Dr. Moses. Says that she hasn't seen neurology, and hasn't received any form of treatment since 2012. Was in inpatient rehabilitation following hospitalization for initial presentation, however states that she regained full function. Says that she intermittently would have worsening of weakness, however each of these episodes were self-contained, and thus did not prompt her to seek medical attention. Over the last eight months, lower extremity weakness has been progressively worsening, leading to several falls, and culminating  with her current presentation. Weakness began worsening eight months ago, affecting the proximal (thigh/hamstring) and distal (ankle/foot) in a symmetric fashion: associated with intermittent numbness and tingling of the distal upper and lower extremities. She denies bulbar symptoms (facial weakness, difficulty managing secretions, voice change) or cardiac/respiratory symptoms.       Past Medical History:   Diagnosis Date    Anxiety     Guillain Barré syndrome 2009    full paralysis but no intubation; residual weakness and neuropathy to hands and feet - diagnosed by Neurologist back in 2009 and last seen by neurology in 2011    Hypertension     New onset type 2 diabetes mellitus 9/4/2019    Personal history of gastric bypass 9/4/2019       Past Surgical History:   Procedure Laterality Date    CHOLECYSTECTOMY  2009    COLONOSCOPY N/A 7/31/2018    Procedure: COLONOSCOPY;  Surgeon: Elpidio Fortune MD;  Location[facility];  Service: Endoscopy;  Laterality: N/A;    GASTRIC BYPASS  2009    HYSTERECTOMY  1999       Review of patient's allergies indicates:   Allergen Reactions    Amoxicillin Hives    Penicillins Hives       No current facility-administered medications on file prior to encounter.     Current Outpatient Medications on File Prior to Encounter   Medication Sig    amLODIPine (NORVASC) 5 MG tablet TAKE 1 TABLET DAILY    cetirizine HCl (CETIRIZINE ORAL) Take 1 tablet by mouth once daily.    CYANOCOBALAMIN, VITAMIN B-12, ORAL Take 1 tablet by mouth once daily.    EScitalopram oxalate (LEXAPRO) 10 MG tablet Take 1 tablet (10 mg total) by mouth once daily.    lisinopriL (PRINIVIL,ZESTRIL) 40 MG tablet Take 1 tablet (40 mg total) by mouth once daily.    MELATONIN ORAL Take 1 tablet by mouth every evening.    multivitamin (ONE DAILY MULTIVITAMIN) per tablet Take 1 tablet by mouth once daily.    naproxen sodium (ALEVE) 220 MG tablet Take 220 mg by mouth every evening.    tirzepatide (MOUNJARO)  5 mg/0.5 mL PnIj Inject 5 mg into the skin every 7 days.    zinc gluconate 50 mg tablet Take 50 mg by mouth once daily.     Family History       Problem Relation (Age of Onset)    Cancer Brother    Dementia Father    Diabetes Mother, Father    Heart disease Mother, Father (60), Brother (56)    Hypertension Mother, Father, Brother, Brother    No Known Problems Daughter, Son    Pancreatic cancer Brother (54)    Stomach cancer Brother    Stroke Father          Tobacco Use    Smoking status: Never     Passive exposure: Never    Smokeless tobacco: Never   Substance and Sexual Activity    Alcohol use: Yes     Alcohol/week: 1.0 standard drink of alcohol     Types: 1 Drinks containing 0.5 oz of alcohol per week     Comment: every other weekend - 1 drink per occasion    Drug use: No    Sexual activity: Not Currently     Partners: Male     Birth control/protection: Partner-Vasectomy     Review of Systems   Respiratory:  Negative for shortness of breath.    Cardiovascular:  Negative for chest pain and palpitations.   Neurological:  Positive for weakness and numbness. Negative for dizziness, tremors, seizures, syncope, facial asymmetry, speech difficulty, light-headedness and headaches.   Psychiatric/Behavioral:  Negative for agitation, behavioral problems and confusion.      Objective:     Vital Signs (Most Recent):  Temp: 97.9 °F (36.6 °C) (08/09/23 1543)  Pulse: 95 (08/09/23 1543)  Resp: 17 (08/09/23 1543)  BP: (!) 92/52 (08/09/23 1543)  SpO2: 99 % (08/09/23 1543) Vital Signs (24h Range):  Temp:  [96.5 °F (35.8 °C)-98.4 °F (36.9 °C)] 97.9 °F (36.6 °C)  Pulse:  [78-98] 95  Resp:  [15-20] 17  SpO2:  [87 %-100 %] 99 %  BP: ()/(52-77) 92/52     Weight: 126 kg (277 lb 12.5 oz)  Body mass index is 49.21 kg/m².     Physical Exam   Neurological Exam:  MENTAL STATUS  Level of consciousness: alert  Orientation: oriented to person, place, time, situation  Attention: normal.   Concentration: normal.  Speech:  normal    CRANIAL NERVES  CN II: visual fields full to confrontation  CN III, IV, VI: PERRL, EOMI  CN V: facial sensation intact, muscles of mastication intact  CN VII: facial expression symmetric and full  CN IX, X: symmetric palate elevation; phonation normal  CN XI: shoulder shrug and head turn intact bilaterally  CN XII: tongue midline, no deviation upon protrusion, no atrophy    MOTOR EXAM  Muscle bulk: normal  Muscle tone: normal  Pronator drift: absent    Strength - Upper Extremities   Arm abduction Elbow flexion Elbow extension Wrist flexion Wrist extension Finger abduction   Right 5/5 5/5 5/5 5/5 5/5 5/5   Left 5/5 5/5 5/5 5/5 5/5 5/5     Strength - Lower Extremities  Deferred, given fractures and orthopedic surgery today.    REFLEXES   Biceps Triceps Brachioradialis Patellar   Right +1 +1 +1 +1   Left +1 +1 +1 Deferred     Planter reflex: deferred.  Clonus: deferred.    SENSORY EXAM  Light touch: intact throughout right lower extremity, bilateral upper extremities  ->left lower extremity deferred due to recent surgery and presence of nerve block   Vibration: intact throughout bilateral upper extremities; slightly diminished in the right lower extremity distal to the knee  ->left lower extremity deferred due to recent surgery and presence of nerve block  Pinprick: intact throughout right lower extremity, bilateral upper extremities  ->left lower extremity deferred due to recent surgery and presence of nerve block     COORDINATION  Tremor: none  Gait: deferred  Romberg's sign: deferred           Significant Labs: All pertinent lab results from the past 24 hours have been reviewed.    Significant Imaging: I have reviewed all pertinent imaging results/findings within the past 24 hours.    Assessment and Plan:     Weakness of both lower extremities  Patient reports that she has a history of CIDP. Starting in 2009, received IVIG 4-5 rounds of treatment, and steroids. Had followed with Dr. Moses in the past, but  states that she has neither seen a neurologist nor undergone additional treatment since 2012. Has had several episodes of weakness from then until current presentation, but describes these as self-limited and she did not seek medical attention. Starting 8 months ago, has experienced progressively worsening lower extremity weakness, bilateral and symmetric, both proximal and distal (ankle and foot) as well as intermittent distal upper/lower extremity numbness and tingling. This has resulted in several falls, the most recent of which resulted in ankle fracture that led her to present to [facility]. Went to the OR today w/orthopedic surgery.     Remains stable from hemodynamic and respiratory standpoint. Would prefer to avoid IVIG at this time due to increased risk of thrombosis, especially in setting of fracture requiring orthopedic surgery and likely poor mobility in the short-term. Would also prefer to avoid steroids if possible given adverse impact on healing. Patient should reestablish care with neuromuscular neurology outpatient. Will need EMG, and IVIG vs other treatment may be considered at that time. If clinical status worsens during this hospital stay, may reconsider the above.    Plan  -hold off on IVIG/steroids for now  -outpatient follow up with neuromuscular neurology  ->will contact clinic scheduling staff to arrange  -please contact neurology if clinical status worsens        VTE Risk Mitigation (From admission, onward)         Ordered     IP VTE HIGH RISK PATIENT  Once         08/08/23 0036     Place sequential compression device  Until discontinued         08/08/23 0036                Thank you for your consult. I will sign off. Please contact us if you have any additional questions.    Lionel Allen, DO  Neurology  [facility]

## 2023-08-09 NOTE — SUBJECTIVE & OBJECTIVE
Principal Problem:Trimalleolar fracture of ankle, closed, left, initial encounter    Principal Orthopedic Problem: same as above    Interval History: NAEO. VSS. Pain well controlled. Plan for Ex fix today. NPO since midnight.    Review of patient's allergies indicates:   Allergen Reactions    Amoxicillin Hives    Penicillins Hives       Current Facility-Administered Medications   Medication    0.9%  NaCl infusion    acetaminophen tablet 650 mg    amLODIPine tablet 5 mg    EScitalopram oxalate tablet 10 mg    glucagon (human recombinant) injection 1 mg    glucose chewable tablet 16 g    glucose chewable tablet 24 g    LIDOcaine-EPINEPHrine 1%-1:100,000 injection 20 mL    LIDOcaine-EPINEPHrine 1%-1:100,000 injection 20 mL    lisinopriL tablet 40 mg    LORazepam injection 2 mg    melatonin tablet 6 mg    methocarbamoL tablet 1,000 mg    naloxone 0.4 mg/mL injection 0.02 mg    ondansetron injection 4 mg    oxyCODONE immediate release tablet Tab 10 mg    oxyCODONE-acetaminophen 5-325 mg per tablet 1 tablet    polyethylene glycol packet 17 g    senna-docusate 8.6-50 mg per tablet 2 tablet    simethicone chewable tablet 80 mg    sodium chloride 0.9% flush 10 mL     Objective:     Vital Signs (Most Recent):  Temp: 97.4 °F (36.3 °C) (08/09/23 0514)  Pulse: 98 (08/09/23 0514)  Resp: 16 (08/09/23 0552)  BP: 124/63 (08/09/23 0514)  SpO2: 100 % (08/09/23 0514) Vital Signs (24h Range):  Temp:  [96.5 °F (35.8 °C)-98.4 °F (36.9 °C)] 97.4 °F (36.3 °C)  Pulse:  [76-98] 98  Resp:  [16-18] 16  SpO2:  [93 %-100 %] 100 %  BP: (102-126)/(53-73) 124/63     Weight: 126 kg (277 lb 12.5 oz)     Body mass index is 49.21 kg/m².      Intake/Output Summary (Last 24 hours) at 8/9/2023 0741  Last data filed at 8/8/2023 1331  Gross per 24 hour   Intake 541.73 ml   Output --   Net 541.73 ml        Ortho/SPM Exam  LLE:   Splint in place, cdi  Cap refill <2s  Able to wiggle toes    RLE:   Pain with ROM of the ankle  TTP over 5th metatarsal  base  SILT       Significant Labs: All pertinent labs within the past 24 hours have been reviewed.    Significant Imaging: I have reviewed and interpreted all pertinent imaging results/findings.

## 2023-08-09 NOTE — PROGRESS NOTES
Patient had 5 rings on her fingers. States that they are unable to come off and haven't been off in years. Dr. Gambino requested that we use lubricant to try and get them off. Lubricant used, 1 ring came off. Given to the . The other 4 rings would not come off.

## 2023-08-10 PROBLEM — N39.0 URINARY TRACT INFECTION DUE TO KLEBSIELLA SPECIES: Status: ACTIVE | Noted: 2023-08-10

## 2023-08-10 PROBLEM — B96.89 URINARY TRACT INFECTION DUE TO KLEBSIELLA SPECIES: Status: ACTIVE | Noted: 2023-08-10

## 2023-08-10 PROBLEM — N30.00 ACUTE CYSTITIS WITHOUT HEMATURIA: Status: ACTIVE | Noted: 2023-08-10

## 2023-08-10 LAB
ANION GAP SERPL CALC-SCNC: 12 MMOL/L (ref 8–16)
BACTERIA UR CULT: ABNORMAL
BASOPHILS # BLD AUTO: 0.03 K/UL (ref 0–0.2)
BASOPHILS NFR BLD: 0.3 % (ref 0–1.9)
BUN SERPL-MCNC: 15 MG/DL (ref 6–20)
CALCIUM SERPL-MCNC: 8.6 MG/DL (ref 8.7–10.5)
CHLORIDE SERPL-SCNC: 109 MMOL/L (ref 95–110)
CO2 SERPL-SCNC: 21 MMOL/L (ref 23–29)
CREAT SERPL-MCNC: 0.9 MG/DL (ref 0.5–1.4)
DIFFERENTIAL METHOD: ABNORMAL
EOSINOPHIL # BLD AUTO: 0.3 K/UL (ref 0–0.5)
EOSINOPHIL NFR BLD: 3.1 % (ref 0–8)
ERYTHROCYTE [DISTWIDTH] IN BLOOD BY AUTOMATED COUNT: 15.9 % (ref 11.5–14.5)
EST. GFR  (NO RACE VARIABLE): >60 ML/MIN/1.73 M^2
GLUCOSE SERPL-MCNC: 97 MG/DL (ref 70–110)
HCT VFR BLD AUTO: 34.7 % (ref 37–48.5)
HGB BLD-MCNC: 10.6 G/DL (ref 12–16)
IMM GRANULOCYTES # BLD AUTO: 0.04 K/UL (ref 0–0.04)
IMM GRANULOCYTES NFR BLD AUTO: 0.4 % (ref 0–0.5)
LYMPHOCYTES # BLD AUTO: 2.6 K/UL (ref 1–4.8)
LYMPHOCYTES NFR BLD: 24.9 % (ref 18–48)
MAGNESIUM SERPL-MCNC: 2.1 MG/DL (ref 1.6–2.6)
MCH RBC QN AUTO: 27.2 PG (ref 27–31)
MCHC RBC AUTO-ENTMCNC: 30.5 G/DL (ref 32–36)
MCV RBC AUTO: 89 FL (ref 82–98)
MONOCYTES # BLD AUTO: 0.7 K/UL (ref 0.3–1)
MONOCYTES NFR BLD: 6.8 % (ref 4–15)
NEUTROPHILS # BLD AUTO: 6.7 K/UL (ref 1.8–7.7)
NEUTROPHILS NFR BLD: 64.5 % (ref 38–73)
NRBC BLD-RTO: 0 /100 WBC
PHOSPHATE SERPL-MCNC: 3.8 MG/DL (ref 2.7–4.5)
PLATELET # BLD AUTO: 279 K/UL (ref 150–450)
PMV BLD AUTO: 9.2 FL (ref 9.2–12.9)
POCT GLUCOSE: 100 MG/DL (ref 70–110)
POCT GLUCOSE: 111 MG/DL (ref 70–110)
POCT GLUCOSE: 122 MG/DL (ref 70–110)
POTASSIUM SERPL-SCNC: 4.4 MMOL/L (ref 3.5–5.1)
RBC # BLD AUTO: 3.9 M/UL (ref 4–5.4)
SODIUM SERPL-SCNC: 142 MMOL/L (ref 136–145)
WBC # BLD AUTO: 10.37 K/UL (ref 3.9–12.7)

## 2023-08-10 PROCEDURE — 99900035 HC TECH TIME PER 15 MIN (STAT)

## 2023-08-10 PROCEDURE — 25000003 PHARM REV CODE 250

## 2023-08-10 PROCEDURE — 94010 BREATHING CAPACITY TEST: CPT

## 2023-08-10 PROCEDURE — 83735 ASSAY OF MAGNESIUM: CPT | Performed by: HOSPITALIST

## 2023-08-10 PROCEDURE — 11000001 HC ACUTE MED/SURG PRIVATE ROOM

## 2023-08-10 PROCEDURE — 99233 PR SUBSEQUENT HOSPITAL CARE,LEVL III: ICD-10-PCS | Mod: ,,, | Performed by: INTERNAL MEDICINE

## 2023-08-10 PROCEDURE — 97165 OT EVAL LOW COMPLEX 30 MIN: CPT

## 2023-08-10 PROCEDURE — 94150 VITAL CAPACITY TEST: CPT

## 2023-08-10 PROCEDURE — 85025 COMPLETE CBC W/AUTO DIFF WBC: CPT | Performed by: HOSPITALIST

## 2023-08-10 PROCEDURE — 97161 PT EVAL LOW COMPLEX 20 MIN: CPT

## 2023-08-10 PROCEDURE — 84100 ASSAY OF PHOSPHORUS: CPT | Performed by: HOSPITALIST

## 2023-08-10 PROCEDURE — 97530 THERAPEUTIC ACTIVITIES: CPT

## 2023-08-10 PROCEDURE — 25000003 PHARM REV CODE 250: Performed by: HOSPITALIST

## 2023-08-10 PROCEDURE — 25000003 PHARM REV CODE 250: Performed by: INTERNAL MEDICINE

## 2023-08-10 PROCEDURE — 36415 COLL VENOUS BLD VENIPUNCTURE: CPT | Performed by: HOSPITALIST

## 2023-08-10 PROCEDURE — 99233 SBSQ HOSP IP/OBS HIGH 50: CPT | Mod: ,,, | Performed by: INTERNAL MEDICINE

## 2023-08-10 PROCEDURE — 80048 BASIC METABOLIC PNL TOTAL CA: CPT | Performed by: HOSPITALIST

## 2023-08-10 PROCEDURE — 63600175 PHARM REV CODE 636 W HCPCS

## 2023-08-10 PROCEDURE — 94761 N-INVAS EAR/PLS OXIMETRY MLT: CPT

## 2023-08-10 PROCEDURE — 97535 SELF CARE MNGMENT TRAINING: CPT

## 2023-08-10 RX ORDER — MUPIROCIN 20 MG/G
OINTMENT TOPICAL
Status: CANCELLED | OUTPATIENT
Start: 2023-08-10

## 2023-08-10 RX ORDER — OXYCODONE HYDROCHLORIDE 5 MG/1
5 TABLET ORAL EVERY 4 HOURS PRN
Status: DISCONTINUED | OUTPATIENT
Start: 2023-08-10 | End: 2023-08-18

## 2023-08-10 RX ORDER — OXYCODONE HYDROCHLORIDE 10 MG/1
10 TABLET ORAL EVERY 4 HOURS PRN
Status: DISCONTINUED | OUTPATIENT
Start: 2023-08-10 | End: 2023-08-18

## 2023-08-10 RX ORDER — ACETAMINOPHEN 500 MG
1000 TABLET ORAL EVERY 8 HOURS
Status: DISCONTINUED | OUTPATIENT
Start: 2023-08-10 | End: 2023-08-19 | Stop reason: HOSPADM

## 2023-08-10 RX ORDER — METHOCARBAMOL 500 MG/1
500 TABLET, FILM COATED ORAL 4 TIMES DAILY
Status: DISCONTINUED | OUTPATIENT
Start: 2023-08-10 | End: 2023-08-11

## 2023-08-10 RX ORDER — SODIUM CHLORIDE 0.9 % (FLUSH) 0.9 %
10 SYRINGE (ML) INJECTION
Status: CANCELLED | OUTPATIENT
Start: 2023-08-10

## 2023-08-10 RX ORDER — MIDAZOLAM HYDROCHLORIDE 1 MG/ML
.5-4 INJECTION INTRAMUSCULAR; INTRAVENOUS
Status: CANCELLED | OUTPATIENT
Start: 2023-08-10

## 2023-08-10 RX ORDER — FENTANYL CITRATE 50 UG/ML
25-200 INJECTION, SOLUTION INTRAMUSCULAR; INTRAVENOUS
Status: CANCELLED | OUTPATIENT
Start: 2023-08-10

## 2023-08-10 RX ORDER — SULFAMETHOXAZOLE AND TRIMETHOPRIM 800; 160 MG/1; MG/1
1 TABLET ORAL 2 TIMES DAILY
Status: COMPLETED | OUTPATIENT
Start: 2023-08-10 | End: 2023-08-11

## 2023-08-10 RX ADMIN — CEFAZOLIN 2 G: 2 INJECTION, POWDER, FOR SOLUTION INTRAMUSCULAR; INTRAVENOUS at 02:08

## 2023-08-10 RX ADMIN — OXYCODONE HYDROCHLORIDE 10 MG: 10 TABLET ORAL at 02:08

## 2023-08-10 RX ADMIN — SODIUM CHLORIDE: 9 INJECTION, SOLUTION INTRAVENOUS at 03:08

## 2023-08-10 RX ADMIN — ACETAMINOPHEN 1000 MG: 325 TABLET ORAL at 07:08

## 2023-08-10 RX ADMIN — CHOLECALCIFEROL TAB 25 MCG (1000 UNIT) 1000 UNITS: 25 TAB at 08:08

## 2023-08-10 RX ADMIN — OXYCODONE HYDROCHLORIDE 10 MG: 10 TABLET ORAL at 05:08

## 2023-08-10 RX ADMIN — OXYCODONE HYDROCHLORIDE 10 MG: 10 TABLET ORAL at 07:08

## 2023-08-10 RX ADMIN — ESCITALOPRAM OXALATE 10 MG: 5 TABLET, FILM COATED ORAL at 08:08

## 2023-08-10 RX ADMIN — METHOCARBAMOL 500 MG: 500 TABLET ORAL at 07:08

## 2023-08-10 RX ADMIN — ASPIRIN 81 MG 81 MG: 81 TABLET ORAL at 08:08

## 2023-08-10 RX ADMIN — SULFAMETHOXAZOLE AND TRIMETHOPRIM 1 TABLET: 800; 160 TABLET ORAL at 02:08

## 2023-08-10 RX ADMIN — POLYETHYLENE GLYCOL 3350 17 G: 17 POWDER, FOR SOLUTION ORAL at 08:08

## 2023-08-10 RX ADMIN — SULFAMETHOXAZOLE AND TRIMETHOPRIM 1 TABLET: 800; 160 TABLET ORAL at 08:08

## 2023-08-10 RX ADMIN — METHOCARBAMOL 500 MG: 500 TABLET ORAL at 11:08

## 2023-08-10 RX ADMIN — OXYCODONE HYDROCHLORIDE 10 MG: 10 TABLET ORAL at 11:08

## 2023-08-10 RX ADMIN — METHOCARBAMOL 1000 MG: 500 TABLET ORAL at 02:08

## 2023-08-10 NOTE — PLAN OF CARE
Worker's comp information for patient. [facility]  Migdalia Taylor-    [phone]  [phone]    Event# - [id number]  Fax [phone]   [facility] [id number]  [phone]    Referral Unit  [phone] option 4    Renetta Heaton RNCM  Case Management  [facility]  [phone]

## 2023-08-10 NOTE — PLAN OF CARE
Problem: Adult Inpatient Plan of Care  Goal: Plan of Care Review  Outcome: Ongoing, Progressing  Goal: Patient-Specific Goal (Individualized)  Outcome: Ongoing, Progressing  Goal: Absence of Hospital-Acquired Illness or Injury  Outcome: Ongoing, Progressing  Goal: Optimal Comfort and Wellbeing  Outcome: Ongoing, Progressing  Goal: Readiness for Transition of Care  Outcome: Ongoing, Progressing     Problem: Adult Inpatient Plan of Care  Goal: Plan of Care Review  Outcome: Ongoing, Progressing     Problem: Adult Inpatient Plan of Care  Goal: Plan of Care Review  Outcome: Ongoing, Progressing     Problem: Adult Inpatient Plan of Care  Goal: Patient-Specific Goal (Individualized)  Outcome: Ongoing, Progressing     Problem: Adult Inpatient Plan of Care  Goal: Absence of Hospital-Acquired Illness or Injury  Outcome: Ongoing, Progressing     Problem: Adult Inpatient Plan of Care  Goal: Optimal Comfort and Wellbeing  Outcome: Ongoing, Progressing     Problem: Adult Inpatient Plan of Care  Goal: Readiness for Transition of Care  Outcome: Ongoing, Progressing     Problem: Bariatric Environmental Safety  Goal: Safety Maintained with Care  Outcome: Ongoing, Progressing     Problem: Diabetes Comorbidity  Goal: Blood Glucose Level Within Targeted Range  Outcome: Ongoing, Progressing     Remain SR on telemetry monitor. PRN medication effective. For pain  Explained plan of care, verbalized understanding. Educated pt .on new medicine . No injury during shift, Side rails up x 2, call light by bedside.

## 2023-08-10 NOTE — SUBJECTIVE & OBJECTIVE
"Principal Problem:Closed trimalleolar fracture of left ankle with routine healing    Principal Orthopedic Problem: same as above    Interval History: NAEO. VSS. Pain well controlled. LLE iced and elevated.    Review of patient's allergies indicates:   Allergen Reactions    Amoxicillin Hives    Penicillins Hives       Current Facility-Administered Medications   Medication    0.9%  NaCl infusion    acetaminophen tablet 650 mg    amLODIPine tablet 5 mg    aspirin chewable tablet 81 mg    EScitalopram oxalate tablet 10 mg    glucagon (human recombinant) injection 1 mg    glucose chewable tablet 16 g    glucose chewable tablet 24 g    LIDOcaine-EPINEPHrine 1%-1:100,000 injection 20 mL    LIDOcaine-EPINEPHrine 1%-1:100,000 injection 20 mL    lisinopriL tablet 40 mg    LORazepam injection 2 mg    melatonin tablet 6 mg    methocarbamoL tablet 1,000 mg    naloxone 0.4 mg/mL injection 0.02 mg    ondansetron injection 4 mg    oxyCODONE immediate release tablet Tab 10 mg    oxyCODONE-acetaminophen 5-325 mg per tablet 1 tablet    polyethylene glycol packet 17 g    senna-docusate 8.6-50 mg per tablet 2 tablet    simethicone chewable tablet 80 mg    sodium chloride 0.9% flush 10 mL    vitamin D 1000 units tablet 1,000 Units     Objective:     Vital Signs (Most Recent):  Temp: 98.5 °F (36.9 °C) (08/10/23 0507)  Pulse: 85 (08/10/23 0507)  Resp: 14 (08/10/23 0553)  BP: (!) 117/90 (08/10/23 0507)  SpO2: 96 % (08/10/23 0507) Vital Signs (24h Range):  Temp:  [96.4 °F (35.8 °C)-98.5 °F (36.9 °C)] 98.5 °F (36.9 °C)  Pulse:  [78-97] 85  Resp:  [14-20] 14  SpO2:  [87 %-100 %] 96 %  BP: ()/(52-90) 117/90     Weight: 126 kg (277 lb 12.5 oz)  Height: 5' 3" (160 cm)  Body mass index is 49.21 kg/m².      Intake/Output Summary (Last 24 hours) at 8/10/2023 0617  Last data filed at 8/9/2023 1826  Gross per 24 hour   Intake 650 ml   Output 1000 ml   Net -350 ml          Ortho/SPM Exam  LLE:   Ex fix in place, no bleeding from pin sites  Cap " refill <2s  Unable to wiggle toes and no sensation 2/2 popliteal block    RLE:   Pain with ROM of the ankle  TTP over 5th metatarsal base  SILT       Significant Labs: All pertinent labs within the past 24 hours have been reviewed.    Significant Imaging: I have reviewed and interpreted all pertinent imaging results/findings.

## 2023-08-10 NOTE — ASSESSMENT & PLAN NOTE
Kalina Ryan is a 58 y.o. female with left ankle trimalleolar fracture and dislocation and base of 5th MT fx s/p ankle spanning ex fix 8/9/23. We will plan for definitive surgery when soft tissues more amenable. Ankle appears moderately swollen but without significant bruising or fracture blisters at this time.    Pain: MM  NWB LLE, WBAT RLE in boot  Pin site care BID  ASA 81 BID  Will discuss with staff optimal timing for definitive surgery

## 2023-08-10 NOTE — PROGRESS NOTES
[facility]  Orthopedics  Progress Note    Attg Note:  Patient seen and examined.  I agree with the resident's assessment and plan.  Still with some swelling.  Potential surgery Friday although not likely.    Kali Santoyo MD      Patient Name: Kalina Ryan  MRN: [id number]  Admission Date: 8/7/2023  Hospital Length of Stay: 1 days  Attending Provider: Nani Hood MD  Primary Care Provider: Nany Hoff NP  Follow-up For: Procedure(s) (LRB):  APPLICATION, EXTERNAL FIXATION DEVICE left ankle, C-arm clock side, synthes (Left)  CLOSED REDUCTION, FRACTURE, ANKLE, TRIMALLEOLAR (Left)    Post-Operative Day: 1 Day Post-Op  Subjective:     Principal Problem:Closed trimalleolar fracture of left ankle with routine healing    Principal Orthopedic Problem: same as above s/p ex fix 8/9/23    Interval History: NAEO. VSS. Pain well controlled. LLE iced and elevated.    Review of patient's allergies indicates:   Allergen Reactions    Amoxicillin Hives    Penicillins Hives       Current Facility-Administered Medications   Medication    0.9%  NaCl infusion    acetaminophen tablet 650 mg    amLODIPine tablet 5 mg    aspirin chewable tablet 81 mg    EScitalopram oxalate tablet 10 mg    glucagon (human recombinant) injection 1 mg    glucose chewable tablet 16 g    glucose chewable tablet 24 g    LIDOcaine-EPINEPHrine 1%-1:100,000 injection 20 mL    LIDOcaine-EPINEPHrine 1%-1:100,000 injection 20 mL    lisinopriL tablet 40 mg    LORazepam injection 2 mg    melatonin tablet 6 mg    methocarbamoL tablet 1,000 mg    naloxone 0.4 mg/mL injection 0.02 mg    ondansetron injection 4 mg    oxyCODONE immediate release tablet Tab 10 mg    oxyCODONE-acetaminophen 5-325 mg per tablet 1 tablet    polyethylene glycol packet 17 g    senna-docusate 8.6-50 mg per tablet 2 tablet    simethicone chewable tablet 80 mg    sodium chloride 0.9% flush 10 mL    vitamin D 1000 units tablet 1,000 Units     Objective:     Vital Signs (Most  "Recent):  Temp: 98.5 °F (36.9 °C) (08/10/23 0507)  Pulse: 85 (08/10/23 0507)  Resp: 14 (08/10/23 0553)  BP: (!) 117/90 (08/10/23 0507)  SpO2: 96 % (08/10/23 0507) Vital Signs (24h Range):  Temp:  [96.4 °F (35.8 °C)-98.5 °F (36.9 °C)] 98.5 °F (36.9 °C)  Pulse:  [78-97] 85  Resp:  [14-20] 14  SpO2:  [87 %-100 %] 96 %  BP: ()/(52-90) 117/90     Weight: 126 kg (277 lb 12.5 oz)  Height: 5' 3" (160 cm)  Body mass index is 49.21 kg/m².      Intake/Output Summary (Last 24 hours) at 8/10/2023 0617  Last data filed at 8/9/2023 1826  Gross per 24 hour   Intake 650 ml   Output 1000 ml   Net -350 ml         Ortho/SPM Exam  LLE:   Ex fix in place, no bleeding from pin sites  Cap refill <2s  Unable to wiggle toes and no sensation 2/2 popliteal block    RLE:   Pain with ROM of the ankle  TTP over 5th metatarsal base  SILT       Significant Labs: All pertinent labs within the past 24 hours have been reviewed.    Significant Imaging: I have reviewed and interpreted all pertinent imaging results/findings.    Assessment/Plan:     * Closed trimalleolar fracture of left ankle with routine healing s/p ex fix on 8/9/2023  Kalina Ryan is a 58 y.o. female with left ankle trimalleolar fracture and dislocation and base of 5th MT fx s/p ankle spanning ex fix 8/9/23. We will plan for definitive surgery when soft tissues more amenable. Ankle appears moderately swollen but without significant bruising or fracture blisters at this time.    Pain: MM  NWB LLE, WBAT RLE in boot  Pin site care BID  ASA 81 BID  Will discuss with staff optimal timing for definitive surgery              Kali Mckeon MD  Orthopedics  [facility] - Surgery  "

## 2023-08-10 NOTE — PT/OT/SLP EVAL
Physical Therapy Co-Evaluation and Co-Treatment    Patient Name:  Kalina Ryan   MRN:  [id number]    Recent Surgery: Procedure(s) (LRB):  APPLICATION, EXTERNAL FIXATION DEVICE left ankle, C-arm clock side, synthes (Left)  CLOSED REDUCTION, FRACTURE, ANKLE, TRIMALLEOLAR (Left) 1 Day Post-Op    Co-evaluation due to acuity of condition and level of skilled assistance needed for assessment of safety and mobility.   Recommendations:     Discharge Recommendations:  rehabilitation facility   Discharge Equipment Recommendations: to be determined by next level of care   Barriers to discharge: Increased level of assist and Decreased caregiver support    Highest Level of Mobility: Sitting EOB ~ 20 minutes  Assistance Required: SBA    Assessment:     Kalina Ryan is a 58 y.o. female admitted with a medical diagnosis of Closed trimalleolar fracture of left ankle with routine healing. She presents with the following impairments/functional limitations:  weakness, impaired endurance, impaired sensation, orthopedic precautions, impaired functional mobility, decreased lower extremity function, gait instability, pain, impaired balance    Pt met with spouse and agreeable to PT session. Pt reports independent PLOF. PT is currently limited by above listed deficits and now requires min(A) with bed mobility with max(A) for LLE management. Transfers and Ambulation not assessed due to precautions and CAM boot not present at time of assessment.     Pt would benefit from continued skilled acute PT 4x/wk to address above listed functional deficits, provide patient/caregiver education, reduce fall risk, and maximize (I) and safety with functional mobility. After hospital discharge, pt would benefit from rehabilitation facility to maximize rehab potential.    Rehab Prognosis: Good; patient would benefit from acute skilled PT services to address these deficits and reach maximum level of function.      Plan:     During this hospitalization,  "patient to be seen daily to address the identified rehab impairments via gait training, therapeutic activities, therapeutic exercises, neuromuscular re-education and progress toward the following goals:    Plan of Care Expires:  09/10/23    This plan of care has been discussed with the patient/caregiver, who was included in its development and is in agreement with the identified goals and treatment plan.     Subjective     Communicated with RN prior to session.  Patient agreeable to participate.     Chief Complaint: R foot soreness  Patient/Family Comments/goals: I'm not in any pain but I do have some soreness of my R foot.     Pain/Comfort:  Pain Rating 1: 0/10    Patients cultural, spiritual, Adventist conflicts given the current situation: no    Patient's living environment is as follows:  Living Environment: Pt lives with  in a [facility] with 1 [address]. Bathroom set-up: walk-in shower with shower bench.  Prior Level of Function: independent with mobility and ADLs  DME owned (not currently used): walker, rolling, shower chair, commode, cane, quad, cane, straight, wheelchair  Upon discharge, patient will have assistance from:  but states has to work during the day; ~ 5 minutes away.     Objective:     Patient found HOB elevated with telemetry, peripheral IV, external fixator  upon PT entry to room.    General Precautions: Standard, fall   Orthopedic Precautions:LLE non weight bearing, RLE weight bearing as tolerated (RLE WBAT with boot)   Braces:     BP (!) 104/53 (BP Location: Right arm, Patient Position: Lying)   Pulse 92   Temp 96.6 °F (35.9 °C) (Oral)   Resp 17   Ht 5' 3" (1.6 m)   Wt 126 kg (277 lb 12.5 oz)   SpO2 100%   Breastfeeding No   BMI 49.21 kg/m²   Oxygen Device: nasal cannula      Exams:    Cognition:  Patient is oriented to Person, Place, Time, Situation  Follows multistep  commands,    Lower Extremity Range of Motion:  Right Lower Extremity: WFL  Left Lower Extremity:  Limited " assessment of ROM due to external fixator.     Lower Extremity Strength    Right LE  Left LE    Hip Flexion: 3+/5; pt complaint of anterior thigh pain Hip Flexion: Atleast 4- patient able to actively lift LE with ex fixator    Knee Extension: 4-/5; pt complaint of anterior thigh pain Knee Extension: Atleast 4- patient able to actively lift LE with ex fixator    Knee Flexion: 4/5 Knee Flexion: Not assessed   Ankle Dorsiflexion:  At least 3+/5, pt able to actively perform no overpressure Ankle Dorsiflexion: Not Assessed   Ankle Plantarflexion: At least 3+/5, pt able to actively perform no overpressure Ankle Plantarflexion: Not assessed        Sensation:   Light touch sensation: Intact RLE, LLE Impaired sensation likely due to nerve block.     Functional Mobility:    Bed Mobility:  Supine to Sit: Minimal Assistance on R side of bed with max(A) for LLE management.   Sit to Supine: Minimal Assistance with max(A) for LLE management  Scooting anteriorly to EOB to plant feet on floor: Minimal Assistance  Scooting/Bridging in supine to HOB: Minimal Assistance    Transfers and Gait:   Not Assessed due to orthopedic precautions and CAM Boot not present at the time of assessment.     Balance:  Static Sit:   Supervision at EOB x 15 minutes  Normal: Patient able to maintain steady balance without handhold support.  Dynamic sit:  Supervision   Normal: Patient able to maintain steady balance without handhold support.    Therapeutic Activities/Exercises     Patient assisted with functional mobility as noted above  Discussed at length benefits of PT as well as d/c recommendations. Pt agreeable  Pt educated on orthopedic precautions.   Patient educated on the importance of early mobility, OOB to prevent functional decline during hospital stay  Patient was instructed to utilize staff assistance for mobility/transfers.  Patient is not appropriate to transfer with RN/PCT assist  Patient educated on PT POC and role of PT in acute  care  White board updated to include patient's safest level of mobility with staff assistance, RN also updated    AM-PAC 6 CLICK MOBILITY  Turning over in bed (including adjusting bedclothes, sheets and blankets)?: 3  Sitting down on and standing up from a chair with arms (e.g., wheelchair, bedside commode, etc.): 1  Moving from lying on back to sitting on the side of the bed?: 3  Moving to and from a bed to a chair (including a wheelchair)?: 1 (due to precautions.)  Need to walk in hospital room?: 1 (due to precautions)  Climbing 3-5 steps with a railing?: 1 (due to precautions)  Basic Mobility Total Score: 10      Patient left HOB elevated with all lines intact, call button in reach, nurse notified, and spouse present.      History/Goals:     PAST MEDICAL HISTORY:  Past Medical History:   Diagnosis Date    Anxiety     CIDP (chronic inflammatory demyelinating polyneuropathy) 1/1/2009    full paralysis but no intubation; residual weakness and neuropathy to hands and feet - diagnosed by Neurologist back in 2009 and last seen by neurology in 2011    Guillain Barré syndrome 2009    full paralysis but no intubation; residual weakness and neuropathy to hands and feet - diagnosed by Neurologist back in 2009 and last seen by neurology in 2011    Hypertension     New onset type 2 diabetes mellitus 9/4/2019    Personal history of gastric bypass 9/4/2019    Reactive depression 10/19/2021       Past Surgical History:   Procedure Laterality Date    APPLICATION, EXTERNAL FIXATION DEVICE Left 8/9/2023    Procedure: APPLICATION, EXTERNAL FIXATION DEVICE left ankle, C-arm clock side, synthes;  Surgeon: Kali Santoyo MD;  Location: [facility] OR [address] [place] [address];  Service: Orthopedics;  Laterality: Left;    CHOLECYSTECTOMY  2009    CLOSED REDUCTION, FRACTURE, ANKLE, TRIMALLEOLAR Left 8/9/2023    Procedure: CLOSED REDUCTION, FRACTURE, ANKLE, TRIMALLEOLAR;  Surgeon: Kali Santoyo MD;  Location: [facility] OR [address] [place] [address];  Service: Orthopedics;   Laterality: Left;    COLONOSCOPY N/A 7/31/2018    Procedure: COLONOSCOPY;  Surgeon: Elpidio Fortune MD;  Location: [facility];  Service: Endoscopy;  Laterality: N/A;    GASTRIC BYPASS  2009    HYSTERECTOMY  1999       GOALS:   Multidisciplinary Problems       Physical Therapy Goals          Problem: Physical Therapy    Goal Priority Disciplines Outcome Goal Variances Interventions   Physical Therapy Goal     PT, PT/OT Ongoing, Progressing     Description: Goals to be met by 8/24/2023    1. Pt supine to sit with SBA-not met  2. Pt sit to supine with SBA-not met  3. Pt sit to stand with mod(A) with LRAD with NWB on LLE with WBAT on R LE with CAM Boot-not met  4. Pt to perform gait 50 ft with mod(A) with LRAD with NWB on LLE with WBAT on R LE with CAM Boot.-not met  5. Pt to go up/down curb step with mod(A) with LRAD with NWB on LLE with WBAT on R LE with CAM Boot.-not met                         Time Tracking:     PT Received On: 08/10/23  PT Start Time: 1006     PT Stop Time: 1042  PT Total Time (min): 36 min     Billable Minutes: Evaluation 12 and Therapeutic Activity 24      MAZIN Ramirez  08/10/2023

## 2023-08-10 NOTE — PT/OT/SLP EVAL
Occupational Therapy   Evaluation    Name: Kalina Ryan  MRN: [id number]  Admitting Diagnosis: Closed trimalleolar fracture of left ankle with routine healing  Recent Surgery: Procedure(s) (LRB):  APPLICATION, EXTERNAL FIXATION DEVICE left ankle, C-arm clock side, synthes (Left)  CLOSED REDUCTION, FRACTURE, ANKLE, TRIMALLEOLAR (Left) 1 Day Post-Op    Recommendations:     Discharge Recommendations: rehabilitation facility  Discharge Equipment Recommendations:  to be determined by next level of care  Barriers to discharge:  Other (Comment)    Assessment:     Kalina Ryan is a 58 y.o. female with a medical diagnosis of Closed trimalleolar fracture of left ankle with routine healing. Performance deficits affecting function: weakness, impaired endurance, impaired sensation, impaired self care skills, impaired functional mobility, gait instability, impaired balance, decreased lower extremity function, impaired skin, orthopedic precautions, decreased ROM, pain.      Rehab Prognosis: Good; patient would benefit from acute skilled OT services to address these deficits and reach maximum level of function.       Plan:     Patient to be seen 4 x/week to address the above listed problems via self-care/home management, therapeutic activities, neuromuscular re-education, therapeutic exercises  Plan of Care Expires: 09/09/23  Plan of Care Reviewed with: patient, spouse    Subjective     Chief Complaint: None verbalized   Patient/Family Comments/goals: Pt. 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  Patient found HOB elevated with telemetry, peripheral IV, external fixator upon OT entry to room.    General Precautions: Standard, fall  Orthopedic Precautions: LLE non weight bearing, RLE non weight bearing  Braces:  (CAM Boot for RLE per sercure chat 8/10)  Respiratory Status: Room air    Occupational Performance:    Bed Mobility:    Patient completed Scooting/Bridging with minimum assistance  Patient completed Supine to Sit with minimum assistance  Patient completed Sit to Supine with minimum assistance  2/2 LLE management    Functional Mobility/Transfers: deferred 2/2 no CAM boot in room and to maintain WB precautions    Activities of Daily Living:  Grooming: supervision oral care and facial hygiene at EOB   Lower Body Dressing: total assistance don and doff sock at bed level  Grooming: supervision apply deodorant at EOB   Toileting: total assistance Purewick in place     Pt. Sat at EOB ~ 22mins with SBA     Cognitive/Visual Perceptual:  Cognitive/Psychosocial Skills:     -       Oriented to: Person, Place, and Situation   -       Follows Commands/attention:Follows multistep  commands  -       Communication: clear/fluent  -       Memory: No Deficits noted  -       Safety awareness/insight to disability: intact   -       Mood/Affect/Coping skills/emotional control: Appropriate to situation    Physical Exam:  Balance:    Static Sitting:  Ind  Dynamic Sitting: SBA -CGA    Dominant hand:    -       R hand   Upper Extremity Range of Motion:  -       Right Upper Extremity: WFL  -       Left Upper Extremity: WFL  Upper Extremity Strength:    -       Right Upper Extremity: WFL  -       Left Upper Extremity: WFL   Strength:    -       Right Upper Extremity: WFL  -       Left Upper Extremity: WFL    AMPA 6 Click ADL:  [facility]  Total Score: 15    Treatment & Education:  Pt.educated and reviewed WB precautions  Pt educated on role of occupational therapy, POC, and safety during ADLs and functional mobility. Pt and OT discussed importance of safe, continued mobility to optimize daily living skills. Pt verbalized understanding. Pt given instruction to call for medical staff/nurse for assistance.   PT present for coeval due to pt's multiple medical comorbidities and functional/cognition deficits requiring two skilled therapists to appropriately progress pt's musculoskeletal strength, neuromuscular control, and endurance while taking into consideration medical acuity and pt safety.    Patient left HOB elevated with all lines intact, call button in reach, and  present    GOALS:   Multidisciplinary Problems       Occupational Therapy Goals          Problem: Occupational Therapy    Goal Priority Disciplines Outcome Interventions   Occupational Therapy Goal     OT, PT/OT Ongoing, Progressing    Description: Goals to be met by: 8/20/23     Patient will increase functional independence with ADLs by performing:    UE Dressing with Minimal Assistance.  LE Dressing with Minimal Assistance.  Grooming while seated at sink with Supervision.  Toileting from bedside commode with Minimal Assistance for hygiene and clothing management.   Toilet transfer to bedside commode with Minimal Assistance.                         History:     Past Medical History:   Diagnosis Date    Anxiety     CIDP (chronic inflammatory demyelinating polyneuropathy) 1/1/2009    full paralysis but no intubation; residual weakness and neuropathy to hands and feet - diagnosed by Neurologist back in 2009 and last seen by neurology in 2011    Guillain Barré syndrome 2009    full paralysis but no intubation; residual weakness and neuropathy to hands and feet - diagnosed by Neurologist back in 2009 and last seen by neurology in 2011    Hypertension     New onset type 2 diabetes  mellitus 9/4/2019    Personal history of gastric bypass 9/4/2019    Reactive depression 10/19/2021         Past Surgical History:   Procedure Laterality Date    APPLICATION, EXTERNAL FIXATION DEVICE Left 8/9/2023    Procedure: APPLICATION, EXTERNAL FIXATION DEVICE left ankle, C-arm clock side, synthes;  Surgeon: Kali Santoyo MD;  Location: [address];  Service: Orthopedics;  Laterality: Left;    CHOLECYSTECTOMY  2009    CLOSED REDUCTION, FRACTURE, ANKLE, TRIMALLEOLAR Left 8/9/2023    Procedure: CLOSED REDUCTION, FRACTURE, ANKLE, TRIMALLEOLAR;  Surgeon: Kali Santoyo MD;  Location: [address];  Service: Orthopedics;  Laterality: Left;    COLONOSCOPY N/A 7/31/2018    Procedure: COLONOSCOPY;  Surgeon: Elpidio Fortune MD;  Location: [facility];  Service: Endoscopy;  Laterality: N/A;    GASTRIC BYPASS  2009    HYSTERECTOMY  1999       Time Tracking:     OT Date of Treatment: 08/10/23  OT Start Time: 0912  OT Stop Time: 0956  OT Total Time (min): 44 min    Billable Minutes:Evaluation 12  Self Care/Home Management 32    8/10/2023

## 2023-08-10 NOTE — PLAN OF CARE
Pt. Evaluated and goals established   Problem: Occupational Therapy  Goal: Occupational Therapy Goal  Description: Goals to be met by: 8/20/23     Patient will increase functional independence with ADLs by performing:    UE Dressing with Minimal Assistance.  LE Dressing with Minimal Assistance.  Grooming while seated at sink with Supervision.  Toileting from bedside commode with Minimal Assistance for hygiene and clothing management.   Toilet transfer to bedside commode with Minimal Assistance.    Outcome: Ongoing, Progressing

## 2023-08-11 PROBLEM — S82.61XD CLOSED DISPLACED FRACTURE OF LATERAL MALLEOLUS OF RIGHT FIBULA WITH ROUTINE HEALING: Status: ACTIVE | Noted: 2023-08-08

## 2023-08-11 LAB
COPPER SERPL-MCNC: 1041 UG/L (ref 810–1990)
POCT GLUCOSE: 105 MG/DL (ref 70–110)
POCT GLUCOSE: 111 MG/DL (ref 70–110)
POCT GLUCOSE: 117 MG/DL (ref 70–110)
POCT GLUCOSE: 123 MG/DL (ref 70–110)
POCT GLUCOSE: 133 MG/DL (ref 70–110)
ZINC SERPL-MCNC: 58 UG/DL (ref 60–130)

## 2023-08-11 PROCEDURE — 94010 BREATHING CAPACITY TEST: CPT

## 2023-08-11 PROCEDURE — 25000003 PHARM REV CODE 250: Performed by: PHYSICIAN ASSISTANT

## 2023-08-11 PROCEDURE — 63600175 PHARM REV CODE 636 W HCPCS: Performed by: INTERNAL MEDICINE

## 2023-08-11 PROCEDURE — 11000001 HC ACUTE MED/SURG PRIVATE ROOM

## 2023-08-11 PROCEDURE — 25000003 PHARM REV CODE 250: Performed by: INTERNAL MEDICINE

## 2023-08-11 PROCEDURE — 99233 SBSQ HOSP IP/OBS HIGH 50: CPT | Mod: ,,, | Performed by: INTERNAL MEDICINE

## 2023-08-11 PROCEDURE — 97535 SELF CARE MNGMENT TRAINING: CPT

## 2023-08-11 PROCEDURE — 25000003 PHARM REV CODE 250

## 2023-08-11 PROCEDURE — 25000003 PHARM REV CODE 250: Performed by: HOSPITALIST

## 2023-08-11 PROCEDURE — 97116 GAIT TRAINING THERAPY: CPT

## 2023-08-11 PROCEDURE — 99233 PR SUBSEQUENT HOSPITAL CARE,LEVL III: ICD-10-PCS | Mod: ,,, | Performed by: INTERNAL MEDICINE

## 2023-08-11 PROCEDURE — 97530 THERAPEUTIC ACTIVITIES: CPT

## 2023-08-11 RX ORDER — AMOXICILLIN 250 MG
2 CAPSULE ORAL 2 TIMES DAILY
Status: DISCONTINUED | OUTPATIENT
Start: 2023-08-11 | End: 2023-08-19 | Stop reason: HOSPADM

## 2023-08-11 RX ORDER — METHOCARBAMOL 750 MG/1
750 TABLET, FILM COATED ORAL 4 TIMES DAILY
Status: DISCONTINUED | OUTPATIENT
Start: 2023-08-11 | End: 2023-08-18

## 2023-08-11 RX ORDER — METHOCARBAMOL 500 MG/1
500 TABLET, FILM COATED ORAL ONCE
Status: COMPLETED | OUTPATIENT
Start: 2023-08-11 | End: 2023-08-11

## 2023-08-11 RX ORDER — MORPHINE SULFATE 2 MG/ML
2 INJECTION, SOLUTION INTRAMUSCULAR; INTRAVENOUS ONCE
Status: COMPLETED | OUTPATIENT
Start: 2023-08-11 | End: 2023-08-11

## 2023-08-11 RX ORDER — PREGABALIN 75 MG/1
75 CAPSULE ORAL 2 TIMES DAILY
Status: DISCONTINUED | OUTPATIENT
Start: 2023-08-11 | End: 2023-08-19 | Stop reason: HOSPADM

## 2023-08-11 RX ADMIN — METHOCARBAMOL 750 MG: 750 TABLET ORAL at 04:08

## 2023-08-11 RX ADMIN — MORPHINE SULFATE 2 MG: 2 INJECTION, SOLUTION INTRAMUSCULAR; INTRAVENOUS at 03:08

## 2023-08-11 RX ADMIN — ESCITALOPRAM OXALATE 10 MG: 5 TABLET, FILM COATED ORAL at 08:08

## 2023-08-11 RX ADMIN — ACETAMINOPHEN 1000 MG: 325 TABLET ORAL at 09:08

## 2023-08-11 RX ADMIN — METHOCARBAMOL 500 MG: 500 TABLET ORAL at 08:08

## 2023-08-11 RX ADMIN — METHOCARBAMOL 500 MG: 500 TABLET ORAL at 09:08

## 2023-08-11 RX ADMIN — ASPIRIN 81 MG 81 MG: 81 TABLET ORAL at 08:08

## 2023-08-11 RX ADMIN — OXYCODONE HYDROCHLORIDE 10 MG: 10 TABLET ORAL at 12:08

## 2023-08-11 RX ADMIN — AMLODIPINE BESYLATE 5 MG: 5 TABLET ORAL at 08:08

## 2023-08-11 RX ADMIN — OXYCODONE HYDROCHLORIDE 10 MG: 10 TABLET ORAL at 04:08

## 2023-08-11 RX ADMIN — ASPIRIN 81 MG 81 MG: 81 TABLET ORAL at 09:08

## 2023-08-11 RX ADMIN — SULFAMETHOXAZOLE AND TRIMETHOPRIM 1 TABLET: 800; 160 TABLET ORAL at 08:08

## 2023-08-11 RX ADMIN — CHOLECALCIFEROL TAB 25 MCG (1000 UNIT) 1000 UNITS: 25 TAB at 08:08

## 2023-08-11 RX ADMIN — METHOCARBAMOL 750 MG: 750 TABLET ORAL at 09:08

## 2023-08-11 RX ADMIN — SENNOSIDES AND DOCUSATE SODIUM 2 TABLET: 50; 8.6 TABLET ORAL at 08:08

## 2023-08-11 RX ADMIN — SENNOSIDES AND DOCUSATE SODIUM 2 TABLET: 50; 8.6 TABLET ORAL at 09:08

## 2023-08-11 RX ADMIN — OXYCODONE HYDROCHLORIDE 10 MG: 10 TABLET ORAL at 09:08

## 2023-08-11 RX ADMIN — METHOCARBAMOL 750 MG: 750 TABLET ORAL at 12:08

## 2023-08-11 RX ADMIN — ACETAMINOPHEN 1000 MG: 325 TABLET ORAL at 04:08

## 2023-08-11 RX ADMIN — POLYETHYLENE GLYCOL 3350 17 G: 17 POWDER, FOR SOLUTION ORAL at 08:08

## 2023-08-11 RX ADMIN — OXYCODONE HYDROCHLORIDE 10 MG: 10 TABLET ORAL at 08:08

## 2023-08-11 RX ADMIN — PREGABALIN 75 MG: 75 CAPSULE ORAL at 09:08

## 2023-08-11 RX ADMIN — ACETAMINOPHEN 1000 MG: 325 TABLET ORAL at 03:08

## 2023-08-11 NOTE — ASSESSMENT & PLAN NOTE
Chronic and controlled. Continue home Amlodipine to treat. Home Lisinopril on hold. Target BP < 140/90. Monitor vital signs every 4 hours.

## 2023-08-11 NOTE — SUBJECTIVE & OBJECTIVE
Interval History: Patient's ankle still too swollen to undergo definitve fixation surgery today and Ortho to reassess daily to see when can undergo definitve fixation surgery. Patient icing and elevating left leg to help with swelling. Patient still having some significant pain to left ankle. Patient reports pain as a sharp stabbing pain and mainly located at pin sites on ex fix and top of left foot and rates pain is 8/10. Pain medication adjusted. Will increase Robaxin from 500 to 750 mg po 4 times daily. Will add Lyrica 75 mg po BID to help with neuropathic pain. Continue scheduled Tylenol and Oxycodone IR 5-10 mg po every 4 hours prn for pain management for breakthrough pain.     Review of Systems   Constitutional:  Negative for fever.   Respiratory:  Negative for cough and shortness of breath.    Cardiovascular:  Negative for chest pain.   Gastrointestinal:  Negative for abdominal pain, nausea and vomiting.   Musculoskeletal:  Positive for arthralgias (Left ankle; Right ankle and right hip) and myalgias (Left ankle area).   Neurological:  Positive for weakness (Bilateral lower extremities). Negative for dizziness and light-headedness.   Psychiatric/Behavioral:  Negative for agitation and confusion.      Objective:     Vital Signs (Most Recent):  Temp: 97.9 °F (36.6 °C) (08/11/23 1515)  Pulse: 93 (08/11/23 1515)  Resp: 17 (08/11/23 1515)  BP: 106/55 (08/11/23 1515)  SpO2: 98 % (08/11/23 1515) on room air Vital Signs (24h Range):  Temp:  [97.9 °F (36.6 °C)-99 °F (37.2 °C)] 97.9 °F (36.6 °C)  Pulse:  [85-99] 93  Resp:  [17-18] 17  SpO2:  [92 %-100 %] 98 %  BP: (106-131)/(53-81) 106/55     Weight: 126 kg (277 lb 12.5 oz)  Body mass index is 49.21 kg/m².    Intake/Output Summary (Last 24 hours) at 8/11/2023 1646  Last data filed at 8/11/2023 1230  Gross per 24 hour   Intake 810 ml   Output 900 ml   Net -90 ml         Physical Exam  Vitals and nursing note reviewed.   Constitutional:       General: She is awake. She  is not in acute distress.     Appearance: Normal appearance. She is well-developed. She is morbidly obese. She is not ill-appearing.   Cardiovascular:      Rate and Rhythm: Normal rate and regular rhythm.      Heart sounds: Normal heart sounds. No murmur heard.     No friction rub. No gallop.   Pulmonary:      Effort: Pulmonary effort is normal. No respiratory distress.      Breath sounds: Normal breath sounds. No wheezing.   Abdominal:      General: Abdomen is flat. Bowel sounds are normal. There is no distension.      Palpations: Abdomen is soft.      Tenderness: There is no abdominal tenderness.   Musculoskeletal:         General: Swelling (Left ankle) present.      Right lower leg: No edema.      Comments: Ex fix in place to left ankle    Skin:     General: Skin is warm.      Findings: No erythema.   Neurological:      Mental Status: She is alert and oriented to person, place, and time.   Psychiatric:         Mood and Affect: Mood normal.         Behavior: Behavior normal. Behavior is cooperative.         Thought Content: Thought content normal.         Judgment: Judgment normal.             Significant Labs: CBC:   Recent Labs   Lab 08/10/23  0435   WBC 10.37   HGB 10.6*   HCT 34.7*        CMP:   Recent Labs   Lab 08/10/23  0435      K 4.4      CO2 21*   GLU 97   BUN 15   CREATININE 0.9   CALCIUM 8.6*   ANIONGAP 12     Magnesium:   Recent Labs   Lab 08/10/23  0435   MG 2.1       Significant Imaging: I have reviewed all pertinent imaging results/findings within the past 24 hours.

## 2023-08-11 NOTE — PROGRESS NOTES
[facility] Medicine  Progress Note    Patient Name: Kalina Ryan  MRN: [id number]  Patient Class: IP- Inpatient   Admission Date: 8/7/2023  Length of Stay: 1 days  Attending Physician: Claudia Graves MD  Primary Care Provider: Nany Hoff NP        Subjective:     Principal Problem:Closed trimalleolar fracture of left ankle with routine healing        HPI:  Kalina Ryan is a 58 y.o. 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 Patient states she twisted her ankle and fell down 3 step while going down the stairs at work. She had immediate pain and inability to bear weight. She noticed her left deformity and were screaming in pain. He coworker called EMS. Denies head injury or LOC. Denies prior injuries or surgeries to the left ankle. She is not on blood thinners. Denies other MSK pains or paresthesias. She walks without assistive devices at baseline. She lives at home with her .     ED course: afebrile and vitals stable. X-ray showed left ankle trimalleolar fracture and dislocation, and right ankle lateral malleolus fx. Patient seen by orthopedic and L ankle was Reduced and splinted in ER placed in short leg splint. Patient received profopol, fenatnyl and oxycodone in ED during conscious sedation for closed reduction of left ankle fracture.     During my interview patient is awake, conversant and not in distress. She reports pain and spasm in both ankles with right side hurting more than left. She denies tobacco, alcohol or other illicit drug use. She is able to ambulate few blocks at baseline without chest pain or SOB.           Overview/Hospital Course:  Patient admitted with closed left trimalleolar ankle fracture. Ortho consulted and taken to OR on 8/9 and had external fixator placed to left  ankle. Post-op, patient non weight bearing to left lower extremity. Patient to elevate and ce left ankle to help with swelling. Needs improvement in left ankle swelling prior to proceeding with definitive fixation surgery to left ankle. LEs. Neurology consulted for progressive bilateral leg weakness in patient with previous diagnosis of Guillain [place] and CIDP. Neurology noted likely CIDP and recommended to check vitamin levels (B12, folate, copper, zinc, and thiamine). Neurology noted patient with history of CIDP and received steroids and IVIG in past an used to see Dr. Moses but no treatment since 2012. She has self-contained episodes in which she did not seek medical attention. These symptoms are similar. Want to avoid steroids now with planned surgery. Follow-up with neuromuscular Neurologist for possible IVIG as outpt upon discharge. If any change in status, reconsult Neurology. Patient also noted on admit to have closed right ankle lateral malleolus fracture and closed right 5th metatarsal fractures but Ortho states no surgery needed and okay for patient to weight bear as tolerated to right lower extremity and to use tall walking boot to right leg when ambulating.       Interval History: Patient reports 7/10 pain to left ankle this am. Ortho team came by today and state they may try to take patient to OR tomorrow for removal of ex fix and ORIF of left ankle fracture but will reassess swelling to left ankle in am. Patient advised to keep left leg elevated and iced today to help with swelling. Patient states right hip is now hurting and right ankle also. Will monitor. Patient sen by Neurology and vitamin and mineral levels ordered and recommended outpatient follow-up for her CIDP and consideration as outpatient for IVIG. No acute treatment needed in hospital unless her leg weakness worsens. Labs reviewed. Urine culture from admit returned with > 100,000 organism with Klebsiella pneumoniae consistent with UTI.  Will start Bactrim DS 1 tablet po BID to treat for 3 days as Klebsiella sensitive to Bactrim. Patient with normal WBC and no isgns to suggest systemic infection. Hgb stable at 10.6.     Review of Systems   Constitutional:  Negative for fever.   Respiratory:  Negative for cough and shortness of breath.    Cardiovascular:  Negative for chest pain.   Gastrointestinal:  Negative for abdominal pain, nausea and vomiting.   Musculoskeletal:  Positive for arthralgias (Left ankle; Right ankle and right hip) and myalgias (Left ankle area).   Neurological:  Positive for weakness (Bilateral lower extremities). Negative for dizziness and light-headedness.   Psychiatric/Behavioral:  Negative for agitation and confusion.      Objective:     Vital Signs (Most Recent):  Temp: 98.4 °F (36.9 °C) (08/10/23 1554)  Pulse: 86 (08/10/23 1554)  Resp: 18 (08/10/23 1554)  BP: 128/55 (08/10/23 1554)  SpO2: 100 % (08/10/23 1554) on room air Vital Signs (24h Range):  Temp:  [96.4 °F (35.8 °C)-98.9 °F (37.2 °C)] 98.4 °F (36.9 °C)  Pulse:  [78-92] 86  Resp:  [14-18] 18  SpO2:  [95 %-100 %] 100 %  BP: ()/(53-90) 128/55     Weight: 126 kg (277 lb 12.5 oz)  Body mass index is 49.21 kg/m².    Intake/Output Summary (Last 24 hours) at 8/10/2023 1900  Last data filed at 8/10/2023 1830  Gross per 24 hour   Intake 630 ml   Output 950 ml   Net -320 ml         Physical Exam  Vitals and nursing note reviewed.   Constitutional:       General: She is awake. She is not in acute distress.     Appearance: Normal appearance. She is well-developed. She is morbidly obese. She is not ill-appearing.   Eyes:      Conjunctiva/sclera: Conjunctivae normal.   Cardiovascular:      Rate and Rhythm: Normal rate and regular rhythm.      Heart sounds: Normal heart sounds. No murmur heard.     No friction rub. No gallop.   Pulmonary:      Effort: Pulmonary effort is normal. No respiratory distress.      Breath sounds: Normal breath sounds. No wheezing.   Abdominal:       General: Abdomen is flat. Bowel sounds are normal. There is no distension.      Palpations: Abdomen is soft.      Tenderness: There is no abdominal tenderness.   Musculoskeletal:         General: Swelling (Left ankle) present.      Right lower leg: No edema.      Comments: Ex fix in place to left ankle    Skin:     General: Skin is warm.      Findings: No erythema.   Neurological:      Mental Status: She is alert and oriented to person, place, and time.   Psychiatric:         Mood and Affect: Mood normal.         Behavior: Behavior normal. Behavior is cooperative.         Thought Content: Thought content normal.         Judgment: Judgment normal.             Significant Labs: CBC:   Recent Labs   Lab 08/09/23  0323 08/10/23  0435   WBC 8.64 10.37   HGB 10.4* 10.6*   HCT 33.7* 34.7*    279     CMP:   Recent Labs   Lab 08/09/23  0323 08/10/23  0435    142   K 4.7 4.4    109   CO2 22* 21*   GLU 87 97   BUN 16 15   CREATININE 0.8 0.9   CALCIUM 8.7 8.6*   ANIONGAP 7* 12     Magnesium:   Recent Labs   Lab 08/09/23  0323 08/10/23  0435   MG 2.1 2.1     Urine Culture, Routine   Date Value Ref Range Status   08/08/2023 KLEBSIELLA PNEUMONIAE  >100,000 cfu/ml   (A)  Final       Significant Imaging: I have reviewed all pertinent imaging results/findings within the past 24 hours.      Assessment/Plan:      * Closed trimalleolar fracture of left ankle with routine healing s/p ex fix on 8/9/2023  - Patient s/p fall of 3 steps while going down the stairs while at work.   - Imaging showed left ankle trimalleolar fracture and dislocation. Patient seen by Orthopedic surgery and left ankle was reduced and splinted in ER placed in short leg splint.   - Patient taken to OR on 8/9/2023 and had e fix placed to left ankle.  - Routine pin site care post-op to left ankle ex fix.  - Non weight bear to left lower extremity.  - Ice and elevate left leg to help with swelling with plan to proceed with removal of ex fix and  definitive fixation of left ankle fracture once left ankle swelling improved.   - Patient tentatively set for second staged surgery by Ortho on 8/11. NPO after midnight tonight.   - Aspirin 81 mg po BID for DVT prophylaxis.  - Pain management with scheduled Tylenol 1000 mg po TID + Robaxin 500 mg po 4 times daily with Oxycodone IR prn for breakthrough pain.     Urinary tract infection due to Klebsiella pneumoniae  Present on admit. U/A with nitrite positive, > 100 WBC and many bacteria on admit. Urine culture sent. Urine culture returned with > 100,000 organism with Klebsiella pneumoniae. Sensitivities reviewed and sensitive to Bactrim so started on Bactrim DS 1 tablet po BID for 3 days on 8/10 to treat Klebsiella UTI.       Closed dislocation of left ankle  Patient s/p closed reduction by Orthopedic in ED with short leg splint placement.        Closed displaced fracture of lateral malleolus of right fibula  Closed nondisplaced fracture of fifth metatarsal bone of right foot with routine healing  · Noted on admit on X-rays to have non-displaced closed fractures of right lateral malleolus and right 5th metatarsal. Orthopedics consulted and recommended non-operative management and patient okay to weight bear as tolerated to right lower extremity. Patient to ambulate in tall walking boot when out of bed.   · Pain management.   · Aspirin 81 mg po BID for DVT prophylaxis.     CIDP (chronic inflammatory demyelinating polyneuropathy)  Bilateral lower extremity weakness   - Patient with known history of bilateral lower extremity weakness but normally able to ambulate without assistive device.   - Patient reports progressive weakness over past 3 months.   - Neurology consulted. Check vitamin levels. Check heavy metal levels.  - Per Neurology: Patient reports history of CIDP and received steroids and IVIG in past and used to follow with Dr. Moses and no treatment since 2012. She has self-contained episodes in which she did  not seek medical attention. These symptoms are similar. Want to avoid steroids now with planned surgery. Neurology recommends neuromuscular Neurologist for possible IVIG as outpatient upon discharge.  -  If any change in status, reconsult Neurology.    Type 2 diabetes mellitus without complication, without long-term current use of insulin  Patient's FSGs are controlled on current medication regimen. Patient on Mounjaro injections weekly at home to treat.   Last A1c reviewed-   Lab Results   Component Value Date    HGBA1C 5.9 (H) 08/07/2023     Most recent fingerstick glucose reviewed-   Recent Labs   Lab 08/10/23  0607 08/10/23  1242 08/10/23  1557   POCTGLUCOSE 111* 100 122*     Monitor blood sugars with meals and at bedtime in hospital.  Diabetic diet.  Target blood sugars 140-180 in hospital.     Class 3 severe obesity due to excess calories with serious comorbidity and body mass index (BMI) of 45.0 to 49.9 in adult  Personal history of gastric bypass   Body mass index is 49.21 kg/m². Morbid obesity complicates all aspects of disease management from diagnostic modalities to treatment. Weight loss encouraged and health benefits explained to patient.         Benign essential HTN  Chronic and controlled. Continue home Amlodipine and Lisinopril to treat. Target BP < 140/90. Monitor vital signs every 4 hours.       Anxiety  Chronic and controlled. Continue home Lexapro to treat.           VTE Risk Mitigation (From admission, onward)         Ordered     IP VTE HIGH RISK PATIENT  Once         08/08/23 0036     Place sequential compression device  Until discontinued         08/08/23 0036                Discharge Planning   LIVIER: 8/15/2023     Code Status: Full Code   Is the patient medically ready for discharge?: No    Reason for patient still in hospital (select all that apply): Patient trending condition  Discharge Plan A: Rehab once all Ortho surgeries complete         Claudia Graves MD  Department of Hospital  Medicine   [facility]

## 2023-08-11 NOTE — PT/OT/SLP PROGRESS
Physical Therapy Co-Treatment with Occupational Therapy    Patient Name:  Kalina Ryan   MRN:  [id number]    Recent Surgery: Procedure(s) (LRB):  APPLICATION, EXTERNAL FIXATION DEVICE left ankle, C-arm clock side, synthes (Left)  CLOSED REDUCTION, FRACTURE, ANKLE, TRIMALLEOLAR (Left) 2 Days Post-Op    Co-treatment due to acuity of condition and level of skilled assistance needed for assessment of safety and mobility.   Recommendations:     Discharge Recommendations:  rehabilitation facility   Discharge Equipment Recommendations: to be determined by next level of care   Barriers to discharge: Increased level of assist and Decreased caregiver support    Highest Level of Mobility: STS x 2 with NWB on LLE WBAT with CAM boot donned RLE with RW  Assistance Required: Max(A)    Assessment:     Kalina Ryan is a 58 y.o. female admitted with a medical diagnosis of Closed trimalleolar fracture of left ankle with routine healing.    Pt met with spouse and agreeable to PT treatment. Today's PT treatment focus was on gait training and bed mobility to increase level of independence and improve functional mobility.     Pt is progressing towards acute PT goals appropriately and continues to benefit from acute PT sessions. After hospital discharge, pt would benefit from rehabilitation facility to maximize rehab potential.    Rehab Prognosis: Good; patient would benefit from acute skilled PT services to address these deficits and reach maximum level of function.      Plan:     During this hospitalization, patient to be seen 4 x/week to address the identified rehab impairments via gait training, therapeutic activities, therapeutic exercises, neuromuscular re-education and progress toward the following goals:    Plan of Care Expires:  09/10/23    This plan of care has been discussed with the patient/caregiver, who was included in its development and is in agreement with the identified goals and treatment plan.     Subjective      Communicated with RN prior to session.  Patient agreeable to participate.     Pain/Comfort:  Pain Rating 1: 8/10  Location - Side 1: Bilateral  Location 1: foot  Pain Addressed 1: Pre-medicate for activity, Distraction  Pain Rating Post-Intervention 1: 8/10    Chief Complaint: Bilateral Foot Pain    Objective:     Patient found HOB elevated with PureWick, telemetry, external fixator  upon PT entry to room.    General Precautions: Standard, fall   Orthopedic Precautions:LLE non weight bearing, RLE weight bearing as tolerated (RLE WBAT with CAM Boot)   Braces:  (CAM Boot RLE)         Exams:    Cognition:  Patient is oriented to Person, Place, Time, Situation  Follows multistep  commands    Functional Mobility:    Bed Mobility:  Supine to Sit: Minimal Assistance on R side of bed due to LLE management.   Sit to Supine: Minimal Assistance due to LLE management.   Scooting anteriorly to EOB to plant feet on floor: Contact Guard Assistance  Scooting/Bridging in supine to HOB: Minimal Assistance    Transfers:   Sit to Stand x 2, ~ 30 seconds each trial: Maximum Assistance  from bed with RW with NWB LLE and WBAT with RLE with CAM boot donned.              Gait:  Patient received gait training 5 side steps with Maximum Assistance and rolling walker NWB LLE and WBAT with RLE with CAM boot donned. (Pt unable to clear her foot R foot to step)  Gait Assessment: occasional unsteady gait and patient slide her foot/ lateral heel toe steps on the RLE  Comments: All lines remained intact throughout ambulation trial, gait belt utilized    Balance:  Static Sit:   Stand-By Assist at EOB x 5 minutes  Good: Patient able to maintain balance without handhold support, limited postural sway.  Dynamic sit:  Stand-By Assist   Good: Patient accepts moderate challenge;  Static Stand:   Min Assist with Rolling walker  Fair: Patient able to maintain balance with handhold support; may require occasional minimal assistance.  Dynamic Stand:  Mod  Assist with Rolling walker  Fair: Patient accepts minimal challenge;      Therapeutic Activities/Exercises     Patient assisted with functional mobility as noted above  Pt educated on therapeutic expectation and progression.   Patient educated on the importance of early mobility to prevent functional decline during hospital stay  Patient was instructed to utilize staff assistance for mobility/transfers.  Patient is appropriate to transfer with RW and RN/PCT assist  Patient educated on PT POC and role of PT in acute care  White board updated regarding patient's safest level of mobility with staff assistance, RN also updated.     AM-PAC 6 CLICK MOBILITY  Turning over in bed (including adjusting bedclothes, sheets and blankets)?: 2  Sitting down on and standing up from a chair with arms (e.g., wheelchair, bedside commode, etc.): 2  Moving from lying on back to sitting on the side of the bed?: 3  Moving to and from a bed to a chair (including a wheelchair)?: 2  Need to walk in hospital room?: 1  Climbing 3-5 steps with a railing?: 1  Basic Mobility Total Score: 11     Patient left HOB elevated with all lines intact, call button in reach, nurse notified, and spouse present.        History/Goals:     PAST MEDICAL HISTORY:  Past Medical History:   Diagnosis Date    Anxiety     CIDP (chronic inflammatory demyelinating polyneuropathy) 1/1/2009    full paralysis but no intubation; residual weakness and neuropathy to hands and feet - diagnosed by Neurologist back in 2009 and last seen by neurology in 2011    Guillain Barré syndrome 2009    full paralysis but no intubation; residual weakness and neuropathy to hands and feet - diagnosed by Neurologist back in 2009 and last seen by neurology in 2011    Hypertension     New onset type 2 diabetes mellitus 9/4/2019    Personal history of gastric bypass 9/4/2019    Reactive depression 10/19/2021       Past Surgical History:   Procedure Laterality Date    APPLICATION, EXTERNAL  FIXATION DEVICE Left 8/9/2023    Procedure: APPLICATION, EXTERNAL FIXATION DEVICE left ankle, C-arm clock side, synthes;  Surgeon: Kali Santoyo MD;  Location: [facility] OR [address] [place] [address];  Service: Orthopedics;  Laterality: Left;    CHOLECYSTECTOMY  2009    CLOSED REDUCTION, FRACTURE, ANKLE, TRIMALLEOLAR Left 8/9/2023    Procedure: CLOSED REDUCTION, FRACTURE, ANKLE, TRIMALLEOLAR;  Surgeon: Kali Santoyo MD;  [facility];  Service: Orthopedics;  Laterality: Left;    COLONOSCOPY N/A 7/31/2018    Procedure: COLONOSCOPY;  Surgeon: Elpidio Fortune MD;  Location: [facility];  Service: Endoscopy;  Laterality: N/A;    GASTRIC BYPASS  2009    HYSTERECTOMY  1999       GOALS:   Multidisciplinary Problems       Physical Therapy Goals          Problem: Physical Therapy    Goal Priority Disciplines Outcome Goal Variances Interventions   Physical Therapy Goal     PT, PT/OT Ongoing, Progressing     Description: Goals to be met by 8/24/2023    1. Pt supine to sit with SBA-not met  2. Pt sit to supine with SBA-not met  3. Pt sit to stand with mod(A) with LRAD with NWB on LLE with WBAT on R LE with CAM Boot-not met  4. Pt to perform gait 50 ft with mod(A) with LRAD with NWB on LLE with WBAT on R LE with CAM Boot.-not met  5. Pt to go up/down curb step with mod(A) with LRAD with NWB on LLE with WBAT on R LE with CAM Boot.-not met                         Time Tracking:     PT Received On: 08/11/23  PT Start Time: 1139     PT Stop Time: 1207  PT Total Time (min): 28 min     Billable Minutes: Gait Training 15 and Therapeutic Activity 13      MAZIN Ramirez  08/11/2023

## 2023-08-11 NOTE — ASSESSMENT & PLAN NOTE
Patient's FSGs are controlled on current medication regimen. Patient on Mounjaro injections weekly at home to treat.   Last A1c reviewed-   Lab Results   Component Value Date    HGBA1C 5.9 (H) 08/07/2023     Most recent fingerstick glucose reviewed-   Recent Labs   Lab 08/11/23  0104 08/11/23  0626 08/11/23  1213 08/11/23  1611   POCTGLUCOSE 111* 123* 117* 133*     Monitor blood sugars with meals and at bedtime in hospital.  Diabetic diet.  Target blood sugars 140-180 in hospital.

## 2023-08-11 NOTE — ASSESSMENT & PLAN NOTE
- Patient s/p fall of 3 steps while going down the stairs while at work.   - Imaging showed left ankle trimalleolar fracture and dislocation. Patient seen by Orthopedic surgery and left ankle was reduced and splinted in ER placed in short leg splint.   - Patient taken to OR on 8/9/2023 and had e fix placed to left ankle.  - Routine pin site care post-op to left ankle ex fix.  - Non weight bear to left lower extremity.  - Ice and elevate left leg to help with swelling with plan to proceed with removal of ex fix and definitive fixation of left ankle fracture once left ankle swelling improved.   - Patient tentatively set for second staged surgery by Ortho on 8/11. NPO after midnight tonight.   - Aspirin 81 mg po BID for DVT prophylaxis.  - Pain management with scheduled Tylenol 1000 mg po TID + Robaxin 500 mg po 4 times daily with Oxycodone IR prn for breakthrough pain.

## 2023-08-11 NOTE — ASSESSMENT & PLAN NOTE
Personal history of gastric bypass   Body mass index is 49.21 kg/m². Morbid obesity complicates all aspects of disease management from diagnostic modalities to treatment. Weight loss encouraged and health benefits explained to patient.

## 2023-08-11 NOTE — ASSESSMENT & PLAN NOTE
Present on admit. U/A with nitrite positive, > 100 WBC and many bacteria on admit. Urine culture sent. Urine culture returned with > 100,000 organism with Klebsiella pneumoniae. Sensitivities reviewed and sensitive to Bactrim so started on Bactrim DS 1 tablet po BID for 3 days on 8/10 to treat Klebsiella UTI.

## 2023-08-11 NOTE — PLAN OF CARE
Problem: Adult Inpatient Plan of Care  Goal: Plan of Care Review  Outcome: Ongoing, Progressing  Goal: Optimal Comfort and Wellbeing  Outcome: Ongoing, Progressing     Problem: Bariatric Environmental Safety  Goal: Safety Maintained with Care  Outcome: Ongoing, Progressing     Problem: Skin Injury Risk Increased  Goal: Skin Health and Integrity  Outcome: Ongoing, Progressing

## 2023-08-11 NOTE — PROGRESS NOTES
[facility] Medicine  Progress Note    Patient Name: Kalina Ryan  MRN: [id number]  Patient Class: IP- Inpatient   Admission Date: 8/7/2023  Length of Stay: 2 days  Attending Physician: Claudia Graves MD  Primary Care Provider: Nany Hoff NP        Subjective:     Principal Problem:Closed trimalleolar fracture of left ankle with routine healing        HPI:  Kalina Ryan is a 58 y.o. 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 Patient states she twisted her ankle and fell down 3 step while going down the stairs at work. She had immediate pain and inability to bear weight. She noticed her left deformity and were screaming in pain. He coworker called EMS. Denies head injury or LOC. Denies prior injuries or surgeries to the left ankle. She is not on blood thinners. Denies other MSK pains or paresthesias. She walks without assistive devices at baseline. She lives at home with her .     ED course: afebrile and vitals stable. X-ray showed left ankle trimalleolar fracture and dislocation, and right ankle lateral malleolus fx. Patient seen by orthopedic and L ankle was Reduced and splinted in ER placed in short leg splint. Patient received profopol, fenatnyl and oxycodone in ED during conscious sedation for closed reduction of left ankle fracture.     During my interview patient is awake, conversant and not in distress. She reports pain and spasm in both ankles with right side hurting more than left. She denies tobacco, alcohol or other illicit drug use. She is able to ambulate few blocks at baseline without chest pain or SOB.           Overview/Hospital Course:  Patient admitted with closed left trimalleolar ankle fracture. Ortho consulted and taken to OR on 8/9 and had external fixator placed to left  ankle. Post-op, patient non weight bearing to left lower extremity. Patient to elevate and ce left ankle to help with swelling. Needs improvement in left ankle swelling prior to proceeding with definitive fixation surgery to left ankle. Patient to remain in hospital until definitive fixation surgery. Neurology consulted for progressive bilateral leg weakness in patient with previous diagnosis of Guillain Keene and CIDP. Neurology noted likely CIDP and recommended to check vitamin levels (B12, folate, copper, zinc, and thiamine). Neurology noted patient with history of CIDP and received steroids and IVIG in past an used to see Dr. Moses but no treatment since 2012. She has self-contained episodes in which she did not seek medical attention. These symptoms are similar. Want to avoid steroids now with planned surgery. Follow-up with neuromuscular Neurologist for possible IVIG as outpatient upon discharge. If any change in status, reconsult Neurology. Patient also noted on admit to have closed right ankle lateral malleolus fracture and closed right 5th metatarsal fractures but Ortho states no surgery needed and okay for patient to weight bear as tolerated to right lower extremity and to use tall walking boot to right leg when ambulating.       Interval History: Patient's ankle still too swollen to undergo definitve fixation surgery today and Ortho to reassess daily to see when can undergo definitve fixation surgery. Patient icing and elevating left leg to help with swelling. Patient still having some significant pain to left ankle. Patient reports pain as a sharp stabbing pain and mainly located at pin sites on ex fix and top of left foot and rates pain is 8/10. Pain medication adjusted. Will increase Robaxin from 500 to 750 mg po 4 times daily. Will add Lyrica 75 mg po BID to help with neuropathic pain. Continue scheduled Tylenol and Oxycodone IR 5-10 mg po every 4 hours prn for pain management for breakthrough pain.      Review of Systems   Constitutional:  Negative for fever.   Respiratory:  Negative for cough and shortness of breath.    Cardiovascular:  Negative for chest pain.   Gastrointestinal:  Negative for abdominal pain, nausea and vomiting.   Musculoskeletal:  Positive for arthralgias (Left ankle; Right ankle and right hip) and myalgias (Left ankle area).   Neurological:  Positive for weakness (Bilateral lower extremities). Negative for dizziness and light-headedness.   Psychiatric/Behavioral:  Negative for agitation and confusion.      Objective:     Vital Signs (Most Recent):  Temp: 97.9 °F (36.6 °C) (08/11/23 1515)  Pulse: 93 (08/11/23 1515)  Resp: 17 (08/11/23 1515)  BP: 106/55 (08/11/23 1515)  SpO2: 98 % (08/11/23 1515) on room air Vital Signs (24h Range):  Temp:  [97.9 °F (36.6 °C)-99 °F (37.2 °C)] 97.9 °F (36.6 °C)  Pulse:  [85-99] 93  Resp:  [17-18] 17  SpO2:  [92 %-100 %] 98 %  BP: (106-131)/(53-81) 106/55     Weight: 126 kg (277 lb 12.5 oz)  Body mass index is 49.21 kg/m².    Intake/Output Summary (Last 24 hours) at 8/11/2023 1646  Last data filed at 8/11/2023 1230  Gross per 24 hour   Intake 810 ml   Output 900 ml   Net -90 ml         Physical Exam  Vitals and nursing note reviewed.   Constitutional:       General: She is awake. She is not in acute distress.     Appearance: Normal appearance. She is well-developed. She is morbidly obese. She is not ill-appearing.   Cardiovascular:      Rate and Rhythm: Normal rate and regular rhythm.      Heart sounds: Normal heart sounds. No murmur heard.     No friction rub. No gallop.   Pulmonary:      Effort: Pulmonary effort is normal. No respiratory distress.      Breath sounds: Normal breath sounds. No wheezing.   Abdominal:      General: Abdomen is flat. Bowel sounds are normal. There is no distension.      Palpations: Abdomen is soft.      Tenderness: There is no abdominal tenderness.   Musculoskeletal:         General: Swelling (Left ankle) present.      Right lower  leg: No edema.      Comments: Ex fix in place to left ankle    Skin:     General: Skin is warm.      Findings: No erythema.   Neurological:      Mental Status: She is alert and oriented to person, place, and time.   Psychiatric:         Mood and Affect: Mood normal.         Behavior: Behavior normal. Behavior is cooperative.         Thought Content: Thought content normal.         Judgment: Judgment normal.             Significant Labs: CBC:   Recent Labs   Lab 08/10/23  0435   WBC 10.37   HGB 10.6*   HCT 34.7*        CMP:   Recent Labs   Lab 08/10/23  0435      K 4.4      CO2 21*   GLU 97   BUN 15   CREATININE 0.9   CALCIUM 8.6*   ANIONGAP 12     Magnesium:   Recent Labs   Lab 08/10/23  0435   MG 2.1       Significant Imaging: I have reviewed all pertinent imaging results/findings within the past 24 hours.      Assessment/Plan:      * Closed trimalleolar fracture of left ankle with routine healing s/p ex fix on 8/9/2023  - Patient s/p fall of 3 steps while going down the stairs while at work.   - Imaging showed left ankle trimalleolar fracture and dislocation. Patient seen by Orthopedic surgery and left ankle was reduced and splinted in ER placed in short leg splint.   - Patient taken to OR on 8/9/2023 and had ex fix placed to left ankle.  - Routine pin site care post-op to left ankle ex fix.  - Non weight bear to left lower extremity.  - Ice and elevate left leg to help with swelling with plan to proceed with removal of ex fix and definitive fixation of left ankle fracture once left ankle swelling improved. Patient to remain in hospital until next surgery.   - PT/OT consulted and working with patient in hospital.   - Continue Aspirin 81 mg po BID for DVT prophylaxis.  - Adjust pain management and continue scheduled Tylenol 1000 mg po TID but increase from Robaxin 500 to 750 mg po 4 times daily and add Lyrica 75 mg po BID on 8/11 and continue with Oxycodone IR prn for breakthrough pain.      Urinary tract infection due to Klebsiella pneumoniae  Present on admit. U/A with nitrite positive, > 100 WBC and many bacteria on admit. Urine culture sent. Urine culture returned with > 100,000 organism with Klebsiella pneumoniae. Sensitivities reviewed and sensitive to Bactrim so started on Bactrim DS 1 tablet po BID for 3 days on 8/10 to treat Klebsiella UTI.       Closed dislocation of left ankle  Patient s/p closed reduction by Orthopedic in ED with short leg splint placement.        Closed displaced fracture of lateral malleolus of right fibula with routine healing  Closed nondisplaced fracture of fifth metatarsal bone of right foot with routine healing  · Noted on admit on X-rays to have non-displaced closed fractures of right lateral malleolus and right 5th metatarsal. Orthopedics consulted and recommended non-operative management and patient okay to weight bear as tolerated to right lower extremity. Patient to ambulate in tall walking boot when out of bed.   · Pain management.   · Aspirin 81 mg po BID for DVT prophylaxis.     CIDP (chronic inflammatory demyelinating polyneuropathy)  Bilateral lower extremity weakness   - Patient with known history of bilateral lower extremity weakness but normally able to ambulate without assistive device.   - Patient reports progressive weakness over past 3 months.   - Neurology consulted. Check vitamin levels. Check heavy metal levels.  - Per Neurology: Patient reports history of CIDP and received steroids and IVIG in past and used to follow with Dr. Moses and no treatment since 2012. She has self-contained episodes in which she did not seek medical attention. These symptoms are similar. Want to avoid steroids now with planned surgery. Neurology recommends neuromuscular Neurologist for possible IVIG as outpatient upon discharge.  -  If any change in status, reconsult Neurology.    Type 2 diabetes mellitus without complication, without long-term current use of  insulin  Patient's FSGs are controlled on current medication regimen. Patient on Mounjaro injections weekly at home to treat.   Last A1c reviewed-   Lab Results   Component Value Date    HGBA1C 5.9 (H) 08/07/2023     Most recent fingerstick glucose reviewed-   Recent Labs   Lab 08/11/23  0104 08/11/23  0626 08/11/23  1213 08/11/23  1611   POCTGLUCOSE 111* 123* 117* 133*     Monitor blood sugars with meals and at bedtime in hospital.  Diabetic diet.  Target blood sugars 140-180 in hospital.     Class 3 severe obesity due to excess calories with serious comorbidity and body mass index (BMI) of 45.0 to 49.9 in adult  Personal history of gastric bypass   Body mass index is 49.21 kg/m². Morbid obesity complicates all aspects of disease management from diagnostic modalities to treatment. Weight loss encouraged and health benefits explained to patient.         Benign essential HTN  Chronic and controlled. Continue home Amlodipine to treat. Home Lisinopril on hold. Target BP < 140/90. Monitor vital signs every 4 hours.       Anxiety  Chronic and controlled. Continue home Lexapro to treat.           VTE Risk Mitigation (From admission, onward)         Ordered     IP VTE HIGH RISK PATIENT  Once         08/08/23 0036     Place sequential compression device  Until discontinued         08/08/23 0036                Discharge Planning   LIVIER: 8/16/2023     Code Status: Full Code   Is the patient medically ready for discharge?: No    Reason for patient still in hospital (select all that apply): Patient trending condition  Discharge Plan A: Rehab          Claudia Graves MD  Department of Hospital Medicine   [facility] - Surgery

## 2023-08-11 NOTE — ASSESSMENT & PLAN NOTE
Chronic and controlled. Continue home Amlodipine and Lisinopril to treat. Target BP < 140/90. Monitor vital signs every 4 hours.

## 2023-08-11 NOTE — ASSESSMENT & PLAN NOTE
Closed nondisplaced fracture of fifth metatarsal bone of right foot with routine healing  · Noted on admit on X-rays to have non-displaced closed fractures of right lateral malleolus and right 5th metatarsal. Orthopedics consulted and recommended non-operative management and patient okay to weight bear as tolerated to right lower extremity. Patient to ambulate in tall walking boot when out of bed.   · Pain management.   · Aspirin 81 mg po BID for DVT prophylaxis.

## 2023-08-11 NOTE — PT/OT/SLP PROGRESS
Occupational Therapy Co-Treatment  Co-treat with PT due to medical complexity of pt and to optimize functional performance.      Patient Name:  Kalina Ryan   MRN:  [id number]  Admit Date: 8/7/2023  Admitting Diagnosis:  Closed trimalleolar fracture of left ankle with routine healing   Length of Stay: 2 days  Recent Surgery: Procedure(s) (LRB):  APPLICATION, EXTERNAL FIXATION DEVICE left ankle, C-arm clock side, synthes (Left)  CLOSED REDUCTION, FRACTURE, ANKLE, TRIMALLEOLAR (Left) 2 Days Post-Op    Recommendations:     Discharge Recommendations: rehabilitation facility  Discharge Equipment Recommendations:  to be determined by next level of care  Barriers to discharge:  Other (Comment) (Increased skilled assistance required)    Plan:     Patient to be seen 4 x/week to address the above listed problems via self-care/home management, therapeutic activities, therapeutic exercises, neuromuscular re-education  Plan of Care Expires: 09/09/23  Plan of Care Reviewed with: patient, spouse    Assessment:   Kalina Ryan is a 58 y.o. female with a medical diagnosis of Closed trimalleolar fracture of left ankle with routine healing.  She presents with the following performance deficits affecting function: weakness, impaired endurance, impaired self care skills, impaired functional mobility, gait instability, impaired balance, pain, decreased safety awareness, decreased lower extremity function, impaired coordination, decreased coordination, decreased ROM, orthopedic precautions, edema, impaired skin.      Pt tolerated session fairly this date and was willing to participate. Demonstrating continued increased pain, impaired BLE function, increased edema, impaired balance, and decreased safety awareness, requiring increased time and assistance to complete functional tasks. Patient tolerated x 2 stand trials this date and right lateral shuffle step trial using RW, increased cueing for posture and technique required. Patient  "is progressing towards established goals, and continues to benefit from acute skilled OT services to increase functional performance and improve quality of life. OT to continue to recommend Rehab at discharge to improve pt functional independence and increase patient safety before returning home.      Rehab Prognosis: Fair; patient would benefit from acute skilled OT services to address these deficits and reach maximum level of function.        Subjective   Communicated with: Nurse prior to session.  Patient found HOB elevated with PureWick, peripheral IV, external fixator, telemetry, SCD upon OT entry to room. Pt agreeable to participate at this time.    Patient: " It just hurts "    Pain/Comfort:  Pain Rating 1: 9/10  Location - Side 1: Bilateral  Location - Orientation 1: lower  Location 1: leg  Pain Addressed 1: Distraction, Cessation of Activity, Pre-medicate for activity, Reposition  Pain Rating Post-Intervention 1: 8/10    Objective:   Patient found with: PureWick, peripheral IV, external fixator, telemetry, SCD   General Precautions: Standard, Cardiac fall   Orthopedic Precautions:RLE weight bearing as tolerated, LLE non weight bearing (RLE CAM  Boot)   Braces:  (RLE CAM boot)   Oxygen Device: Room Air  Vitals: BP (!) 125/55 (BP Location: Right arm, Patient Position: Lying)   Pulse 87   Temp 98.3 °F (36.8 °C) (Oral)   Resp 17   Ht 5' 3" (1.6 m)   Wt 126 kg (277 lb 12.5 oz)   SpO2 96%   Breastfeeding No   BMI 49.21 kg/m²     Outcome Measures:  [facility] 6 Click ADL: 16    Cognition:   Alert and Cooperative  Command following: follows one-step commands  Communication: clear/fluent    Occupational Performance:  Bed Mobility:    Patient completed Rolling/Turning to Right with minimum assistance; assist LLE  Patient completed Supine to Sit with minimum assistance on R side of bed; assist LLE  Scooting anteriorly to EOB to have both feet planted on floor: minimum assistance  Patient completed Sit to Supine " with minimum assistance on R side of bed; assist LLE    Functional Mobility/Transfers:  Static Sitting EOB: SBA  Dynamic Sitting EOB: SBA  Patient completed Sit <> Stand Transfer from EOB with maximal assistance and of 2 persons  with  rolling walker x 2 trials, posterior lean, increased cueing required  Static Standing Balance: Min A, posterior lean and FF posture  Dynamic Standing Balance: Max A  Patient completed right lateral shuffle step trial x 4 with Max A x 2 using RW    Activities of Daily Living:  Grooming: stand by assistance to perform oral care and wash face seated EOB  Upper Body Dressing: moderate assistance to don/doff gown as robe  Lower Body Dressing: maximal assistance to don gown    AMPAC 6 Click ADL:  [facility] Total Score: 16    Treatment & Education:  -Pt and spouse education on OT role and POC.  -Importance of E/OOB activity with staff assistance, emphasis on daily participation  -Safety during functional transfer and mobility ensured  -Patient provided with education on importance of Bilateral UB/LB integration during functional tasks for improvement in functional performance.   -Education provided/reviewed, questions answered within OT scope of practice.   -Patient demonstrates understanding and learning this date.     Nurse notified of patient bed malfunction, leg portion not elevating    Patient left HOB elevated with all lines intact, call button in reach, nurse notified, and spouse present    GOALS:   Multidisciplinary Problems       Occupational Therapy Goals          Problem: Occupational Therapy    Goal Priority Disciplines Outcome Interventions   Occupational Therapy Goal     OT, PT/OT Ongoing, Progressing    Description: Goals to be met by: 8/20/23     Patient will increase functional independence with ADLs by performing:    UE Dressing with Minimal Assistance.  LE Dressing with Minimal Assistance.  Grooming while seated at sink with Supervision.  Toileting from bedside commode with  Minimal Assistance for hygiene and clothing management.   Toilet transfer to bedside commode with Minimal Assistance.                         Time Tracking:     OT Date of Treatment: 08/11/23  OT Start Time: 1139  OT Stop Time: 1206  OT Total Time (min): 27 min    Billable Minutes:Self Care/Home Management 14  Therapeutic Activity 13      8/11/2023

## 2023-08-11 NOTE — ASSESSMENT & PLAN NOTE
- Patient s/p fall of 3 steps while going down the stairs while at work.   - Imaging showed left ankle trimalleolar fracture and dislocation. Patient seen by Orthopedic surgery and left ankle was reduced and splinted in ER placed in short leg splint.   - Patient taken to OR on 8/9/2023 and had ex fix placed to left ankle.  - Routine pin site care post-op to left ankle ex fix.  - Non weight bear to left lower extremity.  - Ice and elevate left leg to help with swelling with plan to proceed with removal of ex fix and definitive fixation of left ankle fracture once left ankle swelling improved. Patient to remain in hospital until next surgery.   - PT/OT consulted and working with patient in hospital.   - Continue Aspirin 81 mg po BID for DVT prophylaxis.  - Adjust pain management and continue scheduled Tylenol 1000 mg po TID but increase from Robaxin 500 to 750 mg po 4 times daily and add Lyrica 75 mg po BID on 8/11 and continue with Oxycodone IR prn for breakthrough pain.

## 2023-08-11 NOTE — SUBJECTIVE & OBJECTIVE
Interval History: Patient reports 7/10 pain to left ankle this am. Ortho team came by today and state they may try to take patient to OR tomorrow for removal of ex fix and ORIF of left ankle fracture but will reassess swelling to left ankle in am. Patient advised to keep left leg elevated and iced today to help with swelling. Patient states right hip is now hurting and right ankle also. Will monitor. Patient sen by Neurology and vitamin and mineral levels ordered and recommended outpatient follow-up for her CIDP and consideration as outpatient for IVIG. No acute treatment needed in hospital unless her leg weakness worsens. Labs reviewed. Urine culture from admit returned with > 100,000 organism with Klebsiella pneumoniae consistent with UTI. Will start Bactrim DS 1 tablet po BID to treat for 3 days as Klebsiella sensitive to Bactrim. Patient with normal WBC and no isgns to suggest systemic infection. Hgb stable at 10.6.     Review of Systems   Constitutional:  Negative for fever.   Respiratory:  Negative for cough and shortness of breath.    Cardiovascular:  Negative for chest pain.   Gastrointestinal:  Negative for abdominal pain, nausea and vomiting.   Musculoskeletal:  Positive for arthralgias (Left ankle; Right ankle and right hip) and myalgias (Left ankle area).   Neurological:  Positive for weakness (Bilateral lower extremities). Negative for dizziness and light-headedness.   Psychiatric/Behavioral:  Negative for agitation and confusion.      Objective:     Vital Signs (Most Recent):  Temp: 98.4 °F (36.9 °C) (08/10/23 1554)  Pulse: 86 (08/10/23 1554)  Resp: 18 (08/10/23 1554)  BP: 128/55 (08/10/23 1554)  SpO2: 100 % (08/10/23 1554) on room air Vital Signs (24h Range):  Temp:  [96.4 °F (35.8 °C)-98.9 °F (37.2 °C)] 98.4 °F (36.9 °C)  Pulse:  [78-92] 86  Resp:  [14-18] 18  SpO2:  [95 %-100 %] 100 %  BP: ()/(53-90) 128/55     Weight: 126 kg (277 lb 12.5 oz)  Body mass index is 49.21 kg/m².    Intake/Output  Summary (Last 24 hours) at 8/10/2023 1900  Last data filed at 8/10/2023 1830  Gross per 24 hour   Intake 630 ml   Output 950 ml   Net -320 ml         Physical Exam  Vitals and nursing note reviewed.   Constitutional:       General: She is awake. She is not in acute distress.     Appearance: Normal appearance. She is well-developed. She is morbidly obese. She is not ill-appearing.   Eyes:      Conjunctiva/sclera: Conjunctivae normal.   Cardiovascular:      Rate and Rhythm: Normal rate and regular rhythm.      Heart sounds: Normal heart sounds. No murmur heard.     No friction rub. No gallop.   Pulmonary:      Effort: Pulmonary effort is normal. No respiratory distress.      Breath sounds: Normal breath sounds. No wheezing.   Abdominal:      General: Abdomen is flat. Bowel sounds are normal. There is no distension.      Palpations: Abdomen is soft.      Tenderness: There is no abdominal tenderness.   Musculoskeletal:         General: Swelling (Left ankle) present.      Right lower leg: No edema.      Comments: Ex fix in place to left ankle    Skin:     General: Skin is warm.      Findings: No erythema.   Neurological:      Mental Status: She is alert and oriented to person, place, and time.   Psychiatric:         Mood and Affect: Mood normal.         Behavior: Behavior normal. Behavior is cooperative.         Thought Content: Thought content normal.         Judgment: Judgment normal.             Significant Labs: CBC:   Recent Labs   Lab 08/09/23 0323 08/10/23  0435   WBC 8.64 10.37   HGB 10.4* 10.6*   HCT 33.7* 34.7*    279     CMP:   Recent Labs   Lab 08/09/23 0323 08/10/23  0435    142   K 4.7 4.4    109   CO2 22* 21*   GLU 87 97   BUN 16 15   CREATININE 0.8 0.9   CALCIUM 8.7 8.6*   ANIONGAP 7* 12     Magnesium:   Recent Labs   Lab 08/09/23  0323 08/10/23  0435   MG 2.1 2.1     Urine Culture, Routine   Date Value Ref Range Status   08/08/2023 KLEBSIELLA PNEUMONIAE  >100,000 cfu/ml   (A)  Final        Significant Imaging: I have reviewed all pertinent imaging results/findings within the past 24 hours.

## 2023-08-11 NOTE — SUBJECTIVE & OBJECTIVE
"Principal Problem:Closed trimalleolar fracture of left ankle with routine healing    Principal Orthopedic Problem: same as above    Interval History: NAEO. VSS. Pain well controlled. Her ankle is more swollen this morning. Surgery postponed.    Review of patient's allergies indicates:   Allergen Reactions    Amoxicillin Hives    Penicillins Hives       Current Facility-Administered Medications   Medication    acetaminophen tablet 1,000 mg    amLODIPine tablet 5 mg    aspirin chewable tablet 81 mg    EScitalopram oxalate tablet 10 mg    glucagon (human recombinant) injection 1 mg    glucose chewable tablet 16 g    glucose chewable tablet 24 g    lisinopriL tablet 40 mg    melatonin tablet 6 mg    methocarbamoL tablet 750 mg    naloxone 0.4 mg/mL injection 0.02 mg    ondansetron injection 4 mg    oxyCODONE immediate release tablet 5 mg    oxyCODONE immediate release tablet Tab 10 mg    polyethylene glycol packet 17 g    senna-docusate 8.6-50 mg per tablet 2 tablet    simethicone chewable tablet 80 mg    sodium chloride 0.9% flush 10 mL    vitamin D 1000 units tablet 1,000 Units     Objective:     Vital Signs (Most Recent):  Temp: 98.3 °F (36.8 °C) (08/11/23 1229)  Pulse: 87 (08/11/23 1229)  Resp: 17 (08/11/23 1229)  BP: (!) 125/55 (08/11/23 1229)  SpO2: 96 % (08/11/23 1229) Vital Signs (24h Range):  Temp:  [97.9 °F (36.6 °C)-99 °F (37.2 °C)] 98.3 °F (36.8 °C)  Pulse:  [85-99] 87  Resp:  [17-18] 17  SpO2:  [92 %-100 %] 96 %  BP: (114-131)/(53-81) 125/55     Weight: 126 kg (277 lb 12.5 oz)  Height: 5' 3" (160 cm)  Body mass index is 49.21 kg/m².      Intake/Output Summary (Last 24 hours) at 8/11/2023 1348  Last data filed at 8/11/2023 0616  Gross per 24 hour   Intake 560 ml   Output 450 ml   Net 110 ml          Ortho/SPM Exam  LLE:   Ex fix in place, no bleeding from pin sites  Cap refill <2s  Able to wiggle toes  SILT    RLE:   Pain with ROM of the ankle  TTP over 5th metatarsal base  SILT       Significant Labs: All " pertinent labs within the past 24 hours have been reviewed.    Significant Imaging: I have reviewed and interpreted all pertinent imaging results/findings.

## 2023-08-11 NOTE — ASSESSMENT & PLAN NOTE
Patient's FSGs are controlled on current medication regimen. Patient on Mounjaro injections weekly at home to treat.   Last A1c reviewed-   Lab Results   Component Value Date    HGBA1C 5.9 (H) 08/07/2023     Most recent fingerstick glucose reviewed-   Recent Labs   Lab 08/10/23  0607 08/10/23  1242 08/10/23  1557   POCTGLUCOSE 111* 100 122*     Monitor blood sugars with meals and at bedtime in hospital.  Diabetic diet.  Target blood sugars 140-180 in hospital.

## 2023-08-11 NOTE — ASSESSMENT & PLAN NOTE
Bilateral lower extremity weakness   - Patient with known history of bilateral lower extremity weakness but normally able to ambulate without assistive device.   - Patient reports progressive weakness over past 3 months.   - Neurology consulted. Check vitamin levels. Check heavy metal levels.  - Per Neurology: Patient reports history of CIDP and received steroids and IVIG in past and used to follow with Dr. Moses and no treatment since 2012. She has self-contained episodes in which she did not seek medical attention. These symptoms are similar. Want to avoid steroids now with planned surgery. Neurology recommends neuromuscular Neurologist for possible IVIG as outpatient upon discharge.  -  If any change in status, reconsult Neurology.

## 2023-08-11 NOTE — PROGRESS NOTES
[facility]  Orthopedics  Progress Note    Patient Name: Kalina Ryan  MRN: [id number]  Admission Date: 8/7/2023  Hospital Length of Stay: 2 days  Attending Provider: Claudia Graves MD  Primary Care Provider: Nany Hoff NP  Follow-up For: Procedure(s) (LRB):  APPLICATION, EXTERNAL FIXATION DEVICE left ankle, C-arm clock side, synthes (Left)  CLOSED REDUCTION, FRACTURE, ANKLE, TRIMALLEOLAR (Left)    Post-Operative Day: 2 Days Post-Op  Subjective:     Principal Problem:Closed trimalleolar fracture of left ankle with routine healing    Principal Orthopedic Problem: same as above    Interval History: NAEO. VSS. Pain well controlled. Her ankle is more swollen this morning. Surgery postponed.    Review of patient's allergies indicates:   Allergen Reactions    Amoxicillin Hives    Penicillins Hives       Current Facility-Administered Medications   Medication    acetaminophen tablet 1,000 mg    amLODIPine tablet 5 mg    aspirin chewable tablet 81 mg    EScitalopram oxalate tablet 10 mg    glucagon (human recombinant) injection 1 mg    glucose chewable tablet 16 g    glucose chewable tablet 24 g    lisinopriL tablet 40 mg    melatonin tablet 6 mg    methocarbamoL tablet 750 mg    naloxone 0.4 mg/mL injection 0.02 mg    ondansetron injection 4 mg    oxyCODONE immediate release tablet 5 mg    oxyCODONE immediate release tablet Tab 10 mg    polyethylene glycol packet 17 g    senna-docusate 8.6-50 mg per tablet 2 tablet    simethicone chewable tablet 80 mg    sodium chloride 0.9% flush 10 mL    vitamin D 1000 units tablet 1,000 Units     Objective:     Vital Signs (Most Recent):  Temp: 98.3 °F (36.8 °C) (08/11/23 1229)  Pulse: 87 (08/11/23 1229)  Resp: 17 (08/11/23 1229)  BP: (!) 125/55 (08/11/23 1229)  SpO2: 96 % (08/11/23 1229) Vital Signs (24h Range):  Temp:  [97.9 °F (36.6 °C)-99 °F (37.2 °C)] 98.3 °F (36.8 °C)  Pulse:  [85-99] 87  Resp:  [17-18] 17  SpO2:  [92 %-100 %] 96 %  BP:  "(114-131)/(53-81) 125/55     Weight: 126 kg (277 lb 12.5 oz)  Height: 5' 3" (160 cm)  Body mass index is 49.21 kg/m².      Intake/Output Summary (Last 24 hours) at 8/11/2023 1348  Last data filed at 8/11/2023 0616  Gross per 24 hour   Intake 560 ml   Output 450 ml   Net 110 ml         Ortho/SPM Exam  LLE:   Ex fix in place, no bleeding from pin sites  Cap refill <2s  Able to wiggle toes  SILT    RLE:   Pain with ROM of the ankle  TTP over 5th metatarsal base  SILT       Significant Labs: All pertinent labs within the past 24 hours have been reviewed.    Significant Imaging: I have reviewed and interpreted all pertinent imaging results/findings.    Assessment/Plan:     * Closed trimalleolar fracture of left ankle with routine healing s/p ex fix on 8/9/2023  Kalina Ryan is a 58 y.o. female with left ankle trimalleolar fracture and dislocation and base of 5th MT fx s/p ankle spanning ex fix 8/9/23. We will plan for definitive surgery when soft tissues more amenable. Ankle appears moderately swollen but without significant bruising or fracture blisters at this time.    Pain: MM  NWB LLE, WBAT RLE in boot  Pin site care BID  ASA 81 BID  Will discuss with staff optimal timing for definitive surgery              Kali Mckeon MD  Orthopedics  [facility] - Surgery  "

## 2023-08-12 LAB
METHYLMALONATE SERPL-SCNC: 0.19 UMOL/L
POCT GLUCOSE: 107 MG/DL (ref 70–110)
POCT GLUCOSE: 131 MG/DL (ref 70–110)

## 2023-08-12 PROCEDURE — 25000003 PHARM REV CODE 250: Performed by: INTERNAL MEDICINE

## 2023-08-12 PROCEDURE — 99233 SBSQ HOSP IP/OBS HIGH 50: CPT | Mod: ,,, | Performed by: INTERNAL MEDICINE

## 2023-08-12 PROCEDURE — 25000003 PHARM REV CODE 250: Performed by: HOSPITALIST

## 2023-08-12 PROCEDURE — 99233 PR SUBSEQUENT HOSPITAL CARE,LEVL III: ICD-10-PCS | Mod: ,,, | Performed by: INTERNAL MEDICINE

## 2023-08-12 PROCEDURE — 94761 N-INVAS EAR/PLS OXIMETRY MLT: CPT

## 2023-08-12 PROCEDURE — 25000003 PHARM REV CODE 250: Performed by: PHYSICIAN ASSISTANT

## 2023-08-12 PROCEDURE — 97530 THERAPEUTIC ACTIVITIES: CPT | Mod: CQ

## 2023-08-12 PROCEDURE — 94010 BREATHING CAPACITY TEST: CPT

## 2023-08-12 PROCEDURE — 25000003 PHARM REV CODE 250

## 2023-08-12 PROCEDURE — 94150 VITAL CAPACITY TEST: CPT

## 2023-08-12 PROCEDURE — 11000001 HC ACUTE MED/SURG PRIVATE ROOM

## 2023-08-12 RX ADMIN — ACETAMINOPHEN 1000 MG: 325 TABLET ORAL at 05:08

## 2023-08-12 RX ADMIN — ACETAMINOPHEN 1000 MG: 325 TABLET ORAL at 09:08

## 2023-08-12 RX ADMIN — AMLODIPINE BESYLATE 5 MG: 5 TABLET ORAL at 08:08

## 2023-08-12 RX ADMIN — ACETAMINOPHEN 1000 MG: 325 TABLET ORAL at 03:08

## 2023-08-12 RX ADMIN — ESCITALOPRAM OXALATE 10 MG: 5 TABLET, FILM COATED ORAL at 08:08

## 2023-08-12 RX ADMIN — POLYETHYLENE GLYCOL 3350 17 G: 17 POWDER, FOR SOLUTION ORAL at 09:08

## 2023-08-12 RX ADMIN — CHOLECALCIFEROL TAB 25 MCG (1000 UNIT) 1000 UNITS: 25 TAB at 08:08

## 2023-08-12 RX ADMIN — PREGABALIN 75 MG: 75 CAPSULE ORAL at 09:08

## 2023-08-12 RX ADMIN — METHOCARBAMOL 750 MG: 750 TABLET ORAL at 05:08

## 2023-08-12 RX ADMIN — OXYCODONE HYDROCHLORIDE 10 MG: 10 TABLET ORAL at 10:08

## 2023-08-12 RX ADMIN — OXYCODONE HYDROCHLORIDE 10 MG: 10 TABLET ORAL at 09:08

## 2023-08-12 RX ADMIN — OXYCODONE HYDROCHLORIDE 10 MG: 10 TABLET ORAL at 05:08

## 2023-08-12 RX ADMIN — ASPIRIN 81 MG 81 MG: 81 TABLET ORAL at 08:08

## 2023-08-12 RX ADMIN — METHOCARBAMOL 750 MG: 750 TABLET ORAL at 08:08

## 2023-08-12 RX ADMIN — SENNOSIDES AND DOCUSATE SODIUM 2 TABLET: 50; 8.6 TABLET ORAL at 08:08

## 2023-08-12 RX ADMIN — METHOCARBAMOL 750 MG: 750 TABLET ORAL at 12:08

## 2023-08-12 RX ADMIN — OXYCODONE HYDROCHLORIDE 10 MG: 10 TABLET ORAL at 03:08

## 2023-08-12 RX ADMIN — SENNOSIDES AND DOCUSATE SODIUM 2 TABLET: 50; 8.6 TABLET ORAL at 09:08

## 2023-08-12 RX ADMIN — METHOCARBAMOL 750 MG: 750 TABLET ORAL at 09:08

## 2023-08-12 RX ADMIN — Medication 6 MG: at 09:08

## 2023-08-12 RX ADMIN — ASPIRIN 81 MG 81 MG: 81 TABLET ORAL at 10:08

## 2023-08-12 RX ADMIN — PREGABALIN 75 MG: 75 CAPSULE ORAL at 08:08

## 2023-08-12 NOTE — PROGRESS NOTES
[facility]  Orthopedics  Progress Note    Patient Name: Kalina Ryan  MRN: [id number]  Admission Date: 8/7/2023  Hospital Length of Stay: 3 days  Attending Provider: Claudia Graves MD  Primary Care Provider: Nany Hoff NP  Follow-up For: Procedure(s) (LRB):  APPLICATION, EXTERNAL FIXATION DEVICE left ankle, C-arm clock side, synthes (Left)  CLOSED REDUCTION, FRACTURE, ANKLE, TRIMALLEOLAR (Left)    Post-Operative Day: 3 Days Post-Op  Subjective:     Principal Problem:Closed trimalleolar fracture of left ankle with routine healing    Principal Orthopedic Problem: As above    Interval History:  Pt seen and examined at bedside. NAEON. VSS. No new symptoms. Denies fevers or chill. Surgery pending improved swelling      Review of patient's allergies indicates:   Allergen Reactions    Amoxicillin Hives    Penicillins Hives       Current Facility-Administered Medications   Medication    acetaminophen tablet 1,000 mg    amLODIPine tablet 5 mg    aspirin chewable tablet 81 mg    EScitalopram oxalate tablet 10 mg    glucagon (human recombinant) injection 1 mg    glucose chewable tablet 16 g    glucose chewable tablet 24 g    melatonin tablet 6 mg    methocarbamoL tablet 750 mg    naloxone 0.4 mg/mL injection 0.02 mg    ondansetron injection 4 mg    oxyCODONE immediate release tablet 5 mg    oxyCODONE immediate release tablet Tab 10 mg    polyethylene glycol packet 17 g    pregabalin capsule 75 mg    senna-docusate 8.6-50 mg per tablet 2 tablet    simethicone chewable tablet 80 mg    sodium chloride 0.9% flush 10 mL    vitamin D 1000 units tablet 1,000 Units     Objective:     Vital Signs (Most Recent):  Temp: 96.7 °F (35.9 °C) (08/12/23 0524)  Pulse: 88 (08/12/23 0524)  Resp: 17 (08/12/23 0532)  BP: (!) 141/89 (08/12/23 0524)  SpO2: 96 % (08/12/23 0524) Vital Signs (24h Range):  Temp:  [96.7 °F (35.9 °C)-98.3 °F (36.8 °C)] 96.7 °F (35.9 °C)  Pulse:  [86-93] 88  Resp:  [16-18]  "17  SpO2:  [94 %-98 %] 96 %  BP: (106-141)/(55-89) 141/89     Weight: 126 kg (277 lb 12.5 oz)  Height: 5' 3" (160 cm)  Body mass index is 49.21 kg/m².      Intake/Output Summary (Last 24 hours) at 8/12/2023 0733  Last data filed at 8/12/2023 0423  Gross per 24 hour   Intake 1100 ml   Output 950 ml   Net 150 ml        Ortho/SPM Exam      Ortho/SPM Exam  LLE:   Ex fix in place, no bleeding from pin sites  Cap refill <2s  Able to wiggle toes  SILT     RLE:   Pain with ROM of the ankle  TTP over 5th metatarsal base  SILT     Significant Labs: All pertinent labs within the past 24 hours have been reviewed.    Significant Imaging: I have reviewed all pertinent imaging results/findings.    Assessment/Plan:     * Closed trimalleolar fracture of left ankle with routine healing s/p ex fix on 8/9/2023  Kalina Ryan is a 58 y.o. female with left ankle trimalleolar fracture and dislocation and base of 5th MT fx s/p ankle spanning ex fix 8/9/23. We will plan for definitive surgery when soft tissues more amenable. Ankle appears moderately swollen but without significant bruising or fracture blisters at this time.    Pain: MM  NWB LLE, WBAT RLE in boot  Pin site care BID  ASA 81 BID  Definitive surgery pending improved swelling              Byron Bland MD  Orthopedics  [facility] - Surgery  "

## 2023-08-12 NOTE — PLAN OF CARE
Pt resting in bed. AAOx4. VSS, c/o moderate pain throughout shift. PRN pain med administered, see MAR. External fixator in place. Neurovasc intact. Pin site care performed, pt tolerated well. LLE elevated using pillow support. Ice packs rotated on LLE throughout shift. Purewick in place. Safety maintained. Report prepared for oncoming nurse, plan of care continues.     Problem: Adult Inpatient Plan of Care  Goal: Plan of Care Review  Outcome: Ongoing, Progressing  Goal: Patient-Specific Goal (Individualized)  Outcome: Ongoing, Progressing  Goal: Absence of Hospital-Acquired Illness or Injury  Outcome: Ongoing, Progressing  Goal: Optimal Comfort and Wellbeing  Outcome: Ongoing, Progressing     Problem: Bariatric Environmental Safety  Goal: Safety Maintained with Care  Outcome: Ongoing, Progressing     Problem: Diabetes Comorbidity  Goal: Blood Glucose Level Within Targeted Range  Outcome: Ongoing, Progressing     Problem: Skin Injury Risk Increased  Goal: Skin Health and Integrity  Outcome: Ongoing, Progressing

## 2023-08-12 NOTE — PROGRESS NOTES
[facility] Medicine  Progress Note    Patient Name: Kalina Ryan  MRN: [id number]  Patient Class: IP- Inpatient   Admission Date: 8/7/2023  Length of Stay: 3 days  Attending Physician: Claudia Graves MD  Primary Care Provider: Nany Hoff NP        Subjective:     Principal Problem:Closed trimalleolar fracture of left ankle with routine healing        HPI:  Kalina Ryan is a 58 y.o. 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 Patient states she twisted her ankle and fell down 3 step while going down the stairs at work. She had immediate pain and inability to bear weight. She noticed her left deformity and were screaming in pain. He coworker called EMS. Denies head injury or LOC. Denies prior injuries or surgeries to the left ankle. She is not on blood thinners. Denies other MSK pains or paresthesias. She walks without assistive devices at baseline. She lives at home with her .     ED course: afebrile and vitals stable. X-ray showed left ankle trimalleolar fracture and dislocation, and right ankle lateral malleolus fx. Patient seen by orthopedic and L ankle was Reduced and splinted in ER placed in short leg splint. Patient received profopol, fenatnyl and oxycodone in ED during conscious sedation for closed reduction of left ankle fracture.     During my interview patient is awake, conversant and not in distress. She reports pain and spasm in both ankles with right side hurting more than left. She denies tobacco, alcohol or other illicit drug use. She is able to ambulate few blocks at baseline without chest pain or SOB.           Overview/Hospital Course:  Patient admitted with closed left trimalleolar ankle fracture. Ortho consulted and taken to OR on 8/9 and had external fixator placed to left  ankle. Post-op, patient non weight bearing to left lower extremity. Patient to elevate and ce left ankle to help with swelling. Needs improvement in left ankle swelling prior to proceeding with definitive fixation surgery to left ankle. Patient to remain in hospital until definitive fixation surgery. Neurology consulted for progressive bilateral leg weakness in patient with previous diagnosis of Guillain [place] and CIDP. Neurology noted likely CIDP and recommended to check vitamin levels (B12, folate, copper, zinc, and thiamine). Neurology noted patient with history of CIDP and received steroids and IVIG in past an used to see Dr. Moses but no treatment since 2012. She has self-contained episodes in which she did not seek medical attention. These symptoms are similar. Want to avoid steroids now with planned surgery. Follow-up with neuromuscular Neurologist for possible IVIG as outpatient upon discharge. If any change in status, reconsult Neurology. Patient also noted on admit to have closed right ankle lateral malleolus fracture and closed right 5th metatarsal fractures but Ortho states no surgery needed and okay for patient to weight bear as tolerated to right lower extremity and to use tall walking boot to right leg when ambulating.       Interval History: Left ankle swelling and pain better today. Patient in much better spirits today and family at bedside and laughing and talking to family. Patient has been elevating and icing her left ankle. Patient having some swelling to right ankle and foot from fractures on that side so told patient to ice and elevate her right leg also. No labs today and will recheck labs tomorrow. Vital signs stable. Patient reports 4/10 pain to left ankle and 5/10 pain to right ankle and foot.     Review of Systems   Constitutional:  Negative for fever.   Respiratory:  Negative for cough and shortness of breath.    Cardiovascular:  Negative for chest pain.   Gastrointestinal:  Negative for  abdominal pain, nausea and vomiting.   Musculoskeletal:  Positive for arthralgias (Left ankle; Right ankle and right hip) and myalgias (Left ankle area).   Neurological:  Positive for weakness (Bilateral lower extremities). Negative for dizziness and light-headedness.   Psychiatric/Behavioral:  Negative for agitation and confusion.      Objective:     Vital Signs (Most Recent):  Temp: 97.4 °F (36.3 °C) (08/12/23 1221)  Pulse: 87 (08/12/23 1221)  Resp: 18 (08/12/23 1221)  BP: 149/81 (08/12/23 1221)  SpO2: 100 % (08/12/23 1221) on room air Vital Signs (24h Range):  Temp:  [96.7 °F (35.9 °C)-98.1 °F (36.7 °C)] 97.4 °F (36.3 °C)  Pulse:  [84-88] 87  Resp:  [16-18] 18  SpO2:  [94 %-100 %] 100 %  BP: (117-149)/(58-89) 149/81     Weight: 126 kg (277 lb 12.5 oz)  Body mass index is 49.21 kg/m².    Intake/Output Summary (Last 24 hours) at 8/12/2023 1516  Last data filed at 8/12/2023 1243  Gross per 24 hour   Intake 1330 ml   Output 1400 ml   Net -70 ml         Physical Exam  Vitals and nursing note reviewed.   Constitutional:       General: She is awake. She is not in acute distress.     Appearance: Normal appearance. She is well-developed. She is morbidly obese. She is not ill-appearing.   Cardiovascular:      Rate and Rhythm: Normal rate and regular rhythm.      Heart sounds: Normal heart sounds. No murmur heard.     No friction rub. No gallop.   Pulmonary:      Effort: Pulmonary effort is normal. No respiratory distress.      Breath sounds: Normal breath sounds. No wheezing.   Abdominal:      General: Abdomen is flat. Bowel sounds are normal. There is no distension.      Palpations: Abdomen is soft.      Tenderness: There is no abdominal tenderness.   Musculoskeletal:         General: Swelling (Left ankle and right ankle and foot) present.      Comments: Ex fix in place to left ankle    Skin:     General: Skin is warm.      Findings: Bruising (Right lateral ankle and lateral aspect of right foot) present. No erythema.    Neurological:      Mental Status: She is alert and oriented to person, place, and time.   Psychiatric:         Mood and Affect: Mood normal.         Behavior: Behavior normal. Behavior is cooperative.         Thought Content: Thought content normal.         Judgment: Judgment normal.             Significant Labs: All pertinent labs within the past 24 hours have been reviewed.    Significant Imaging: I have reviewed all pertinent imaging results/findings within the past 24 hours.      Assessment/Plan:      * Closed trimalleolar fracture of left ankle with routine healing s/p ex fix on 8/9/2023  - Patient s/p fall of 3 steps while going down the stairs while at work.   - Imaging showed left ankle trimalleolar fracture and dislocation. Patient seen by Orthopedic surgery and left ankle was reduced and splinted in ER placed in short leg splint.   - Patient taken to OR on 8/9/2023 and had ex fix placed to left ankle.  - Routine pin site care post-op to left ankle ex fix.  - Non weight bear to left lower extremity.  - Ice and elevate left leg to help with swelling with plan to proceed with removal of ex fix and definitive fixation of left ankle fracture once left ankle swelling improved. Patient to remain in hospital until next surgery.   - PT/OT consulted and working with patient in hospital.   - Continue Aspirin 81 mg po BID for DVT prophylaxis.  - Pain improved. Continue scheduled Tylenol 1000 mg po TID + Robaxin 750 mg po 4 times daily and Lyrica 75 mg po BID and continue with Oxycodone IR prn for breakthrough pain.     Urinary tract infection due to Klebsiella pneumoniae  Resolved. Patient completed treatment with Bactrim on 8/12.   Present on admit. U/A with nitrite positive, > 100 WBC and many bacteria on admit. Urine culture sent. Urine culture returned with > 100,000 organism with Klebsiella pneumoniae. Sensitivities reviewed and sensitive to Bactrim so started on Bactrim DS 1 tablet po BID for 3 days on 8/10 to  treat Klebsiella UTI.       Closed dislocation of left ankle  Patient s/p closed reduction by Orthopedic in ED with short leg splint placement.        Closed displaced fracture of lateral malleolus of right fibula with routine healing  Closed nondisplaced fracture of fifth metatarsal bone of right foot with routine healing  · Noted on admit on X-rays to have non-displaced closed fractures of right lateral malleolus and right 5th metatarsal. Orthopedics consulted and recommended non-operative management and patient okay to weight bear as tolerated to right lower extremity. Patient to ambulate in tall walking boot when out of bed.   · Pain management.   · Aspirin 81 mg po BID for DVT prophylaxis.     CIDP (chronic inflammatory demyelinating polyneuropathy)  Bilateral lower extremity weakness   - Patient with known history of bilateral lower extremity weakness but normally able to ambulate without assistive device.   - Patient reports progressive weakness over past 3 months.   - Neurology consulted. Check vitamin levels. Check heavy metal levels.  - Per Neurology: Patient reports history of CIDP and received steroids and IVIG in past and used to follow with Dr. Moses and no treatment since 2012. She has self-contained episodes in which she did not seek medical attention. These symptoms are similar. Want to avoid steroids now with planned surgery. Neurology recommends neuromuscular Neurologist for possible IVIG as outpatient upon discharge.  -  If any change in status, reconsult Neurology.    Type 2 diabetes mellitus without complication, without long-term current use of insulin  Patient's FSGs are controlled on current medication regimen. Patient on Mounjaro injections weekly at home to treat.   Last A1c reviewed-   Lab Results   Component Value Date    HGBA1C 5.9 (H) 08/07/2023     Most recent fingerstick glucose reviewed-   Recent Labs   Lab 08/11/23  1611 08/11/23  2109 08/12/23  0629 08/12/23  1341   POCTGLUCOSE  133* 105 107 131*     Monitor blood sugars with meals and at bedtime in hospital.  Diabetic diet.  Target blood sugars 140-180 in hospital.     Class 3 severe obesity due to excess calories with serious comorbidity and body mass index (BMI) of 45.0 to 49.9 in adult  Personal history of gastric bypass   Body mass index is 49.21 kg/m². Morbid obesity complicates all aspects of disease management from diagnostic modalities to treatment. Weight loss encouraged and health benefits explained to patient.         Benign essential HTN  Chronic and controlled. Continue home Amlodipine to treat. Home Lisinopril on hold. Target BP < 140/90. Monitor vital signs every 4 hours.       Anxiety  Chronic and controlled. Continue home Lexapro to treat.         VTE Risk Mitigation (From admission, onward)         Ordered     IP VTE HIGH RISK PATIENT  Once         08/08/23 0036     Place sequential compression device  Until discontinued         08/08/23 0036                Discharge Planning   LIVIER: 8/16/2023     Code Status: Full Code   Is the patient medically ready for discharge?: No    Reason for patient still in hospital (select all that apply): Patient trending condition  Discharge Plan A: Rehab            Claudia Graves MD  Department of Hospital Medicine   [facility] - Surgery

## 2023-08-12 NOTE — ASSESSMENT & PLAN NOTE
Resolved. Patient completed treatment with Bactrim on 8/12.   Present on admit. U/A with nitrite positive, > 100 WBC and many bacteria on admit. Urine culture sent. Urine culture returned with > 100,000 organism with Klebsiella pneumoniae. Sensitivities reviewed and sensitive to Bactrim so started on Bactrim DS 1 tablet po BID for 3 days on 8/10 to treat Klebsiella UTI.

## 2023-08-12 NOTE — ASSESSMENT & PLAN NOTE
Patient's FSGs are controlled on current medication regimen. Patient on Mounjaro injections weekly at home to treat.   Last A1c reviewed-   Lab Results   Component Value Date    HGBA1C 5.9 (H) 08/07/2023     Most recent fingerstick glucose reviewed-   Recent Labs   Lab 08/11/23  1611 08/11/23  2109 08/12/23  0629 08/12/23  1341   POCTGLUCOSE 133* 105 107 131*     Monitor blood sugars with meals and at bedtime in hospital.  Diabetic diet.  Target blood sugars 140-180 in hospital.

## 2023-08-12 NOTE — ASSESSMENT & PLAN NOTE
Kalina Ryan is a 58 y.o. female with left ankle trimalleolar fracture and dislocation and base of 5th MT fx s/p ankle spanning ex fix 8/9/23. We will plan for definitive surgery when soft tissues more amenable. Ankle appears moderately swollen but without significant bruising or fracture blisters at this time.    Pain: MM  NWB LLE, WBAT RLE in boot  Pin site care BID  ASA 81 BID  Definitive surgery pending improved swelling

## 2023-08-12 NOTE — SUBJECTIVE & OBJECTIVE
Interval History: Left ankle swelling and pain better today. Patient in much better spirits today and family at bedside and laughing and talking to family. Patient has been elevating and icing her left ankle. Patient having some swelling to right ankle and foot from fractures on that side so told patient to ice and elevate her right leg also. No labs today and will recheck labs tomorrow. Vital signs stable. Patient reports 4/10 pain to left ankle and 5/10 pain to right ankle and foot.     Review of Systems   Constitutional:  Negative for fever.   Respiratory:  Negative for cough and shortness of breath.    Cardiovascular:  Negative for chest pain.   Gastrointestinal:  Negative for abdominal pain, nausea and vomiting.   Musculoskeletal:  Positive for arthralgias (Left ankle; Right ankle and right hip) and myalgias (Left ankle area).   Neurological:  Positive for weakness (Bilateral lower extremities). Negative for dizziness and light-headedness.   Psychiatric/Behavioral:  Negative for agitation and confusion.      Objective:     Vital Signs (Most Recent):  Temp: 97.4 °F (36.3 °C) (08/12/23 1221)  Pulse: 87 (08/12/23 1221)  Resp: 18 (08/12/23 1221)  BP: 149/81 (08/12/23 1221)  SpO2: 100 % (08/12/23 1221) on room air Vital Signs (24h Range):  Temp:  [96.7 °F (35.9 °C)-98.1 °F (36.7 °C)] 97.4 °F (36.3 °C)  Pulse:  [84-88] 87  Resp:  [16-18] 18  SpO2:  [94 %-100 %] 100 %  BP: (117-149)/(58-89) 149/81     Weight: 126 kg (277 lb 12.5 oz)  Body mass index is 49.21 kg/m².    Intake/Output Summary (Last 24 hours) at 8/12/2023 1516  Last data filed at 8/12/2023 1243  Gross per 24 hour   Intake 1330 ml   Output 1400 ml   Net -70 ml         Physical Exam  Vitals and nursing note reviewed.   Constitutional:       General: She is awake. She is not in acute distress.     Appearance: Normal appearance. She is well-developed. She is morbidly obese. She is not ill-appearing.   Cardiovascular:      Rate and Rhythm: Normal rate and  regular rhythm.      Heart sounds: Normal heart sounds. No murmur heard.     No friction rub. No gallop.   Pulmonary:      Effort: Pulmonary effort is normal. No respiratory distress.      Breath sounds: Normal breath sounds. No wheezing.   Abdominal:      General: Abdomen is flat. Bowel sounds are normal. There is no distension.      Palpations: Abdomen is soft.      Tenderness: There is no abdominal tenderness.   Musculoskeletal:         General: Swelling (Left ankle and right ankle and foot) present.      Comments: Ex fix in place to left ankle    Skin:     General: Skin is warm.      Findings: Bruising (Right lateral ankle and lateral aspect of right foot) present. No erythema.   Neurological:      Mental Status: She is alert and oriented to person, place, and time.   Psychiatric:         Mood and Affect: Mood normal.         Behavior: Behavior normal. Behavior is cooperative.         Thought Content: Thought content normal.         Judgment: Judgment normal.             Significant Labs: All pertinent labs within the past 24 hours have been reviewed.    Significant Imaging: I have reviewed all pertinent imaging results/findings within the past 24 hours.

## 2023-08-12 NOTE — SUBJECTIVE & OBJECTIVE
"Principal Problem:Closed trimalleolar fracture of left ankle with routine healing    Principal Orthopedic Problem: As above    Interval History:  Pt seen and examined at bedside. NAEON. VSS. No new symptoms. Denies fevers or chill. Surgery pending improved swelling      Review of patient's allergies indicates:   Allergen Reactions    Amoxicillin Hives    Penicillins Hives       Current Facility-Administered Medications   Medication    acetaminophen tablet 1,000 mg    amLODIPine tablet 5 mg    aspirin chewable tablet 81 mg    EScitalopram oxalate tablet 10 mg    glucagon (human recombinant) injection 1 mg    glucose chewable tablet 16 g    glucose chewable tablet 24 g    melatonin tablet 6 mg    methocarbamoL tablet 750 mg    naloxone 0.4 mg/mL injection 0.02 mg    ondansetron injection 4 mg    oxyCODONE immediate release tablet 5 mg    oxyCODONE immediate release tablet Tab 10 mg    polyethylene glycol packet 17 g    pregabalin capsule 75 mg    senna-docusate 8.6-50 mg per tablet 2 tablet    simethicone chewable tablet 80 mg    sodium chloride 0.9% flush 10 mL    vitamin D 1000 units tablet 1,000 Units     Objective:     Vital Signs (Most Recent):  Temp: 96.7 °F (35.9 °C) (08/12/23 0524)  Pulse: 88 (08/12/23 0524)  Resp: 17 (08/12/23 0532)  BP: (!) 141/89 (08/12/23 0524)  SpO2: 96 % (08/12/23 0524) Vital Signs (24h Range):  Temp:  [96.7 °F (35.9 °C)-98.3 °F (36.8 °C)] 96.7 °F (35.9 °C)  Pulse:  [86-93] 88  Resp:  [16-18] 17  SpO2:  [94 %-98 %] 96 %  BP: (106-141)/(55-89) 141/89     Weight: 126 kg (277 lb 12.5 oz)  Height: 5' 3" (160 cm)  Body mass index is 49.21 kg/m².      Intake/Output Summary (Last 24 hours) at 8/12/2023 2953  Last data filed at 8/12/2023 0423  Gross per 24 hour   Intake 1100 ml   Output 950 ml   Net 150 ml        Ortho/SPM Exam      Ortho/SPM Exam  LLE:   Ex fix in place, no bleeding from pin sites  Cap refill <2s  Able to wiggle toes  SILT     RLE:   Pain with ROM of the ankle  TTP over 5th " metatarsal base  SILT     Significant Labs: All pertinent labs within the past 24 hours have been reviewed.    Significant Imaging: I have reviewed all pertinent imaging results/findings.

## 2023-08-12 NOTE — ASSESSMENT & PLAN NOTE
- Patient s/p fall of 3 steps while going down the stairs while at work.   - Imaging showed left ankle trimalleolar fracture and dislocation. Patient seen by Orthopedic surgery and left ankle was reduced and splinted in ER placed in short leg splint.   - Patient taken to OR on 8/9/2023 and had ex fix placed to left ankle.  - Routine pin site care post-op to left ankle ex fix.  - Non weight bear to left lower extremity.  - Ice and elevate left leg to help with swelling with plan to proceed with removal of ex fix and definitive fixation of left ankle fracture once left ankle swelling improved. Patient to remain in hospital until next surgery.   - PT/OT consulted and working with patient in hospital.   - Continue Aspirin 81 mg po BID for DVT prophylaxis.  - Pain improved. Continue scheduled Tylenol 1000 mg po TID + Robaxin 750 mg po 4 times daily and Lyrica 75 mg po BID and continue with Oxycodone IR prn for breakthrough pain.

## 2023-08-12 NOTE — PLAN OF CARE
Pt is AAOX4,VSS. Pt is progressing towards plan of care goals. Pain management has been assessed. Pt reports pain, PRN meds are given and pain is reassessed.Pain controlled with prn and scheduled medications. LLE elevated and ice applied throughout shift. Pin site care complete to LLE. SCDs in place with frequent checks for skin breakdown. Purewhick in place. Fall precautions in place, no report of falls.Safety measures are in place bed in lowest position, wheels locked, call light within reach.

## 2023-08-12 NOTE — PT/OT/SLP PROGRESS
"Physical Therapy Treatment    Patient Name:  Kalina Ryan   MRN:  [id number]    Recommendations:     Discharge Recommendations: rehabilitation facility  Discharge Equipment Recommendations: to be determined by next level of care  Barriers to discharge: Inaccessible home and Decreased caregiver support    Assessment:     Kalina Ryan is a 58 y.o. female admitted with a medical diagnosis of Closed trimalleolar fracture of left ankle with routine healing.  She presents with the following impairments/functional limitations: weakness, impaired endurance, impaired self care skills, impaired functional mobility, gait instability, impaired balance, decreased lower extremity function, orthopedic precautions, pain, edema.    Rehab Prognosis: Good; patient would benefit from acute skilled PT services to address these deficits and reach maximum level of function.    Recent Surgery: Procedure(s) (LRB):  APPLICATION, EXTERNAL FIXATION DEVICE left ankle, C-arm clock side, synthes (Left)  CLOSED REDUCTION, FRACTURE, ANKLE, TRIMALLEOLAR (Left) 3 Days Post-Op    Plan:     During this hospitalization, patient to be seen 4 x/week to address the identified rehab impairments via gait training, therapeutic activities, therapeutic exercises, neuromuscular re-education and progress toward the following goals:    Plan of Care Expires:  09/10/23    Subjective     Chief Complaint: pain  Patient/Family Comments/goals: "I'd love to sit up on the edge of the bed."  Pain/Comfort:  Pain Rating 1: 5/10  Location - Side 1: Bilateral  Location - Orientation 1: generalized  Location 1: leg  Pain Addressed 1: Distraction, Cessation of Activity  Pain Rating Post-Intervention 1: 7/10      Objective:     Communicated with RN prior to session.  Patient found supine with telemetry, PureWick, external fixator upon PTA entry to room. Spouse present at bedside.     General Precautions: Standard, fall  Orthopedic Precautions: LLE non weight bearing, RLE " weight bearing as tolerated  Braces:  (CAM boot RLE)  Respiratory Status: Room air     Functional Mobility:  Bed Mobility:     Supine to Sit: minimum assistance  Sit to Supine: minimum assistance  Transfers:     Sit to Stand:  minimum assistance with rolling walker; x 2 trials  Gait: Pt limited to sliding R foot for 3-4 steps along EOB, unable to clear RLE from ground.       AM-PAC 6 CLICK MOBILITY  Turning over in bed (including adjusting bedclothes, sheets and blankets)?: 3  Sitting down on and standing up from a chair with arms (e.g., wheelchair, bedside commode, etc.): 3  Moving from lying on back to sitting on the side of the bed?: 3  Moving to and from a bed to a chair (including a wheelchair)?: 2  Need to walk in hospital room?: 1  Climbing 3-5 steps with a railing?: 1  Basic Mobility Total Score: 13       Patient left HOB elevated with all lines intact, call button in reach, RN notified, and spouse present.    GOALS:   Multidisciplinary Problems       Physical Therapy Goals          Problem: Physical Therapy    Goal Priority Disciplines Outcome Goal Variances Interventions   Physical Therapy Goal     PT, PT/OT Ongoing, Progressing     Description: Goals to be met by 8/24/2023    1. Pt supine to sit with SBA-not met  2. Pt sit to supine with SBA-not met  3. Pt sit to stand with mod(A) with LRAD with NWB on LLE with WBAT on R LE with CAM Boot-not met  4. Pt to perform gait 50 ft with mod(A) with LRAD with NWB on LLE with WBAT on R LE with CAM Boot.-not met  5. Pt to go up/down curb step with mod(A) with LRAD with NWB on LLE with WBAT on R LE with CAM Boot.-not met                         Time Tracking:     PT Received On: 08/12/23  PT Start Time: 1435     PT Stop Time: 1459  PT Total Time (min): 24 min     Billable Minutes: Therapeutic Activity 24    Treatment Type: Treatment  PT/PTA: PTA     Number of PTA visits since last PT visit: 1 08/12/2023

## 2023-08-13 PROBLEM — B96.89 URINARY TRACT INFECTION DUE TO KLEBSIELLA SPECIES: Status: RESOLVED | Noted: 2023-08-10 | Resolved: 2023-08-13

## 2023-08-13 PROBLEM — N39.0 URINARY TRACT INFECTION DUE TO KLEBSIELLA SPECIES: Status: RESOLVED | Noted: 2023-08-10 | Resolved: 2023-08-13

## 2023-08-13 LAB
ANION GAP SERPL CALC-SCNC: 8 MMOL/L (ref 8–16)
BASOPHILS # BLD AUTO: 0.03 K/UL (ref 0–0.2)
BASOPHILS NFR BLD: 0.4 % (ref 0–1.9)
BUN SERPL-MCNC: 16 MG/DL (ref 6–20)
CALCIUM SERPL-MCNC: 8.9 MG/DL (ref 8.7–10.5)
CHLORIDE SERPL-SCNC: 107 MMOL/L (ref 95–110)
CO2 SERPL-SCNC: 27 MMOL/L (ref 23–29)
CREAT SERPL-MCNC: 0.8 MG/DL (ref 0.5–1.4)
DIFFERENTIAL METHOD: ABNORMAL
EOSINOPHIL # BLD AUTO: 0.4 K/UL (ref 0–0.5)
EOSINOPHIL NFR BLD: 5.2 % (ref 0–8)
ERYTHROCYTE [DISTWIDTH] IN BLOOD BY AUTOMATED COUNT: 15.6 % (ref 11.5–14.5)
EST. GFR  (NO RACE VARIABLE): >60 ML/MIN/1.73 M^2
GLUCOSE SERPL-MCNC: 91 MG/DL (ref 70–110)
HCT VFR BLD AUTO: 30.5 % (ref 37–48.5)
HGB BLD-MCNC: 9.7 G/DL (ref 12–16)
IMM GRANULOCYTES # BLD AUTO: 0.02 K/UL (ref 0–0.04)
IMM GRANULOCYTES NFR BLD AUTO: 0.2 % (ref 0–0.5)
LYMPHOCYTES # BLD AUTO: 2 K/UL (ref 1–4.8)
LYMPHOCYTES NFR BLD: 24.3 % (ref 18–48)
MCH RBC QN AUTO: 27.3 PG (ref 27–31)
MCHC RBC AUTO-ENTMCNC: 31.8 G/DL (ref 32–36)
MCV RBC AUTO: 86 FL (ref 82–98)
MONOCYTES # BLD AUTO: 0.6 K/UL (ref 0.3–1)
MONOCYTES NFR BLD: 6.6 % (ref 4–15)
NEUTROPHILS # BLD AUTO: 5.3 K/UL (ref 1.8–7.7)
NEUTROPHILS NFR BLD: 63.3 % (ref 38–73)
NRBC BLD-RTO: 0 /100 WBC
PLATELET # BLD AUTO: 303 K/UL (ref 150–450)
PMV BLD AUTO: 9.1 FL (ref 9.2–12.9)
POCT GLUCOSE: 102 MG/DL (ref 70–110)
POCT GLUCOSE: 110 MG/DL (ref 70–110)
POCT GLUCOSE: 157 MG/DL (ref 70–110)
POTASSIUM SERPL-SCNC: 4.4 MMOL/L (ref 3.5–5.1)
RBC # BLD AUTO: 3.55 M/UL (ref 4–5.4)
SODIUM SERPL-SCNC: 142 MMOL/L (ref 136–145)
WBC # BLD AUTO: 8.32 K/UL (ref 3.9–12.7)

## 2023-08-13 PROCEDURE — 11000001 HC ACUTE MED/SURG PRIVATE ROOM

## 2023-08-13 PROCEDURE — 94150 VITAL CAPACITY TEST: CPT

## 2023-08-13 PROCEDURE — 25000003 PHARM REV CODE 250

## 2023-08-13 PROCEDURE — 25000003 PHARM REV CODE 250: Performed by: PHYSICIAN ASSISTANT

## 2023-08-13 PROCEDURE — 80048 BASIC METABOLIC PNL TOTAL CA: CPT | Performed by: INTERNAL MEDICINE

## 2023-08-13 PROCEDURE — 36415 COLL VENOUS BLD VENIPUNCTURE: CPT | Performed by: INTERNAL MEDICINE

## 2023-08-13 PROCEDURE — 25000003 PHARM REV CODE 250: Performed by: INTERNAL MEDICINE

## 2023-08-13 PROCEDURE — 94010 BREATHING CAPACITY TEST: CPT

## 2023-08-13 PROCEDURE — 97535 SELF CARE MNGMENT TRAINING: CPT

## 2023-08-13 PROCEDURE — 85025 COMPLETE CBC W/AUTO DIFF WBC: CPT | Performed by: INTERNAL MEDICINE

## 2023-08-13 PROCEDURE — 99232 SBSQ HOSP IP/OBS MODERATE 35: CPT | Mod: ,,, | Performed by: INTERNAL MEDICINE

## 2023-08-13 PROCEDURE — 25000003 PHARM REV CODE 250: Performed by: HOSPITALIST

## 2023-08-13 PROCEDURE — 99232 PR SUBSEQUENT HOSPITAL CARE,LEVL II: ICD-10-PCS | Mod: ,,, | Performed by: INTERNAL MEDICINE

## 2023-08-13 RX ADMIN — PREGABALIN 75 MG: 75 CAPSULE ORAL at 09:08

## 2023-08-13 RX ADMIN — ASPIRIN 81 MG 81 MG: 81 TABLET ORAL at 09:08

## 2023-08-13 RX ADMIN — METHOCARBAMOL 750 MG: 750 TABLET ORAL at 09:08

## 2023-08-13 RX ADMIN — CHOLECALCIFEROL TAB 25 MCG (1000 UNIT) 1000 UNITS: 25 TAB at 08:08

## 2023-08-13 RX ADMIN — ASPIRIN 81 MG 81 MG: 81 TABLET ORAL at 08:08

## 2023-08-13 RX ADMIN — ACETAMINOPHEN 1000 MG: 325 TABLET ORAL at 10:08

## 2023-08-13 RX ADMIN — ACETAMINOPHEN 1000 MG: 325 TABLET ORAL at 06:08

## 2023-08-13 RX ADMIN — Medication 6 MG: at 10:08

## 2023-08-13 RX ADMIN — PREGABALIN 75 MG: 75 CAPSULE ORAL at 08:08

## 2023-08-13 RX ADMIN — OXYCODONE HYDROCHLORIDE 10 MG: 10 TABLET ORAL at 10:08

## 2023-08-13 RX ADMIN — ESCITALOPRAM OXALATE 10 MG: 5 TABLET, FILM COATED ORAL at 08:08

## 2023-08-13 RX ADMIN — METHOCARBAMOL 750 MG: 750 TABLET ORAL at 08:08

## 2023-08-13 RX ADMIN — ACETAMINOPHEN 1000 MG: 325 TABLET ORAL at 01:08

## 2023-08-13 RX ADMIN — OXYCODONE HYDROCHLORIDE 10 MG: 10 TABLET ORAL at 06:08

## 2023-08-13 RX ADMIN — SENNOSIDES AND DOCUSATE SODIUM 2 TABLET: 50; 8.6 TABLET ORAL at 08:08

## 2023-08-13 RX ADMIN — SENNOSIDES AND DOCUSATE SODIUM 2 TABLET: 50; 8.6 TABLET ORAL at 09:08

## 2023-08-13 RX ADMIN — POLYETHYLENE GLYCOL 3350 17 G: 17 POWDER, FOR SOLUTION ORAL at 08:08

## 2023-08-13 RX ADMIN — AMLODIPINE BESYLATE 5 MG: 5 TABLET ORAL at 08:08

## 2023-08-13 RX ADMIN — METHOCARBAMOL 750 MG: 750 TABLET ORAL at 04:08

## 2023-08-13 RX ADMIN — METHOCARBAMOL 750 MG: 750 TABLET ORAL at 01:08

## 2023-08-13 NOTE — SUBJECTIVE & OBJECTIVE
"Principal Problem:Closed trimalleolar fracture of left ankle with routine healing    Principal Orthopedic Problem: As above    Interval History: Pt seen and examined at bedside. NAEON. VSS. No new symptoms. Denies fevers or chill. Wrinkles at the anterior ankle. Surgery pending improved swelling      Review of patient's allergies indicates:   Allergen Reactions    Amoxicillin Hives    Penicillins Hives       Current Facility-Administered Medications   Medication    acetaminophen tablet 1,000 mg    amLODIPine tablet 5 mg    aspirin chewable tablet 81 mg    EScitalopram oxalate tablet 10 mg    glucagon (human recombinant) injection 1 mg    glucose chewable tablet 16 g    glucose chewable tablet 24 g    melatonin tablet 6 mg    methocarbamoL tablet 750 mg    naloxone 0.4 mg/mL injection 0.02 mg    NON FORMULARY MEDICATION 5 mg    ondansetron injection 4 mg    oxyCODONE immediate release tablet 5 mg    oxyCODONE immediate release tablet Tab 10 mg    polyethylene glycol packet 17 g    pregabalin capsule 75 mg    senna-docusate 8.6-50 mg per tablet 2 tablet    simethicone chewable tablet 80 mg    sodium chloride 0.9% flush 10 mL    vitamin D 1000 units tablet 1,000 Units     Objective:     Vital Signs (Most Recent):  Temp: 97.4 °F (36.3 °C) (08/13/23 0431)  Pulse: 83 (08/13/23 0431)  Resp: 16 (08/13/23 0431)  BP: 126/67 (08/13/23 0431)  SpO2: 100 % (08/13/23 0431) Vital Signs (24h Range):  Temp:  [97 °F (36.1 °C)-98.1 °F (36.7 °C)] 97.4 °F (36.3 °C)  Pulse:  [81-93] 83  Resp:  [16-18] 16  SpO2:  [94 %-100 %] 100 %  BP: (107-149)/(56-81) 126/67     Weight: 126 kg (277 lb 12.5 oz)  Height: 5' 3" (160 cm)  Body mass index is 49.21 kg/m².      Intake/Output Summary (Last 24 hours) at 8/13/2023 0643  Last data filed at 8/12/2023 1800  Gross per 24 hour   Intake 480 ml   Output 1400 ml   Net -920 ml        Ortho/SPM Exam   LLE:   Ex fix in place, no bleeding from pin sites  Cap refill <2s  Able to wiggle toes  UMANG     RLE: "   Pain with ROM of the ankle  TTP over 5th metatarsal base  SILT      Significant Labs: All pertinent labs within the past 24 hours have been reviewed.    Significant Imaging: I have reviewed all pertinent imaging results/findings.

## 2023-08-13 NOTE — ASSESSMENT & PLAN NOTE
Patient's FSGs are controlled on current medication regimen. Patient on Mounjaro injections weekly at home to treat.   Last A1c reviewed-   Lab Results   Component Value Date    HGBA1C 5.9 (H) 08/07/2023     Most recent fingerstick glucose reviewed-   Recent Labs   Lab 08/13/23  0742 08/13/23  1157 08/13/23  1645   POCTGLUCOSE 102 110 157*     Monitor blood sugars with meals and at bedtime in hospital.  Diabetic diet.  Target blood sugars 140-180 in hospital.

## 2023-08-13 NOTE — SUBJECTIVE & OBJECTIVE
Interval History: Left ankle swelling continues to improve and Ortho hopefully can take to OR tomorrow for definitive fixation of her left ankle fracture and patient to be NPO after midnight tonight. Patient has been elevating and icing her left ankle. Labs reviewed. Hgb stable at 9.7. Blood sugars are well controlled.     Review of Systems   Constitutional:  Negative for fever.   Respiratory:  Negative for cough and shortness of breath.    Cardiovascular:  Negative for chest pain.   Gastrointestinal:  Negative for abdominal pain, nausea and vomiting.   Musculoskeletal:  Positive for arthralgias (Left ankle; Right ankle and right hip) and myalgias (Left ankle area).   Neurological:  Positive for weakness (Bilateral lower extremities). Negative for dizziness and light-headedness.   Psychiatric/Behavioral:  Negative for agitation and confusion.      Objective:     Vital Signs (Most Recent):  Temp: 98 °F (36.7 °C) (08/13/23 1642)  Pulse: 90 (08/13/23 1642)  Resp: 16 (08/13/23 1642)  BP: 133/72 (08/13/23 1642)  SpO2: 98 % (08/13/23 1642) on room air Vital Signs (24h Range):  Temp:  [97.4 °F (36.3 °C)-98.1 °F (36.7 °C)] 98 °F (36.7 °C)  Pulse:  [78-93] 90  Resp:  [16-18] 16  SpO2:  [94 %-100 %] 98 %  BP: (107-151)/(56-80) 133/72     Weight: 126 kg (277 lb 12.5 oz)  Body mass index is 49.21 kg/m².    Intake/Output Summary (Last 24 hours) at 8/13/2023 1740  Last data filed at 8/12/2023 1800  Gross per 24 hour   Intake --   Output 500 ml   Net -500 ml         Physical Exam  Vitals and nursing note reviewed.   Constitutional:       General: She is awake. She is not in acute distress.     Appearance: Normal appearance. She is well-developed. She is morbidly obese. She is not ill-appearing.   Cardiovascular:      Rate and Rhythm: Normal rate and regular rhythm.      Heart sounds: Normal heart sounds. No murmur heard.     No friction rub. No gallop.   Pulmonary:      Effort: Pulmonary effort is normal. No respiratory distress.       Breath sounds: Normal breath sounds. No wheezing.   Abdominal:      General: Abdomen is flat. Bowel sounds are normal. There is no distension.      Palpations: Abdomen is soft.      Tenderness: There is no abdominal tenderness.   Musculoskeletal:         General: Swelling (Left ankle and right ankle and foot) present.      Comments: Ex fix in place to left ankle    Skin:     General: Skin is warm.      Findings: Bruising (Right lateral ankle and lateral aspect of right foot) present. No erythema.   Neurological:      Mental Status: She is alert and oriented to person, place, and time.   Psychiatric:         Mood and Affect: Mood normal.         Behavior: Behavior normal. Behavior is cooperative.         Thought Content: Thought content normal.         Judgment: Judgment normal.             Significant Labs: CBC:   Recent Labs   Lab 08/13/23  0551   WBC 8.32   HGB 9.7*   HCT 30.5*        CMP:   Recent Labs   Lab 08/13/23  0551      K 4.4      CO2 27   GLU 91   BUN 16   CREATININE 0.8   CALCIUM 8.9   ANIONGAP 8       POCT Glucose:   Recent Labs   Lab 08/13/23  0742 08/13/23  1157 08/13/23  1645   POCTGLUCOSE 102 110 157*       Significant Imaging: I have reviewed all pertinent imaging results/findings within the past 24 hours.

## 2023-08-13 NOTE — PROGRESS NOTES
[facility] - [facility]  Orthopedics  Progress Note    Patient Name: Kalina Ryan  MRN: [id number]  Admission Date: 8/7/2023  Hospital Length of Stay: 4 days  Attending Provider: Claudia Graves MD  Primary Care Provider: Nany Hoff NP  Follow-up For: Procedure(s) (LRB):  APPLICATION, EXTERNAL FIXATION DEVICE left ankle, C-arm clock side, synthes (Left)  CLOSED REDUCTION, FRACTURE, ANKLE, TRIMALLEOLAR (Left)    Post-Operative Day: 4 Days Post-Op  Subjective:     Principal Problem:Closed trimalleolar fracture of left ankle with routine healing    Principal Orthopedic Problem: As above    Interval History: Pt seen and examined at bedside. NAEON. VSS. No new symptoms. Denies fevers or chill. Wrinkles at the anterior ankle. Surgery pending improved swelling      Review of patient's allergies indicates:   Allergen Reactions    Amoxicillin Hives    Penicillins Hives       Current Facility-Administered Medications   Medication    acetaminophen tablet 1,000 mg    amLODIPine tablet 5 mg    aspirin chewable tablet 81 mg    EScitalopram oxalate tablet 10 mg    glucagon (human recombinant) injection 1 mg    glucose chewable tablet 16 g    glucose chewable tablet 24 g    melatonin tablet 6 mg    methocarbamoL tablet 750 mg    naloxone 0.4 mg/mL injection 0.02 mg    NON FORMULARY MEDICATION 5 mg    ondansetron injection 4 mg    oxyCODONE immediate release tablet 5 mg    oxyCODONE immediate release tablet Tab 10 mg    polyethylene glycol packet 17 g    pregabalin capsule 75 mg    senna-docusate 8.6-50 mg per tablet 2 tablet    simethicone chewable tablet 80 mg    sodium chloride 0.9% flush 10 mL    vitamin D 1000 units tablet 1,000 Units     Objective:     Vital Signs (Most Recent):  Temp: 97.4 °F (36.3 °C) (08/13/23 0431)  Pulse: 83 (08/13/23 0431)  Resp: 16 (08/13/23 0431)  BP: 126/67 (08/13/23 0431)  SpO2: 100 % (08/13/23 0431) Vital Signs (24h Range):  Temp:  [97 °F (36.1 °C)-98.1 °F (36.7 °C)]  "97.4 °F (36.3 °C)  Pulse:  [81-93] 83  Resp:  [16-18] 16  SpO2:  [94 %-100 %] 100 %  BP: (107-149)/(56-81) 126/67     Weight: 126 kg (277 lb 12.5 oz)  Height: 5' 3" (160 cm)  Body mass index is 49.21 kg/m².      Intake/Output Summary (Last 24 hours) at 8/13/2023 0643  Last data filed at 8/12/2023 1800  Gross per 24 hour   Intake 480 ml   Output 1400 ml   Net -920 ml        Ortho/SPM Exam   LLE:   Ex fix in place, no bleeding from pin sites  Cap refill <2s  Able to wiggle toes  SILT     RLE:   Pain with ROM of the ankle  TTP over 5th metatarsal base  SILT      Significant Labs: All pertinent labs within the past 24 hours have been reviewed.    Significant Imaging: I have reviewed all pertinent imaging results/findings.    Assessment/Plan:     * Closed trimalleolar fracture of left ankle with routine healing s/p ex fix on 8/9/2023  Kalina Ryan is a 58 y.o. female with left ankle trimalleolar fracture and dislocation and base of 5th MT fx s/p ankle spanning ex fix 8/9/23. We will plan for definitive surgery when soft tissues more amenable. Ankle appears moderately swollen but without significant bruising or fracture blisters at this time.    Pain: MM  NWB LLE, WBAT RLE in boot  Pin site care BID  ASA 81 BID  Definitive surgery pending improved swelling              Byron Bland MD  Orthopedics  [facility] - Surgery  "

## 2023-08-13 NOTE — PLAN OF CARE
Problem: Adult Inpatient Plan of Care  Goal: Plan of Care Review  Outcome: Ongoing, Progressing  Goal: Patient-Specific Goal (Individualized)  Outcome: Ongoing, Progressing  Goal: Absence of Hospital-Acquired Illness or Injury  Outcome: Ongoing, Progressing  Goal: Optimal Comfort and Wellbeing  Outcome: Ongoing, Progressing  Goal: Readiness for Transition of Care  Outcome: Ongoing, Progressing     Problem: Bariatric Environmental Safety  Goal: Safety Maintained with Care  Outcome: Ongoing, Progressing     Problem: Diabetes Comorbidity  Goal: Blood Glucose Level Within Targeted Range  Outcome: Ongoing, Progressing     Problem: Skin Injury Risk Increased  Goal: Skin Health and Integrity  Outcome: Ongoing, Progressing

## 2023-08-13 NOTE — ASSESSMENT & PLAN NOTE
Kalina Rayn is a 58 y.o. female with left ankle trimalleolar fracture and dislocation and base of 5th MT fx s/p ankle spanning ex fix 8/9/23. We will plan for definitive surgery when soft tissues more amenable. Ankle appears moderately swollen but without significant bruising or fracture blisters at this time.    Pain: MM  NWB LLE, WBAT RLE in boot  Pin site care BID  ASA 81 BID  Definitive surgery pending improved swelling

## 2023-08-13 NOTE — PT/OT/SLP PROGRESS
Occupational Therapy Treatment    Patient Name:  Kalina Ryan   MRN:  [id number]  Admit Date: 8/7/2023  Admitting Diagnosis:  Closed trimalleolar fracture of left ankle with routine healing   Length of Stay: 4 days  Recent Surgery: Procedure(s) (LRB):  APPLICATION, EXTERNAL FIXATION DEVICE left ankle, C-arm clock side, synthes (Left)  CLOSED REDUCTION, FRACTURE, ANKLE, TRIMALLEOLAR (Left) 4 Days Post-Op    Recommendations:     Discharge Recommendations: rehabilitation facility  Discharge Equipment Recommendations:  to be determined by next level of care  Barriers to discharge:  Other (Comment) (Increased skilled assistance required)    Plan:     Patient to be seen 4 x/week to address the above listed problems via self-care/home management, therapeutic activities, therapeutic exercises, neuromuscular re-education  Plan of Care Expires: 09/09/23  Plan of Care Reviewed with: patient, spouse    Assessment:   Kalina Ryan is a 58 y.o. female with a medical diagnosis of Closed trimalleolar fracture of left ankle with routine healing.  She presents with the following performance deficits affecting function: weakness, impaired endurance, impaired self care skills, impaired functional mobility, gait instability, impaired balance, pain, decreased safety awareness, decreased lower extremity function, impaired coordination, decreased coordination, decreased ROM, orthopedic precautions.      Pt tolerated session well this date and was eager to participate. Demonstrating continued increased pain, impaired balance, impaired BLE function and decreased safety awareness, requiring increased time and assistance to complete functional tasks. Patient continues to remain limited in step trials, stand pivots completed this date. Patient with increased fatigue following BSC>bed transfer, expressed feeling like she was going to pass out, requiring Max A x 2 to return safely to seated position. Patient is progressing towards  "established goals, and continues to benefit from acute skilled OT services to increase functional performance and improve quality of life. OT to continue to recommend Rehab at discharge to improve pt functional independence and increase patient safety before returning home.      Rehab Prognosis: Fair; patient would benefit from acute skilled OT services to address these deficits and reach maximum level of function.        Subjective   Communicated with: Nurse prior to session.  Patient found HOB elevated with telemetry, peripheral IV, PureWick, external fixator upon OT entry to room. Pt agreeable to participate at this time.    Patient: " I was thinking I could try to get on the commode today "    Pain/Comfort:  Pain Rating 1: 6/10  Location - Side 1: Bilateral  Location - Orientation 1: generalized  Location 1: leg  Pain Addressed 1: Distraction, Pre-medicate for activity, Cessation of Activity  Pain Rating Post-Intervention 1: 6/10    Objective:   Patient found with: telemetry, peripheral IV, PureWick, external fixator   General Precautions: Standard, Cardiac fall   Orthopedic Precautions:RLE weight bearing as tolerated, LLE non weight bearing   Braces:  (RLE CAM boot)   Oxygen Device: Room Air  Vitals: BP (!) 151/80 (Patient Position: Lying)   Pulse 91   Temp 98 °F (36.7 °C) (Oral)   Resp 17   Ht 5' 3" (1.6 m)   Wt 126 kg (277 lb 12.5 oz)   SpO2 98%   Breastfeeding No   BMI 49.21 kg/m²     Outcome Measures:  [facility] 6 Click ADL: 17    Cognition:   Alert and Cooperative  Command following: follows two-step commands  Communication: clear/fluent    Occupational Performance:  Bed Mobility:    Patient completed Rolling/Turning to Right with minimum assistance  Patient completed Supine to Sit with minimum assistance on R side of bed; assist LLE  Scooting anteriorly to EOB to have both feet planted on floor: contact guard assistance  Patient completed Sit to Supine with minimum assistance on R side of bed; assist " RLE  Scooting to HOB in supine: contact guard assistance using side rails    Functional Mobility/Transfers:  Static Sitting EOB: SBA  Dynamic Sitting EOB: SBA  Patient completed Sit <> Stand Transfer from EOB with minimum assistance  with  rolling walker   Static Standing Balance: Min A  Dynamic Standing Balance: Min A  Patient completed Bed > BSC Transfer using Stand Pivot technique with minimum assistance with rolling walker  Patient completed stand from BSC level with Min A using RW  Patient completed stand pivot transfer BSC>Bed with Max A using RW, reports increased dizziness and feeling of near passing out      Activities of Daily Living:  Lower Body Dressing: maximal assistance to don/doff CAM boot while HOB elevated  Toileting: independence to perform pericare seated at toilet level; Max A to perform perirectal care in supported stance    AMPA 6 Click ADL:  AMPAC Total Score: 17    Treatment & Education:  -Pt and spouse education on OT role and POC.  -Importance of E/OOB activity with staff assistance, emphasis on daily participation  -Safety during functional transfer and mobility ensured  -Patient provided with education on importance of Bilateral UB/LB integration during functional tasks for improvement in functional performance.   -Education provided/reviewed, questions answered within OT scope of practice.   -Patient demonstrates understanding and learning this date.         Patient left HOB elevated with all lines intact, call button in reach, nurse notified, and spouse present    GOALS:   Multidisciplinary Problems       Occupational Therapy Goals          Problem: Occupational Therapy    Goal Priority Disciplines Outcome Interventions   Occupational Therapy Goal     OT, PT/OT Ongoing, Progressing    Description: Goals to be met by: 8/20/23     Patient will increase functional independence with ADLs by performing:    UE Dressing with Minimal Assistance.  LE Dressing with Minimal Assistance.  Grooming  while seated at sink with Supervision.  Toileting from bedside commode with Minimal Assistance for hygiene and clothing management.   Toilet transfer to bedside commode with Minimal Assistance.                         Time Tracking:     OT Date of Treatment: 08/13/23  OT Start Time: 1105  OT Stop Time: 1144  OT Total Time (min): 39 min    Billable Minutes:Self Care/Home Management 39      8/13/2023

## 2023-08-13 NOTE — NURSING
Nurses Note -- 4 Eyes      8/13/2023   6:48 PM      Skin assessed during: Daily Assessment      [] No Altered Skin Integrity Present    []Prevention Measures Documented      [x] Yes- Altered Skin Integrity Present or Discovered   [x] LDA Added if Not in Epic (Describe Wound)   [] New Altered Skin Integrity was Present on Admit and Documented in LDA   [] Wound Image Taken    Wound Care Consulted? Yes    Attending Nurse:  Elise Ribeiro RN/Staff Member:   Shannon Smiley RN

## 2023-08-13 NOTE — PROGRESS NOTES
[facility] Medicine  Progress Note    Patient Name: Kalina Ryan  MRN: [id number]  Patient Class: IP- Inpatient   Admission Date: 8/7/2023  Length of Stay: 4 days  Attending Physician: Claudia Graves MD  Primary Care Provider: Nany Hoff NP        Subjective:     Principal Problem:Closed trimalleolar fracture of left ankle with routine healing        HPI:  Kalina Ryan is a 58 y.o. 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 Patient states she twisted her ankle and fell down 3 step while going down the stairs at work. She had immediate pain and inability to bear weight. She noticed her left deformity and were screaming in pain. He coworker called EMS. Denies head injury or LOC. Denies prior injuries or surgeries to the left ankle. She is not on blood thinners. Denies other MSK pains or paresthesias. She walks without assistive devices at baseline. She lives at home with her .     ED course: afebrile and vitals stable. X-ray showed left ankle trimalleolar fracture and dislocation, and right ankle lateral malleolus fx. Patient seen by orthopedic and L ankle was Reduced and splinted in ER placed in short leg splint. Patient received profopol, fenatnyl and oxycodone in ED during conscious sedation for closed reduction of left ankle fracture.     During my interview patient is awake, conversant and not in distress. She reports pain and spasm in both ankles with right side hurting more than left. She denies tobacco, alcohol or other illicit drug use. She is able to ambulate few blocks at baseline without chest pain or SOB.           Overview/Hospital Course:  Patient admitted with closed left trimalleolar ankle fracture. Ortho consulted and taken to OR on 8/9 and had external fixator placed to left  ankle. Post-op, patient non weight bearing to left lower extremity. Patient to elevate and ce left ankle to help with swelling. Needs improvement in left ankle swelling prior to proceeding with definitive fixation surgery to left ankle. Patient to remain in hospital until definitive fixation surgery. Neurology consulted for progressive bilateral leg weakness in patient with previous diagnosis of Guillain [place] and CIDP. Neurology noted likely CIDP and recommended to check vitamin levels (B12, folate, copper, zinc, and thiamine). Neurology noted patient with history of CIDP and received steroids and IVIG in past an used to see Dr. Moses but no treatment since 2012. She has self-contained episodes in which she did not seek medical attention. These symptoms are similar. Want to avoid steroids now with planned surgery. Follow-up with neuromuscular Neurologist for possible IVIG as outpatient upon discharge. If any change in status, reconsult Neurology. Patient also noted on admit to have closed right ankle lateral malleolus fracture and closed right 5th metatarsal fractures but Ortho states no surgery needed and okay for patient to weight bear as tolerated to right lower extremity and to use tall walking boot to right leg when ambulating.       Interval History: Left ankle swelling continues to improve and Ortho hopefully can take to OR tomorrow for definitive fixation of her left ankle fracture and patient to be NPO after midnight tonight. Patient has been elevating and icing her left ankle. Labs reviewed. Hgb stable at 9.7. Blood sugars are well controlled.     Review of Systems   Constitutional:  Negative for fever.   Respiratory:  Negative for cough and shortness of breath.    Cardiovascular:  Negative for chest pain.   Gastrointestinal:  Negative for abdominal pain, nausea and vomiting.   Musculoskeletal:  Positive for arthralgias (Left ankle; Right ankle and right hip) and myalgias (Left ankle area).    Neurological:  Positive for weakness (Bilateral lower extremities). Negative for dizziness and light-headedness.   Psychiatric/Behavioral:  Negative for agitation and confusion.      Objective:     Vital Signs (Most Recent):  Temp: 98 °F (36.7 °C) (08/13/23 1642)  Pulse: 90 (08/13/23 1642)  Resp: 16 (08/13/23 1642)  BP: 133/72 (08/13/23 1642)  SpO2: 98 % (08/13/23 1642) on room air Vital Signs (24h Range):  Temp:  [97.4 °F (36.3 °C)-98.1 °F (36.7 °C)] 98 °F (36.7 °C)  Pulse:  [78-93] 90  Resp:  [16-18] 16  SpO2:  [94 %-100 %] 98 %  BP: (107-151)/(56-80) 133/72     Weight: 126 kg (277 lb 12.5 oz)  Body mass index is 49.21 kg/m².    Intake/Output Summary (Last 24 hours) at 8/13/2023 1740  Last data filed at 8/12/2023 1800  Gross per 24 hour   Intake --   Output 500 ml   Net -500 ml         Physical Exam  Vitals and nursing note reviewed.   Constitutional:       General: She is awake. She is not in acute distress.     Appearance: Normal appearance. She is well-developed. She is morbidly obese. She is not ill-appearing.   Cardiovascular:      Rate and Rhythm: Normal rate and regular rhythm.      Heart sounds: Normal heart sounds. No murmur heard.     No friction rub. No gallop.   Pulmonary:      Effort: Pulmonary effort is normal. No respiratory distress.      Breath sounds: Normal breath sounds. No wheezing.   Abdominal:      General: Abdomen is flat. Bowel sounds are normal. There is no distension.      Palpations: Abdomen is soft.      Tenderness: There is no abdominal tenderness.   Musculoskeletal:         General: Swelling (Left ankle and right ankle and foot) present.      Comments: Ex fix in place to left ankle    Skin:     General: Skin is warm.      Findings: Bruising (Right lateral ankle and lateral aspect of right foot) present. No erythema.   Neurological:      Mental Status: She is alert and oriented to person, place, and time.   Psychiatric:         Mood and Affect: Mood normal.         Behavior:  Behavior normal. Behavior is cooperative.         Thought Content: Thought content normal.         Judgment: Judgment normal.             Significant Labs: CBC:   Recent Labs   Lab 08/13/23  0551   WBC 8.32   HGB 9.7*   HCT 30.5*        CMP:   Recent Labs   Lab 08/13/23  0551      K 4.4      CO2 27   GLU 91   BUN 16   CREATININE 0.8   CALCIUM 8.9   ANIONGAP 8       POCT Glucose:   Recent Labs   Lab 08/13/23  0742 08/13/23  1157 08/13/23  1645   POCTGLUCOSE 102 110 157*       Significant Imaging: I have reviewed all pertinent imaging results/findings within the past 24 hours.      Assessment/Plan:      * Closed trimalleolar fracture of left ankle with routine healing s/p ex fix on 8/9/2023  - Patient s/p fall of 3 steps while going down the stairs while at work.   - Imaging showed left ankle trimalleolar fracture and dislocation. Patient seen by Orthopedic surgery and left ankle was reduced and splinted in ER placed in short leg splint.   - Patient taken to OR on 8/9/2023 and had ex fix placed to left ankle.  - Routine pin site care post-op to left ankle ex fix.  - Non weight bear to left lower extremity.  - Ice and elevate left leg to help with swelling with plan to proceed with removal of ex fix and definitive fixation of left ankle fracture once left ankle swelling improved. Patient to remain in hospital until next surgery. Patient tentatively scheduled to go to OR on 8/14 for definitve fixation of left ankle by Ortho.  - PT/OT consulted and working with patient in hospital.   - Continue Aspirin 81 mg po BID for DVT prophylaxis.  - Pain controlled. Continue scheduled Tylenol 1000 mg po TID + Robaxin 750 mg po 4 times daily and Lyrica 75 mg po BID and continue with Oxycodone IR prn for breakthrough pain.     Closed dislocation of left ankle  Patient s/p closed reduction by Orthopedic in ED with short leg splint placement.        Closed displaced fracture of lateral malleolus of right fibula with  routine healing  Closed nondisplaced fracture of fifth metatarsal bone of right foot with routine healing  · Noted on admit on X-rays to have non-displaced closed fractures of right lateral malleolus and right 5th metatarsal. Orthopedics consulted and recommended non-operative management and patient okay to weight bear as tolerated to right lower extremity. Patient to ambulate in tall walking boot when out of bed.   · Pain management.   · Aspirin 81 mg po BID for DVT prophylaxis.     CIDP (chronic inflammatory demyelinating polyneuropathy)  Bilateral lower extremity weakness   - Patient with known history of bilateral lower extremity weakness but normally able to ambulate without assistive device.   - Patient reports progressive weakness over past 3 months.   - Neurology consulted. Check vitamin levels. Check heavy metal levels.  - Per Neurology: Patient reports history of CIDP and received steroids and IVIG in past and used to follow with Dr. Moses and no treatment since 2012. She has self-contained episodes in which she did not seek medical attention. These symptoms are similar. Want to avoid steroids now with planned surgery. Neurology recommends neuromuscular Neurologist for possible IVIG as outpatient upon discharge.  -  If any change in status, reconsult Neurology.    Type 2 diabetes mellitus without complication, without long-term current use of insulin  Patient's FSGs are controlled on current medication regimen. Patient on Mounjaro injections weekly at home to treat.   Last A1c reviewed-   Lab Results   Component Value Date    HGBA1C 5.9 (H) 08/07/2023     Most recent fingerstick glucose reviewed-   Recent Labs   Lab 08/13/23  0742 08/13/23  1157 08/13/23  1645   POCTGLUCOSE 102 110 157*     Monitor blood sugars with meals and at bedtime in hospital.  Diabetic diet.  Target blood sugars 140-180 in hospital.     Class 3 severe obesity due to excess calories with serious comorbidity and body mass index (BMI)  of 45.0 to 49.9 in adult  Personal history of gastric bypass   Body mass index is 49.21 kg/m². Morbid obesity complicates all aspects of disease management from diagnostic modalities to treatment. Weight loss encouraged and health benefits explained to patient.         Benign essential HTN  Chronic and controlled. Continue home Amlodipine to treat. Home Lisinopril on hold. Target BP < 140/90. Monitor vital signs every 4 hours.       Anxiety  Chronic and controlled. Continue home Lexapro to treat.         VTE Risk Mitigation (From admission, onward)         Ordered     IP VTE HIGH RISK PATIENT  Once         08/08/23 0036     Place sequential compression device  Until discontinued         08/08/23 0036                Discharge Planning   ILVIER: 8/16/2023     Code Status: Full Code   Is the patient medically ready for discharge?: No    Reason for patient still in hospital (select all that apply): Patient trending condition  Discharge Plan A: Rehab            Claudia Graves MD  Department of [facility] Medicine   [facility] - Surgery

## 2023-08-13 NOTE — ASSESSMENT & PLAN NOTE
- Patient s/p fall of 3 steps while going down the stairs while at work.   - Imaging showed left ankle trimalleolar fracture and dislocation. Patient seen by Orthopedic surgery and left ankle was reduced and splinted in ER placed in short leg splint.   - Patient taken to OR on 8/9/2023 and had ex fix placed to left ankle.  - Routine pin site care post-op to left ankle ex fix.  - Non weight bear to left lower extremity.  - Ice and elevate left leg to help with swelling with plan to proceed with removal of ex fix and definitive fixation of left ankle fracture once left ankle swelling improved. Patient to remain in hospital until next surgery. Patient tentatively scheduled to go to OR on 8/14 for definitve fixation of left ankle by Ortho.  - PT/OT consulted and working with patient in hospital.   - Continue Aspirin 81 mg po BID for DVT prophylaxis.  - Pain controlled. Continue scheduled Tylenol 1000 mg po TID + Robaxin 750 mg po 4 times daily and Lyrica 75 mg po BID and continue with Oxycodone IR prn for breakthrough pain.

## 2023-08-14 LAB
A-TOCOPHEROL VIT E SERPL-MCNC: 891 UG/DL (ref 500–1800)
POCT GLUCOSE: 112 MG/DL (ref 70–110)
POCT GLUCOSE: 156 MG/DL (ref 70–110)
POCT GLUCOSE: 95 MG/DL (ref 70–110)

## 2023-08-14 PROCEDURE — 25000003 PHARM REV CODE 250: Performed by: PHYSICIAN ASSISTANT

## 2023-08-14 PROCEDURE — 11000001 HC ACUTE MED/SURG PRIVATE ROOM

## 2023-08-14 PROCEDURE — 25000003 PHARM REV CODE 250

## 2023-08-14 PROCEDURE — 94761 N-INVAS EAR/PLS OXIMETRY MLT: CPT

## 2023-08-14 PROCEDURE — 99233 SBSQ HOSP IP/OBS HIGH 50: CPT | Mod: ,,, | Performed by: INTERNAL MEDICINE

## 2023-08-14 PROCEDURE — 99900035 HC TECH TIME PER 15 MIN (STAT)

## 2023-08-14 PROCEDURE — 99233 PR SUBSEQUENT HOSPITAL CARE,LEVL III: ICD-10-PCS | Mod: ,,, | Performed by: INTERNAL MEDICINE

## 2023-08-14 PROCEDURE — 25000003 PHARM REV CODE 250: Performed by: INTERNAL MEDICINE

## 2023-08-14 PROCEDURE — 25000003 PHARM REV CODE 250: Performed by: HOSPITALIST

## 2023-08-14 PROCEDURE — 94010 BREATHING CAPACITY TEST: CPT

## 2023-08-14 PROCEDURE — 94150 VITAL CAPACITY TEST: CPT

## 2023-08-14 PROCEDURE — 97116 GAIT TRAINING THERAPY: CPT

## 2023-08-14 RX ADMIN — ACETAMINOPHEN 1000 MG: 325 TABLET ORAL at 03:08

## 2023-08-14 RX ADMIN — Medication 6 MG: at 10:08

## 2023-08-14 RX ADMIN — METHOCARBAMOL 750 MG: 750 TABLET ORAL at 12:08

## 2023-08-14 RX ADMIN — METHOCARBAMOL 750 MG: 750 TABLET ORAL at 09:08

## 2023-08-14 RX ADMIN — SENNOSIDES AND DOCUSATE SODIUM 2 TABLET: 50; 8.6 TABLET ORAL at 10:08

## 2023-08-14 RX ADMIN — OXYCODONE HYDROCHLORIDE 10 MG: 10 TABLET ORAL at 10:08

## 2023-08-14 RX ADMIN — METHOCARBAMOL 750 MG: 750 TABLET ORAL at 05:08

## 2023-08-14 RX ADMIN — SENNOSIDES AND DOCUSATE SODIUM 2 TABLET: 50; 8.6 TABLET ORAL at 09:08

## 2023-08-14 RX ADMIN — METHOCARBAMOL 750 MG: 750 TABLET ORAL at 10:08

## 2023-08-14 RX ADMIN — ESCITALOPRAM OXALATE 10 MG: 5 TABLET, FILM COATED ORAL at 09:08

## 2023-08-14 RX ADMIN — OXYCODONE HYDROCHLORIDE 10 MG: 10 TABLET ORAL at 03:08

## 2023-08-14 RX ADMIN — AMLODIPINE BESYLATE 5 MG: 5 TABLET ORAL at 09:08

## 2023-08-14 RX ADMIN — PREGABALIN 75 MG: 75 CAPSULE ORAL at 10:08

## 2023-08-14 RX ADMIN — POLYETHYLENE GLYCOL 3350 17 G: 17 POWDER, FOR SOLUTION ORAL at 09:08

## 2023-08-14 RX ADMIN — ASPIRIN 81 MG 81 MG: 81 TABLET ORAL at 10:08

## 2023-08-14 RX ADMIN — ASPIRIN 81 MG 81 MG: 81 TABLET ORAL at 09:08

## 2023-08-14 RX ADMIN — PREGABALIN 75 MG: 75 CAPSULE ORAL at 09:08

## 2023-08-14 RX ADMIN — CHOLECALCIFEROL TAB 25 MCG (1000 UNIT) 1000 UNITS: 25 TAB at 09:08

## 2023-08-14 RX ADMIN — ACETAMINOPHEN 1000 MG: 325 TABLET ORAL at 10:08

## 2023-08-14 NOTE — PLAN OF CARE
Received call from Etta SALCEDO from [facility] [phone] regarding d/c plan for patient. Surgery anticipated for 8/16/23. LIVIER 8/18/23. CM to e-mail 1010 to be completed by MD for rehab placement. Will continue to follow.        200PM-1010 form completed by MD and E-mailed to Etta SALCEDO @ [facility]. Anticipate definitive surgery on 8/16 with d/c on 8/18. Will continue to follow for needs.        225PM- 1010 form returned from [facility] and patient approved for 14 days of rehab once she has had her definitive fixation. LIVIER 8/18/23 at this time.      Renetta SALCEDO  Case Management  [facility]  [phone]

## 2023-08-14 NOTE — SUBJECTIVE & OBJECTIVE
"Principal Problem:Closed trimalleolar fracture of left ankle with routine healing    Principal Orthopedic Problem: same as above    Interval History: NAEO. VSS. Pain well controlled. Working well with PT.    Review of patient's allergies indicates:   Allergen Reactions    Amoxicillin Hives    Penicillins Hives       Current Facility-Administered Medications   Medication    acetaminophen tablet 1,000 mg    amLODIPine tablet 5 mg    aspirin chewable tablet 81 mg    EScitalopram oxalate tablet 10 mg    glucagon (human recombinant) injection 1 mg    glucose chewable tablet 16 g    glucose chewable tablet 24 g    melatonin tablet 6 mg    methocarbamoL tablet 750 mg    naloxone 0.4 mg/mL injection 0.02 mg    ondansetron injection 4 mg    oxyCODONE immediate release tablet 5 mg    oxyCODONE immediate release tablet Tab 10 mg    polyethylene glycol packet 17 g    pregabalin capsule 75 mg    senna-docusate 8.6-50 mg per tablet 2 tablet    simethicone chewable tablet 80 mg    sodium chloride 0.9% flush 10 mL    tirzepatide PnIj 5 mg    vitamin D 1000 units tablet 1,000 Units     Objective:     Vital Signs (Most Recent):  Temp: 97.3 °F (36.3 °C) (08/14/23 0801)  Pulse: 80 (08/14/23 0846)  Resp: 18 (08/14/23 0846)  BP: 124/61 (08/14/23 0801)  SpO2: 98 % (08/14/23 0846) Vital Signs (24h Range):  Temp:  [97.3 °F (36.3 °C)-98 °F (36.7 °C)] 97.3 °F (36.3 °C)  Pulse:  [80-91] 80  Resp:  [16-18] 18  SpO2:  [97 %-98 %] 98 %  BP: (124-151)/(61-80) 124/61     Weight: 126 kg (277 lb 12.5 oz)  Height: 5' 3" (160 cm)  Body mass index is 49.21 kg/m².      Intake/Output Summary (Last 24 hours) at 8/14/2023 0943  Last data filed at 8/14/2023 0600  Gross per 24 hour   Intake --   Output 1400 ml   Net -1400 ml          Ortho/SPM Exam  LLE:   Ex fix in place, no bleeding from pin sites  Cap refill <2s  Able to wiggle toes  SILT    RLE:   Pain with ROM of the ankle  TTP over 5th metatarsal base  SILT       Significant Labs: All pertinent labs " within the past 24 hours have been reviewed.    Significant Imaging: I have reviewed and interpreted all pertinent imaging results/findings.

## 2023-08-14 NOTE — ASSESSMENT & PLAN NOTE
Patient's FSGs are controlled on current medication regimen. Patient on Mounjaro injections weekly at home to treat.   Last A1c reviewed-   Lab Results   Component Value Date    HGBA1C 5.9 (H) 08/07/2023     Most recent fingerstick glucose reviewed-   Recent Labs   Lab 08/13/23  1645 08/14/23  0618 08/14/23  1134   POCTGLUCOSE 157* 95 112*     Monitor blood sugars with meals and at bedtime in hospital.  Diabetic diet.  Target blood sugars 140-180 in hospital.

## 2023-08-14 NOTE — PT/OT/SLP PROGRESS
"Physical Therapy Treatment    Patient Name:  Kalina Ryan   MRN:  [id number]    Recommendations:     Discharge Recommendations: rehabilitation facility  Discharge Equipment Recommendations: to be determined by next level of care  Barriers to discharge: None    Assessment:     Kalina Ryan is a 58 y.o. female admitted with a medical diagnosis of Closed trimalleolar fracture of left ankle with routine healing.  She presents with the following impairments/functional limitations: weakness, impaired endurance, impaired self care skills, impaired functional mobility, gait instability, impaired balance, orthopedic precautions, decreased ROM, decreased lower extremity function, pain, decreased coordination   Pt was receptive and tolerated PT treatment well this session. Pt shows improvement with decreased assistance needed to complete supine to sit bed mobility. Pt needs min A for R lateral steps at EOB, still unable to clear RLE off ground, using shuffling steps. Pt would continue to benefit from acute skilled PT services to improve functional mobility..    Rehab Prognosis: Good; patient would benefit from acute skilled PT services to address these deficits and reach maximum level of function.    Recent Surgery: Procedure(s) (LRB):  APPLICATION, EXTERNAL FIXATION DEVICE left ankle, C-arm clock side, synthes (Left)  CLOSED REDUCTION, FRACTURE, ANKLE, TRIMALLEOLAR (Left) 5 Days Post-Op    Plan:     During this hospitalization, patient to be seen 4 x/week to address the identified rehab impairments via gait training, therapeutic activities, therapeutic exercises, neuromuscular re-education and progress toward the following goals:    Plan of Care Expires:  09/10/23    Subjective     Chief Complaint: BLE pain  Patient/Family Comments/goals: "I've been feeling more groggy today"  Pain/Comfort:  Pain Rating 1: 7/10  Location - Side 1: Bilateral  Location - Orientation 1: generalized  Location 1: leg  Pain Addressed 1: " Reposition, Distraction, Cessation of Activity  Pain Rating Post-Intervention 1: 7/10      Objective:     Communicated with RN prior to session.  Patient found HOB elevated with peripheral IV, PureWick, external fixator, telemetry upon PT entry to room.     General Precautions: Standard, fall  Orthopedic Precautions: RLE weight bearing as tolerated, LLE non weight bearing  Braces:  (RLE CAM boot)  Respiratory Status: Room air     Functional Mobility:    Bed Mobility:   Supine > Sit: stand by assistance with HOB elevated  CAM boot donned before sitting EOB  Sit > Supine: minimum assistance for LLE management     Transfers:   Sit <> Stand Transfer: minimum assistance from EOB using rolling walker     Balance:   Sitting balance: FAIR+: Maintains balance through MINIMAL excursions of active trunk motion  Standing balance:   FAIR: Maintains without assist but unable to take challenges  POOR+: Needs MIN (minimal ) assist during gait                 Gait:  Distance: 4 lateral shuffle steps at EOB   Assistive Device: RW  Assistance Level: minimum assistance  Gait Assessment: Pt unable to clear RLE off ground, shuffles RLE instead.       AM-PAC 6 CLICK MOBILITY  Turning over in bed (including adjusting bedclothes, sheets and blankets)?: 3  Sitting down on and standing up from a chair with arms (e.g., wheelchair, bedside commode, etc.): 3  Moving from lying on back to sitting on the side of the bed?: 3  Moving to and from a bed to a chair (including a wheelchair)?: 2  Need to walk in hospital room?: 1  Climbing 3-5 steps with a railing?: 1  Basic Mobility Total Score: 13       Treatment & Education:  Pt able to take 4 lateral shuffle steps at EOB with RW and min A. Pt needed total assistance for donning and doffing of CAM boot  Pt educated on safety with mobility and using call button for assistance from nursing staff with OOB mobility.  Pt educated on tips to reduce fall risk.  All questions answered within the scope of  PT.  White board updated accordingly.      Patient left HOB elevated with all lines intact, call button in reach, and spouse present..    GOALS:   Multidisciplinary Problems       Physical Therapy Goals          Problem: Physical Therapy    Goal Priority Disciplines Outcome Goal Variances Interventions   Physical Therapy Goal     PT, PT/OT Ongoing, Progressing     Description: Goals to be met by 8/24/2023    1. Pt supine to sit with SBA-not met  2. Pt sit to supine with SBA-not met  3. Pt sit to stand with mod(A) with LRAD with NWB on LLE with WBAT on R LE with CAM Boot-not met  4. Pt to perform gait 50 ft with mod(A) with LRAD with NWB on LLE with WBAT on R LE with CAM Boot.-not met  5. Pt to go up/down curb step with mod(A) with LRAD with NWB on LLE with WBAT on R LE with CAM Boot.-not met                         Time Tracking:     PT Received On: 08/14/23  PT Start Time: 1507     PT Stop Time: 1525  PT Total Time (min): 18 min     Billable Minutes: Gait Training 16    Treatment Type: Treatment  PT/PTA: PT     Number of PTA visits since last PT visit: 0     08/14/2023

## 2023-08-14 NOTE — PLAN OF CARE
Problem: Adult Inpatient Plan of Care  Goal: Plan of Care Review  Outcome: Ongoing, Progressing  Goal: Patient-Specific Goal (Individualized)  Outcome: Ongoing, Progressing  Goal: Absence of Hospital-Acquired Illness or Injury  Outcome: Ongoing, Progressing  Goal: Optimal Comfort and Wellbeing  Outcome: Ongoing, Progressing     Problem: Diabetes Comorbidity  Goal: Blood Glucose Level Within Targeted Range  Outcome: Ongoing, Progressing     Problem: Skin Injury Risk Increased  Goal: Skin Health and Integrity  Outcome: Ongoing, Progressing     Problem: Impaired Wound Healing  Goal: Optimal Wound Healing  Outcome: Ongoing, Progressing

## 2023-08-14 NOTE — SUBJECTIVE & OBJECTIVE
Interval History: Left ankle still too swollen today as per Ortho to proceed for definitive fixation to left ankle fracture today. Ortho to continue to reassess daily. Patient continuing to elevate and ice left ankle to help with swelling. Patient reports right ankle and foot pain controlled. Left ankle pain controlled and 3/10. NO new labs today. Vital signs stable.     Review of Systems   Constitutional:  Negative for fever.   Respiratory:  Negative for cough and shortness of breath.    Cardiovascular:  Negative for chest pain.   Gastrointestinal:  Negative for abdominal pain, nausea and vomiting.   Musculoskeletal:  Positive for arthralgias (Left ankle; Right ankle and right hip) and myalgias (Left ankle area).   Neurological:  Positive for weakness (Bilateral lower extremities). Negative for dizziness and light-headedness.   Psychiatric/Behavioral:  Negative for agitation and confusion.      Objective:     Vital Signs (Most Recent):  Temp: 97.2 °F (36.2 °C) (08/14/23 1136)  Pulse: 82 (08/14/23 1136)  Resp: 18 (08/14/23 1136)  BP: 115/66 (08/14/23 1136)  SpO2: 91 % (08/14/23 1136) on room air Vital Signs (24h Range):  Temp:  [97.2 °F (36.2 °C)-98 °F (36.7 °C)] 97.2 °F (36.2 °C)  Pulse:  [80-90] 82  Resp:  [16-18] 18  SpO2:  [91 %-98 %] 91 %  BP: (115-146)/(61-79) 115/66     Weight: 126 kg (277 lb 12.5 oz)  Body mass index is 49.21 kg/m².    Intake/Output Summary (Last 24 hours) at 8/14/2023 1504  Last data filed at 8/14/2023 0600  Gross per 24 hour   Intake --   Output 1400 ml   Net -1400 ml         Physical Exam  Vitals and nursing note reviewed.   Constitutional:       General: She is awake. She is not in acute distress.     Appearance: Normal appearance. She is well-developed. She is morbidly obese. She is not ill-appearing.   Cardiovascular:      Rate and Rhythm: Normal rate and regular rhythm.      Heart sounds: Normal heart sounds. No murmur heard.     No friction rub. No gallop.   Pulmonary:      Effort:  Pulmonary effort is normal. No respiratory distress.      Breath sounds: Normal breath sounds. No wheezing.   Abdominal:      General: Abdomen is flat. Bowel sounds are normal. There is no distension.      Palpations: Abdomen is soft.      Tenderness: There is no abdominal tenderness.   Musculoskeletal:         General: Swelling (Left ankle and right ankle and foot) present.      Comments: Ex fix in place to left ankle    Skin:     General: Skin is warm.      Findings: Bruising (Right lateral ankle and lateral aspect of right foot) present. No erythema.   Neurological:      Mental Status: She is alert and oriented to person, place, and time.   Psychiatric:         Mood and Affect: Mood normal.         Behavior: Behavior normal. Behavior is cooperative.         Thought Content: Thought content normal.         Judgment: Judgment normal.             Significant Labs: CBC:   Recent Labs   Lab 08/13/23  0551   WBC 8.32   HGB 9.7*   HCT 30.5*        CMP:   Recent Labs   Lab 08/13/23  0551      K 4.4      CO2 27   GLU 91   BUN 16   CREATININE 0.8   CALCIUM 8.9   ANIONGAP 8       Significant Imaging: I have reviewed all pertinent imaging results/findings within the past 24 hours.

## 2023-08-14 NOTE — NURSING
Plan of care reviewed with patient. Patient verbalized understanding. Side rails x 2, bed in lowest position, call bell within reach, pt advised to call for assistance. Maintain bed alarm for patient safety.

## 2023-08-14 NOTE — ASSESSMENT & PLAN NOTE
- Patient s/p fall of 3 steps while going down the stairs while at work.   - Imaging showed left ankle trimalleolar fracture and dislocation. Patient seen by Orthopedic surgery and left ankle was reduced and splinted in ER placed in short leg splint.   - Patient taken to OR on 8/9/2023 and had ex fix placed to left ankle.  - Routine pin site care post-op to left ankle ex fix.  - Non weight bear to left lower extremity.  - Ice and elevate left leg to help with swelling with plan to proceed with removal of ex fix and definitive fixation of left ankle fracture once left ankle swelling improved. Patient to remain in hospital until next surgery. Ortho reassessing left ankle swelling daily to see when can proceed with definitve fixation surgery.   - PT/OT consulted and working with patient in hospital.   - Continue Aspirin 81 mg po BID for DVT prophylaxis.  - Pain controlled. Continue scheduled Tylenol 1000 mg po TID + Robaxin 750 mg po 4 times daily and Lyrica 75 mg po BID and continue with Oxycodone IR prn for breakthrough pain.

## 2023-08-14 NOTE — PROGRESS NOTES
[facility] Medicine  Progress Note    Patient Name: Kalina Ryan  MRN: [id number]  Patient Class: IP- Inpatient   Admission Date: 8/7/2023  Length of Stay: 5 days  Attending Physician: Claudia Graves MD  Primary Care Provider: Nany Hoff NP        Subjective:     Principal Problem:Closed trimalleolar fracture of left ankle with routine healing        HPI:  Kalina Ryan is a 58 y.o. 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 Patient states she twisted her ankle and fell down 3 step while going down the stairs at work. She had immediate pain and inability to bear weight. She noticed her left deformity and were screaming in pain. He coworker called EMS. Denies head injury or LOC. Denies prior injuries or surgeries to the left ankle. She is not on blood thinners. Denies other MSK pains or paresthesias. She walks without assistive devices at baseline. She lives at home with her .     ED course: afebrile and vitals stable. X-ray showed left ankle trimalleolar fracture and dislocation, and right ankle lateral malleolus fx. Patient seen by orthopedic and L ankle was Reduced and splinted in ER placed in short leg splint. Patient received profopol, fenatnyl and oxycodone in ED during conscious sedation for closed reduction of left ankle fracture.     During my interview patient is awake, conversant and not in distress. She reports pain and spasm in both ankles with right side hurting more than left. She denies tobacco, alcohol or other illicit drug use. She is able to ambulate few blocks at baseline without chest pain or SOB.           Overview/Hospital Course:  Patient admitted with closed left trimalleolar ankle fracture. Ortho consulted and taken to OR on 8/9 and had external fixator placed to left  ankle. Post-op, patient non weight bearing to left lower extremity. Patient to elevate and ce left ankle to help with swelling. Needs improvement in left ankle swelling prior to proceeding with definitive fixation surgery to left ankle. Patient to remain in hospital until definitive fixation surgery. Neurology consulted for progressive bilateral leg weakness in patient with previous diagnosis of Guillain [place] and CIDP. Neurology noted likely CIDP and recommended to check vitamin levels (B12, folate, copper, zinc, and thiamine). Neurology noted patient with history of CIDP and received steroids and IVIG in past an used to see Dr. Moses but no treatment since 2012. She has self-contained episodes in which she did not seek medical attention. These symptoms are similar. Want to avoid steroids now with planned surgery. Follow-up with neuromuscular Neurologist for possible IVIG as outpatient upon discharge. If any change in status, reconsult Neurology. Patient also noted on admit to have closed right ankle lateral malleolus fracture and closed right 5th metatarsal fractures but Ortho states no surgery needed and okay for patient to weight bear as tolerated to right lower extremity and to use tall walking boot to right leg when ambulating.       Interval History: Left ankle still too swollen today as per Ortho to proceed for definitive fixation to left ankle fracture today. Ortho to continue to reassess daily. Patient continuing to elevate and ice left ankle to help with swelling. Patient reports right ankle and foot pain controlled. Left ankle pain controlled and 3/10. NO new labs today. Vital signs stable.     Review of Systems   Constitutional:  Negative for fever.   Respiratory:  Negative for cough and shortness of breath.    Cardiovascular:  Negative for chest pain.   Gastrointestinal:  Negative for abdominal pain, nausea and vomiting.   Musculoskeletal:  Positive for arthralgias (Left ankle; Right ankle and right  hip) and myalgias (Left ankle area).   Neurological:  Positive for weakness (Bilateral lower extremities). Negative for dizziness and light-headedness.   Psychiatric/Behavioral:  Negative for agitation and confusion.      Objective:     Vital Signs (Most Recent):  Temp: 97.2 °F (36.2 °C) (08/14/23 1136)  Pulse: 82 (08/14/23 1136)  Resp: 18 (08/14/23 1136)  BP: 115/66 (08/14/23 1136)  SpO2: 91 % (08/14/23 1136) on room air Vital Signs (24h Range):  Temp:  [97.2 °F (36.2 °C)-98 °F (36.7 °C)] 97.2 °F (36.2 °C)  Pulse:  [80-90] 82  Resp:  [16-18] 18  SpO2:  [91 %-98 %] 91 %  BP: (115-146)/(61-79) 115/66     Weight: 126 kg (277 lb 12.5 oz)  Body mass index is 49.21 kg/m².    Intake/Output Summary (Last 24 hours) at 8/14/2023 1504  Last data filed at 8/14/2023 0600  Gross per 24 hour   Intake --   Output 1400 ml   Net -1400 ml         Physical Exam  Vitals and nursing note reviewed.   Constitutional:       General: She is awake. She is not in acute distress.     Appearance: Normal appearance. She is well-developed. She is morbidly obese. She is not ill-appearing.   Cardiovascular:      Rate and Rhythm: Normal rate and regular rhythm.      Heart sounds: Normal heart sounds. No murmur heard.     No friction rub. No gallop.   Pulmonary:      Effort: Pulmonary effort is normal. No respiratory distress.      Breath sounds: Normal breath sounds. No wheezing.   Abdominal:      General: Abdomen is flat. Bowel sounds are normal. There is no distension.      Palpations: Abdomen is soft.      Tenderness: There is no abdominal tenderness.   Musculoskeletal:         General: Swelling (Left ankle and right ankle and foot) present.      Comments: Ex fix in place to left ankle    Skin:     General: Skin is warm.      Findings: Bruising (Right lateral ankle and lateral aspect of right foot) present. No erythema.   Neurological:      Mental Status: She is alert and oriented to person, place, and time.   Psychiatric:         Mood and  Affect: Mood normal.         Behavior: Behavior normal. Behavior is cooperative.         Thought Content: Thought content normal.         Judgment: Judgment normal.             Significant Labs: CBC:   Recent Labs   Lab 08/13/23  0551   WBC 8.32   HGB 9.7*   HCT 30.5*        CMP:   Recent Labs   Lab 08/13/23  0551      K 4.4      CO2 27   GLU 91   BUN 16   CREATININE 0.8   CALCIUM 8.9   ANIONGAP 8       Significant Imaging: I have reviewed all pertinent imaging results/findings within the past 24 hours.      Assessment/Plan:      * Closed trimalleolar fracture of left ankle with routine healing s/p ex fix on 8/9/2023  - Patient s/p fall of 3 steps while going down the stairs while at work.   - Imaging showed left ankle trimalleolar fracture and dislocation. Patient seen by Orthopedic surgery and left ankle was reduced and splinted in ER placed in short leg splint.   - Patient taken to OR on 8/9/2023 and had ex fix placed to left ankle.  - Routine pin site care post-op to left ankle ex fix.  - Non weight bear to left lower extremity.  - Ice and elevate left leg to help with swelling with plan to proceed with removal of ex fix and definitive fixation of left ankle fracture once left ankle swelling improved. Patient to remain in hospital until next surgery. Ortho reassessing left ankle swelling daily to see when can proceed with definitve fixation surgery.   - PT/OT consulted and working with patient in hospital.   - Continue Aspirin 81 mg po BID for DVT prophylaxis.  - Pain controlled. Continue scheduled Tylenol 1000 mg po TID + Robaxin 750 mg po 4 times daily and Lyrica 75 mg po BID and continue with Oxycodone IR prn for breakthrough pain.     Closed dislocation of left ankle  Patient s/p closed reduction by Orthopedic in ED with short leg splint placement.        Closed displaced fracture of lateral malleolus of right fibula with routine healing  Closed nondisplaced fracture of fifth metatarsal  bone of right foot with routine healing  · Noted on admit on X-rays to have non-displaced closed fractures of right lateral malleolus and right 5th metatarsal. Orthopedics consulted and recommended non-operative management and patient okay to weight bear as tolerated to right lower extremity. Patient to ambulate in tall walking boot when out of bed.   · Pain management.   · Aspirin 81 mg po BID for DVT prophylaxis.     CIDP (chronic inflammatory demyelinating polyneuropathy)  Bilateral lower extremity weakness   - Patient with known history of bilateral lower extremity weakness but normally able to ambulate without assistive device.   - Patient reports progressive weakness over past 3 months.   - Neurology consulted. Check vitamin levels. Check heavy metal levels.  - Per Neurology: Patient reports history of CIDP and received steroids and IVIG in past and used to follow with Dr. Moses and no treatment since 2012. She has self-contained episodes in which she did not seek medical attention. These symptoms are similar. Want to avoid steroids now with planned surgery. Neurology recommends neuromuscular Neurologist for possible IVIG as outpatient upon discharge.  -  If any change in status, reconsult Neurology.    Type 2 diabetes mellitus without complication, without long-term current use of insulin  Patient's FSGs are controlled on current medication regimen. Patient on Mounjaro injections weekly at home to treat.   Last A1c reviewed-   Lab Results   Component Value Date    HGBA1C 5.9 (H) 08/07/2023     Most recent fingerstick glucose reviewed-   Recent Labs   Lab 08/13/23  1645 08/14/23  0618 08/14/23  1134   POCTGLUCOSE 157* 95 112*     Monitor blood sugars with meals and at bedtime in hospital.  Diabetic diet.  Target blood sugars 140-180 in hospital.     Class 3 severe obesity due to excess calories with serious comorbidity and body mass index (BMI) of 45.0 to 49.9 in adult  Personal history of gastric bypass    Body mass index is 49.21 kg/m². Morbid obesity complicates all aspects of disease management from diagnostic modalities to treatment. Weight loss encouraged and health benefits explained to patient.         Benign essential HTN  Chronic and controlled. Continue home Amlodipine to treat. Home Lisinopril on hold. Target BP < 140/90. Monitor vital signs every 4 hours.       Anxiety  Chronic and controlled. Continue home Lexapro to treat.           VTE Risk Mitigation (From admission, onward)         Ordered     IP VTE HIGH RISK PATIENT  Once         08/08/23 0036     Place sequential compression device  Until discontinued         08/08/23 0036                Discharge Planning   LIVIER: 8/18/2023     Code Status: Full Code   Is the patient medically ready for discharge?: No    Reason for patient still in hospital (select all that apply): Patient trending condition  Discharge Plan A: Rehab; Awaiting definitive fixation surgery to left ankle prior to discharge to [facility]. Received approval for IP rehab from her Workman's comp.       Claudia Graves MD  Department of Hospital Medicine   [facility] - Surgery

## 2023-08-14 NOTE — PROGRESS NOTES
[facility]  Orthopedics  Progress Note    Attg Note:  I agree with the resident's assessment and plan.  Plan ORIF when soft tissues amenable.    Kali Santoyo MD      Patient Name: Kalina Ryan  MRN: [id number]  Admission Date: 8/7/2023  Hospital Length of Stay: 5 days  Attending Provider: Claudia Graves MD  Primary Care Provider: Nany Hoff NP  Follow-up For: Procedure(s) (LRB):  APPLICATION, EXTERNAL FIXATION DEVICE left ankle, C-arm clock side, synthes (Left)  CLOSED REDUCTION, FRACTURE, ANKLE, TRIMALLEOLAR (Left)    Post-Operative Day: 5 Days Post-Op  Subjective:     Principal Problem:Closed trimalleolar fracture of left ankle with routine healing    Principal Orthopedic Problem: same as above    Interval History: NAEO. VSS. Pain well controlled. Working well with PT.    Review of patient's allergies indicates:   Allergen Reactions    Amoxicillin Hives    Penicillins Hives       Current Facility-Administered Medications   Medication    acetaminophen tablet 1,000 mg    amLODIPine tablet 5 mg    aspirin chewable tablet 81 mg    EScitalopram oxalate tablet 10 mg    glucagon (human recombinant) injection 1 mg    glucose chewable tablet 16 g    glucose chewable tablet 24 g    melatonin tablet 6 mg    methocarbamoL tablet 750 mg    naloxone 0.4 mg/mL injection 0.02 mg    ondansetron injection 4 mg    oxyCODONE immediate release tablet 5 mg    oxyCODONE immediate release tablet Tab 10 mg    polyethylene glycol packet 17 g    pregabalin capsule 75 mg    senna-docusate 8.6-50 mg per tablet 2 tablet    simethicone chewable tablet 80 mg    sodium chloride 0.9% flush 10 mL    tirzepatide PnIj 5 mg    vitamin D 1000 units tablet 1,000 Units     Objective:     Vital Signs (Most Recent):  Temp: 97.3 °F (36.3 °C) (08/14/23 0801)  Pulse: 80 (08/14/23 0846)  Resp: 18 (08/14/23 0846)  BP: 124/61 (08/14/23 0801)  SpO2: 98 % (08/14/23 0846) Vital Signs (24h Range):  Temp:  [97.3 °F (36.3 °C)-98 °F (36.7 °C)]  "97.3 °F (36.3 °C)  Pulse:  [80-91] 80  Resp:  [16-18] 18  SpO2:  [97 %-98 %] 98 %  BP: (124-151)/(61-80) 124/61     Weight: 126 kg (277 lb 12.5 oz)  Height: 5' 3" (160 cm)  Body mass index is 49.21 kg/m².      Intake/Output Summary (Last 24 hours) at 8/14/2023 0943  Last data filed at 8/14/2023 0600  Gross per 24 hour   Intake --   Output 1400 ml   Net -1400 ml         Ortho/SPM Exam  LLE:   Ex fix in place, no bleeding from pin sites  Cap refill <2s  Able to wiggle toes  SILT    RLE:   Pain with ROM of the ankle  TTP over 5th metatarsal base  SILT       Significant Labs: All pertinent labs within the past 24 hours have been reviewed.    Significant Imaging: I have reviewed and interpreted all pertinent imaging results/findings.    Assessment/Plan:     * Closed trimalleolar fracture of left ankle with routine healing s/p ex fix on 8/9/2023  Kalina Ryan is a 58 y.o. female with left ankle trimalleolar fracture and dislocation and base of 5th MT fx s/p ankle spanning ex fix 8/9/23. We will plan for definitive surgery when soft tissues more amenable. Ankle appears moderately swollen but without significant bruising or fracture blisters at this time.    Pain: MM  NWB LLE, WBAT RLE in boot  Pin site care BID  ASA 81 BID  Definitive surgery pending improved swelling, diet today              Ozzie Kothari MD  Orthopedics  [facility] - Surgery  "

## 2023-08-14 NOTE — ASSESSMENT & PLAN NOTE
Kalina Ryan is a 58 y.o. female with left ankle trimalleolar fracture and dislocation and base of 5th MT fx s/p ankle spanning ex fix 8/9/23. We will plan for definitive surgery when soft tissues more amenable. Ankle appears moderately swollen but without significant bruising or fracture blisters at this time.    Pain: MM  NWB LLE, WBAT RLE in boot  Pin site care BID  ASA 81 BID  Definitive surgery pending improved swelling, diet today

## 2023-08-14 NOTE — CONSULTS
Inpatient consult to Physical Medicine Rehab  Consult performed by: Therese Thomas NP  Consult ordered by: Claudia Graves MD      Consult received.  Reviewed patient history and current admission.  PM&R following.    Therese Thomas NP  Physical Medicine & Rehabilitation   08/14/2023

## 2023-08-15 LAB
POCT GLUCOSE: 105 MG/DL (ref 70–110)
POCT GLUCOSE: 116 MG/DL (ref 70–110)
POCT GLUCOSE: 93 MG/DL (ref 70–110)
POCT GLUCOSE: 99 MG/DL (ref 70–110)

## 2023-08-15 PROCEDURE — 94010 BREATHING CAPACITY TEST: CPT

## 2023-08-15 PROCEDURE — 25000003 PHARM REV CODE 250

## 2023-08-15 PROCEDURE — 25000003 PHARM REV CODE 250: Performed by: INTERNAL MEDICINE

## 2023-08-15 PROCEDURE — 25000003 PHARM REV CODE 250: Performed by: HOSPITALIST

## 2023-08-15 PROCEDURE — 25000003 PHARM REV CODE 250: Performed by: PHYSICIAN ASSISTANT

## 2023-08-15 PROCEDURE — 99232 PR SUBSEQUENT HOSPITAL CARE,LEVL II: ICD-10-PCS | Mod: ,,, | Performed by: INTERNAL MEDICINE

## 2023-08-15 PROCEDURE — 11000001 HC ACUTE MED/SURG PRIVATE ROOM

## 2023-08-15 PROCEDURE — 99232 SBSQ HOSP IP/OBS MODERATE 35: CPT | Mod: ,,, | Performed by: INTERNAL MEDICINE

## 2023-08-15 PROCEDURE — 97530 THERAPEUTIC ACTIVITIES: CPT

## 2023-08-15 PROCEDURE — 94150 VITAL CAPACITY TEST: CPT

## 2023-08-15 RX ORDER — BISACODYL 10 MG
10 SUPPOSITORY, RECTAL RECTAL ONCE
Status: COMPLETED | OUTPATIENT
Start: 2023-08-15 | End: 2023-08-15

## 2023-08-15 RX ORDER — BISACODYL 10 MG
10 SUPPOSITORY, RECTAL RECTAL DAILY PRN
Status: DISCONTINUED | OUTPATIENT
Start: 2023-08-16 | End: 2023-08-19 | Stop reason: HOSPADM

## 2023-08-15 RX ADMIN — Medication 6 MG: at 09:08

## 2023-08-15 RX ADMIN — OXYCODONE HYDROCHLORIDE 10 MG: 10 TABLET ORAL at 02:08

## 2023-08-15 RX ADMIN — ESCITALOPRAM OXALATE 10 MG: 5 TABLET, FILM COATED ORAL at 08:08

## 2023-08-15 RX ADMIN — ACETAMINOPHEN 1000 MG: 325 TABLET ORAL at 09:08

## 2023-08-15 RX ADMIN — CHOLECALCIFEROL TAB 25 MCG (1000 UNIT) 1000 UNITS: 25 TAB at 08:08

## 2023-08-15 RX ADMIN — SENNOSIDES AND DOCUSATE SODIUM 2 TABLET: 50; 8.6 TABLET ORAL at 09:08

## 2023-08-15 RX ADMIN — METHOCARBAMOL 750 MG: 750 TABLET ORAL at 02:08

## 2023-08-15 RX ADMIN — PREGABALIN 75 MG: 75 CAPSULE ORAL at 08:08

## 2023-08-15 RX ADMIN — OXYCODONE HYDROCHLORIDE 10 MG: 10 TABLET ORAL at 06:08

## 2023-08-15 RX ADMIN — BISACODYL 10 MG: 10 SUPPOSITORY RECTAL at 08:08

## 2023-08-15 RX ADMIN — SENNOSIDES AND DOCUSATE SODIUM 2 TABLET: 50; 8.6 TABLET ORAL at 08:08

## 2023-08-15 RX ADMIN — POLYETHYLENE GLYCOL 3350 17 G: 17 POWDER, FOR SOLUTION ORAL at 08:08

## 2023-08-15 RX ADMIN — AMLODIPINE BESYLATE 5 MG: 5 TABLET ORAL at 08:08

## 2023-08-15 RX ADMIN — ASPIRIN 81 MG 81 MG: 81 TABLET ORAL at 08:08

## 2023-08-15 RX ADMIN — ACETAMINOPHEN 1000 MG: 325 TABLET ORAL at 02:08

## 2023-08-15 RX ADMIN — PREGABALIN 75 MG: 75 CAPSULE ORAL at 09:08

## 2023-08-15 RX ADMIN — METHOCARBAMOL 750 MG: 750 TABLET ORAL at 08:08

## 2023-08-15 RX ADMIN — METHOCARBAMOL 750 MG: 750 TABLET ORAL at 06:08

## 2023-08-15 RX ADMIN — OXYCODONE HYDROCHLORIDE 10 MG: 10 TABLET ORAL at 09:08

## 2023-08-15 RX ADMIN — ASPIRIN 81 MG 81 MG: 81 TABLET ORAL at 09:08

## 2023-08-15 RX ADMIN — ACETAMINOPHEN 1000 MG: 325 TABLET ORAL at 05:08

## 2023-08-15 NOTE — PT/OT/SLP PROGRESS
"Occupational Therapy   Treatment    Name: Kalina Ryan  MRN: [id number]  Admitting Diagnosis:  Closed trimalleolar fracture of left ankle with routine healing  6 Days Post-Op    Recommendations:     Discharge Recommendations: rehabilitation facility  Discharge Equipment Recommendations:  to be determined by next level of care  Barriers to discharge:  None    Assessment:     Kalina Ryan is a 58 y.o. female with a medical diagnosis of Closed trimalleolar fracture of left ankle with routine healing.  She presents with the following performance deficits affecting function: weakness, impaired endurance, impaired self care skills, impaired functional mobility, gait instability, impaired balance, decreased lower extremity function, pain, decreased ROM, orthopedic precautions, impaired skin. Pt agreeable to therapy and tolerated well. Pt remains limited in ADLs, functional mobility, and functional transfers and is currently not performing tasks at [facility]. Pt would continue to benefit from skilled OT services to maximize functional independence with ADLs and functional mobility, reduce caregiver burden, and facilitate safe discharge in the least restrictive environment.    Rehab Prognosis:  Good; patient would benefit from acute skilled OT services to address these deficits and reach maximum level of function.       Plan:     Patient to be seen 4 x/week to address the above listed problems via self-care/home management, therapeutic activities, therapeutic exercises, neuromuscular re-education  Plan of Care Expires: 09/09/23  Plan of Care Reviewed with: patient    Subjective   "It's not hurting as much today"  Chief Complaint: L LE pain with functional mobility  Patient/Family Comments/goals: to get better  Pain/Comfort:  Pain Rating 1: 0/10 (at rest)  Location - Side 1: Left  Location - Orientation 1: lower  Location 1: leg  Pain Addressed 1: Reposition, Distraction, Pre-medicate for activity  Pain Rating " Post-Intervention 1:  (pt did not rate)    Objective:     Communicated with: RN prior to session.  Patient found HOB elevated with external fixator, Corina, SCD upon OT entry to room.    General Precautions: Standard, fall    Orthopedic Precautions:RLE weight bearing as tolerated, LLE non weight bearing  Braces:  (RLE CAM boot)  Respiratory Status: Room air     Occupational Performance:     Bed Mobility:    Patient completed Scooting/Bridging with stand by assistance  Patient completed Supine to Sit with stand by assistance     Functional Mobility/Transfers:  Patient completed Sit <> Stand Transfer with minimum assistance  with  rolling walker   Patient completed Bed > Chair Transfer using Step/Slide Transfer technique with contact guard assistance with rolling walker  No LOB or SOB noted  Pt able to maintain Wb'ng precautions to L LE with no cueing required      Activities of Daily Living:  Lower Body Dressing: minimum assistance required to initiate donning (R) sock with pt able to manipulate above heel in long sitting; max (A) to don (R) CAM boot EOB      [facility] 6 Click ADL: 17    Treatment & Education:  -Education on weightbearing and surgical precautions; education on CAM boot wear schedule  -Education on energy conservation and task modification to maximize safety and (I) during ADLs and mobility  -Education on importance of OOB activity to improve overall activity tolerance and promote recovery  -Pt educated to call for assistance and to transfer with hospital staff only  -Provided education regarding role of OT, POC, & discharge recommendations with pt verbalizing understanding.  Pt had no further questions & when asked whether there were any concerns pt reported none.     Patient left up in chair with all lines intact, call button in reach, and RN notified    GOALS:   Multidisciplinary Problems       Occupational Therapy Goals          Problem: Occupational Therapy    Goal Priority Disciplines Outcome  Interventions   Occupational Therapy Goal     OT, PT/OT Ongoing, Progressing    Description: Goals to be met by: 8/20/23     Patient will increase functional independence with ADLs by performing:    UE Dressing with Minimal Assistance.  LE Dressing with Minimal Assistance.  Grooming while seated at sink with Supervision.  Toileting from bedside commode with Minimal Assistance for hygiene and clothing management.   Toilet transfer to bedside commode with Minimal Assistance.                         Time Tracking:     OT Date of Treatment: 08/15/23  OT Start Time: 1252  OT Stop Time: 1308  OT Total Time (min): 16 min    Billable Minutes:Therapeutic Activity 16    OT/ROSANNA: OT          8/15/2023

## 2023-08-15 NOTE — PROGRESS NOTES
[facility]  Orthopedics  Progress Note    Patient Name: Kalina Ryan  MRN: [id number]  Admission Date: 8/7/2023  Hospital Length of Stay: 6 days  Attending Provider: Claudia Graves MD  Primary Care Provider: Nany Hoff NP  Follow-up For: Procedure(s) (LRB):  APPLICATION, EXTERNAL FIXATION DEVICE left ankle, C-arm clock side, synthes (Left)  CLOSED REDUCTION, FRACTURE, ANKLE, TRIMALLEOLAR (Left)    Post-Operative Day: 6 Days Post-Op  Subjective:     Principal Problem:Closed trimalleolar fracture of left ankle with routine healing    Principal Orthopedic Problem: same as above    Interval History: NAEO. VSS. Pain well controlled. 4 lateral step with PT yesterday.    Review of patient's allergies indicates:   Allergen Reactions    Amoxicillin Hives    Penicillins Hives       Current Facility-Administered Medications   Medication    acetaminophen tablet 1,000 mg    amLODIPine tablet 5 mg    aspirin chewable tablet 81 mg    EScitalopram oxalate tablet 10 mg    glucagon (human recombinant) injection 1 mg    glucose chewable tablet 16 g    glucose chewable tablet 24 g    melatonin tablet 6 mg    methocarbamoL tablet 750 mg    naloxone 0.4 mg/mL injection 0.02 mg    ondansetron injection 4 mg    oxyCODONE immediate release tablet 5 mg    oxyCODONE immediate release tablet Tab 10 mg    polyethylene glycol packet 17 g    pregabalin capsule 75 mg    senna-docusate 8.6-50 mg per tablet 2 tablet    simethicone chewable tablet 80 mg    sodium chloride 0.9% flush 10 mL    tirzepatide PnIj 5 mg    vitamin D 1000 units tablet 1,000 Units     Objective:     Vital Signs (Most Recent):  Temp: 98.5 °F (36.9 °C) (08/15/23 0341)  Pulse: 81 (08/15/23 0341)  Resp: 18 (08/15/23 0601)  BP: 126/71 (08/15/23 0341)  SpO2: 99 % (08/15/23 0341) Vital Signs (24h Range):  Temp:  [96 °F (35.6 °C)-98.5 °F (36.9 °C)] 98.5 °F (36.9 °C)  Pulse:  [79-92] 81  Resp:  [16-20] 18  SpO2:  [91 %-99 %] 99 %  BP:  "(99143)/(55-71) 126/71     Weight: 126 kg (277 lb 12.5 oz)  Height: 5' 3" (160 cm)  Body mass index is 49.21 kg/m².      Intake/Output Summary (Last 24 hours) at 8/15/2023 0647  Last data filed at 8/14/2023 2200  Gross per 24 hour   Intake 200 ml   Output 550 ml   Net -350 ml         Ortho/SPM Exam  LLE:   Ex fix in place, no bleeding from pin sites  Cap refill <2s  Able to wiggle toes  SILT    RLE:   Pain with ROM of the ankle  TTP over 5th metatarsal base  SILT       Significant Labs: All pertinent labs within the past 24 hours have been reviewed.    Significant Imaging: I have reviewed and interpreted all pertinent imaging results/findings.    Assessment/Plan:     * Closed trimalleolar fracture of left ankle with routine healing s/p ex fix on 8/9/2023  Kalina Ryan is a 58 y.o. female with left ankle trimalleolar fracture and dislocation and base of 5th MT fx s/p ankle spanning ex fix 8/9/23. We will plan for definitive surgery when soft tissues more amenable. Ankle appears moderately swollen but without significant bruising or fracture blisters at this time.    Pain: MM  NWB LLE, WBAT RLE in boot  Pin site care BID  ASA 81 BID  Definitive surgery pending improved swelling, will discuss with staff.               Ozzie Kothari MD  Orthopedics  [facility] - Surgery  "

## 2023-08-15 NOTE — PROGRESS NOTES
[facility] Medicine  Progress Note    Patient Name: Kalina Ryan  MRN: [id number]  Patient Class: IP- Inpatient   Admission Date: 8/7/2023  Length of Stay: 6 days  Attending Physician: Claudia Graves MD  Primary Care Provider: Nany Hoff NP        Subjective:     Principal Problem:Closed trimalleolar fracture of left ankle with routine healing        HPI:  Kalina Ryan is a 58 y.o. 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 Patient states she twisted her ankle and fell down 3 step while going down the stairs at work. She had immediate pain and inability to bear weight. She noticed her left deformity and were screaming in pain. He coworker called EMS. Denies head injury or LOC. Denies prior injuries or surgeries to the left ankle. She is not on blood thinners. Denies other MSK pains or paresthesias. She walks without assistive devices at baseline. She lives at home with her .     ED course: afebrile and vitals stable. X-ray showed left ankle trimalleolar fracture and dislocation, and right ankle lateral malleolus fx. Patient seen by orthopedic and L ankle was Reduced and splinted in ER placed in short leg splint. Patient received profopol, fenatnyl and oxycodone in ED during conscious sedation for closed reduction of left ankle fracture.     During my interview patient is awake, conversant and not in distress. She reports pain and spasm in both ankles with right side hurting more than left. She denies tobacco, alcohol or other illicit drug use. She is able to ambulate few blocks at baseline without chest pain or SOB.           Overview/Hospital Course:  Patient admitted with closed left trimalleolar ankle fracture. Ortho consulted and taken to OR on 8/9 and had external fixator placed to left  ankle. Post-op, patient non weight bearing to left lower extremity. Patient to elevate and ce left ankle to help with swelling. Needs improvement in left ankle swelling prior to proceeding with definitive fixation surgery to left ankle. Patient to remain in hospital until definitive fixation surgery. Neurology consulted for progressive bilateral leg weakness in patient with previous diagnosis of Guillain Laura and CIDP. Neurology noted likely CIDP and recommended to check vitamin levels (B12, folate, copper, zinc, and thiamine). Neurology noted patient with history of CIDP and received steroids and IVIG in past an used to see Dr. Moses but no treatment since 2012. She has self-contained episodes in which she did not seek medical attention. These symptoms are similar. Want to avoid steroids now with planned surgery. Follow-up with neuromuscular Neurologist for possible IVIG as outpatient upon discharge. If any change in status, reconsult Neurology. Patient also noted on admit to have closed right ankle lateral malleolus fracture and closed right 5th metatarsal fractures but Ortho states no surgery needed and okay for patient to weight bear as tolerated to right lower extremity and to use tall walking boot to right leg when ambulating.       Interval History: Patient's left ankle still too swollen according to Ortho to proceed with definitive fixation. Ortho to reassess again tomorrow. Patient has been very mindful and elevating and icing her left ankle for swelling. Pain to left and right ankle both controlled. No new labs today. Vital signs stable.     Review of Systems   Constitutional:  Negative for fever.   Respiratory:  Negative for cough and shortness of breath.    Cardiovascular:  Negative for chest pain.   Gastrointestinal:  Negative for abdominal pain, nausea and vomiting.   Musculoskeletal:  Positive for arthralgias (Left ankle; Right ankle and right hip) and myalgias (Left ankle area).   Neurological:   Positive for weakness (Bilateral lower extremities). Negative for dizziness.   Psychiatric/Behavioral:  Negative for agitation and confusion.      Objective:     Vital Signs (Most Recent):  Temp: 98 °F (36.7 °C) (08/15/23 1128)  Pulse: 88 (08/15/23 1128)  Resp: 17 (08/15/23 1128)  BP: 123/68 (08/15/23 1128)  SpO2: 94 % (08/15/23 1128) on room air Vital Signs (24h Range):  Temp:  [96 °F (35.6 °C)-98.5 °F (36.9 °C)] 98 °F (36.7 °C)  Pulse:  [79-92] 88  Resp:  [17-20] 17  SpO2:  [94 %-99 %] 94 %  BP: ()/(55-71) 123/68     Weight: 126 kg (277 lb 12.5 oz)  Body mass index is 49.21 kg/m².    Intake/Output Summary (Last 24 hours) at 8/15/2023 1239  Last data filed at 8/15/2023 1221  Gross per 24 hour   Intake 440 ml   Output 1050 ml   Net -610 ml         Physical Exam  Vitals and nursing note reviewed.   Constitutional:       General: She is awake. She is not in acute distress.     Appearance: Normal appearance. She is well-developed. She is morbidly obese. She is not ill-appearing.   Cardiovascular:      Rate and Rhythm: Normal rate and regular rhythm.      Heart sounds: Normal heart sounds. No murmur heard.     No friction rub. No gallop.   Pulmonary:      Effort: Pulmonary effort is normal. No respiratory distress.      Breath sounds: Normal breath sounds. No wheezing.   Abdominal:      General: Abdomen is flat. Bowel sounds are normal. There is no distension.      Palpations: Abdomen is soft.      Tenderness: There is no abdominal tenderness.   Musculoskeletal:         General: Swelling (Left ankle and right ankle and foot) present.      Comments: Ex fix in place to left ankle    Skin:     General: Skin is warm.      Findings: Bruising (Right lateral ankle and lateral aspect of right foot) present. No erythema.   Neurological:      Mental Status: She is alert and oriented to person, place, and time.   Psychiatric:         Mood and Affect: Mood normal.         Behavior: Behavior normal. Behavior is cooperative.          Thought Content: Thought content normal.         Judgment: Judgment normal.             Significant Labs: All pertinent labs within the past 24 hours have been reviewed.    Significant Imaging: I have reviewed all pertinent imaging results/findings within the past 24 hours.      Assessment/Plan:      * Closed trimalleolar fracture of left ankle with routine healing s/p ex fix on 8/9/2023  - Patient s/p fall of 3 steps while going down the stairs while at work.   - Imaging showed left ankle trimalleolar fracture and dislocation. Patient seen by Orthopedic surgery and left ankle was reduced and splinted in ER placed in short leg splint.   - Patient taken to OR on 8/9/2023 and had ex fix placed to left ankle.  - Routine pin site care post-op to left ankle ex fix.  - Non weight bear to left lower extremity.  - Ice and elevate left leg to help with swelling with plan to proceed with removal of ex fix and definitive fixation of left ankle fracture once left ankle swelling improved. Patient to remain in hospital until next surgery. Ortho reassessing left ankle swelling daily to see when can proceed with definitve fixation surgery.   - PT/OT consulted and working with patient in hospital.   - Continue Aspirin 81 mg po BID for DVT prophylaxis.  - Pain controlled. Continue scheduled Tylenol 1000 mg po TID + Robaxin 750 mg po 4 times daily and Lyrica 75 mg po BID and continue with Oxycodone IR prn for breakthrough pain.     Closed dislocation of left ankle  Patient s/p closed reduction by Orthopedic in ED with short leg splint placement.        Closed displaced fracture of lateral malleolus of right fibula with routine healing  Closed nondisplaced fracture of fifth metatarsal bone of right foot with routine healing  · Noted on admit on X-rays to have non-displaced closed fractures of right lateral malleolus and right 5th metatarsal. Orthopedics consulted and recommended non-operative management and patient okay to weight  bear as tolerated to right lower extremity. Patient to ambulate in tall walking boot when out of bed.   · Pain management.   · Aspirin 81 mg po BID for DVT prophylaxis.     CIDP (chronic inflammatory demyelinating polyneuropathy)  Bilateral lower extremity weakness   - Patient with known history of bilateral lower extremity weakness but normally able to ambulate without assistive device.   - Patient reports progressive weakness over past 3 months.   - Neurology consulted. Check vitamin levels. Check heavy metal levels.  - Per Neurology: Patient reports history of CIDP and received steroids and IVIG in past and used to follow with Dr. Moses and no treatment since 2012. She has self-contained episodes in which she did not seek medical attention. These symptoms are similar. Want to avoid steroids now with planned surgery. Neurology recommends neuromuscular Neurologist for possible IVIG as outpatient upon discharge.  -  If any change in status, reconsult Neurology.    Type 2 diabetes mellitus without complication, without long-term current use of insulin  Patient's FSGs are controlled on current medication regimen. Patient on Mounjaro injections weekly at home to treat.   Last A1c reviewed-   Lab Results   Component Value Date    HGBA1C 5.9 (H) 08/07/2023     Most recent fingerstick glucose reviewed-   Recent Labs   Lab 08/14/23  1622 08/15/23  0736 08/15/23  1129   POCTGLUCOSE 156* 99 105     Monitor blood sugars with meals and at bedtime in hospital.  Diabetic diet.  Target blood sugars 140-180 in hospital.     Class 3 severe obesity due to excess calories with serious comorbidity and body mass index (BMI) of 45.0 to 49.9 in adult  Personal history of gastric bypass   Body mass index is 49.21 kg/m². Morbid obesity complicates all aspects of disease management from diagnostic modalities to treatment. Weight loss encouraged and health benefits explained to patient.         Benign essential HTN  Chronic and controlled.  Continue home Amlodipine to treat. Home Lisinopril on hold. Target BP < 140/90. Monitor vital signs every 4 hours.       Anxiety  Chronic and controlled. Continue home Lexapro to treat.       VTE Risk Mitigation (From admission, onward)         Ordered     IP VTE HIGH RISK PATIENT  Once         08/08/23 0036     Place sequential compression device  Until discontinued         08/08/23 0036                Discharge Planning   LIVIER: 8/18/2023     Code Status: Full Code   Is the patient medically ready for discharge?: No    Reason for patient still in hospital (select all that apply): Patient trending condition  Discharge Plan A: Rehab; Insurance auth received for 14 days of IP rehab on discharge from [facility] when medically ready. Patient needs definitive fixation surgery prior to discharge to rehab. Patient has been accepted to [facility].            Claudia Graves MD  Department of [facility] Medicine   [facility] - Surgery

## 2023-08-15 NOTE — CARE UPDATE
Respiratory Mechanics; NIF= greater than -40cm2O                                         VT= 700ml                                         [id number]  Room air. O2 sat=98%

## 2023-08-15 NOTE — PLAN OF CARE
Patient resting in bed, awake and alert. No distress or discomfort noted. Vital signs obtained and stable. PM assessment completed. Patient oriented X 4. Patient complained of pain. Okay with routine medications for pain. See MAR. POC ongoing.     Problem: Adult Inpatient Plan of Care  Goal: Plan of Care Review  Outcome: Ongoing, Progressing  Goal: Patient-Specific Goal (Individualized)  Outcome: Ongoing, Progressing  Goal: Absence of Hospital-Acquired Illness or Injury  Outcome: Ongoing, Progressing  Goal: Optimal Comfort and Wellbeing  Outcome: Ongoing, Progressing  Goal: Readiness for Transition of Care  Outcome: Ongoing, Progressing     Problem: Bariatric Environmental Safety  Goal: Safety Maintained with Care  Outcome: Ongoing, Progressing     Problem: Diabetes Comorbidity  Goal: Blood Glucose Level Within Targeted Range  Outcome: Ongoing, Progressing     Problem: Skin Injury Risk Increased  Goal: Skin Health and Integrity  Outcome: Ongoing, Progressing     Problem: Impaired Wound Healing  Goal: Optimal Wound Healing  Outcome: Ongoing, Progressing     Problem: Fall Injury Risk  Goal: Absence of Fall and Fall-Related Injury  Outcome: Ongoing, Progressing

## 2023-08-15 NOTE — SUBJECTIVE & OBJECTIVE
Interval History: Patient's left ankle still too swollen according to Ortho to proceed with definitive fixation. Ortho to reassess again tomorrow. Patient has been very mindful and elevating and icing her left ankle for swelling. Pain to left and right ankle both controlled. No new labs today. Vital signs stable.     Review of Systems   Constitutional:  Negative for fever.   Respiratory:  Negative for cough and shortness of breath.    Cardiovascular:  Negative for chest pain.   Gastrointestinal:  Negative for abdominal pain, nausea and vomiting.   Musculoskeletal:  Positive for arthralgias (Left ankle; Right ankle and right hip) and myalgias (Left ankle area).   Neurological:  Positive for weakness (Bilateral lower extremities). Negative for dizziness.   Psychiatric/Behavioral:  Negative for agitation and confusion.      Objective:     Vital Signs (Most Recent):  Temp: 98 °F (36.7 °C) (08/15/23 1128)  Pulse: 88 (08/15/23 1128)  Resp: 17 (08/15/23 1128)  BP: 123/68 (08/15/23 1128)  SpO2: 94 % (08/15/23 1128) on room air Vital Signs (24h Range):  Temp:  [96 °F (35.6 °C)-98.5 °F (36.9 °C)] 98 °F (36.7 °C)  Pulse:  [79-92] 88  Resp:  [17-20] 17  SpO2:  [94 %-99 %] 94 %  BP: ()/(55-71) 123/68     Weight: 126 kg (277 lb 12.5 oz)  Body mass index is 49.21 kg/m².    Intake/Output Summary (Last 24 hours) at 8/15/2023 1239  Last data filed at 8/15/2023 1221  Gross per 24 hour   Intake 440 ml   Output 1050 ml   Net -610 ml         Physical Exam  Vitals and nursing note reviewed.   Constitutional:       General: She is awake. She is not in acute distress.     Appearance: Normal appearance. She is well-developed. She is morbidly obese. She is not ill-appearing.   Cardiovascular:      Rate and Rhythm: Normal rate and regular rhythm.      Heart sounds: Normal heart sounds. No murmur heard.     No friction rub. No gallop.   Pulmonary:      Effort: Pulmonary effort is normal. No respiratory distress.      Breath sounds: Normal  breath sounds. No wheezing.   Abdominal:      General: Abdomen is flat. Bowel sounds are normal. There is no distension.      Palpations: Abdomen is soft.      Tenderness: There is no abdominal tenderness.   Musculoskeletal:         General: Swelling (Left ankle and right ankle and foot) present.      Comments: Ex fix in place to left ankle    Skin:     General: Skin is warm.      Findings: Bruising (Right lateral ankle and lateral aspect of right foot) present. No erythema.   Neurological:      Mental Status: She is alert and oriented to person, place, and time.   Psychiatric:         Mood and Affect: Mood normal.         Behavior: Behavior normal. Behavior is cooperative.         Thought Content: Thought content normal.         Judgment: Judgment normal.             Significant Labs: All pertinent labs within the past 24 hours have been reviewed.    Significant Imaging: I have reviewed all pertinent imaging results/findings within the past 24 hours.

## 2023-08-15 NOTE — ASSESSMENT & PLAN NOTE
Patient's FSGs are controlled on current medication regimen. Patient on Mounjaro injections weekly at home to treat.   Last A1c reviewed-   Lab Results   Component Value Date    HGBA1C 5.9 (H) 08/07/2023     Most recent fingerstick glucose reviewed-   Recent Labs   Lab 08/14/23  1622 08/15/23  0736 08/15/23  1129   POCTGLUCOSE 156* 99 105     Monitor blood sugars with meals and at bedtime in hospital.  Diabetic diet.  Target blood sugars 140-180 in hospital.

## 2023-08-15 NOTE — NURSING
Pin site care completed at this time. Patient seen and evaluated per resident. Will keep NPO for now. LLE elevated above heart. Ice to area. Bed in lowest position. Call light in reach. Monitoring ongoing.

## 2023-08-15 NOTE — SUBJECTIVE & OBJECTIVE
"Principal Problem:Closed trimalleolar fracture of left ankle with routine healing    Principal Orthopedic Problem: same as above    Interval History: NAEO. VSS. Pain well controlled. 4 lateral step with PT yesterday.    Review of patient's allergies indicates:   Allergen Reactions    Amoxicillin Hives    Penicillins Hives       Current Facility-Administered Medications   Medication    acetaminophen tablet 1,000 mg    amLODIPine tablet 5 mg    aspirin chewable tablet 81 mg    EScitalopram oxalate tablet 10 mg    glucagon (human recombinant) injection 1 mg    glucose chewable tablet 16 g    glucose chewable tablet 24 g    melatonin tablet 6 mg    methocarbamoL tablet 750 mg    naloxone 0.4 mg/mL injection 0.02 mg    ondansetron injection 4 mg    oxyCODONE immediate release tablet 5 mg    oxyCODONE immediate release tablet Tab 10 mg    polyethylene glycol packet 17 g    pregabalin capsule 75 mg    senna-docusate 8.6-50 mg per tablet 2 tablet    simethicone chewable tablet 80 mg    sodium chloride 0.9% flush 10 mL    tirzepatide PnIj 5 mg    vitamin D 1000 units tablet 1,000 Units     Objective:     Vital Signs (Most Recent):  Temp: 98.5 °F (36.9 °C) (08/15/23 0341)  Pulse: 81 (08/15/23 0341)  Resp: 18 (08/15/23 0601)  BP: 126/71 (08/15/23 0341)  SpO2: 99 % (08/15/23 0341) Vital Signs (24h Range):  Temp:  [96 °F (35.6 °C)-98.5 °F (36.9 °C)] 98.5 °F (36.9 °C)  Pulse:  [79-92] 81  Resp:  [16-20] 18  SpO2:  [91 %-99 %] 99 %  BP: ()/(55-71) 126/71     Weight: 126 kg (277 lb 12.5 oz)  Height: 5' 3" (160 cm)  Body mass index is 49.21 kg/m².      Intake/Output Summary (Last 24 hours) at 8/15/2023 0647  Last data filed at 8/14/2023 2200  Gross per 24 hour   Intake 200 ml   Output 550 ml   Net -350 ml          Ortho/SPM Exam  LLE:   Ex fix in place, no bleeding from pin sites  Cap refill <2s  Able to wiggle toes  SILT    RLE:   Pain with ROM of the ankle  TTP over 5th metatarsal base  SILT       Significant Labs: All " pertinent labs within the past 24 hours have been reviewed.    Significant Imaging: I have reviewed and interpreted all pertinent imaging results/findings.

## 2023-08-16 ENCOUNTER — ANESTHESIA EVENT (OUTPATIENT)
Dept: SURGERY | Facility: HOSPITAL | Age: 58
DRG: 493 | End: 2023-08-16
Payer: OTHER MISCELLANEOUS

## 2023-08-16 LAB
POCT GLUCOSE: 102 MG/DL (ref 70–110)
POCT GLUCOSE: 187 MG/DL (ref 70–110)
POCT GLUCOSE: 65 MG/DL (ref 70–110)
POCT GLUCOSE: 99 MG/DL (ref 70–110)

## 2023-08-16 PROCEDURE — 94761 N-INVAS EAR/PLS OXIMETRY MLT: CPT

## 2023-08-16 PROCEDURE — 25000003 PHARM REV CODE 250: Performed by: INTERNAL MEDICINE

## 2023-08-16 PROCEDURE — 99222 1ST HOSP IP/OBS MODERATE 55: CPT | Mod: ,,, | Performed by: NURSE PRACTITIONER

## 2023-08-16 PROCEDURE — 25000003 PHARM REV CODE 250

## 2023-08-16 PROCEDURE — 11000001 HC ACUTE MED/SURG PRIVATE ROOM

## 2023-08-16 PROCEDURE — 99233 PR SUBSEQUENT HOSPITAL CARE,LEVL III: ICD-10-PCS | Mod: ,,, | Performed by: INTERNAL MEDICINE

## 2023-08-16 PROCEDURE — 99222 PR INITIAL HOSPITAL CARE,LEVL II: ICD-10-PCS | Mod: ,,, | Performed by: NURSE PRACTITIONER

## 2023-08-16 PROCEDURE — 94010 BREATHING CAPACITY TEST: CPT

## 2023-08-16 PROCEDURE — 99233 SBSQ HOSP IP/OBS HIGH 50: CPT | Mod: ,,, | Performed by: INTERNAL MEDICINE

## 2023-08-16 PROCEDURE — 97530 THERAPEUTIC ACTIVITIES: CPT

## 2023-08-16 PROCEDURE — 25000003 PHARM REV CODE 250: Performed by: HOSPITALIST

## 2023-08-16 PROCEDURE — 99900035 HC TECH TIME PER 15 MIN (STAT)

## 2023-08-16 PROCEDURE — 94150 VITAL CAPACITY TEST: CPT

## 2023-08-16 PROCEDURE — 25000003 PHARM REV CODE 250: Performed by: PHYSICIAN ASSISTANT

## 2023-08-16 RX ORDER — SODIUM CHLORIDE 0.9 % (FLUSH) 0.9 %
10 SYRINGE (ML) INJECTION
Status: DISCONTINUED | OUTPATIENT
Start: 2023-08-16 | End: 2023-08-19 | Stop reason: HOSPADM

## 2023-08-16 RX ADMIN — SENNOSIDES AND DOCUSATE SODIUM 2 TABLET: 50; 8.6 TABLET ORAL at 08:08

## 2023-08-16 RX ADMIN — ACETAMINOPHEN 1000 MG: 325 TABLET ORAL at 09:08

## 2023-08-16 RX ADMIN — ACETAMINOPHEN 1000 MG: 325 TABLET ORAL at 03:08

## 2023-08-16 RX ADMIN — CHOLECALCIFEROL TAB 25 MCG (1000 UNIT) 1000 UNITS: 25 TAB at 08:08

## 2023-08-16 RX ADMIN — ASPIRIN 81 MG 81 MG: 81 TABLET ORAL at 08:08

## 2023-08-16 RX ADMIN — ESCITALOPRAM OXALATE 10 MG: 5 TABLET, FILM COATED ORAL at 08:08

## 2023-08-16 RX ADMIN — OXYCODONE HYDROCHLORIDE 10 MG: 10 TABLET ORAL at 06:08

## 2023-08-16 RX ADMIN — METHOCARBAMOL 750 MG: 750 TABLET ORAL at 08:08

## 2023-08-16 RX ADMIN — ACETAMINOPHEN 1000 MG: 325 TABLET ORAL at 06:08

## 2023-08-16 RX ADMIN — POLYETHYLENE GLYCOL 3350 17 G: 17 POWDER, FOR SOLUTION ORAL at 08:08

## 2023-08-16 RX ADMIN — PREGABALIN 75 MG: 75 CAPSULE ORAL at 08:08

## 2023-08-16 RX ADMIN — METHOCARBAMOL 750 MG: 750 TABLET ORAL at 03:08

## 2023-08-16 RX ADMIN — Medication 16 G: at 11:08

## 2023-08-16 RX ADMIN — OXYCODONE HYDROCHLORIDE 10 MG: 10 TABLET ORAL at 08:08

## 2023-08-16 NOTE — ASSESSMENT & PLAN NOTE
- Patient s/p fall of 3 steps while going down the stairs while at work.   - Imaging showed left ankle trimalleolar fracture and dislocation. Patient seen by Orthopedic surgery and left ankle was reduced and splinted in ER placed in short leg splint.   - Patient taken to OR on 8/9/2023 and had ex fix placed to left ankle.  - Routine pin site care post-op to left ankle ex fix.  - Non weight bear to left lower extremity.  - Ice and elevate left leg to help with swelling with plan to proceed with removal of ex fix and definitive fixation of left ankle fracture once left ankle swelling improved. Patient to remain in hospital until next surgery. Ortho reassessing left ankle swelling daily to see when can proceed with definitve fixation surgery. Hopeful surgery on 8/17.   - PT/OT consulted and working with patient in hospital.   - Continue Aspirin 81 mg po BID for DVT prophylaxis.  - Pain controlled. Continue scheduled Tylenol 1000 mg po TID + Robaxin 750 mg po 4 times daily and Lyrica 75 mg po BID and continue with Oxycodone IR prn for breakthrough pain.

## 2023-08-16 NOTE — HOSPITAL COURSE
Per chart review,    PT- 08/14    Bed Mobility:   Supine > Sit: stand by assistance with HOB elevated  CAM boot donned before sitting EOB  Sit > Supine: minimum assistance for LLE management      Transfers:   Sit <> Stand Transfer: minimum assistance from EOB using rolling walker      Balance:   Sitting balance: FAIR+: Maintains balance through MINIMAL excursions of active trunk motion  Standing balance:   FAIR: Maintains without assist but unable to take challenges  POOR+: Needs MIN (minimal ) assist during gait                 Gait:  Distance: 4 lateral shuffle steps at EOB   Assistive Device: RW  Assistance Level: minimum assistance  Gait Assessment: Pt unable to clear RLE off ground, shuffles RLE instead.   OT- 08/15    Bed Mobility:    Patient completed Scooting/Bridging with stand by assistance  Patient completed Supine to Sit with stand by assistance      Functional Mobility/Transfers:  Patient completed Sit <> Stand Transfer with minimum assistance  with  rolling walker   Patient completed Bed > Chair Transfer using Step/Slide Transfer technique with contact guard assistance with rolling walker  No LOB or SOB noted  Pt able to maintain Wb'ng precautions to L LE with no cueing required        Activities of Daily Living:  Lower Body Dressing: minimum assistance required to initiate donning (R) sock with pt able to manipulate above heel in long sitting; max (A) to don (R) CAM boot EOB

## 2023-08-16 NOTE — PLAN OF CARE
Patient AAOX4, call light and belongings in reach, siderails up x2. LLE pin sites c/d/I, leg elevated and iced,pain controlled with current regimen.  Tolerating diet with no complaints of n/v, voiding with no concerns.  Remains free from falls and safety maintained.          Problem: Adult Inpatient Plan of Care  Goal: Plan of Care Review  Outcome: Ongoing, Progressing  Goal: Patient-Specific Goal (Individualized)  Outcome: Ongoing, Progressing  Goal: Absence of Hospital-Acquired Illness or Injury  Outcome: Ongoing, Progressing  Goal: Optimal Comfort and Wellbeing  Outcome: Ongoing, Progressing     Problem: Bariatric Environmental Safety  Goal: Safety Maintained with Care  Outcome: Ongoing, Progressing     Problem: Diabetes Comorbidity  Goal: Blood Glucose Level Within Targeted Range  Outcome: Ongoing, Progressing     Problem: Skin Injury Risk Increased  Goal: Skin Health and Integrity  Outcome: Ongoing, Progressing     Problem: Impaired Wound Healing  Goal: Optimal Wound Healing  Outcome: Ongoing, Progressing

## 2023-08-16 NOTE — CONSULTS
[facility]  Physical Medicine & Rehab  Consult Note    Patient Name: Kalina Ryan  MRN: [id number]  Admission Date: 8/7/2023  Hospital Length of Stay: 7 days  Attending Physician: Claudia Graves MD  Consults  Subjective:     Principal Problem: Closed trimalleolar fracture of left ankle with routine healing    HPI:  Per chart review, Kalina Ryan is a 58 y.o. 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 Patient states she twisted her ankle and fell down 3 step while going down the stairs at work. She had immediate pain and inability to bear weight. She noticed her left deformity and were screaming in pain. He coworker called EMS. Denies head injury or LOC. Denies prior injuries or surgeries to the left ankle. She is not on blood thinners. Denies other MSK pains or paresthesias. Patient admitted with closed left trimalleolar ankle fracture. Ortho consulted and taken to OR on 8/9 and had external fixator placed to left ankle. Post-op, patient non weight bearing to left lower extremity. Patient to elevate and ce left ankle to help with swelling. Needs improvement in left ankle swelling prior to proceeding with definitive fixation surgery to left ankle. Patient to remain in hospital until definitive fixation surgery. Neurology consulted for progressive bilateral leg weakness in patient with previous diagnosis of Guillain Boscobel and CIDP. Neurology noted likely CIDP and recommended to check vitamin levels (B12, folate, copper, zinc, and thiamine).  Patient also noted on admit to have closed right ankle lateral malleolus fracture and closed right 5th metatarsal fractures but Ortho states no surgery needed and okay for patient to weight bear as tolerated to right lower extremity and to use tall walking boot to right leg when  ambulating. Pt is S/P  ex fix on 08/09/2023. Pt in the plans per ortho for definitive fixation 08/17/2023. PM &R was consulted to evaluate pt for IPR placement.          Functional History: Patient lives  with   in a single  story home with 0 steps to enter.  Prior to admission, Pt was I with ADLs and mobility.  DME: None.        Hospital Course: Per chart review,    PT- 08/14    Bed Mobility:    Supine > Sit: stand by assistance with HOB elevated  ? CAM boot donned before sitting EOB   Sit > Supine: minimum assistance for LLE management      Transfers:    Sit <> Stand Transfer: minimum assistance from EOB using rolling walker      Balance:    Sitting balance: FAIR+: Maintains balance through MINIMAL excursions of active trunk motion   Standing balance:   ? FAIR: Maintains without assist but unable to take challenges  ? POOR+: Needs MIN (minimal ) assist during gait                 Gait:   Distance: 4 lateral shuffle steps at EOB    Assistive Device: RW   Assistance Level: minimum assistance   Gait Assessment: Pt unable to clear RLE off ground, shuffles RLE instead.   OT- 08/15    Bed Mobility:     Patient completed Scooting/Bridging with stand by assistance   Patient completed Supine to Sit with stand by assistance      Functional Mobility/Transfers:   Patient completed Sit <> Stand Transfer with minimum assistance  with  rolling walker    Patient completed Bed > Chair Transfer using Step/Slide Transfer technique with contact guard assistance with rolling walker  ? No LOB or SOB noted  ? Pt able to maintain Wb'ng precautions to L LE with no cueing required        Activities of Daily Living:   Lower Body Dressing: minimum assistance required to initiate donning (R) sock with pt able to manipulate above heel in long sitting; max (A) to don (R) CAM boot EOB      Past Medical History:   Diagnosis Date    Anxiety     CIDP (chronic inflammatory demyelinating polyneuropathy) 1/1/2009    full  paralysis but no intubation; residual weakness and neuropathy to hands and feet - diagnosed by Neurologist back in 2009 and last seen by neurology in 2011    Guillain Barré syndrome 2009    full paralysis but no intubation; residual weakness and neuropathy to hands and feet - diagnosed by Neurologist back in 2009 and last seen by neurology in 2011    Hypertension     New onset type 2 diabetes mellitus 9/4/2019    Personal history of gastric bypass 9/4/2019    Reactive depression 10/19/2021     Past Surgical History:   Procedure Laterality Date    APPLICATION, EXTERNAL FIXATION DEVICE Left 8/9/2023    Procedure: APPLICATION, EXTERNAL FIXATION DEVICE left ankle, C-arm clock side, synthes;  Surgeon: Kali Santoyo MD;  Location: [address];  Service: Orthopedics;  Laterality: Left;    CHOLECYSTECTOMY  2009    CLOSED REDUCTION, FRACTURE, ANKLE, TRIMALLEOLAR Left 8/9/2023    Procedure: CLOSED REDUCTION, FRACTURE, ANKLE, TRIMALLEOLAR;  Surgeon: Kali Santoyo MD;  Location: [address];  Service: Orthopedics;  Laterality: Left;    COLONOSCOPY N/A 7/31/2018    Procedure: COLONOSCOPY;  Surgeon: Elpidio Fortune MD;  Location: [facility];  Service: Endoscopy;  Laterality: N/A;    GASTRIC BYPASS  2009    HYSTERECTOMY  1999     Review of patient's allergies indicates:   Allergen Reactions    Amoxicillin Hives    Penicillins Hives       Scheduled Medications:    acetaminophen  1,000 mg Oral Q8H    amLODIPine  5 mg Oral Daily    aspirin  81 mg Oral BID    EScitalopram oxalate  10 mg Oral Daily    methocarbamoL  750 mg Oral QID    polyethylene glycol  17 g Oral Daily    pregabalin  75 mg Oral BID    senna-docusate 8.6-50 mg  2 tablet Oral BID    tirzepatide  5 mg Subcutaneous Every Sun    vitamin D  1,000 Units Oral Daily       PRN Medications: bisacodyL, glucagon (human recombinant), glucose, glucose, melatonin, naloxone, ondansetron, oxyCODONE, oxyCODONE, simethicone, sodium chloride  0.9%    Family History       Problem Relation (Age of Onset)    Cancer Brother    Dementia Father    Diabetes Mother, Father    Heart disease Mother, Father (60), Brother (56)    Hypertension Mother, Father, Brother, Brother    No Known Problems Daughter, Son    Pancreatic cancer Brother (54)    Stomach cancer Brother    Stroke Father          Tobacco Use    Smoking status: Never     Passive exposure: Never    Smokeless tobacco: Never   Substance and Sexual Activity    Alcohol use: Yes     Alcohol/week: 1.0 standard drink of alcohol     Types: 1 Drinks containing 0.5 oz of alcohol per week     Comment: every other weekend - 1 drink per occasion    Drug use: No    Sexual activity: Not Currently     Partners: Male     Birth control/protection: Partner-Vasectomy     Review of Systems   Constitutional:  Positive for activity change.   Cardiovascular:  Negative for chest pain and leg swelling.   Musculoskeletal:  Positive for gait problem.   Neurological:  Positive for weakness.     Objective:     Vital Signs (Most Recent):  Temp: 97.6 °F (36.4 °C) (08/16/23 0728)  Pulse: 81 (08/16/23 0728)  Resp: 18 (08/16/23 0728)  BP: (!) 106/58 (08/16/23 0728)  SpO2: 96 % (08/16/23 0728)    Vital Signs (24h Range):  Temp:  [97.4 °F (36.3 °C)-99.3 °F (37.4 °C)] 97.6 °F (36.4 °C)  Pulse:  [77-88] 81  Resp:  [17-20] 18  SpO2:  [94 %-100 %] 96 %  BP: (102-130)/(56-68) 106/58     Body mass index is 49.21 kg/m².     Physical Exam  Vitals and nursing note reviewed.   Constitutional:       General: She is awake.      Appearance: Normal appearance.   HENT:      Head: Normocephalic and atraumatic.   Eyes:      Extraocular Movements: Extraocular movements intact.   Pulmonary:      Effort: Pulmonary effort is normal.   Abdominal:      General: There is no distension.      Palpations: Abdomen is soft.   Musculoskeletal:      Cervical back: Normal range of motion and neck supple.      Comments: EX-Fix to LLE   Skin:     General: Skin is warm  and dry.      Capillary Refill: Capillary refill takes 2 to 3 seconds.   Neurological:      General: No focal deficit present.      Mental Status: She is alert and oriented to person, place, and time.      GCS: GCS eye subscore is 4. GCS verbal subscore is 5. GCS motor subscore is 6.      Motor: Weakness present.      Coordination: Coordination abnormal. Heel to Shin Test abnormal. Impaired rapid alternating movements.      Gait: Gait abnormal.   Psychiatric:         Mood and Affect: Mood normal.         Behavior: Behavior normal. Behavior is cooperative.         Thought Content: Thought content normal.         Judgment: Judgment normal.          NEUROLOGICAL EXAMINATION:     MENTAL STATUS   Oriented to person, place, and time.       Diagnostic Results: Labs: Reviewed    Assessment/Plan:     * Closed trimalleolar fracture of left ankle with routine healing s/p ex fix on 8/9/2023  -S/P ex-fix 08/09/2023 per ortho.  -Plan for possible defintive Sx on 08/17/2023.  -PT/OT currently. Recommending IPR. Pt doing well.   -Pain appears controlled with oral regimen.   -DVT PPX with ASA.   -Will follow for definitive Sx as per ortho.     Benign essential HTN  -Stable.       CIDP (chronic inflammatory demyelinating polyneuropathy)  -Follow up out pt with Neurology.     Class 3 severe obesity due to excess calories with serious comorbidity and body mass index (BMI) of 45.0 to 49.9 in adult  -Consider nutrition consult.     Anxiety  -Stable.    PM&R Recommendation:     At this time, the PM&R team has reviewed this patient's ongoing medical case including inpatient diagnosis, medical history, clinical examination, labs, vitals, current social and functional history. We will  Continue to follow pt for a potential rehab candidate pending definitive surgery as per ortho.          Thank you for your consult.     Therese Thomas NP  Department of Physical Medicine & Rehab  [facility]

## 2023-08-16 NOTE — NURSING
BS 65, patient asymptomatic at this time, 16G glucose tabs given as per order, will repeat MD NEIDA notified

## 2023-08-16 NOTE — PROGRESS NOTES
[facility]  Orthopedics  Progress Note    Attg Note:  I agree with the resident's assessment and plan. Skin better.  To OR tomorrow.      Kali Santoyo MD      Patient Name: Kalina Ryan  MRN: [id number]  Admission Date: 8/7/2023  Hospital Length of Stay: 7 days  Attending Provider: Claudia Graves MD  Primary Care Provider: Nany Hoff NP  Follow-up For: Procedure(s) (LRB):  APPLICATION, EXTERNAL FIXATION DEVICE left ankle, C-arm clock side, synthes (Left)  CLOSED REDUCTION, FRACTURE, ANKLE, TRIMALLEOLAR (Left)    Post-Operative Day: 7 Days Post-Op  Subjective:     Principal Problem:Closed trimalleolar fracture of left ankle with routine healing    Principal Orthopedic Problem: same as above    Interval History: NAEO. VSS. Pain well controlled. Working with PT. Up to chair yesterday.    Review of patient's allergies indicates:   Allergen Reactions    Amoxicillin Hives    Penicillins Hives       Current Facility-Administered Medications   Medication    acetaminophen tablet 1,000 mg    amLODIPine tablet 5 mg    aspirin chewable tablet 81 mg    bisacodyL suppository 10 mg    EScitalopram oxalate tablet 10 mg    glucagon (human recombinant) injection 1 mg    glucose chewable tablet 16 g    glucose chewable tablet 24 g    melatonin tablet 6 mg    methocarbamoL tablet 750 mg    naloxone 0.4 mg/mL injection 0.02 mg    ondansetron injection 4 mg    oxyCODONE immediate release tablet 5 mg    oxyCODONE immediate release tablet Tab 10 mg    polyethylene glycol packet 17 g    pregabalin capsule 75 mg    senna-docusate 8.6-50 mg per tablet 2 tablet    simethicone chewable tablet 80 mg    sodium chloride 0.9% flush 10 mL    tirzepatide PnIj 5 mg    vitamin D 1000 units tablet 1,000 Units     Objective:     Vital Signs (Most Recent):  Temp: 97.5 °F (36.4 °C) (08/16/23 0450)  Pulse: 77 (08/16/23 0450)  Resp: 18 (08/16/23 0450)  BP: 122/68 (08/16/23 0450)  SpO2: (!) 94 % (08/16/23 0450) Vital Signs (24h  "Range):  Temp:  [97.4 °F (36.3 °C)-99.3 °F (37.4 °C)] 97.5 °F (36.4 °C)  Pulse:  [77-88] 77  Resp:  [17-20] 18  SpO2:  [94 %-100 %] 94 %  BP: (102-130)/(56-68) 122/68     Weight: 126 kg (277 lb 12.5 oz)  Height: 5' 3" (160 cm)  Body mass index is 49.21 kg/m².      Intake/Output Summary (Last 24 hours) at 8/16/2023 0631  Last data filed at 8/15/2023 1803  Gross per 24 hour   Intake 960 ml   Output 750 ml   Net 210 ml         Ortho/SPM Exam  LLE:   Ex fix in place, no bleeding from pin sites  Cap refill <2s  Able to wiggle toes  SILT    RLE:   Pain with ROM of the ankle  TTP over 5th metatarsal base  SILT       Significant Labs: All pertinent labs within the past 24 hours have been reviewed.    Significant Imaging: I have reviewed and interpreted all pertinent imaging results/findings.    Assessment/Plan:     * Closed trimalleolar fracture of left ankle with routine healing s/p ex fix on 8/9/2023  Kalina Ryan is a 58 y.o. female with left ankle trimalleolar fracture and dislocation and base of 5th MT fx s/p ankle spanning ex fix 8/9/23. We will plan for definitive surgery when soft tissues more amenable. Ankle appears moderately swollen but without significant bruising or fracture blisters at this time.    Pain: MM  NWB LLE, WBAT RLE in boot  Pin site care BID  ASA 81 BID  Definitive surgery pending improved swelling, diet today, hopefully surgery tomorrow              Ozzie Kothari MD  Orthopedics  [facility] - Surgery  "

## 2023-08-16 NOTE — PLAN OF CARE
Patient resting in bed, awake and alert. No distress or discomfort noted. PM assessment completed. Vital signs obtained and stable. LLE elevated above the heart. Ice applied to extremity. Bed in lowest position. Call light in reach. Spouse at bedside. POC discussed. Monitoring ongoing.     Problem: Adult Inpatient Plan of Care  Goal: Plan of Care Review  Outcome: Ongoing, Progressing  Goal: Patient-Specific Goal (Individualized)  Outcome: Ongoing, Progressing  Goal: Absence of Hospital-Acquired Illness or Injury  Outcome: Ongoing, Progressing  Goal: Readiness for Transition of Care  Outcome: Ongoing, Progressing     Problem: Bariatric Environmental Safety  Goal: Safety Maintained with Care  Outcome: Ongoing, Progressing     Problem: Diabetes Comorbidity  Goal: Blood Glucose Level Within Targeted Range  Outcome: Ongoing, Progressing     Problem: Skin Injury Risk Increased  Goal: Skin Health and Integrity  Outcome: Ongoing, Progressing     Problem: Impaired Wound Healing  Goal: Optimal Wound Healing  Outcome: Ongoing, Progressing     Problem: Fall Injury Risk  Goal: Absence of Fall and Fall-Related Injury  Outcome: Ongoing, Progressing

## 2023-08-16 NOTE — PROGRESS NOTES
[facility]  Progress Note    Patient Name: Kalina Ryan  MRN: [id number]  Patient Class: IP- Inpatient   Admission Date: 8/7/2023  Length of Stay: 7 days  Attending Physician: Claudia Graves MD  Primary Care Provider: Nany Hoff NP        Subjective:     Principal Problem:Closed trimalleolar fracture of left ankle with routine healing        HPI:  Kalina Ryan is a 58 y.o. 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 Patient states she twisted her ankle and fell down 3 step while going down the stairs at work. She had immediate pain and inability to bear weight. She noticed her left deformity and were screaming in pain. He coworker called EMS. Denies head injury or LOC. Denies prior injuries or surgeries to the left ankle. She is not on blood thinners. Denies other MSK pains or paresthesias. She walks without assistive devices at baseline. She lives at home with her .     ED course: afebrile and vitals stable. X-ray showed left ankle trimalleolar fracture and dislocation, and right ankle lateral malleolus fx. Patient seen by orthopedic and L ankle was Reduced and splinted in ER placed in short leg splint. Patient received profopol, fenatnyl and oxycodone in ED during conscious sedation for closed reduction of left ankle fracture.     During my interview patient is awake, conversant and not in distress. She reports pain and spasm in both ankles with right side hurting more than left. She denies tobacco, alcohol or other illicit drug use. She is able to ambulate few blocks at baseline without chest pain or SOB.           Overview/Hospital Course:  Patient admitted with closed left trimalleolar ankle fracture. Ortho consulted and taken to OR on 8/9 and had external fixator placed to left  ankle. Post-op, patient non weight bearing to left lower extremity. Patient to elevate and ce left ankle to help with swelling. Needs improvement in left ankle swelling prior to proceeding with definitive fixation surgery to left ankle. Patient to remain in hospital until definitive fixation surgery. Neurology consulted for progressive bilateral leg weakness in patient with previous diagnosis of Guillain Seymour and CIDP. Neurology noted likely CIDP and recommended to check vitamin levels (B12, folate, copper, zinc, and thiamine). Neurology noted patient with history of CIDP and received steroids and IVIG in past an used to see Dr. Moses but no treatment since 2012. She has self-contained episodes in which she did not seek medical attention. These symptoms are similar. Want to avoid steroids now with planned surgery. Follow-up with neuromuscular Neurologist for possible IVIG as outpatient upon discharge. If any change in status, reconsult Neurology. Patient also noted on admit to have closed right ankle lateral malleolus fracture and closed right 5th metatarsal fractures but Ortho states no surgery needed and okay for patient to weight bear as tolerated to right lower extremity and to use tall walking boot to right leg when ambulating.       Interval History: Ortho hopes to get patient to OR tomorrow for definitve fixation of left ankle. Swelling much improved to left ankle. Patient is excited. Explained to patient once surgery complete then will need to be monitored in hospital a few days for medical stability and then hopefully if everything goes well then can discharge to [facility]. Patient reports left ankle pain controlled. Right foot and ankle pain controlled. No new labs today so will order tomorrow for surgery. Vital signs stable.     Review of Systems   Constitutional:  Negative for fever.   Respiratory:  Negative for cough and shortness of breath.    Cardiovascular:  Negative for chest pain.    Gastrointestinal:  Negative for abdominal pain, nausea and vomiting.   Musculoskeletal:  Positive for arthralgias (Left ankle; Right ankle and right hip) and myalgias (Left ankle area).   Neurological:  Positive for weakness (Bilateral lower extremities). Negative for dizziness.   Psychiatric/Behavioral:  Negative for agitation and confusion.      Objective:     Vital Signs (Most Recent):  Temp: 96.4 °F (35.8 °C) (08/16/23 1135)  Pulse: 83 (08/16/23 1135)  Resp: 17 (08/16/23 1135)  BP: 107/58 (08/16/23 1135)  SpO2: 98 % (08/16/23 1135) on room air Vital Signs (24h Range):  Temp:  [96.4 °F (35.8 °C)-99.3 °F (37.4 °C)] 96.4 °F (35.8 °C)  Pulse:  [77-87] 83  Resp:  [17-20] 17  SpO2:  [94 %-100 %] 98 %  BP: (102-130)/(56-68) 107/58     Weight: 126 kg (277 lb 12.5 oz)  Body mass index is 49.21 kg/m².    Intake/Output Summary (Last 24 hours) at 8/16/2023 1312  Last data filed at 8/15/2023 1803  Gross per 24 hour   Intake 720 ml   Output 250 ml   Net 470 ml         Physical Exam  Vitals and nursing note reviewed.   Constitutional:       General: She is awake. She is not in acute distress.     Appearance: Normal appearance. She is well-developed. She is morbidly obese. She is not ill-appearing.   Cardiovascular:      Rate and Rhythm: Normal rate and regular rhythm.      Heart sounds: Normal heart sounds. No murmur heard.     No friction rub. No gallop.   Pulmonary:      Effort: Pulmonary effort is normal. No respiratory distress.      Breath sounds: Normal breath sounds. No wheezing.   Abdominal:      General: Abdomen is flat. Bowel sounds are normal. There is no distension.      Palpations: Abdomen is soft.      Tenderness: There is no abdominal tenderness.   Musculoskeletal:         General: Swelling (Left ankle and right ankle and foot) present.      Comments: Ex fix in place to left ankle    Skin:     General: Skin is warm.      Findings: Bruising (Right lateral ankle and lateral aspect of right foot) present. No  erythema.   Neurological:      Mental Status: She is alert and oriented to person, place, and time.   Psychiatric:         Mood and Affect: Mood normal.         Behavior: Behavior normal. Behavior is cooperative.         Thought Content: Thought content normal.         Judgment: Judgment normal.             Significant Labs: All pertinent labs within the past 24 hours have been reviewed.    Significant Imaging: I have reviewed all pertinent imaging results/findings within the past 24 hours.      Assessment/Plan:      * Closed trimalleolar fracture of left ankle with routine healing s/p ex fix on 8/9/2023  - Patient s/p fall of 3 steps while going down the stairs while at work.   - Imaging showed left ankle trimalleolar fracture and dislocation. Patient seen by Orthopedic surgery and left ankle was reduced and splinted in ER placed in short leg splint.   - Patient taken to OR on 8/9/2023 and had ex fix placed to left ankle.  - Routine pin site care post-op to left ankle ex fix.  - Non weight bear to left lower extremity.  - Ice and elevate left leg to help with swelling with plan to proceed with removal of ex fix and definitive fixation of left ankle fracture once left ankle swelling improved. Patient to remain in hospital until next surgery. Ortho reassessing left ankle swelling daily to see when can proceed with definitve fixation surgery. Hopeful surgery on 8/17.   - PT/OT consulted and working with patient in hospital.   - Continue Aspirin 81 mg po BID for DVT prophylaxis.  - Pain controlled. Continue scheduled Tylenol 1000 mg po TID + Robaxin 750 mg po 4 times daily and Lyrica 75 mg po BID and continue with Oxycodone IR prn for breakthrough pain.     Closed dislocation of left ankle  Patient s/p closed reduction by Orthopedic in ED with short leg splint placement.        Closed displaced fracture of lateral malleolus of right fibula with routine healing  Closed nondisplaced fracture of fifth metatarsal bone of  right foot with routine healing  · Noted on admit on X-rays to have non-displaced closed fractures of right lateral malleolus and right 5th metatarsal. Orthopedics consulted and recommended non-operative management and patient okay to weight bear as tolerated to right lower extremity. Patient to ambulate in tall walking boot when out of bed.   · Pain management.   · Aspirin 81 mg po BID for DVT prophylaxis.     CIDP (chronic inflammatory demyelinating polyneuropathy)  Bilateral lower extremity weakness   - Patient with known history of bilateral lower extremity weakness but normally able to ambulate without assistive device.   - Patient reports progressive weakness over past 3 months.   - Neurology consulted. Check vitamin levels. Check heavy metal levels.  - Per Neurology: Patient reports history of CIDP and received steroids and IVIG in past and used to follow with Dr. Moses and no treatment since 2012. She has self-contained episodes in which she did not seek medical attention. These symptoms are similar. Want to avoid steroids now with planned surgery. Neurology recommends neuromuscular Neurologist for possible IVIG as outpatient upon discharge.  -  If any change in status, reconsult Neurology.    Type 2 diabetes mellitus without complication, without long-term current use of insulin  Patient's FSGs are controlled on current medication regimen. Patient on Mounjaro injections weekly at home to treat.   Last A1c reviewed-   Lab Results   Component Value Date    HGBA1C 5.9 (H) 08/07/2023     Most recent fingerstick glucose reviewed-   Recent Labs   Lab 08/15/23  1534 08/15/23  2018 08/16/23  0733 08/16/23  1136   POCTGLUCOSE 93 116* 99 65*     Monitor blood sugars with meals and at bedtime in hospital.  Diabetic diet.  Target blood sugars 140-180 in hospital.     Class 3 severe obesity due to excess calories with serious comorbidity and body mass index (BMI) of 45.0 to 49.9 in adult  Personal history of gastric  bypass   Body mass index is 49.21 kg/m². Morbid obesity complicates all aspects of disease management from diagnostic modalities to treatment. Weight loss encouraged and health benefits explained to patient.         Benign essential HTN  Chronic and controlled. Continue home Amlodipine to treat. Home Lisinopril on hold. Target BP < 140/90. Monitor vital signs every 4 hours.       Anxiety  Chronic and controlled. Continue home Lexapro to treat.         VTE Risk Mitigation (From admission, onward)         Ordered     IP VTE HIGH RISK PATIENT  Once         08/08/23 0036     Place sequential compression device  Until discontinued         08/08/23 0036                Discharge Planning   LIVIER: 8/19/2023     Code Status: Full Code   Is the patient medically ready for discharge?: No    Reason for patient still in hospital (select all that apply): Patient trending condition  Discharge Plan A: Rehab          Claudia Graves MD  Department of [facility]   [facility] - Surgery

## 2023-08-16 NOTE — PT/OT/SLP PROGRESS
Physical Therapy Treatment    Patient Name:  Kalina Ryan   MRN:  [id number]  Admitting Diagnosis:  Closed trimalleolar fracture of left ankle with routine healing   Recent Surgery: Procedure(s) (LRB):  APPLICATION, EXTERNAL FIXATION DEVICE left ankle, C-arm clock side, synthes (Left)  CLOSED REDUCTION, FRACTURE, ANKLE, TRIMALLEOLAR (Left) 7 Days Post-Op  Admit Date: 8/7/2023  Length of Stay: 7 days    Recommendations:     Discharge Recommendations:  rehabilitation facility   Discharge Equipment Recommendations: to be determined by next level of care   Barriers to discharge: Evolving Clinical Presentation    Assessment:     Kalina Ryan is a 58 y.o. female admitted with a medical diagnosis of Closed trimalleolar fracture of left ankle with routine healing.  She presents with the following impairments/functional limitations:  weakness, impaired self care skills, impaired functional mobility, gait instability, impaired balance, impaired endurance, decreased lower extremity function, decreased safety awareness, pain, edema.     Pt agreeable to therapy, is hopeful she will get her surgery tomorrow. Pt sits EOB from supine with SBA, stands from EOB with min A then CGA, scoot steps to HOB and forward backward with CGA, back into supine with SBA. Continue to maximize mobility as able.    Rehab Prognosis: Good; patient would benefit from acute skilled PT services to address these deficits and reach maximum level of function.    Recent Surgery: Procedure(s) (LRB):  APPLICATION, EXTERNAL FIXATION DEVICE left ankle, C-arm clock side, synthes (Left)  CLOSED REDUCTION, FRACTURE, ANKLE, TRIMALLEOLAR (Left) 7 Days Post-Op    Treatment Tolerated: Well    Highest level of mobility achieved this visit: scoot steps at EOB with RW and CGA    Activity with RN/PCT: bed mobility only    Plan:     During this hospitalization, patient to be seen 4 x/week to address the identified rehab impairments via gait training, therapeutic  "exercises, therapeutic activities, neuromuscular re-education and progress toward the following goals:    Plan of Care Expires:  09/10/23    Subjective     RN Francoise notified prior to session. Pt's  present upon PT entrance into room.    Chief Complaint: pain  Patient/Family Comments/goals: "The surgeon says I'm ready"  Pain/Comfort:  Pain Rating 1: 4/10  Location - Side 1: Left  Location - Orientation 1: lower  Location 1: leg  Pain Addressed 1: Reposition, Distraction      Objective:     Additional staff present: none    Patient found supine with: external fixator, SCD   Cognition:   Alert and Cooperative  Command following: Follows two-step verbal commands  Fluency: clear/fluent  General Precautions: Standard, Cardiac fall   Orthopedic Precautions:RLE weight bearing as tolerated, LLE non weight bearing   Braces:  (R CAM boot)   Body mass index is 49.21 kg/m².  Oxygen Device: Room Air    Vitals: /61   Pulse 92   Temp 96.8 °F (36 °C)   Resp 16   Ht 5' 3" (1.6 m)   Wt 126 kg (277 lb 12.5 oz)   SpO2 99%   Breastfeeding No   BMI 49.21 kg/m²     Outcome Measures:  AM-PAC 6 CLICK MOBILITY  Turning over in bed (including adjusting bedclothes, sheets and blankets)?: 4  Sitting down on and standing up from a chair with arms (e.g., wheelchair, bedside commode, etc.): 3  Moving from lying on back to sitting on the side of the bed?: 4  Moving to and from a bed to a chair (including a wheelchair)?: 3  Need to walk in hospital room?: 1  Climbing 3-5 steps with a railing?: 1  Basic Mobility Total Score: 16     Functional Mobility:    Bed Mobility:   Supine to Sit: stand by assistance; from R side of bed  Scooting anteriorly to EOB to have both feet planted on floor: stand by assistance  Sit to Supine: stand by assistance; to L side of bed    Sitting Balance at Edge of Bed:  Assistance Level Required: Stand-by Assistance  Time: 5 min  Postural deviations noted: no deviations noted    Transfers:   Sit <> " Stand Transfer: contact guard assistance with rolling walker   Stand <> Sit Transfer: contact guard assistance with rolling walker   [id number] from EOB    Standing Balance:  Assistance Level Required: Stand-by Assistance  Patient used: rolling walker   Time: 5min + 3 min  Postural deviations noted: no deviations noted    Gait:  Patient ambulated: lateral and forward scoot steps at EOB   Patient required: contact guard  Patient used:  rolling walker   Gait Pattern observed: swing to  Gait Deviation(s): scoots foot, unable to hop at this time  Impairments due to: decreased strength and decreased endurance    Education:  Time provided for education, counseling and discussion of health disposition in regards to patient's current status  All questions answered within PT scope of practice and to patient's satisfaction  PT role in POC to address current functional deficits  Pt educated on proper body mechanics, safety techniques, and energy conservation with PT facilitation and cueing throughout session    Patient left HOB elevated with call button in reach and  present.    GOALS:   Multidisciplinary Problems       Physical Therapy Goals          Problem: Physical Therapy    Goal Priority Disciplines Outcome Goal Variances Interventions   Physical Therapy Goal     PT, PT/OT Ongoing, Progressing     Description: Goals to be met by 8/24/2023    1. Pt supine to sit with SBA-not met  2. Pt sit to supine with SBA-not met  3. Pt sit to stand with mod(A) with LRAD with NWB on LLE with WBAT on R LE with CAM Boot-not met  4. Pt to perform gait 50 ft with mod(A) with LRAD with NWB on LLE with WBAT on R LE with CAM Boot.-not met  5. Pt to go up/down curb step with mod(A) with LRAD with NWB on LLE with WBAT on R LE with CAM Boot.-not met                       Time Tracking:     PT Received On: 08/16/23  PT Start Time: 1532     PT Stop Time: 1545  PT Total Time (min): 13 min     Billable Minutes:   Therapeutic Activity 10  min    Treatment Type: Treatment  PT/PTA: PT       Jim Albarado PT, DPT  8/16/2023

## 2023-08-16 NOTE — ASSESSMENT & PLAN NOTE
-S/P ex-fix 08/09/2023 per ortho.  -Plan for possible defintive Sx on 08/17/2023.  -PT/OT currently. Recommending IPR. Pt doing well.   -Pain appears controlled with oral regimen.   -DVT PPX with ASA.   -Will follow for definitive Sx as per ortho.    traMADol (ULTRAM) 50 MG tablet  50 mg, EVERY 4 HOURS PRN 3 ordered  Edit       Summary: TAKE 1 TABLET BY MOUTH EVERY 4 HOURS AS NEEDED FOR PAIN  Eprescribe, Disp-30 tablet, R-3   Dose, Route, Frequency: 50 mg, Oral, EVERY 4 HOURS PRN  Start: 8/9/2022        Patient will need medication before provider returns on 1/23/23.  Currently has Covid, patient also has MS and states that she is experiencing more discomfort than usual.    Last office visit  1/10/23    Next office visit 0    Last Fill:  8/9/22         Quantity: 30          R: 3        Opiod contract signed:  YES    Urine Drug screen Completed: NO    Narcan on MAR: NO

## 2023-08-16 NOTE — ASSESSMENT & PLAN NOTE
Patient's FSGs are controlled on current medication regimen. Patient on Mounjaro injections weekly at home to treat.   Last A1c reviewed-   Lab Results   Component Value Date    HGBA1C 5.9 (H) 08/07/2023     Most recent fingerstick glucose reviewed-   Recent Labs   Lab 08/15/23  1534 08/15/23  2018 08/16/23  0733 08/16/23  1136   POCTGLUCOSE 93 116* 99 65*     Monitor blood sugars with meals and at bedtime in hospital.  Diabetic diet.  Target blood sugars 140-180 in hospital.

## 2023-08-16 NOTE — SUBJECTIVE & OBJECTIVE
Past Medical History:   Diagnosis Date    Anxiety     CIDP (chronic inflammatory demyelinating polyneuropathy) 1/1/2009    full paralysis but no intubation; residual weakness and neuropathy to hands and feet - diagnosed by Neurologist back in 2009 and last seen by neurology in 2011    Guillain Barré syndrome 2009    full paralysis but no intubation; residual weakness and neuropathy to hands and feet - diagnosed by Neurologist back in 2009 and last seen by neurology in 2011    Hypertension     New onset type 2 diabetes mellitus 9/4/2019    Personal history of gastric bypass 9/4/2019    Reactive depression 10/19/2021     Past Surgical History:   Procedure Laterality Date    APPLICATION, EXTERNAL FIXATION DEVICE Left 8/9/2023    Procedure: APPLICATION, EXTERNAL FIXATION DEVICE left ankle, C-arm clock side, synthes;  Surgeon: Kali Santoyo MD;  Location: [address];  Service: Orthopedics;  Laterality: Left;    CHOLECYSTECTOMY  2009    CLOSED REDUCTION, FRACTURE, ANKLE, TRIMALLEOLAR Left 8/9/2023    Procedure: CLOSED REDUCTION, FRACTURE, ANKLE, TRIMALLEOLAR;  Surgeon: Kali Santoyo MD;  Location: [address];  Service: Orthopedics;  Laterality: Left;    COLONOSCOPY N/A 7/31/2018    Procedure: COLONOSCOPY;  Surgeon: Elpidio Fortune MD;  Location: [facility];  Service: Endoscopy;  Laterality: N/A;    GASTRIC BYPASS  2009    HYSTERECTOMY  1999     Review of patient's allergies indicates:   Allergen Reactions    Amoxicillin Hives    Penicillins Hives       Scheduled Medications:    acetaminophen  1,000 mg Oral Q8H    amLODIPine  5 mg Oral Daily    aspirin  81 mg Oral BID    EScitalopram oxalate  10 mg Oral Daily    methocarbamoL  750 mg Oral QID    polyethylene glycol  17 g Oral Daily    pregabalin  75 mg Oral BID    senna-docusate 8.6-50 mg  2 tablet Oral BID    tirzepatide  5 mg Subcutaneous Every Sun    vitamin D  1,000 Units Oral Daily       PRN Medications: bisacodyL, glucagon (human recombinant),  glucose, glucose, melatonin, naloxone, ondansetron, oxyCODONE, oxyCODONE, simethicone, sodium chloride 0.9%    Family History       Problem Relation (Age of Onset)    Cancer Brother    Dementia Father    Diabetes Mother, Father    Heart disease Mother, Father (60), Brother (56)    Hypertension Mother, Father, Brother, Brother    No Known Problems Daughter, Son    Pancreatic cancer Brother (54)    Stomach cancer Brother    Stroke Father          Tobacco Use    Smoking status: Never     Passive exposure: Never    Smokeless tobacco: Never   Substance and Sexual Activity    Alcohol use: Yes     Alcohol/week: 1.0 standard drink of alcohol     Types: 1 Drinks containing 0.5 oz of alcohol per week     Comment: every other weekend - 1 drink per occasion    Drug use: No    Sexual activity: Not Currently     Partners: Male     Birth control/protection: Partner-Vasectomy     Review of Systems   Constitutional:  Positive for activity change.   Cardiovascular:  Negative for chest pain and leg swelling.   Musculoskeletal:  Positive for gait problem.   Neurological:  Positive for weakness.     Objective:     Vital Signs (Most Recent):  Temp: 97.6 °F (36.4 °C) (08/16/23 0728)  Pulse: 81 (08/16/23 0728)  Resp: 18 (08/16/23 0728)  BP: (!) 106/58 (08/16/23 0728)  SpO2: 96 % (08/16/23 0728)    Vital Signs (24h Range):  Temp:  [97.4 °F (36.3 °C)-99.3 °F (37.4 °C)] 97.6 °F (36.4 °C)  Pulse:  [77-88] 81  Resp:  [17-20] 18  SpO2:  [94 %-100 %] 96 %  BP: (102-130)/(56-68) 106/58     Body mass index is 49.21 kg/m².     Physical Exam  Vitals and nursing note reviewed.   Constitutional:       General: She is awake.      Appearance: Normal appearance.   HENT:      Head: Normocephalic and atraumatic.   Eyes:      Extraocular Movements: Extraocular movements intact.   Pulmonary:      Effort: Pulmonary effort is normal.   Abdominal:      General: There is no distension.      Palpations: Abdomen is soft.   Musculoskeletal:      Cervical back:  Normal range of motion and neck supple.      Comments: EX-Fix to LLE   Skin:     General: Skin is warm and dry.      Capillary Refill: Capillary refill takes 2 to 3 seconds.   Neurological:      General: No focal deficit present.      Mental Status: She is alert and oriented to person, place, and time.      GCS: GCS eye subscore is 4. GCS verbal subscore is 5. GCS motor subscore is 6.      Motor: Weakness present.      Coordination: Coordination abnormal. Heel to Shin Test abnormal. Impaired rapid alternating movements.      Gait: Gait abnormal.   Psychiatric:         Mood and Affect: Mood normal.         Behavior: Behavior normal. Behavior is cooperative.         Thought Content: Thought content normal.         Judgment: Judgment normal.          NEUROLOGICAL EXAMINATION:     MENTAL STATUS   Oriented to person, place, and time.       Diagnostic Results: Labs: Reviewed

## 2023-08-16 NOTE — HPI
Per chart review, Kalina Ryan is a 58 y.o. 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 Patient states she twisted her ankle and fell down 3 step while going down the stairs at work. She had immediate pain and inability to bear weight. She noticed her left deformity and were screaming in pain. He coworker called EMS. Denies head injury or LOC. Denies prior injuries or surgeries to the left ankle. She is not on blood thinners. Denies other MSK pains or paresthesias. Patient admitted with closed left trimalleolar ankle fracture. Ortho consulted and taken to OR on 8/9 and had external fixator placed to left ankle. Post-op, patient non weight bearing to left lower extremity. Patient to elevate and ce left ankle to help with swelling. Needs improvement in left ankle swelling prior to proceeding with definitive fixation surgery to left ankle. Patient to remain in hospital until definitive fixation surgery. Neurology consulted for progressive bilateral leg weakness in patient with previous diagnosis of Guillain [place] and CIDP. Neurology noted likely CIDP and recommended to check vitamin levels (B12, folate, copper, zinc, and thiamine).  Patient also noted on admit to have closed right ankle lateral malleolus fracture and closed right 5th metatarsal fractures but Ortho states no surgery needed and okay for patient to weight bear as tolerated to right lower extremity and to use tall walking boot to right leg when ambulating. Pt is S/P  ex fix on 08/09/2023. Pt in the plans per ortho for definitive fixation 08/17/2023. PM &R was consulted to evaluate pt for IPR placement.          Functional History: Patient lives  with   in a single  story home with 0 steps to enter.  Prior to admission, Pt was I with ADLs and mobility.  DME: None.

## 2023-08-16 NOTE — SUBJECTIVE & OBJECTIVE
"Principal Problem:Closed trimalleolar fracture of left ankle with routine healing    Principal Orthopedic Problem: same as above    Interval History: NAEO. VSS. Pain well controlled. Working with PT. Up to chair yesterday.    Review of patient's allergies indicates:   Allergen Reactions    Amoxicillin Hives    Penicillins Hives       Current Facility-Administered Medications   Medication    acetaminophen tablet 1,000 mg    amLODIPine tablet 5 mg    aspirin chewable tablet 81 mg    bisacodyL suppository 10 mg    EScitalopram oxalate tablet 10 mg    glucagon (human recombinant) injection 1 mg    glucose chewable tablet 16 g    glucose chewable tablet 24 g    melatonin tablet 6 mg    methocarbamoL tablet 750 mg    naloxone 0.4 mg/mL injection 0.02 mg    ondansetron injection 4 mg    oxyCODONE immediate release tablet 5 mg    oxyCODONE immediate release tablet Tab 10 mg    polyethylene glycol packet 17 g    pregabalin capsule 75 mg    senna-docusate 8.6-50 mg per tablet 2 tablet    simethicone chewable tablet 80 mg    sodium chloride 0.9% flush 10 mL    tirzepatide PnIj 5 mg    vitamin D 1000 units tablet 1,000 Units     Objective:     Vital Signs (Most Recent):  Temp: 97.5 °F (36.4 °C) (08/16/23 0450)  Pulse: 77 (08/16/23 0450)  Resp: 18 (08/16/23 0450)  BP: 122/68 (08/16/23 0450)  SpO2: (!) 94 % (08/16/23 0450) Vital Signs (24h Range):  Temp:  [97.4 °F (36.3 °C)-99.3 °F (37.4 °C)] 97.5 °F (36.4 °C)  Pulse:  [77-88] 77  Resp:  [17-20] 18  SpO2:  [94 %-100 %] 94 %  BP: (102-130)/(56-68) 122/68     Weight: 126 kg (277 lb 12.5 oz)  Height: 5' 3" (160 cm)  Body mass index is 49.21 kg/m².      Intake/Output Summary (Last 24 hours) at 8/16/2023 0631  Last data filed at 8/15/2023 1803  Gross per 24 hour   Intake 960 ml   Output 750 ml   Net 210 ml          Ortho/SPM Exam  LLE:   Ex fix in place, no bleeding from pin sites  Cap refill <2s  Able to wiggle toes  SILT    RLE:   Pain with ROM of the ankle  TTP over 5th metatarsal " base  SILT       Significant Labs: All pertinent labs within the past 24 hours have been reviewed.    Significant Imaging: I have reviewed and interpreted all pertinent imaging results/findings.

## 2023-08-16 NOTE — SUBJECTIVE & OBJECTIVE
Interval History: Ortho hopes to get patient to OR tomorrow for definitve fixation of left ankle. Swelling much improved to left ankle. Patient is excited. Explained to patient once surgery complete then will need to be monitored in hospital a few days for medical stability and then hopefully if everything goes well then can discharge to [facility]. Patient reports left ankle pain controlled. Right foot and ankle pain controlled. No new labs today so will order tomorrow for surgery. Vital signs stable.     Review of Systems   Constitutional:  Negative for fever.   Respiratory:  Negative for cough and shortness of breath.    Cardiovascular:  Negative for chest pain.   Gastrointestinal:  Negative for abdominal pain, nausea and vomiting.   Musculoskeletal:  Positive for arthralgias (Left ankle; Right ankle and right hip) and myalgias (Left ankle area).   Neurological:  Positive for weakness (Bilateral lower extremities). Negative for dizziness.   Psychiatric/Behavioral:  Negative for agitation and confusion.      Objective:     Vital Signs (Most Recent):  Temp: 96.4 °F (35.8 °C) (08/16/23 1135)  Pulse: 83 (08/16/23 1135)  Resp: 17 (08/16/23 1135)  BP: 107/58 (08/16/23 1135)  SpO2: 98 % (08/16/23 1135) on room air Vital Signs (24h Range):  Temp:  [96.4 °F (35.8 °C)-99.3 °F (37.4 °C)] 96.4 °F (35.8 °C)  Pulse:  [77-87] 83  Resp:  [17-20] 17  SpO2:  [94 %-100 %] 98 %  BP: (102-130)/(56-68) 107/58     Weight: 126 kg (277 lb 12.5 oz)  Body mass index is 49.21 kg/m².    Intake/Output Summary (Last 24 hours) at 8/16/2023 1312  Last data filed at 8/15/2023 1803  Gross per 24 hour   Intake 720 ml   Output 250 ml   Net 470 ml         Physical Exam  Vitals and nursing note reviewed.   Constitutional:       General: She is awake. She is not in acute distress.     Appearance: Normal appearance. She is well-developed. She is morbidly obese. She is not ill-appearing.   Cardiovascular:      Rate and Rhythm: Normal rate and  regular rhythm.      Heart sounds: Normal heart sounds. No murmur heard.     No friction rub. No gallop.   Pulmonary:      Effort: Pulmonary effort is normal. No respiratory distress.      Breath sounds: Normal breath sounds. No wheezing.   Abdominal:      General: Abdomen is flat. Bowel sounds are normal. There is no distension.      Palpations: Abdomen is soft.      Tenderness: There is no abdominal tenderness.   Musculoskeletal:         General: Swelling (Left ankle and right ankle and foot) present.      Comments: Ex fix in place to left ankle    Skin:     General: Skin is warm.      Findings: Bruising (Right lateral ankle and lateral aspect of right foot) present. No erythema.   Neurological:      Mental Status: She is alert and oriented to person, place, and time.   Psychiatric:         Mood and Affect: Mood normal.         Behavior: Behavior normal. Behavior is cooperative.         Thought Content: Thought content normal.         Judgment: Judgment normal.             Significant Labs: All pertinent labs within the past 24 hours have been reviewed.    Significant Imaging: I have reviewed all pertinent imaging results/findings within the past 24 hours.

## 2023-08-17 ENCOUNTER — ANESTHESIA (OUTPATIENT)
Dept: SURGERY | Facility: HOSPITAL | Age: 58
DRG: 493 | End: 2023-08-17
Payer: OTHER MISCELLANEOUS

## 2023-08-17 PROBLEM — D72.829 LEUKOCYTOSIS: Status: ACTIVE | Noted: 2023-08-17

## 2023-08-17 LAB
ALBUMIN SERPL BCP-MCNC: 2.9 G/DL (ref 3.5–5.2)
ALP SERPL-CCNC: 120 U/L (ref 55–135)
ALT SERPL W/O P-5'-P-CCNC: 20 U/L (ref 10–44)
ANION GAP SERPL CALC-SCNC: 9 MMOL/L (ref 8–16)
AST SERPL-CCNC: 18 U/L (ref 10–40)
BASOPHILS # BLD AUTO: 0.04 K/UL (ref 0–0.2)
BASOPHILS NFR BLD: 0.2 % (ref 0–1.9)
BILIRUB SERPL-MCNC: 0.2 MG/DL (ref 0.1–1)
BUN SERPL-MCNC: 27 MG/DL (ref 6–20)
CALCIUM SERPL-MCNC: 8.6 MG/DL (ref 8.7–10.5)
CHLORIDE SERPL-SCNC: 105 MMOL/L (ref 95–110)
CO2 SERPL-SCNC: 23 MMOL/L (ref 23–29)
CREAT SERPL-MCNC: 0.9 MG/DL (ref 0.5–1.4)
DIFFERENTIAL METHOD: ABNORMAL
EOSINOPHIL # BLD AUTO: 0.1 K/UL (ref 0–0.5)
EOSINOPHIL NFR BLD: 0.3 % (ref 0–8)
ERYTHROCYTE [DISTWIDTH] IN BLOOD BY AUTOMATED COUNT: 14.9 % (ref 11.5–14.5)
EST. GFR  (NO RACE VARIABLE): >60 ML/MIN/1.73 M^2
GLUCOSE SERPL-MCNC: 151 MG/DL (ref 70–110)
HCT VFR BLD AUTO: 34.3 % (ref 37–48.5)
HGB BLD-MCNC: 11.1 G/DL (ref 12–16)
IMM GRANULOCYTES # BLD AUTO: 0.09 K/UL (ref 0–0.04)
IMM GRANULOCYTES NFR BLD AUTO: 0.5 % (ref 0–0.5)
LYMPHOCYTES # BLD AUTO: 1.2 K/UL (ref 1–4.8)
LYMPHOCYTES NFR BLD: 5.9 % (ref 18–48)
MCH RBC QN AUTO: 26.9 PG (ref 27–31)
MCHC RBC AUTO-ENTMCNC: 32.4 G/DL (ref 32–36)
MCV RBC AUTO: 83 FL (ref 82–98)
MONOCYTES # BLD AUTO: 0.3 K/UL (ref 0.3–1)
MONOCYTES NFR BLD: 1.4 % (ref 4–15)
NEUTROPHILS # BLD AUTO: 18 K/UL (ref 1.8–7.7)
NEUTROPHILS NFR BLD: 91.7 % (ref 38–73)
NRBC BLD-RTO: 0 /100 WBC
PLATELET # BLD AUTO: 418 K/UL (ref 150–450)
PMV BLD AUTO: 8.5 FL (ref 9.2–12.9)
POCT GLUCOSE: 121 MG/DL (ref 70–110)
POCT GLUCOSE: 123 MG/DL (ref 70–110)
POCT GLUCOSE: 186 MG/DL (ref 70–110)
POCT GLUCOSE: 94 MG/DL (ref 70–110)
POTASSIUM SERPL-SCNC: 5 MMOL/L (ref 3.5–5.1)
PROT SERPL-MCNC: 6.7 G/DL (ref 6–8.4)
RBC # BLD AUTO: 4.12 M/UL (ref 4–5.4)
SODIUM SERPL-SCNC: 137 MMOL/L (ref 136–145)
WBC # BLD AUTO: 19.57 K/UL (ref 3.9–12.7)

## 2023-08-17 PROCEDURE — 64447 NJX AA&/STRD FEMORAL NRV IMG: CPT | Performed by: STUDENT IN AN ORGANIZED HEALTH CARE EDUCATION/TRAINING PROGRAM

## 2023-08-17 PROCEDURE — 27822 TREATMENT OF ANKLE FRACTURE: CPT | Mod: 58,LT,, | Performed by: ORTHOPAEDIC SURGERY

## 2023-08-17 PROCEDURE — 99900035 HC TECH TIME PER 15 MIN (STAT)

## 2023-08-17 PROCEDURE — 25000003 PHARM REV CODE 250: Performed by: INTERNAL MEDICINE

## 2023-08-17 PROCEDURE — 37000009 HC ANESTHESIA EA ADD 15 MINS: Performed by: ORTHOPAEDIC SURGERY

## 2023-08-17 PROCEDURE — 25000003 PHARM REV CODE 250: Performed by: PHYSICIAN ASSISTANT

## 2023-08-17 PROCEDURE — 11000001 HC ACUTE MED/SURG PRIVATE ROOM

## 2023-08-17 PROCEDURE — 80053 COMPREHEN METABOLIC PANEL: CPT | Performed by: INTERNAL MEDICINE

## 2023-08-17 PROCEDURE — 71000033 HC RECOVERY, INTIAL HOUR: Performed by: ORTHOPAEDIC SURGERY

## 2023-08-17 PROCEDURE — 64450: ICD-10-PCS | Mod: 59,LT,, | Performed by: ANESTHESIOLOGY

## 2023-08-17 PROCEDURE — 99233 SBSQ HOSP IP/OBS HIGH 50: CPT | Mod: ,,, | Performed by: HOSPITALIST

## 2023-08-17 PROCEDURE — 99233 PR SUBSEQUENT HOSPITAL CARE,LEVL III: ICD-10-PCS | Mod: ,,, | Performed by: HOSPITALIST

## 2023-08-17 PROCEDURE — 82962 GLUCOSE BLOOD TEST: CPT | Performed by: ORTHOPAEDIC SURGERY

## 2023-08-17 PROCEDURE — 36000709 HC OR TIME LEV III EA ADD 15 MIN: Performed by: ORTHOPAEDIC SURGERY

## 2023-08-17 PROCEDURE — 27829 TREAT LOWER LEG JOINT: CPT | Mod: 58,51,LT, | Performed by: ORTHOPAEDIC SURGERY

## 2023-08-17 PROCEDURE — 71000015 HC POSTOP RECOV 1ST HR: Performed by: ORTHOPAEDIC SURGERY

## 2023-08-17 PROCEDURE — 64447: ICD-10-PCS | Mod: 59,LT,, | Performed by: ANESTHESIOLOGY

## 2023-08-17 PROCEDURE — 20694 RMVL EXT FIXJ SYS UNDER ANES: CPT | Mod: 58,51,, | Performed by: ORTHOPAEDIC SURGERY

## 2023-08-17 PROCEDURE — 63600175 PHARM REV CODE 636 W HCPCS: Performed by: SURGERY

## 2023-08-17 PROCEDURE — 63600175 PHARM REV CODE 636 W HCPCS: Performed by: STUDENT IN AN ORGANIZED HEALTH CARE EDUCATION/TRAINING PROGRAM

## 2023-08-17 PROCEDURE — 25000003 PHARM REV CODE 250: Performed by: STUDENT IN AN ORGANIZED HEALTH CARE EDUCATION/TRAINING PROGRAM

## 2023-08-17 PROCEDURE — 85025 COMPLETE CBC W/AUTO DIFF WBC: CPT | Performed by: INTERNAL MEDICINE

## 2023-08-17 PROCEDURE — 25000003 PHARM REV CODE 250

## 2023-08-17 PROCEDURE — 36415 COLL VENOUS BLD VENIPUNCTURE: CPT | Performed by: INTERNAL MEDICINE

## 2023-08-17 PROCEDURE — 21400001 HC TELEMETRY ROOM

## 2023-08-17 PROCEDURE — 63600175 PHARM REV CODE 636 W HCPCS

## 2023-08-17 PROCEDURE — 27822 PR OPEN TX TRIMALLEOLAR ANKLE FX W/O FIX PST LIP: ICD-10-PCS | Mod: 58,LT,, | Performed by: ORTHOPAEDIC SURGERY

## 2023-08-17 PROCEDURE — D9220A PRA ANESTHESIA: ICD-10-PCS | Mod: ,,, | Performed by: SURGERY

## 2023-08-17 PROCEDURE — 64450 NJX AA&/STRD OTHER PN/BRANCH: CPT | Mod: 59,LT,, | Performed by: ANESTHESIOLOGY

## 2023-08-17 PROCEDURE — 37000008 HC ANESTHESIA 1ST 15 MINUTES: Performed by: ORTHOPAEDIC SURGERY

## 2023-08-17 PROCEDURE — 27201423 OPTIME MED/SURG SUP & DEVICES STERILE SUPPLY: Performed by: ORTHOPAEDIC SURGERY

## 2023-08-17 PROCEDURE — D9220A PRA ANESTHESIA: Mod: ,,, | Performed by: SURGERY

## 2023-08-17 PROCEDURE — 64708 PR NEUROPLASTY OTHER ARM/LEG NERVE,OPEN: ICD-10-PCS | Mod: 58,51,LT, | Performed by: ORTHOPAEDIC SURGERY

## 2023-08-17 PROCEDURE — 64445 NJX AA&/STRD SCIATIC NRV IMG: CPT | Performed by: STUDENT IN AN ORGANIZED HEALTH CARE EDUCATION/TRAINING PROGRAM

## 2023-08-17 PROCEDURE — 63600175 PHARM REV CODE 636 W HCPCS: Performed by: ANESTHESIOLOGY

## 2023-08-17 PROCEDURE — 20694 PR REMOVE EXTERN BONE FIX DEV W ANESTH: ICD-10-PCS | Mod: 58,51,, | Performed by: ORTHOPAEDIC SURGERY

## 2023-08-17 PROCEDURE — 64447 NJX AA&/STRD FEMORAL NRV IMG: CPT | Mod: 59,LT,, | Performed by: ANESTHESIOLOGY

## 2023-08-17 PROCEDURE — 64708 REVISE ARM/LEG NERVE: CPT | Mod: 58,51,LT, | Performed by: ORTHOPAEDIC SURGERY

## 2023-08-17 PROCEDURE — C1769 GUIDE WIRE: HCPCS | Performed by: ORTHOPAEDIC SURGERY

## 2023-08-17 PROCEDURE — C1713 ANCHOR/SCREW BN/BN,TIS/BN: HCPCS | Performed by: ORTHOPAEDIC SURGERY

## 2023-08-17 PROCEDURE — 36000708 HC OR TIME LEV III 1ST 15 MIN: Performed by: ORTHOPAEDIC SURGERY

## 2023-08-17 PROCEDURE — 94761 N-INVAS EAR/PLS OXIMETRY MLT: CPT

## 2023-08-17 PROCEDURE — 27829 PR OPEN TX DISTAL TIBIOFIBULAR JOINT DISRUPTION: ICD-10-PCS | Mod: 58,51,LT, | Performed by: ORTHOPAEDIC SURGERY

## 2023-08-17 DEVICE — KIT FIBULINK SYNDSMOS REP SS: Type: IMPLANTABLE DEVICE | Site: ANKLE | Status: FUNCTIONAL

## 2023-08-17 DEVICE — K-WIRE TRCR PT1.6MM DIA 150MM: Type: IMPLANTABLE DEVICE | Site: ANKLE | Status: FUNCTIONAL

## 2023-08-17 DEVICE — SCREW 2.7X14MM: Type: IMPLANTABLE DEVICE | Site: ANKLE | Status: FUNCTIONAL

## 2023-08-17 DEVICE — SCREW CRTX LOW PROFILE H 40MM: Type: IMPLANTABLE DEVICE | Site: ANKLE | Status: FUNCTIONAL

## 2023-08-17 DEVICE — PLATE BONE 6H 3.5X2.7X112MM LF: Type: IMPLANTABLE DEVICE | Site: ANKLE | Status: FUNCTIONAL

## 2023-08-17 DEVICE — SCREW CORTEX LP HEX SS 3.5X16: Type: IMPLANTABLE DEVICE | Site: ANKLE | Status: FUNCTIONAL

## 2023-08-17 DEVICE — SCREW CRTX LOW PROFILE H 70MM: Type: IMPLANTABLE DEVICE | Site: ANKLE | Status: FUNCTIONAL

## 2023-08-17 DEVICE — IMPLANTABLE DEVICE: Type: IMPLANTABLE DEVICE | Site: ANKLE | Status: FUNCTIONAL

## 2023-08-17 RX ORDER — CHOLECALCIFEROL (VITAMIN D3) 25 MCG
1000 TABLET ORAL DAILY
Status: DISCONTINUED | OUTPATIENT
Start: 2023-08-17 | End: 2023-08-17

## 2023-08-17 RX ORDER — CEFAZOLIN SODIUM 1 G/3ML
INJECTION, POWDER, FOR SOLUTION INTRAMUSCULAR; INTRAVENOUS
Status: DISCONTINUED | OUTPATIENT
Start: 2023-08-17 | End: 2023-08-17

## 2023-08-17 RX ORDER — ROCURONIUM BROMIDE 10 MG/ML
INJECTION, SOLUTION INTRAVENOUS
Status: DISCONTINUED | OUTPATIENT
Start: 2023-08-17 | End: 2023-08-17

## 2023-08-17 RX ORDER — MIDAZOLAM HYDROCHLORIDE 1 MG/ML
.5-4 INJECTION INTRAMUSCULAR; INTRAVENOUS
Status: DISCONTINUED | OUTPATIENT
Start: 2023-08-17 | End: 2023-08-17 | Stop reason: HOSPADM

## 2023-08-17 RX ORDER — SUCCINYLCHOLINE CHLORIDE 20 MG/ML
INJECTION INTRAMUSCULAR; INTRAVENOUS
Status: DISCONTINUED | OUTPATIENT
Start: 2023-08-17 | End: 2023-08-17

## 2023-08-17 RX ORDER — DEXAMETHASONE SODIUM PHOSPHATE 4 MG/ML
INJECTION, SOLUTION INTRA-ARTICULAR; INTRALESIONAL; INTRAMUSCULAR; INTRAVENOUS; SOFT TISSUE
Status: DISCONTINUED | OUTPATIENT
Start: 2023-08-17 | End: 2023-08-17

## 2023-08-17 RX ORDER — MUPIROCIN 20 MG/G
OINTMENT TOPICAL
Status: DISCONTINUED | OUTPATIENT
Start: 2023-08-17 | End: 2023-08-17 | Stop reason: HOSPADM

## 2023-08-17 RX ORDER — ONDANSETRON 2 MG/ML
INJECTION INTRAMUSCULAR; INTRAVENOUS
Status: DISCONTINUED | OUTPATIENT
Start: 2023-08-17 | End: 2023-08-17

## 2023-08-17 RX ORDER — HYDROMORPHONE HYDROCHLORIDE 1 MG/ML
0.2 INJECTION, SOLUTION INTRAMUSCULAR; INTRAVENOUS; SUBCUTANEOUS EVERY 5 MIN PRN
Status: DISCONTINUED | OUTPATIENT
Start: 2023-08-17 | End: 2023-08-17 | Stop reason: HOSPADM

## 2023-08-17 RX ORDER — FENTANYL CITRATE 50 UG/ML
INJECTION, SOLUTION INTRAMUSCULAR; INTRAVENOUS
Status: DISCONTINUED | OUTPATIENT
Start: 2023-08-17 | End: 2023-08-17

## 2023-08-17 RX ORDER — HALOPERIDOL 5 MG/ML
0.5 INJECTION INTRAMUSCULAR EVERY 10 MIN PRN
Status: DISCONTINUED | OUTPATIENT
Start: 2023-08-17 | End: 2023-08-17 | Stop reason: HOSPADM

## 2023-08-17 RX ORDER — FENTANYL CITRATE 50 UG/ML
25-200 INJECTION, SOLUTION INTRAMUSCULAR; INTRAVENOUS
Status: DISCONTINUED | OUTPATIENT
Start: 2023-08-17 | End: 2023-08-17 | Stop reason: HOSPADM

## 2023-08-17 RX ORDER — SODIUM CHLORIDE 0.9 % (FLUSH) 0.9 %
10 SYRINGE (ML) INJECTION
Status: DISCONTINUED | OUTPATIENT
Start: 2023-08-17 | End: 2023-08-17 | Stop reason: HOSPADM

## 2023-08-17 RX ORDER — PROPOFOL 10 MG/ML
VIAL (ML) INTRAVENOUS
Status: DISCONTINUED | OUTPATIENT
Start: 2023-08-17 | End: 2023-08-17

## 2023-08-17 RX ORDER — LIDOCAINE HYDROCHLORIDE 20 MG/ML
INJECTION INTRAVENOUS
Status: DISCONTINUED | OUTPATIENT
Start: 2023-08-17 | End: 2023-08-17

## 2023-08-17 RX ORDER — BUPIVACAINE HYDROCHLORIDE 2.5 MG/ML
INJECTION, SOLUTION EPIDURAL; INFILTRATION; INTRACAUDAL
Status: COMPLETED | OUTPATIENT
Start: 2023-08-17 | End: 2023-08-17

## 2023-08-17 RX ORDER — PHENYLEPHRINE HYDROCHLORIDE 10 MG/ML
INJECTION INTRAVENOUS
Status: DISCONTINUED | OUTPATIENT
Start: 2023-08-17 | End: 2023-08-17

## 2023-08-17 RX ORDER — BUPIVACAINE HYDROCHLORIDE 5 MG/ML
INJECTION, SOLUTION EPIDURAL; INTRACAUDAL
Status: COMPLETED | OUTPATIENT
Start: 2023-08-17 | End: 2023-08-17

## 2023-08-17 RX ADMIN — SUCCINYLCHOLINE CHLORIDE 140 MG: 20 INJECTION, SOLUTION INTRAMUSCULAR; INTRAVENOUS at 07:08

## 2023-08-17 RX ADMIN — ASPIRIN 81 MG 81 MG: 81 TABLET ORAL at 08:08

## 2023-08-17 RX ADMIN — FENTANYL CITRATE 100 MCG: 50 INJECTION, SOLUTION INTRAMUSCULAR; INTRAVENOUS at 07:08

## 2023-08-17 RX ADMIN — SODIUM CHLORIDE, SODIUM GLUCONATE, SODIUM ACETATE, POTASSIUM CHLORIDE, MAGNESIUM CHLORIDE, SODIUM PHOSPHATE, DIBASIC, AND POTASSIUM PHOSPHATE: .53; .5; .37; .037; .03; .012; .00082 INJECTION, SOLUTION INTRAVENOUS at 07:08

## 2023-08-17 RX ADMIN — MUPIROCIN: 20 OINTMENT TOPICAL at 06:08

## 2023-08-17 RX ADMIN — SODIUM CHLORIDE: 0.9 INJECTION, SOLUTION INTRAVENOUS at 06:08

## 2023-08-17 RX ADMIN — DEXAMETHASONE SODIUM PHOSPHATE 4 MG: 4 INJECTION, SOLUTION INTRAMUSCULAR; INTRAVENOUS at 08:08

## 2023-08-17 RX ADMIN — BUPIVACAINE HYDROCHLORIDE 20 ML: 2.5 INJECTION, SOLUTION EPIDURAL; INFILTRATION; INTRACAUDAL; PERINEURAL at 06:08

## 2023-08-17 RX ADMIN — METHOCARBAMOL 750 MG: 750 TABLET ORAL at 05:08

## 2023-08-17 RX ADMIN — SENNOSIDES AND DOCUSATE SODIUM 2 TABLET: 50; 8.6 TABLET ORAL at 08:08

## 2023-08-17 RX ADMIN — METHOCARBAMOL 750 MG: 750 TABLET ORAL at 08:08

## 2023-08-17 RX ADMIN — PHENYLEPHRINE HYDROCHLORIDE 200 MCG: 10 INJECTION INTRAVENOUS at 08:08

## 2023-08-17 RX ADMIN — CEFAZOLIN 2 G: 2 INJECTION, POWDER, FOR SOLUTION INTRAMUSCULAR; INTRAVENOUS at 10:08

## 2023-08-17 RX ADMIN — ACETAMINOPHEN 1000 MG: 325 TABLET ORAL at 09:08

## 2023-08-17 RX ADMIN — CEFAZOLIN 2 G: 2 INJECTION, POWDER, FOR SOLUTION INTRAMUSCULAR; INTRAVENOUS at 03:08

## 2023-08-17 RX ADMIN — LIDOCAINE HYDROCHLORIDE 100 MG: 20 INJECTION INTRAVENOUS at 07:08

## 2023-08-17 RX ADMIN — PHENYLEPHRINE HYDROCHLORIDE 100 MCG: 10 INJECTION INTRAVENOUS at 07:08

## 2023-08-17 RX ADMIN — BUPIVACAINE HYDROCHLORIDE 30 ML: 5 INJECTION, SOLUTION EPIDURAL; INTRACAUDAL; PERINEURAL at 06:08

## 2023-08-17 RX ADMIN — ROCURONIUM BROMIDE 10 MG: 10 INJECTION INTRAVENOUS at 07:08

## 2023-08-17 RX ADMIN — VANCOMYCIN HYDROCHLORIDE 1000 MG: 1 INJECTION, POWDER, LYOPHILIZED, FOR SOLUTION INTRAVENOUS at 07:08

## 2023-08-17 RX ADMIN — OXYCODONE HYDROCHLORIDE 10 MG: 10 TABLET ORAL at 05:08

## 2023-08-17 RX ADMIN — CEFAZOLIN 3 G: 330 INJECTION, POWDER, FOR SOLUTION INTRAMUSCULAR; INTRAVENOUS at 07:08

## 2023-08-17 RX ADMIN — SUGAMMADEX 400 MG: 100 INJECTION, SOLUTION INTRAVENOUS at 09:08

## 2023-08-17 RX ADMIN — ROCURONIUM BROMIDE 20 MG: 10 INJECTION INTRAVENOUS at 07:08

## 2023-08-17 RX ADMIN — PREGABALIN 75 MG: 75 CAPSULE ORAL at 08:08

## 2023-08-17 RX ADMIN — FENTANYL CITRATE 100 MCG: 50 INJECTION INTRAMUSCULAR; INTRAVENOUS at 06:08

## 2023-08-17 RX ADMIN — VANCOMYCIN HYDROCHLORIDE 1000 MG: 1 INJECTION, POWDER, LYOPHILIZED, FOR SOLUTION INTRAVENOUS at 06:08

## 2023-08-17 RX ADMIN — ONDANSETRON 4 MG: 2 INJECTION INTRAMUSCULAR; INTRAVENOUS at 08:08

## 2023-08-17 RX ADMIN — MIDAZOLAM 2 MG: 1 INJECTION INTRAMUSCULAR; INTRAVENOUS at 06:08

## 2023-08-17 RX ADMIN — ACETAMINOPHEN 1000 MG: 325 TABLET ORAL at 02:08

## 2023-08-17 RX ADMIN — OXYCODONE HYDROCHLORIDE 5 MG: 5 TABLET ORAL at 10:08

## 2023-08-17 RX ADMIN — ROCURONIUM BROMIDE 30 MG: 10 INJECTION INTRAVENOUS at 08:08

## 2023-08-17 RX ADMIN — METHOCARBAMOL 750 MG: 750 TABLET ORAL at 02:08

## 2023-08-17 RX ADMIN — PROPOFOL 200 MG: 10 INJECTION, EMULSION INTRAVENOUS at 07:08

## 2023-08-17 NOTE — ANESTHESIA PROCEDURE NOTES
Intubation    Date/Time: 8/17/2023 7:11 AM    Performed by: Kenn Parrish MD  Authorized by: Ozzie Romano MD    Intubation:     Induction:  Rapid sequence induction    Intubated:  Postinduction    Mask Ventilation:  Not attempted    Attempts:  1    Attempted By:  Resident anesthesiologist    Method of Intubation:  Video laryngoscopy    Blade:  Aguiar 3    Laryngeal View Grade: Grade I - full view of cords      Difficult Airway Encountered?: No      Complications:  None    Airway Device:  Oral endotracheal tube    Airway Device Size:  7.0    Style/Cuff Inflation:  Cuffed    Inflation Amount (mL):  8    Tube secured:  21    Secured at:  The lips    Placement Verified By:  Capnometry    Complicating Factors:  None    Findings Post-Intubation:  BS equal bilateral

## 2023-08-17 NOTE — OP NOTE
OP NOTE    DOS:  08/17/2023    Preop Dx: Left trimalleolar ankle fracture status post closed reduction and spanning external fixation    Left ankle syndesmosis disruption    Postop Dx: Left trimalleolar ankle fracture status post closed reduction and spanning external fixation    Left ankle syndesmosis disruption    Procedure: Open reduction internal fixation left trimalleolar ankle fracture with fixation of medial and lateral malleoli, without fixation of posterior lip - 08878    Open treatment of left ankle syndesmosis disruption with reduction internal fixation - 66367    Removal, under anesthesia of external fixation left ankle - 29020    Neurolysis left superficial peroneal nerve - 15882    Surgeon: Kali Santoyo M.D.    Asst:  Kali Mckeon M.D    Anesthesia: GETA    EBL:  10 cc    IVF:  1000 cc crystalloid    Implants: Synthes locking fibular plate and screws.  3.5 mm screws.  Fibulink syndesmotic fixation device.    Specimens: None    Findings: Stable fixation with good alignment of the posterior malleolus fracture.  Good syndesmotic alignment.    Dispo:  To PACU extubated/stable    Dressing: Prevena restore 2 week wound VAC       Indications for Procedure:      58-year-old female sustained a complex left trimalleolar ankle fracture dislocation, treated initially with closed reduction and spanning external fixation.  She is now returning to the operating room for definitive fixation with a soft tissue envelope amenable to surgery.  The risks, benefits and alternatives to surgery discussed with the patient prior to going to the operating room.  Informed consent was obtained.    Procedure in Detail:    Patient identified in preoperative holding area the site was marked.  Regional analgesia was administered.  Patient was wheeled to the operating room and placed on the operating table in supine position.  General endotracheal anesthesia induced and preoperative antibiotics were administered to include Ancef  and vancomycin.  A time-out was undertaken to confirm patient, side, site, surgery, surgeon and administration of preoperative antibiotics.  All agreed we proceeded.    This point I deconstructed the bars and clamps from the external fixator.  I removed the pins from the tibia the 1st metatarsal.  I cleansed the distal portion of the calcaneal pin and then removed this.  Patient was then placed into a nonsterile tourniquet and then prepped and draped sterile fashion.  Leg was exsanguinated the tourniquet was raised.      Made a lateral incision dissected down level the fibular fracture.  This was curetted and then reduced with a claw clamp.  2 mm K-wires placed posterior to anterior to hold position.  I selected a Synthes fibular locking plate and placed this in the appropriate position.  I fixed it with a bicortical screw distally to pull it down to bone.  I then placed 3 screws into the shaft proximally.  I then filled the distal locking screw holes and removed the initial bicortical screw and replaced this with a locking screw.  K-wire was removed.  I good reduction of the fibula.      I turned my attention medially made a medial dissection.  I pulled a good deal of periosteum out of the medial malleolar fracture site.  I then reduced this with a clamp and placed 2 bicortical medial malleolar screws getting good fixation and good reduction.  I then turned my attention back laterally to the syndesmosis.      Patient will bit of comminution at the fracture and although I would taken a little bit of the bend of the plate I wanted more compression on that side prior to placing syndesmotic fixation.  I am using a dynamic device that does not really compress.  For this reason, I placed a single syndesmosis screw in the hole above were my dynamic fixation would go compressing the syndesmosis nicely.  I then placed a guidewire for the Fibulink.  Placed a screw in the tibia the appropriate position and then placed the  locking cap tensioning it to its torque limit.  I then cycled the ankle through several ranges of motion and again tightened the locking cap until its torque limit.  I then removed the syndesmosis screw and replaced that screw in just the fibula.  I visualized the entire construct again had very good reduction of the ankle and the mortise.      The tourniquet was let down hemostasis was obtained with electrocautery.  The wounds were copiously irrigated normal saline solution.  I used Metzenbaum scissors to find the position of the superficial peroneal nerve.  Unfortunately, it was right in the layer that I needed to close deep.  For this reason I performed a tenolysis throughout the entire length of the incision freeing up the superficial peroneal nerve to protect this from being trapped in scar were in the closure.  I then closed the deep fascia over vancomycin cefepime powder.  Same was done on the medial side.  The subcutaneous tissues were closed with 3-0 Vicryl suture and the skin with 3-0 nylon suture in running fashion.    Given the patient's skin condition and the proven efficacy in decreasing wound breakdown improving blood flow to the skin, I placed a Prevena restore 2 week incisional wound VAC which covered both the incisions and the external fixator pin sites except for that on the 1st metatarsal which was covered with another sterile dressing.  A short-leg posterior splint with stirrups was then applied.      All instrument and sponge counts were reported correct at the end of the case.  There were no complications.  The patient was extubated, awakened and taken to the recovery room stable condition.      Plan the patient:     Multimodal pain management limiting narcotics.  Physical occupational therapy with nonweightbearing left lower extremity below the knee.  DVT prophylaxis times for 6 weeks.  Sutures out in 2 weeks and begin weight-bearing as tolerated in a Cam boot at 6 weeks postop.    Kali  MD Natanael

## 2023-08-17 NOTE — ANESTHESIA PROCEDURE NOTES
Left Adductor Canal Single Shot Block    Patient location during procedure: pre-op   Block not for primary anesthetic.  Reason for block: at surgeon's request and post-op pain management   Post-op Pain Location: Left ankle   Start time: 8/17/2023 6:45 AM  Timeout: 8/17/2023 6:40 AM   End time: 8/17/2023 6:47 AM    Staffing  Authorizing Provider: Deep Yoo MD  Performing Provider: Ernst Lan MD    Staffing  Performed by: Ernst Lan MD  Authorized by: Deep Yoo MD    Preanesthetic Checklist  Completed: patient identified, IV checked, site marked, risks and benefits discussed, surgical consent, monitors and equipment checked, pre-op evaluation and timeout performed  Peripheral Block  Patient position: supine  Prep: ChloraPrep  Patient monitoring: heart rate, cardiac monitor, continuous pulse ox, continuous capnometry and frequent blood pressure checks  Block type: adductor canal  Laterality: left  Injection technique: single shot  Needle  Needle type: Stimuplex   Needle gauge: 21 G  Needle length: 4 in  Needle localization: anatomical landmarks and ultrasound guidance   -ultrasound image captured on disc.  Assessment  Injection assessment: negative aspiration, negative parasthesia and local visualized surrounding nerve  Paresthesia pain: none  Heart rate change: no  Slow fractionated injection: yes  Pain Tolerance: comfortable throughout block and no complaints  Medications:    Medications: bupivacaine (pf) (MARCAINE) injection 0.25% - Perineural   20 mL - 8/17/2023 6:47:00 AM    Additional Notes  VSS.  [facility] RN monitoring vitals throughout procedure.  Patient tolerated procedure well.     Administered 20 cc of 0.25% bupivacaine with epi.

## 2023-08-17 NOTE — ANESTHESIA PROCEDURE NOTES
Left Popliteal Single Shot Block    Patient location during procedure: pre-op   Block not for primary anesthetic.  Reason for block: at surgeon's request and post-op pain management   Post-op Pain Location: Left ankle   Start time: 8/17/2023 6:41 AM  Timeout: 8/17/2023 6:40 AM   End time: 8/17/2023 6:44 AM    Staffing  Authorizing Provider: Deep Yoo MD  Performing Provider: Ernst Lan MD    Staffing  Performed by: Ernst Lan MD  Authorized by: Deep Yoo MD    Preanesthetic Checklist  Completed: patient identified, IV checked, site marked, risks and benefits discussed, surgical consent, monitors and equipment checked, pre-op evaluation and timeout performed  Peripheral Block  Patient position: supine  Prep: ChloraPrep  Patient monitoring: heart rate, cardiac monitor, continuous pulse ox, continuous capnometry and frequent blood pressure checks  Block type: popliteal  Laterality: left  Injection technique: single shot  Needle  Needle type: Stimuplex   Needle gauge: 21 G  Needle length: 4 in  Needle localization: anatomical landmarks and ultrasound guidance   -ultrasound image captured on disc.  Assessment  Injection assessment: negative aspiration, negative parasthesia and local visualized surrounding nerve  Paresthesia pain: none  Heart rate change: no  Slow fractionated injection: yes  Pain Tolerance: comfortable throughout block and no complaints  Medications:    Medications: bupivacaine (pf) (MARCAINE) injection 0.5% - Perineural, Left Popliteal   30 mL - 8/17/2023 6:44:00 AM    Additional Notes  VSS.  [facility] RN monitoring vitals throughout procedure.  Patient tolerated procedure well.     Administered 30 cc of 0.5% of bupivacaine with epi.

## 2023-08-17 NOTE — SUBJECTIVE & OBJECTIVE
"Principal Problem:Closed trimalleolar fracture of left ankle with routine healing    Principal Orthopedic Problem: same as above    Interval History: NAEO. VSS. Pain well controlled. Working with PT.     Review of patient's allergies indicates:   Allergen Reactions    Amoxicillin Hives    Penicillins Hives       Current Facility-Administered Medications   Medication    acetaminophen tablet 1,000 mg    amLODIPine tablet 5 mg    aspirin chewable tablet 81 mg    bisacodyL suppository 10 mg    ceFAZolin 2 g in dextrose 5 % in water (D5W) 50 mL IVPB (MB+)    EScitalopram oxalate tablet 10 mg    glucagon (human recombinant) injection 1 mg    glucose chewable tablet 16 g    glucose chewable tablet 24 g    melatonin tablet 6 mg    methocarbamoL tablet 750 mg    mupirocin 2 % ointment    naloxone 0.4 mg/mL injection 0.02 mg    ondansetron injection 4 mg    oxyCODONE immediate release tablet 5 mg    oxyCODONE immediate release tablet Tab 10 mg    polyethylene glycol packet 17 g    pregabalin capsule 75 mg    senna-docusate 8.6-50 mg per tablet 2 tablet    simethicone chewable tablet 80 mg    sodium chloride 0.9% flush 10 mL    sodium chloride 0.9% flush 10 mL    tirzepatide PnIj 5 mg    vancomycin (VANCOCIN) 1,000 mg in dextrose 5 % (D5W) 250 mL IVPB (Vial-Mate)    vitamin D 1000 units tablet 1,000 Units     Objective:     Vital Signs (Most Recent):  Temp: 97.6 °F (36.4 °C) (08/17/23 0448)  Pulse: 82 (08/17/23 0448)  Resp: 18 (08/17/23 0448)  BP: (!) 156/74 (08/17/23 0448)  SpO2: (!) 92 % (08/17/23 0448) Vital Signs (24h Range):  Temp:  [96.4 °F (35.8 °C)-98.4 °F (36.9 °C)] 97.6 °F (36.4 °C)  Pulse:  [81-92] 82  Resp:  [16-20] 18  SpO2:  [92 %-99 %] 92 %  BP: (106-156)/(58-74) 156/74     Weight: 126 kg (277 lb 12.5 oz)  Height: 5' 3" (160 cm)  Body mass index is 49.21 kg/m².      Intake/Output Summary (Last 24 hours) at 8/17/2023 0605  Last data filed at 8/16/2023 1742  Gross per 24 hour   Intake 880 ml   Output 1200 ml   Net " -320 ml          Ortho/SPM Exam  LLE:   Ex fix in place, no bleeding from pin sites  Cap refill <2s  Able to wiggle toes  SILT    RLE:   Pain with ROM of the ankle  TTP over 5th metatarsal base  SILT       Significant Labs: All pertinent labs within the past 24 hours have been reviewed.    Significant Imaging: I have reviewed and interpreted all pertinent imaging results/findings.   no

## 2023-08-17 NOTE — TRANSFER OF CARE
"Anesthesia Transfer of Care Note    Patient: Kalina Ryan    Procedure(s) Performed: Procedure(s) (LRB):  ORIF, ANKLE (Left)  FIXATION, SYNDESMOSIS, ANKLE (Left)  REMOVAL, EXTERNAL FIXATION DEVICE (Left)    Patient location: PACU    Anesthesia Type: general    Transport from OR: Transported from OR on 6-10 L/min O2 by face mask with adequate spontaneous ventilation    Post pain: adequate analgesia    Post assessment: no apparent anesthetic complications    Post vital signs: stable    Level of consciousness: awake and alert    Nausea/Vomiting: no nausea/vomiting    Complications: none    Transfer of care protocol was followed      Last vitals:   Visit Vitals  /62 (BP Location: Left arm, Patient Position: Lying)   Pulse 80   Temp 36.4 °C (97.6 °F) (Oral)   Resp (!) 22   Ht 5' 3" (1.6 m)   Wt 126 kg (277 lb 12.5 oz)   SpO2 100%   Breastfeeding No   BMI 49.21 kg/m²     "

## 2023-08-17 NOTE — PT/OT/SLP PROGRESS
Physical Therapy      Patient Name:  Kalina Ryan   MRN:  [id number]    Patient not seen today secondary to Patient fatigue ( requests hold until tomorrow as patient still asleep after surgery today). Will follow-up 8/18.

## 2023-08-17 NOTE — ASSESSMENT & PLAN NOTE
Closed nondisplaced fracture of fifth metatarsal bone of right foot with routine healing  · Noted on admit on X-rays to have non-displaced closed fractures of right lateral malleolus and right 5th metatarsal. Orthopedics consulted and recommended non-operative management and patient okay to weight bear as tolerated to right lower extremity. Patient to ambulate in tall walking boot when out of bed.   · Pain management.   · Aspirin 81 mg po BID for DVT prophylaxis.   · Will need to reasddress in 6 weeks once in CAM boot on left as will hopefully be out of boot on right then as wont be able to mobilize in 2 boots

## 2023-08-17 NOTE — PLAN OF CARE
Labs stable, wbc 19 post op, likely secondary to decadron received in OR today per review as no signs of infetion and will repeat cbc tomorrow for monitoring.

## 2023-08-17 NOTE — PROGRESS NOTES
[facility], An Acute Care Hospital Medicine  Progress Note    Patient Name: Kalina Ryan  MRN: [id number]  Patient Class: IP- Inpatient   Admission Date: 8/7/2023  Length of Stay: 8 days  Attending Physician: Edie Mark MD  Primary Care Provider: Nany Hoff NP        Subjective:     Principal Problem:Closed trimalleolar fracture of left ankle        HPI:  Kalina Ryan is a 58 y.o. 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 Patient states she twisted her ankle and fell down 3 step while going down the stairs at work. She had immediate pain and inability to bear weight. She noticed her left deformity and were screaming in pain. He coworker called EMS. Denies head injury or LOC. Denies prior injuries or surgeries to the left ankle. She is not on blood thinners. Denies other MSK pains or paresthesias. She walks without assistive devices at baseline. She lives at home with her .     ED course: afebrile and vitals stable. X-ray showed left ankle trimalleolar fracture and dislocation, and right ankle lateral malleolus fx. Patient seen by orthopedic and L ankle was Reduced and splinted in ER placed in short leg splint. Patient received profopol, fenatnyl and oxycodone in ED during conscious sedation for closed reduction of left ankle fracture.     During my interview patient is awake, conversant and not in distress. She reports pain and spasm in both ankles with right side hurting more than left. She denies tobacco, alcohol or other illicit drug use. She is able to ambulate few blocks at baseline without chest pain or SOB.           Overview/Hospital Course:  Patient admitted with closed left trimalleolar ankle fracture. Ortho consulted and taken to OR on 8/9 and had external fixator placed to left ankle. Post-op, patient  non weight bearing to left lower extremity. Patient to elevate and ce left ankle to help with swelling. Needs improvement in left ankle swelling prior to proceeding with definitive fixation surgery to left ankle. Patient to remain in hospital until definitive fixation surgery. Neurology consulted for progressive bilateral leg weakness in patient with previous diagnosis of Guillain Effort and CIDP. Neurology noted likely CIDP and recommended to check vitamin levels (B12, folate, copper, zinc, and thiamine). Neurology noted patient with history of CIDP and received steroids and IVIG in past an used to see Dr. Moses but no treatment since 2012. She has self-contained episodes in which she did not seek medical attention. These symptoms are similar. Want to avoid steroids now with planned surgery. Follow-up with neuromuscular Neurologist for possible IVIG as outpatient upon discharge. If any change in status, reconsult Neurology. Patient also noted on admit to have closed right ankle lateral malleolus fracture and closed right 5th metatarsal fractures but Ortho states no surgery needed and okay for patient to weight bear as tolerated to right lower extremity and to use tall walking boot to right leg when ambulating.       Interval History:  patient went for fixation today of ankle as swelling finally improved enough to go for surgery as was waiting all week for swelling to improve and per op note went well with ORIF with fixation of medial and lateral malleoli and ex fix removal with neurolysis of left superficial peroneal nerve with recs for NWB for 6 weeks with suture removal in 2 weeks and cam boot in 6 weeks with DVT ppx continue for 6 weeks. Will plan to continue PT/OT post op, pain doing okay right now, she reports feeling a little groggy, seen in pacu right before they were about to wheel her upstairs back to room. She reports not too hungry yet but DM diet ordered for once appetite starts returning for her. Has  been using a boot to the right leg with wbat in this boot for this non op fracture, discussed that once she goes into CAM BOOT on left in 6 weeks that will have to see at that time what the plan will be on the right and hopefully will be out of that boot as wont be able to really mobilize with 2 boots on so that will have to be addressed at ortho f/u at that time      Review of Systems   Constitutional:  Negative for fever.   Respiratory:  Negative for cough and shortness of breath.    Cardiovascular:  Negative for chest pain.   Gastrointestinal:  Negative for abdominal pain, nausea and vomiting.   Musculoskeletal:  Positive for arthralgias (Left ankle; Right ankle and right hip) and myalgias (Left ankle area).   Neurological:  Positive for weakness (Bilateral lower extremities). Negative for dizziness.   Psychiatric/Behavioral:  Negative for agitation and confusion.      Objective:     Vital Signs (Most Recent):  Temp: 96.4 °F (35.8 °C) (08/16/23 1135)  Pulse: 83 (08/16/23 1135)  Resp: 17 (08/16/23 1135)  BP: 107/58 (08/16/23 1135)  SpO2: 98 % (08/16/23 1135) on room air Vital Signs (24h Range):  Temp:  [96.4 °F (35.8 °C)-98.4 °F (36.9 °C)] 97.3 °F (36.3 °C)  Pulse:  [78-92] 80  Resp:  [11-22] 18  SpO2:  [92 %-100 %] 95 %  BP: (107-156)/(58-76) 119/66     Weight: 126 kg (277 lb 12.5 oz)  Body mass index is 49.21 kg/m².    Intake/Output Summary (Last 24 hours) at 8/17/2023 1129  Last data filed at 8/17/2023 0750  Gross per 24 hour   Intake 2080 ml   Output 700 ml   Net 1380 ml           Physical Exam  Vitals and nursing note reviewed.   Constitutional:       General: She is awake. She is not in acute distress.     Appearance: Normal appearance. She is well-developed. She is morbidly obese. She is not ill-appearing.   Cardiovascular:      Rate and Rhythm: Normal rate and regular rhythm.      Heart sounds: Normal heart sounds. No murmur heard.     No friction rub. No gallop.   Pulmonary:      Effort: Pulmonary effort  is normal. No respiratory distress.      Breath sounds: Normal breath sounds. No wheezing.   Abdominal:      General: Abdomen is flat. Bowel sounds are normal. There is no distension.      Palpations: Abdomen is soft.      Tenderness: There is no abdominal tenderness.   Musculoskeletal:         General: Swelling (Left ankle and right ankle and foot) present.      Comments: Bandages to LLE post op   Skin:     General: Skin is warm.      Findings: Bruising (Right lateral ankle and lateral aspect of right foot) present. No erythema.   Neurological:      Mental Status: She is alert and oriented to person, place, and time.   Psychiatric:         Mood and Affect: Mood normal.         Behavior: Behavior normal. Behavior is cooperative.         Thought Content: Thought content normal.         Judgment: Judgment normal.             Significant Labs: All pertinent labs within the past 24 hours have been reviewed.    Significant Imaging: I have reviewed all pertinent imaging results/findings within the past 24 hours.      Assessment/Plan:      * Closed trimalleolar fracture of left ankle  - Patient s/p fall of 3 steps while going down the stairs while at work.   - Imaging showed left ankle trimalleolar fracture and dislocation. Patient seen by Orthopedic surgery and left ankle was reduced and splinted in ER placed in short leg splint.   - Patient taken to OR on 8/9/2023 and had ex fix placed to left ankle.  - Routine pin site care post-op to left ankle ex fix.  - Non weight bear to left lower extremity.  - Ice and elevate left leg to help with swelling with plan to proceed with removal of ex fix and definitive fixation of left ankle fracture once left ankle swelling improved. Patient to remain in hospital until next surgery. Ortho reassessing left ankle swelling daily to see when can proceed with definitve fixation surgery.   OR on 8/17 with Dr Santoyo with : Open reduction internal fixation left trimalleolar ankle fracture  with fixation of medial and lateral malleoli, without fixation of posterior lip - 50754                          Open treatment of left ankle syndesmosis disruption with reduction internal fixation - 55883                          Removal, under anesthesia of external fixation left ankle - 20694                          Neurolysis left superficial peroneal nerve - 87282  - PT/OT consulted and working with patient in hospital and plan for O-rehab on likely POD 2 for further rehabilitation  - Continue Aspirin 81 mg po BID for DVT prophylaxis for 6 weeks per ortho (sept 29th, which is when will be able to go into CAM boot and weight bear). Sutures out I n2 weeks  - Pain controlled. Continue scheduled Tylenol 1000 mg po TID + Robaxin 750 mg po 4 times daily and Lyrica 75 mg po BID and continue with Oxycodone IR prn for breakthrough pain.     Weakness of both lower extremities        Closed dislocation of left ankle  Patient s/p closed reduction by Orthopedic in ED with short leg splint placement.  -see above        Closed displaced fracture of lateral malleolus of right fibula with routine healing  Closed nondisplaced fracture of fifth metatarsal bone of right foot with routine healing  · Noted on admit on X-rays to have non-displaced closed fractures of right lateral malleolus and right 5th metatarsal. Orthopedics consulted and recommended non-operative management and patient okay to weight bear as tolerated to right lower extremity. Patient to ambulate in tall walking boot when out of bed.   · Pain management.   · Aspirin 81 mg po BID for DVT prophylaxis.   · Will need to reasddress in 6 weeks once in CAM boot on left as will hopefully be out of boot on right then as wont be able to mobilize in 2 boots    Benign essential HTN  Chronic and controlled. Continue home Amlodipine to treat. Home Lisinopril on hold. Target BP < 140/90. Monitor vital signs every 4 hours.       CIDP (chronic inflammatory demyelinating  polyneuropathy)  Bilateral lower extremity weakness   - Patient with known history of bilateral lower extremity weakness but normally able to ambulate without assistive device.   - Patient reports progressive weakness over past 3 months.   - Neurology consulted. Check vitamin levels. Check heavy metal levels.  - Per Neurology: Patient reports history of CIDP and received steroids and IVIG in past and used to follow with Dr. Moses and no treatment since 2012. She has self-contained episodes in which she did not seek medical attention. These symptoms are similar. Want to avoid steroids now with planned surgery. Neurology recommends neuromuscular Neurologist for possible IVIG as outpatient upon discharge.  -  If any change in status, reconsult Neurology.    Class 3 severe obesity due to excess calories with serious comorbidity and body mass index (BMI) of 45.0 to 49.9 in adult  Personal history of gastric bypass   Body mass index is 49.21 kg/m². Morbid obesity complicates all aspects of disease management from diagnostic modalities to treatment. Weight loss encouraged and health benefits explained to patient.         Type 2 diabetes mellitus without complication, without long-term current use of insulin  Patient's FSGs are controlled on current medication regimen. Patient on Mounjaro injections weekly at home to treat.   Last A1c reviewed-   Lab Results   Component Value Date    HGBA1C 5.9 (H) 08/07/2023     Most recent fingerstick glucose reviewed-   Recent Labs   Lab 08/15/23  1534 08/15/23  2018 08/16/23  0733 08/16/23  1136   POCTGLUCOSE 93 116* 99 65*     Monitor blood sugars with meals and at bedtime in hospital.  Diabetic diet.  Target blood sugars 140-180 in hospital.     Anxiety  Chronic and controlled. Continue home Lexapro to treat.         VTE Risk Mitigation (From admission, onward)         Ordered     IP VTE HIGH RISK PATIENT  Once         08/16/23 2033     Place sequential compression device  Until  discontinued         08/16/23 2033     Place sequential compression device  Until discontinued         08/08/23 0036                Discharge Planning   LIVIER: 8/19/2023     Code Status: Full Code   Is the patient medically ready for discharge?: No    Reason for patient still in hospital (select all that apply): Patient trending condition  Discharge Plan A: Rehab                  Edie Mark MD  Department of Hospital Medicine   [facility] - Surgery

## 2023-08-17 NOTE — PLAN OF CARE
Problem: Adult Inpatient Plan of Care  Goal: Plan of Care Review  Outcome: Ongoing, Progressing  Goal: Patient-Specific Goal (Individualized)  Outcome: Ongoing, Progressing  Goal: Absence of Hospital-Acquired Illness or Injury  Outcome: Ongoing, Progressing  Goal: Optimal Comfort and Wellbeing  Outcome: Ongoing, Progressing  Goal: Readiness for Transition of Care  Outcome: Ongoing, Progressing     Problem: Bariatric Environmental Safety  Goal: Safety Maintained with Care  Outcome: Ongoing, Progressing     Problem: Diabetes Comorbidity  Goal: Blood Glucose Level Within Targeted Range  Outcome: Ongoing, Progressing     Problem: Skin Injury Risk Increased  Goal: Skin Health and Integrity  Outcome: Ongoing, Progressing     Problem: Impaired Wound Healing  Goal: Optimal Wound Healing  Outcome: Ongoing, Progressing     Problem: Fall Injury Risk  Goal: Absence of Fall and Fall-Related Injury  Outcome: Ongoing, Progressing    Patient had sx today, medicated x1 for pain after sx, wound vac to continuous suction, sx dressing remained clean dry and intact.

## 2023-08-17 NOTE — NURSING TRANSFER
Nursing Transfer Note      8/17/2023   10:59 AM    Transfer To: [id number]    Transfer via bed    Transported by PCT    Medicines sent: N/A    Any special needs or follow-up needed: N/A    Chart send with patient: Yes    Notified: spouse    Patient reassessed at: 8/17/2023 @ 1015

## 2023-08-17 NOTE — PROGRESS NOTES
[facility] ([facility])  Orthopedics  Progress Note    Attg Note:  Patient seen and examined.  I agree with the resident's assessment and plan.  To OR for ex fix removal and ORIF left ankle fracture.    The risks, benefits and alternatives to surgery were discussed with the patient at great length.  These include bleeding, infection, vessel/nerve damage, pain, numbness, tingling, complex regional pain syndrome, hardware/surgical failure, need for further surgery, malunion, nonunion, DVT, PE, arthritis and death.  Patient states an understanding and wishes to proceed with surgery.   All questions were answered.  No guarantees were implied or stated.  Informed consent was obtained.      Kali Santoyo MD      Patient Name: Kalina Ryan  MRN: [id number]  Admission Date: 8/7/2023  Hospital Length of Stay: 8 days  Attending Provider: Claudia Graves MD  Primary Care Provider: Nany Hoff NP  Follow-up For: Procedure(s) (LRB):  ORIF, ANKLE, synthes, ex fix removal, c arm (Left)    Post-Operative Day: Day of Surgery  Subjective:     Principal Problem:Closed trimalleolar fracture of left ankle with routine healing    Principal Orthopedic Problem: same as above    Interval History: NAEO. VSS. Pain well controlled. Working with PT.     Review of patient's allergies indicates:   Allergen Reactions    Amoxicillin Hives    Penicillins Hives       Current Facility-Administered Medications   Medication    acetaminophen tablet 1,000 mg    amLODIPine tablet 5 mg    aspirin chewable tablet 81 mg    bisacodyL suppository 10 mg    ceFAZolin 2 g in dextrose 5 % in water (D5W) 50 mL IVPB (MB+)    EScitalopram oxalate tablet 10 mg    glucagon (human recombinant) injection 1 mg    glucose chewable tablet 16 g    glucose chewable tablet 24 g    melatonin tablet 6 mg    methocarbamoL tablet 750 mg    mupirocin 2 % ointment    naloxone 0.4 mg/mL injection 0.02 mg    ondansetron injection 4 mg    oxyCODONE immediate release  "tablet 5 mg    oxyCODONE immediate release tablet Tab 10 mg    polyethylene glycol packet 17 g    pregabalin capsule 75 mg    senna-docusate 8.6-50 mg per tablet 2 tablet    simethicone chewable tablet 80 mg    sodium chloride 0.9% flush 10 mL    sodium chloride 0.9% flush 10 mL    tirzepatide PnIj 5 mg    vancomycin (VANCOCIN) 1,000 mg in dextrose 5 % (D5W) 250 mL IVPB (Vial-Mate)    vitamin D 1000 units tablet 1,000 Units     Objective:     Vital Signs (Most Recent):  Temp: 97.6 °F (36.4 °C) (08/17/23 0448)  Pulse: 82 (08/17/23 0448)  Resp: 18 (08/17/23 0448)  BP: (!) 156/74 (08/17/23 0448)  SpO2: (!) 92 % (08/17/23 0448) Vital Signs (24h Range):  Temp:  [96.4 °F (35.8 °C)-98.4 °F (36.9 °C)] 97.6 °F (36.4 °C)  Pulse:  [81-92] 82  Resp:  [16-20] 18  SpO2:  [92 %-99 %] 92 %  BP: (106-156)/(58-74) 156/74     Weight: 126 kg (277 lb 12.5 oz)  Height: 5' 3" (160 cm)  Body mass index is 49.21 kg/m².      Intake/Output Summary (Last 24 hours) at 8/17/2023 0605  Last data filed at 8/16/2023 1742  Gross per 24 hour   Intake 880 ml   Output 1200 ml   Net -320 ml         Ortho/SPM Exam  LLE:   Ex fix in place, no bleeding from pin sites  Cap refill <2s  Able to wiggle toes  SILT    RLE:   Pain with ROM of the ankle  TTP over 5th metatarsal base  SILT       Significant Labs: All pertinent labs within the past 24 hours have been reviewed.    Significant Imaging: I have reviewed and interpreted all pertinent imaging results/findings.    Assessment/Plan:     * Closed trimalleolar fracture of left ankle with routine healing s/p ex fix on 8/9/2023  Kalina Ryan is a 58 y.o. female with left ankle trimalleolar fracture and dislocation and base of 5th MT fx s/p ankle spanning ex fix 8/9/23. We will plan for definitive surgery when soft tissues more amenable. Ankle appears moderately swollen but without significant bruising or fracture blisters at this time.    Pain: MM  NWB LLE, WBAT RLE in boot  Pin site care BID  ASA 81 " BID  Swelling stable and improved, plan for ORIF today.               Ozzie Kothari MD  Orthopedics  [facility] - Surgery (2nd Fl)

## 2023-08-17 NOTE — ANESTHESIA PREPROCEDURE EVALUATION
[facility]  Anesthesia Pre-Operative Evaluation         Patient Name: Kalina Ryan  YOB: 1965  MRN: [id number]    SUBJECTIVE:     Pre-operative evaluation for Procedure(s) (LRB):  ORIF, ANKLE - LEFT, ex fix removal, synthes (Left)     08/17/2023    Kalina Ryan is a 58 y.o. 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 Admitted with left ankle trimalleolar fracture and dislocation and base of 5th MT fx s/p ankle spanning ex fix 8/9/23.    Patient now presents for the above procedure(s).    Echo Summary  No results found for this or any previous visit.      Prev airway 8/9/23:      Induction:  Rapid sequence induction    Intubated:  Postinduction    Mask Ventilation:  Not attempted    Attempts:  1    Attempted By:  Student    Method of Intubation:  Video laryngoscopy    Blade:  Esha 3    Laryngeal View Grade: Grade I - full view of cords      Difficult Airway Encountered?: No      Complications:  None    Airway Device:  Oral endotracheal tube    Airway Device Size:  7.5    Style/Cuff Inflation:  Cuffed (inflated to minimal occlusive pressure)    Tube secured:  22    Secured at:  The lips    Placement Verified By:  Capnometry    Complicating Factors:  Obesity and short neck    Findings Post-Intubation:  BS equal bilateral and atraumatic/condition of teeth unchanged    LDA:        Peripheral IV - Single Lumen 08/09/23 1623 20 G Anterior;Left Forearm (Active)   Site Assessment Clean;Dry;Intact 08/10/23 0000   Line Status Infusing 08/09/23 1947   Dressing Status Clean;Dry;Intact 08/09/23 1947   Dressing Intervention Integrity maintained 08/10/23 0000   Reason Not Rotated Not due 08/09/23 1947   Number of days: 0       Female External Urinary Catheter 08/07/23 2136 (Active)   Skin no redness;no breakdown 08/09/23 0730   Tolerance no  signs/symptoms of discomfort 08/09/23 0730   Suction Continuous suction at 70 mmHg;Other 08/09/23 1947   Date of last wick change 08/09/23 08/09/23 0730   Time of last wick change 0600 08/09/23 0730   Number of days: 2       Drips:       Patient Active Problem List   Diagnosis    Anxiety    Personal history of gastric bypass    Type 2 diabetes mellitus without complication, without long-term current use of insulin    Class 3 severe obesity due to excess calories with serious comorbidity and body mass index (BMI) of 45.0 to 49.9 in adult    CIDP (chronic inflammatory demyelinating polyneuropathy)    Reactive depression    Peripheral neuropathy    Benign essential HTN    Closed trimalleolar fracture of left ankle with routine healing s/p ex fix on 8/9/2023    Closed displaced fracture of lateral malleolus of right fibula with routine healing    Closed dislocation of left ankle    Weakness of both lower extremities    Closed nondisplaced fracture of fifth metatarsal bone of right foot with routine healing       Review of patient's allergies indicates:   Allergen Reactions    Amoxicillin Hives    Penicillins Hives       Current Inpatient Medications:      Current Facility-Administered Medications on File Prior to Visit   Medication Dose Route Frequency Provider Last Rate Last Admin    acetaminophen tablet 1,000 mg  1,000 mg Oral Q8H Claudia Graves MD   1,000 mg at 08/16/23 2101    amLODIPine tablet 5 mg  5 mg Oral Daily Joselito Orourke, DO   5 mg at 08/15/23 0856    aspirin chewable tablet 81 mg  81 mg Oral BID Kali Mckeon MD   81 mg at 08/16/23 2051    bisacodyL suppository 10 mg  10 mg Rectal Daily PRN Scarlett London PA-C        EScitalopram oxalate tablet 10 mg  10 mg Oral Daily Joselito Orourke., DO   10 mg at 08/16/23 0837    glucagon (human recombinant) injection 1 mg  1 mg Intramuscular PRN Joselito Orourke, DO        glucose chewable tablet 16 g  16 g Oral PRN Joselito Orourke, DO   16 g  at 08/16/23 1142    glucose chewable tablet 24 g  24 g Oral PRN Joselito Orourke, DO        melatonin tablet 6 mg  6 mg Oral Nightly PRN Joselito Orourke, DO   6 mg at 08/15/23 2147    methocarbamoL tablet 750 mg  750 mg Oral QID Claudia Graves MD   750 mg at 08/16/23 2051    naloxone 0.4 mg/mL injection 0.02 mg  0.02 mg Intravenous PRN Joselito Orourke, DO        ondansetron injection 4 mg  4 mg Intravenous Q8H PRN Joselito Oruorke, DO        oxyCODONE immediate release tablet 5 mg  5 mg Oral Q4H PRN Claudia Graves MD        oxyCODONE immediate release tablet Tab 10 mg  10 mg Oral Q4H PRN Claudia Graves MD   10 mg at 08/16/23 2050    polyethylene glycol packet 17 g  17 g Oral Daily Joselito Orourke, DO   17 g at 08/16/23 0837    pregabalin capsule 75 mg  75 mg Oral BID Claudia Graves MD   75 mg at 08/16/23 2051    senna-docusate 8.6-50 mg per tablet 2 tablet  2 tablet Oral BID Scarlett London PA-C   2 tablet at 08/16/23 2051    simethicone chewable tablet 80 mg  1 tablet Oral QID PRN Joselito Orourke, DO        sodium chloride 0.9% flush 10 mL  10 mL Intravenous Q12H PRN Joselito Orourke, DO        sodium chloride 0.9% flush 10 mL  10 mL Intravenous PRN Ozzie Kothari MD        tirzepatide PnIj 5 mg  5 mg Subcutaneous Every Sun Claudia Graves MD   5 mg at 08/13/23 1349    vitamin D 1000 units tablet 1,000 Units  1,000 Units Oral Daily Kali Mckeon MD   1,000 Units at 08/16/23 0837     Current Outpatient Medications on File Prior to Visit   Medication Sig Dispense Refill    amLODIPine (NORVASC) 5 MG tablet TAKE 1 TABLET DAILY 90 tablet 3    cetirizine HCl (CETIRIZINE ORAL) Take 1 tablet by mouth once daily.      CYANOCOBALAMIN, VITAMIN B-12, ORAL Take 1 tablet by mouth once daily.      EScitalopram oxalate (LEXAPRO) 10 MG tablet Take 1 tablet (10 mg total) by mouth once daily. 90 tablet 0    lisinopriL (PRINIVIL,ZESTRIL) 40 MG tablet Take 1 tablet (40 mg total) by mouth once  daily. 90 tablet 0    MELATONIN ORAL Take 1 tablet by mouth every evening.      multivitamin (ONE DAILY MULTIVITAMIN) per tablet Take 1 tablet by mouth once daily.      naproxen sodium (ALEVE) 220 MG tablet Take 220 mg by mouth every evening.      tirzepatide (MOUNJARO) 5 mg/0.5 mL PnIj Inject 5 mg into the skin every 7 days. 4 pen 5    zinc gluconate 50 mg tablet Take 50 mg by mouth once daily.         Past Surgical History:   Procedure Laterality Date    APPLICATION, EXTERNAL FIXATION DEVICE Left 8/9/2023    Procedure: APPLICATION, EXTERNAL FIXATION DEVICE left ankle, C-arm clock side, synthes;  Surgeon: Kali Santoyo MD;  Location: [address];  Service: Orthopedics;  Laterality: Left;    CHOLECYSTECTOMY  2009    CLOSED REDUCTION, FRACTURE, ANKLE, TRIMALLEOLAR Left 8/9/2023    Procedure: CLOSED REDUCTION, FRACTURE, ANKLE, TRIMALLEOLAR;  Surgeon: Kali Santoyo MD;  [facility];  Service: Orthopedics;  Laterality: Left;    COLONOSCOPY N/A 7/31/2018    Procedure: COLONOSCOPY;  Surgeon: Elpidio Fortune MD;  Location: [facility] ENDO;  Service: Endoscopy;  Laterality: N/A;    GASTRIC BYPASS  2009    HYSTERECTOMY  1999       Social History:  Tobacco Use: Low Risk  (8/10/2023)    Patient History     Smoking Tobacco Use: Never     Smokeless Tobacco Use: Never     Passive Exposure: Never      Alcohol Use: Not At Risk (1/13/2023)    AUDIT-C     Frequency of Alcohol Consumption: 2-4 times a month     Average Number of Drinks: 1 or 2     Frequency of Binge Drinking: Never        OBJECTIVE:     Vital Signs Range (Last 24H):  Temp:  [35.8 °C (96.4 °F)-36.9 °C (98.4 °F)]   Pulse:  [77-92]   Resp:  [16-20]   BP: (106-124)/(58-70)   SpO2:  [94 %-99 %]       Significant Labs:  Lab Results   Component Value Date    WBC 8.32 08/13/2023    HGB 9.7 (L) 08/13/2023    HCT 30.5 (L) 08/13/2023     08/13/2023    CHOL 154 10/14/2022    TRIG 100 10/14/2022    HDL 53 10/14/2022    ALT 12  08/07/2023    AST 16 08/07/2023     08/13/2023    K 4.4 08/13/2023     08/13/2023    CREATININE 0.8 08/13/2023    BUN 16 08/13/2023    CO2 27 08/13/2023    TSH 3.010 10/14/2022    INR 1.0 08/08/2023    HGBA1C 5.9 (H) 08/07/2023       Diagnostic Studies: No relevant studies.    EKG:   Results for orders placed or performed during the hospital encounter of 08/07/23   EKG 12-lead    Collection Time: 08/08/23  6:15 AM    Narrative    Test Reason : Z01.810,    Vent. Rate : 077 BPM     Atrial Rate : 077 BPM     P-R Int : 148 ms          QRS Dur : 082 ms      QT Int : 394 ms       P-R-T Axes : 026 -08 013 degrees     QTc Int : 445 ms    Normal sinus rhythm  Low voltage QRS  Low anterior forces- Cannot rule out Anterior infarct ,age undetermined but  doubt possibly lead placement  Abnormal ECG  When compared with ECG of 17-NOV-2009 13:02,  QRS voltage has decreased  Anterior forces are less  Confirmed by Dong FAULKNER MD ([id number]) on 8/8/2023 7:44:03 AM    Referred By: [email or web address]   SELF           Confirmed By:Dong FAULKNER MD       2D ECHO:  TTE:  No results found for this or any previous visit.    VERONICA:  No results found for this or any previous visit.    ASSESSMENT/PLAN:           Pre-op Assessment    I have reviewed the Patient Summary Reports.     I have reviewed the Nursing Notes.    I have reviewed the Medications.     Review of Systems  Anesthesia Hx:  No problems with previous Anesthesia  Denies Family Hx of Anesthesia complications.   Denies Personal Hx of Anesthesia complications.   Cardiovascular:   Exercise tolerance: good Hypertension    Pulmonary:  Pulmonary Normal    Renal/:  Renal/ Normal     Hepatic/GI:   Bowel Prep.    Neurological:   Neuromuscular Disease, Hx guillian barre   Endocrine:  Endocrine Normal    Psych:   Psychiatric History          Physical Exam  General: Alert and Oriented    Airway:  Mallampati: III   Mouth Opening: Normal  TM Distance: Normal  Tongue: Normal  Neck ROM: Normal  ROM    Dental:  Intact    Chest/Lungs:  Normal Respiratory Rate    Heart:  Rate: Normal  Rhythm: Regular Rhythm        Anesthesia Plan  Type of Anesthesia, risks & benefits discussed:    Anesthesia Type: Regional, Gen ETT, Gen Natural Airway  Intra-op Monitoring Plan: Standard ASA Monitors  Post Op Pain Control Plan: multimodal analgesia, IV/PO Opioids PRN and peripheral nerve block  Induction:  IV and rapid sequence  Informed Consent: Informed consent signed with the Patient and all parties understand the risks and agree with anesthesia plan.  All questions answered.   ASA Score: 3  Day of Surgery Review of History & Physical: H&P Update referred to the surgeon/provider.    Ready For Surgery From Anesthesia Perspective.     .

## 2023-08-17 NOTE — ANESTHESIA POSTPROCEDURE EVALUATION
Anesthesia Post Evaluation    Patient: Kalina Ryan    Procedure(s) Performed: Procedure(s) (LRB):  ORIF, ANKLE (Left)  FIXATION, SYNDESMOSIS, ANKLE (Left)  REMOVAL, EXTERNAL FIXATION DEVICE (Left)    Final Anesthesia Type: general      Patient location during evaluation: floor  Patient participation: Yes- Able to Participate  Level of consciousness: awake and alert and oriented  Post-procedure vital signs: reviewed and stable  Pain management: adequate  Airway patency: patent  AISHA mitigation strategies: Multimodal analgesia  PONV status at discharge: No PONV  Anesthetic complications: no      Cardiovascular status: blood pressure returned to baseline and hemodynamically stable  Respiratory status: unassisted, spontaneous ventilation and room air  Hydration status: euvolemic  Follow-up not needed.          Vitals Value Taken Time   /66 08/17/23 1120   Temp 36.3 °C (97.3 °F) 08/17/23 1120   Pulse 80 08/17/23 1120   Resp 18 08/17/23 1120   SpO2 95 % 08/17/23 1120         Event Time   Out of Recovery 08/17/2023 10:15:00         Pain/Morena Score: Pain Rating Prior to Med Admin: 4 (8/17/2023 10:41 AM)  Pain Rating Post Med Admin: 2 (8/17/2023 11:20 AM)  Morena Score: 9 (8/17/2023 10:15 AM)

## 2023-08-17 NOTE — ASSESSMENT & PLAN NOTE
Kalina Ryan is a 58 y.o. female with left ankle trimalleolar fracture and dislocation and base of 5th MT fx s/p ankle spanning ex fix 8/9/23. We will plan for definitive surgery when soft tissues more amenable. Ankle appears moderately swollen but without significant bruising or fracture blisters at this time.    Pain: MM  NWB LLE, WBAT RLE in boot  Pin site care BID  ASA 81 BID  Swelling stable and improved, plan for ORIF today.

## 2023-08-17 NOTE — SUBJECTIVE & OBJECTIVE
Interval History:  patient went for fixation today of ankle as swelling finally improved enough to go for surgery as was waiting all week for swelling to improve and per op note went well with ORIF with fixation of medial and lateral malleoli and ex fix removal with neurolysis of left superficial peroneal nerve with recs for NWB for 6 weeks with suture removal in 2 weeks and cam boot in 6 weeks with DVT ppx continue for 6 weeks. Will plan to continue PT/OT post op, pain doing okay right now, she reports feeling a little groggy, seen in pacu right before they were about to wheel her upstairs back to room. She reports not too hungry yet but DM diet ordered for once appetite starts returning for her. Has been using a boot to the right leg with wbat in this boot for this non op fracture, discussed that once she goes into CAM BOOT on left in 6 weeks that will have to see at that time what the plan will be on the right and hopefully will be out of that boot as wont be able to really mobilize with 2 boots on so that will have to be addressed at ortho f/u at that time      Review of Systems   Constitutional:  Negative for fever.   Respiratory:  Negative for cough and shortness of breath.    Cardiovascular:  Negative for chest pain.   Gastrointestinal:  Negative for abdominal pain, nausea and vomiting.   Musculoskeletal:  Positive for arthralgias (Left ankle; Right ankle and right hip) and myalgias (Left ankle area).   Neurological:  Positive for weakness (Bilateral lower extremities). Negative for dizziness.   Psychiatric/Behavioral:  Negative for agitation and confusion.      Objective:     Vital Signs (Most Recent):  Temp: 96.4 °F (35.8 °C) (08/16/23 1135)  Pulse: 83 (08/16/23 1135)  Resp: 17 (08/16/23 1135)  BP: 107/58 (08/16/23 1135)  SpO2: 98 % (08/16/23 1135) on room air Vital Signs (24h Range):  Temp:  [96.4 °F (35.8 °C)-98.4 °F (36.9 °C)] 97.3 °F (36.3 °C)  Pulse:  [78-92] 80  Resp:  [11-22] 18  SpO2:  [92 %-100 %]  95 %  BP: (107-156)/(58-76) 119/66     Weight: 126 kg (277 lb 12.5 oz)  Body mass index is 49.21 kg/m².    Intake/Output Summary (Last 24 hours) at 8/17/2023 1129  Last data filed at 8/17/2023 0750  Gross per 24 hour   Intake 2080 ml   Output 700 ml   Net 1380 ml           Physical Exam  Vitals and nursing note reviewed.   Constitutional:       General: She is awake. She is not in acute distress.     Appearance: Normal appearance. She is well-developed. She is morbidly obese. She is not ill-appearing.   Cardiovascular:      Rate and Rhythm: Normal rate and regular rhythm.      Heart sounds: Normal heart sounds. No murmur heard.     No friction rub. No gallop.   Pulmonary:      Effort: Pulmonary effort is normal. No respiratory distress.      Breath sounds: Normal breath sounds. No wheezing.   Abdominal:      General: Abdomen is flat. Bowel sounds are normal. There is no distension.      Palpations: Abdomen is soft.      Tenderness: There is no abdominal tenderness.   Musculoskeletal:         General: Swelling (Left ankle and right ankle and foot) present.      Comments: Bandages to LLE post op   Skin:     General: Skin is warm.      Findings: Bruising (Right lateral ankle and lateral aspect of right foot) present. No erythema.   Neurological:      Mental Status: She is alert and oriented to person, place, and time.   Psychiatric:         Mood and Affect: Mood normal.         Behavior: Behavior normal. Behavior is cooperative.         Thought Content: Thought content normal.         Judgment: Judgment normal.             Significant Labs: All pertinent labs within the past 24 hours have been reviewed.    Significant Imaging: I have reviewed all pertinent imaging results/findings within the past 24 hours.

## 2023-08-18 LAB
BASOPHILS # BLD AUTO: 0.03 K/UL (ref 0–0.2)
BASOPHILS NFR BLD: 0.3 % (ref 0–1.9)
DIFFERENTIAL METHOD: ABNORMAL
EOSINOPHIL # BLD AUTO: 0.2 K/UL (ref 0–0.5)
EOSINOPHIL NFR BLD: 1.5 % (ref 0–8)
ERYTHROCYTE [DISTWIDTH] IN BLOOD BY AUTOMATED COUNT: 14.7 % (ref 11.5–14.5)
HCT VFR BLD AUTO: 30.5 % (ref 37–48.5)
HGB BLD-MCNC: 9.4 G/DL (ref 12–16)
IMM GRANULOCYTES # BLD AUTO: 0.05 K/UL (ref 0–0.04)
IMM GRANULOCYTES NFR BLD AUTO: 0.4 % (ref 0–0.5)
LYMPHOCYTES # BLD AUTO: 3 K/UL (ref 1–4.8)
LYMPHOCYTES NFR BLD: 26 % (ref 18–48)
MCH RBC QN AUTO: 26.6 PG (ref 27–31)
MCHC RBC AUTO-ENTMCNC: 30.8 G/DL (ref 32–36)
MCV RBC AUTO: 86 FL (ref 82–98)
MONOCYTES # BLD AUTO: 0.7 K/UL (ref 0.3–1)
MONOCYTES NFR BLD: 6.1 % (ref 4–15)
NEUTROPHILS # BLD AUTO: 7.5 K/UL (ref 1.8–7.7)
NEUTROPHILS NFR BLD: 65.7 % (ref 38–73)
NRBC BLD-RTO: 0 /100 WBC
PLATELET # BLD AUTO: 359 K/UL (ref 150–450)
PMV BLD AUTO: 8.7 FL (ref 9.2–12.9)
POCT GLUCOSE: 152 MG/DL (ref 70–110)
POCT GLUCOSE: 87 MG/DL (ref 70–110)
POCT GLUCOSE: 93 MG/DL (ref 70–110)
RBC # BLD AUTO: 3.54 M/UL (ref 4–5.4)
WBC # BLD AUTO: 11.4 K/UL (ref 3.9–12.7)

## 2023-08-18 PROCEDURE — 25000003 PHARM REV CODE 250: Performed by: PHYSICIAN ASSISTANT

## 2023-08-18 PROCEDURE — 97530 THERAPEUTIC ACTIVITIES: CPT

## 2023-08-18 PROCEDURE — 25000003 PHARM REV CODE 250: Performed by: HOSPITALIST

## 2023-08-18 PROCEDURE — 25000003 PHARM REV CODE 250: Performed by: INTERNAL MEDICINE

## 2023-08-18 PROCEDURE — 99232 SBSQ HOSP IP/OBS MODERATE 35: CPT | Mod: ,,, | Performed by: NURSE PRACTITIONER

## 2023-08-18 PROCEDURE — 99900035 HC TECH TIME PER 15 MIN (STAT)

## 2023-08-18 PROCEDURE — 94010 BREATHING CAPACITY TEST: CPT

## 2023-08-18 PROCEDURE — 63600175 PHARM REV CODE 636 W HCPCS: Performed by: PHYSICIAN ASSISTANT

## 2023-08-18 PROCEDURE — 85025 COMPLETE CBC W/AUTO DIFF WBC: CPT | Performed by: HOSPITALIST

## 2023-08-18 PROCEDURE — 97164 PT RE-EVAL EST PLAN CARE: CPT

## 2023-08-18 PROCEDURE — 94761 N-INVAS EAR/PLS OXIMETRY MLT: CPT

## 2023-08-18 PROCEDURE — 99233 SBSQ HOSP IP/OBS HIGH 50: CPT | Mod: ,,, | Performed by: HOSPITALIST

## 2023-08-18 PROCEDURE — 25000003 PHARM REV CODE 250

## 2023-08-18 PROCEDURE — 94150 VITAL CAPACITY TEST: CPT

## 2023-08-18 PROCEDURE — 99232 PR SUBSEQUENT HOSPITAL CARE,LEVL II: ICD-10-PCS | Mod: ,,, | Performed by: NURSE PRACTITIONER

## 2023-08-18 PROCEDURE — 21400001 HC TELEMETRY ROOM

## 2023-08-18 PROCEDURE — 97112 NEUROMUSCULAR REEDUCATION: CPT

## 2023-08-18 PROCEDURE — 36415 COLL VENOUS BLD VENIPUNCTURE: CPT | Performed by: HOSPITALIST

## 2023-08-18 PROCEDURE — 97168 OT RE-EVAL EST PLAN CARE: CPT

## 2023-08-18 PROCEDURE — 99233 PR SUBSEQUENT HOSPITAL CARE,LEVL III: ICD-10-PCS | Mod: ,,, | Performed by: HOSPITALIST

## 2023-08-18 PROCEDURE — 11000001 HC ACUTE MED/SURG PRIVATE ROOM

## 2023-08-18 RX ORDER — OXYCODONE HYDROCHLORIDE 5 MG/1
5 TABLET ORAL
Status: DISCONTINUED | OUTPATIENT
Start: 2023-08-18 | End: 2023-08-19 | Stop reason: HOSPADM

## 2023-08-18 RX ORDER — METHOCARBAMOL 500 MG/1
1000 TABLET, FILM COATED ORAL 4 TIMES DAILY
Status: DISCONTINUED | OUTPATIENT
Start: 2023-08-18 | End: 2023-08-19 | Stop reason: HOSPADM

## 2023-08-18 RX ORDER — TALC
6 POWDER (GRAM) TOPICAL NIGHTLY PRN
Refills: 0
Start: 2023-08-18 | End: 2023-10-18 | Stop reason: SDUPTHER

## 2023-08-18 RX ORDER — METHOCARBAMOL 1000 MG/1
1000 TABLET, COATED ORAL 4 TIMES DAILY
Start: 2023-08-18 | End: 2023-09-20

## 2023-08-18 RX ORDER — ONDANSETRON 4 MG/1
8 TABLET, ORALLY DISINTEGRATING ORAL EVERY 6 HOURS PRN
Start: 2023-08-18 | End: 2023-10-18

## 2023-08-18 RX ORDER — MORPHINE SULFATE 2 MG/ML
2 INJECTION, SOLUTION INTRAMUSCULAR; INTRAVENOUS ONCE
Status: COMPLETED | OUTPATIENT
Start: 2023-08-18 | End: 2023-08-18

## 2023-08-18 RX ORDER — CHOLECALCIFEROL (VITAMIN D3) 25 MCG
1000 TABLET ORAL DAILY
Start: 2023-08-19 | End: 2023-10-18 | Stop reason: SDUPTHER

## 2023-08-18 RX ORDER — ACETAMINOPHEN 500 MG
1000 TABLET ORAL EVERY 8 HOURS
Refills: 0
Start: 2023-08-18

## 2023-08-18 RX ORDER — OXYCODONE HYDROCHLORIDE 10 MG/1
10 TABLET ORAL
Refills: 0
Start: 2023-08-18 | End: 2023-09-20

## 2023-08-18 RX ORDER — AMOXICILLIN 250 MG
2 CAPSULE ORAL 2 TIMES DAILY
Start: 2023-08-18 | End: 2023-11-07 | Stop reason: ALTCHOICE

## 2023-08-18 RX ORDER — SIMETHICONE 80 MG
80 TABLET,CHEWABLE ORAL 4 TIMES DAILY PRN
Refills: 0
Start: 2023-08-18 | End: 2023-10-18

## 2023-08-18 RX ORDER — NAPROXEN SODIUM 220 MG/1
81 TABLET, FILM COATED ORAL 2 TIMES DAILY
Refills: 0
Start: 2023-08-18 | End: 2031-01-28

## 2023-08-18 RX ORDER — OXYCODONE HYDROCHLORIDE 10 MG/1
10 TABLET ORAL
Status: DISCONTINUED | OUTPATIENT
Start: 2023-08-18 | End: 2023-08-19 | Stop reason: HOSPADM

## 2023-08-18 RX ORDER — PREGABALIN 75 MG/1
75 CAPSULE ORAL 2 TIMES DAILY
Qty: 60 CAPSULE | Refills: 6
Start: 2023-08-18 | End: 2023-10-18 | Stop reason: SDUPTHER

## 2023-08-18 RX ORDER — POLYETHYLENE GLYCOL 3350 17 G/17G
17 POWDER, FOR SOLUTION ORAL DAILY
Refills: 0
Start: 2023-08-19 | End: 2023-10-18

## 2023-08-18 RX ORDER — TIRZEPATIDE 5 MG/.5ML
5 INJECTION, SOLUTION SUBCUTANEOUS
Qty: 4 PEN | Refills: 5
Start: 2023-08-18 | End: 2023-11-21

## 2023-08-18 RX ORDER — OXYCODONE HYDROCHLORIDE 10 MG/1
10 TABLET ORAL EVERY 4 HOURS PRN
Refills: 0
Start: 2023-08-18 | End: 2023-08-18 | Stop reason: SDUPTHER

## 2023-08-18 RX ORDER — METHOCARBAMOL 750 MG/1
750 TABLET, FILM COATED ORAL 4 TIMES DAILY
Qty: 40 TABLET | Refills: 0
Start: 2023-08-18 | End: 2023-08-18 | Stop reason: HOSPADM

## 2023-08-18 RX ORDER — OXYCODONE HYDROCHLORIDE 5 MG/1
5 TABLET ORAL
Refills: 0
Start: 2023-08-18 | End: 2023-09-20

## 2023-08-18 RX ORDER — OXYCODONE HYDROCHLORIDE 5 MG/1
5 TABLET ORAL EVERY 4 HOURS PRN
Refills: 0
Start: 2023-08-18 | End: 2023-08-18 | Stop reason: SDUPTHER

## 2023-08-18 RX ADMIN — SENNOSIDES AND DOCUSATE SODIUM 2 TABLET: 50; 8.6 TABLET ORAL at 09:08

## 2023-08-18 RX ADMIN — AMLODIPINE BESYLATE 5 MG: 5 TABLET ORAL at 09:08

## 2023-08-18 RX ADMIN — PREGABALIN 75 MG: 75 CAPSULE ORAL at 09:08

## 2023-08-18 RX ADMIN — ACETAMINOPHEN 1000 MG: 325 TABLET ORAL at 09:08

## 2023-08-18 RX ADMIN — ACETAMINOPHEN 1000 MG: 325 TABLET ORAL at 02:08

## 2023-08-18 RX ADMIN — OXYCODONE HYDROCHLORIDE 10 MG: 10 TABLET ORAL at 09:08

## 2023-08-18 RX ADMIN — ACETAMINOPHEN 1000 MG: 325 TABLET ORAL at 05:08

## 2023-08-18 RX ADMIN — OXYCODONE HYDROCHLORIDE 10 MG: 10 TABLET ORAL at 05:08

## 2023-08-18 RX ADMIN — CHOLECALCIFEROL TAB 25 MCG (1000 UNIT) 1000 UNITS: 25 TAB at 09:08

## 2023-08-18 RX ADMIN — POLYETHYLENE GLYCOL 3350 17 G: 17 POWDER, FOR SOLUTION ORAL at 09:08

## 2023-08-18 RX ADMIN — METHOCARBAMOL 1000 MG: 500 TABLET ORAL at 08:08

## 2023-08-18 RX ADMIN — METHOCARBAMOL 750 MG: 750 TABLET ORAL at 09:08

## 2023-08-18 RX ADMIN — ESCITALOPRAM OXALATE 10 MG: 5 TABLET, FILM COATED ORAL at 09:08

## 2023-08-18 RX ADMIN — SENNOSIDES AND DOCUSATE SODIUM 2 TABLET: 50; 8.6 TABLET ORAL at 08:08

## 2023-08-18 RX ADMIN — Medication 6 MG: at 08:08

## 2023-08-18 RX ADMIN — PREGABALIN 75 MG: 75 CAPSULE ORAL at 08:08

## 2023-08-18 RX ADMIN — OXYCODONE HYDROCHLORIDE 10 MG: 10 TABLET ORAL at 02:08

## 2023-08-18 RX ADMIN — METHOCARBAMOL 1000 MG: 500 TABLET ORAL at 05:08

## 2023-08-18 RX ADMIN — OXYCODONE HYDROCHLORIDE 10 MG: 10 TABLET ORAL at 08:08

## 2023-08-18 RX ADMIN — ASPIRIN 81 MG 81 MG: 81 TABLET ORAL at 08:08

## 2023-08-18 RX ADMIN — OXYCODONE HYDROCHLORIDE 5 MG: 5 TABLET ORAL at 05:08

## 2023-08-18 RX ADMIN — ASPIRIN 81 MG 81 MG: 81 TABLET ORAL at 09:08

## 2023-08-18 RX ADMIN — MORPHINE SULFATE 2 MG: 2 INJECTION, SOLUTION INTRAMUSCULAR; INTRAVENOUS at 11:08

## 2023-08-18 RX ADMIN — METHOCARBAMOL 750 MG: 750 TABLET ORAL at 02:08

## 2023-08-18 NOTE — ASSESSMENT & PLAN NOTE
- Patient s/p fall of 3 steps while going down the stairs while at work.   - Imaging showed left ankle trimalleolar fracture and dislocation. Patient seen by Orthopedic surgery and left ankle was reduced and splinted in ER placed in short leg splint.   - Patient taken to OR on 8/9/2023 and had ex fix placed to left ankle.  - Routine pin site care post-op to left ankle ex fix.  - Non weight bear to left lower extremity.  - Ice and elevate left leg to help with swelling with plan to proceed with removal of ex fix and definitive fixation of left ankle fracture once left ankle swelling improved. Patient to remain in hospital until next surgery. Ortho reassessing left ankle swelling daily to see when can proceed with definitve fixation surgery with   OR on 8/17 with Dr Santoyo with : Open reduction internal fixation left trimalleolar ankle fracture with fixation of medial and lateral malleoli, without fixation of posterior lip - 34620                          Open treatment of left ankle syndesmosis disruption with reduction internal fixation - 23669                          Removal, under anesthesia of external fixation left ankle - 33251                          Neurolysis left superficial peroneal nerve - 96120  - PT/OT consulted and working with patient in hospital and plan for O-rehab on likely POD 2 for further rehabilitation  - Continue Aspirin 81 mg po BID for DVT prophylaxis for 6 weeks per ortho (sept 29th, which is when will be able to go into CAM boot and weight bear). Sutures out I n2 weeks  -hospital vac in place, prevena in roomw ith plan to transition at rehab for 1 week.  - Pain controlled. Continue scheduled Tylenol 1000 mg po TID + Robaxin 750 mg po 4 times daily and Lyrica 75 mg po BID and continue with Oxycodone IR prn for breakthrough pain.

## 2023-08-18 NOTE — PLAN OF CARE
Recommendations    1) Continue diabetic diet   2) If intake <50% add boost glucose control BID   3) Following    Goals: Meet % een/epn by next Rd f/u  Nutrition Goal Status: new  Communication of RD Recs: other (comment) (POC)

## 2023-08-18 NOTE — ASSESSMENT & PLAN NOTE
-S/P ex-fix 08/09/2023 per ortho.  -Plan for possible defintive Sx on 08/17/2023.  -PT/OT currently. Recommending IPR. Pt doing well.   -Pain appears controlled with oral regimen.   -DVT PPX with ASA.   -Will follow for definitive Sx as per ortho.   -08/18- S/P ORIF 08/17. Post op BM pending. Pain in control with current oral regimen.  -PT/OT re-eval pending.

## 2023-08-18 NOTE — PROGRESS NOTES
[facility] Medicine  Progress Note    Patient Name: Kalina Ryan  MRN: [id number]  Patient Class: IP- Inpatient   Admission Date: 8/7/2023  Length of Stay: 9 days  Attending Physician: Edie Mark MD  Primary Care Provider: Nany Hoff NP        Subjective:     Principal Problem:Closed trimalleolar fracture of left ankle        HPI:  Kalina Ryan is a 58 y.o. 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 Patient states she twisted her ankle and fell down 3 step while going down the stairs at work. She had immediate pain and inability to bear weight. She noticed her left deformity and were screaming in pain. He coworker called EMS. Denies head injury or LOC. Denies prior injuries or surgeries to the left ankle. She is not on blood thinners. Denies other MSK pains or paresthesias. She walks without assistive devices at baseline. She lives at home with her .     ED course: afebrile and vitals stable. X-ray showed left ankle trimalleolar fracture and dislocation, and right ankle lateral malleolus fx. Patient seen by orthopedic and L ankle was Reduced and splinted in ER placed in short leg splint. Patient received profopol, fenatnyl and oxycodone in ED during conscious sedation for closed reduction of left ankle fracture.     During my interview patient is awake, conversant and not in distress. She reports pain and spasm in both ankles with right side hurting more than left. She denies tobacco, alcohol or other illicit drug use. She is able to ambulate few blocks at baseline without chest pain or SOB.           Overview/Hospital Course:  Patient admitted with closed left trimalleolar ankle fracture. Ortho consulted and taken to OR on 8/9 and had external fixator placed to left ankle. Post-op, patient  non weight bearing to left lower extremity. Patient to elevate and ce left ankle to help with swelling. Needs improvement in left ankle swelling prior to proceeding with definitive fixation surgery to left ankle. Patient to remain in hospital until definitive fixation surgery. Neurology consulted for progressive bilateral leg weakness in patient with previous diagnosis of Guillain [place] and CIDP. Neurology noted likely CIDP and recommended to check vitamin levels (B12, folate, copper, zinc, and thiamine). Neurology noted patient with history of CIDP and received steroids and IVIG in past an used to see Dr. Moses but no treatment since 2012. She has self-contained episodes in which she did not seek medical attention. These symptoms are similar. Want to avoid steroids now with planned surgery. Follow-up with neuromuscular Neurologist for possible IVIG as outpatient upon discharge. If any change in status, reconsult Neurology. Patient also noted on admit to have closed right ankle lateral malleolus fracture and closed right 5th metatarsal fractures but Ortho states no surgery needed and okay for patient to weight bear as tolerated to right lower extremity and to use tall walking boot to right leg when ambulating.       Interval History:  she reports rested yesterday after surgery and pain was fairly controlled, started feeling some tingling and pain in her foot early morning likely when nerve block wore off and so receieved some pain medication which helped control pain and feeling better once it kicked in. Awaiting breakfast this morning, discussed wound vac in place and has prevena in room with plan to keep on for a week once at rehab she reports ortho discussed with her, no exudate in vac now. Mild downtick in hg to 9 post op and eukocytosis yesterday on labs to 19 likely from steroids given intraop with resolution now to 11 on rpeat labs today. Has boot for right foot in room now and plan to transition into that  for left side in 6 weeks with suture remoavl in 2 weeks. Plan for O-rehab for likely tomorrow if pain stable as has auth from insurance. Denies new issues this morning and pleasant on exam.        Review of Systems   Constitutional:  Negative for fever.   Respiratory:  Negative for cough and shortness of breath.    Cardiovascular:  Negative for chest pain.   Gastrointestinal:  Negative for abdominal pain, nausea and vomiting.   Musculoskeletal:  Positive for arthralgias (Left ankle; Right ankle and right hip) and myalgias (Left ankle area).   Neurological:  Positive for weakness (Bilateral lower extremities). Negative for dizziness.   Psychiatric/Behavioral:  Negative for agitation and confusion.      Objective:     Vital Signs (Most Recent):  Temp: 96.4 °F (35.8 °C) (08/16/23 1135)  Pulse: 83 (08/16/23 1135)  Resp: 17 (08/16/23 1135)  BP: 107/58 (08/16/23 1135)  SpO2: 98 % (08/16/23 1135) on room air Vital Signs (24h Range):  Temp:  [96.2 °F (35.7 °C)-98.2 °F (36.8 °C)] 97.4 °F (36.3 °C)  Pulse:  [74-94] 84  Resp:  [11-20] 20  SpO2:  [93 %-98 %] 97 %  BP: (104-121)/(51-71) 106/54     Weight: 126 kg (277 lb 12.5 oz)  Body mass index is 49.21 kg/m².    Intake/Output Summary (Last 24 hours) at 8/18/2023 1022  Last data filed at 8/18/2023 0501  Gross per 24 hour   Intake 360 ml   Output 800 ml   Net -440 ml           Physical Exam  Vitals and nursing note reviewed.   Constitutional:       General: She is awake. She is not in acute distress.     Appearance: Normal appearance. She is well-developed. She is morbidly obese. She is not ill-appearing.      Comments: Pleasant.   Cardiovascular:      Rate and Rhythm: Normal rate and regular rhythm.      Heart sounds: Normal heart sounds. No murmur heard.     No friction rub. No gallop.   Pulmonary:      Effort: Pulmonary effort is normal. No respiratory distress.      Breath sounds: Normal breath sounds. No wheezing.   Abdominal:      General: Abdomen is flat. Bowel sounds are  normal. There is no distension.      Palpations: Abdomen is soft.      Tenderness: There is no abdominal tenderness.   Musculoskeletal:         General: Swelling (Left ankle and right ankle and foot) present.      Comments: Hospital vac in place to LLE without exuate. Bandages to LLE post op   Skin:     General: Skin is warm.      Findings: Bruising (Right lateral ankle and lateral aspect of right foot) present. No erythema.   Neurological:      Mental Status: She is alert and oriented to person, place, and time.   Psychiatric:         Mood and Affect: Mood normal.         Behavior: Behavior normal. Behavior is cooperative.         Thought Content: Thought content normal.         Judgment: Judgment normal.             Significant Labs: All pertinent labs within the past 24 hours have been reviewed.    Significant Imaging: I have reviewed all pertinent imaging results/findings within the past 24 hours.      Assessment/Plan:      * Closed trimalleolar fracture of left ankle  - Patient s/p fall of 3 steps while going down the stairs while at work.   - Imaging showed left ankle trimalleolar fracture and dislocation. Patient seen by Orthopedic surgery and left ankle was reduced and splinted in ER placed in short leg splint.   - Patient taken to OR on 8/9/2023 and had ex fix placed to left ankle.  - Routine pin site care post-op to left ankle ex fix.  - Non weight bear to left lower extremity.  - Ice and elevate left leg to help with swelling with plan to proceed with removal of ex fix and definitive fixation of left ankle fracture once left ankle swelling improved. Patient to remain in hospital until next surgery. Ortho reassessing left ankle swelling daily to see when can proceed with definitve fixation surgery with   OR on 8/17 with Dr Santoyo with : Open reduction internal fixation left trimalleolar ankle fracture with fixation of medial and lateral malleoli, without fixation of posterior lip - 26565                           Open treatment of left ankle syndesmosis disruption with reduction internal fixation - 91019                          Removal, under anesthesia of external fixation left ankle - 99101                          Neurolysis left superficial peroneal nerve - 99483  - PT/OT consulted and working with patient in hospital and plan for O-rehab on likely POD 2 for further rehabilitation  - Continue Aspirin 81 mg po BID for DVT prophylaxis for 6 weeks per ortho (sept 29th, which is when will be able to go into CAM boot and weight bear). Sutures out I n2 weeks  -hospital vac in place, prevena in roomw ith plan to transition at rehab for 1 week.  - Pain controlled. Continue scheduled Tylenol 1000 mg po TID + Robaxin 750 mg po 4 times daily and Lyrica 75 mg po BID and continue with Oxycodone IR prn for breakthrough pain.     Leukocytosis  Likely from steroids as 19 post op on 8/15 with improvement to 11 now      Weakness of both lower extremities        Closed dislocation of left ankle  Patient s/p closed reduction by Orthopedic in ED with short leg splint placement.  -see above        Closed displaced fracture of lateral malleolus of right fibula with routine healing  Closed nondisplaced fracture of fifth metatarsal bone of right foot with routine healing  · Noted on admit on X-rays to have non-displaced closed fractures of right lateral malleolus and right 5th metatarsal. Orthopedics consulted and recommended non-operative management and patient okay to weight bear as tolerated to right lower extremity. Patient to ambulate in tall walking boot when out of bed.   · Pain management.   · Aspirin 81 mg po BID for DVT prophylaxis.   · Will need to reasddress in 6 weeks once in CAM boot on left as will hopefully be out of boot on right then as wont be able to mobilize in 2 boots    Benign essential HTN  Chronic and controlled. Continue home Amlodipine to treat. Home Lisinopril on hold. Target BP < 140/90. Monitor vital signs  every 4 hours.       CIDP (chronic inflammatory demyelinating polyneuropathy)  Bilateral lower extremity weakness   - Patient with known history of bilateral lower extremity weakness but normally able to ambulate without assistive device.   - Patient reports progressive weakness over past 3 months.   - Neurology consulted. Check vitamin levels. Check heavy metal levels.  - Per Neurology: Patient reports history of CIDP and received steroids and IVIG in past and used to follow with Dr. Moses and no treatment since 2012. She has self-contained episodes in which she did not seek medical attention. These symptoms are similar. Want to avoid steroids now with planned surgery. Neurology recommends neuromuscular Neurologist for possible IVIG as outpatient upon discharge.  -  If any change in status, reconsult Neurology.    Class 3 severe obesity due to excess calories with serious comorbidity and body mass index (BMI) of 45.0 to 49.9 in adult  Personal history of gastric bypass   Body mass index is 49.21 kg/m². Morbid obesity complicates all aspects of disease management from diagnostic modalities to treatment. Weight loss encouraged and health benefits explained to patient.         Type 2 diabetes mellitus without complication, without long-term current use of insulin  Patient's FSGs are controlled on current medication regimen. Patient on Mounjaro injections weekly at home to treat.   Last A1c reviewed-   Lab Results   Component Value Date    HGBA1C 5.9 (H) 08/07/2023     Most recent fingerstick glucose reviewed-   Recent Labs   Lab 08/15/23  1534 08/15/23  2018 08/16/23  0733 08/16/23  1136   POCTGLUCOSE 93 116* 99 65*     Monitor blood sugars with meals and at bedtime in hospital.  Diabetic diet.  Target blood sugars 140-180 in hospital.     Anxiety  Chronic and controlled. Continue home Lexapro to treat.         VTE Risk Mitigation (From admission, onward)         Ordered     IP VTE HIGH RISK PATIENT  Once          08/16/23 2033     Place sequential compression device  Until discontinued         08/16/23 2033     Place sequential compression device  Until discontinued         08/08/23 0036                Discharge Planning   LIVIER: 8/19/2023     Code Status: Full Code   Is the patient medically ready for discharge?: No    Reason for patient still in hospital (select all that apply): Patient trending condition  Discharge Plan A: Rehab                  Edie Mark MD  Department of [facility] Medicine   [facility]

## 2023-08-18 NOTE — PT/OT/SLP RE-EVAL
"Physical Therapy  Re-Evaluation and Treatment    Kalina Ryan   [id number]    Time Tracking:     PT Received On: 08/18/23   PT Start Time: 1037   PT Stop Time: 1056   PT Total Time (min): 19 min    Billable Minutes: Re-eval 9 and Neuromuscular Re-education 10 minutes       *This session was completed as a co-reevaluation with OT secondary to patient's first time mobilizing after surgery yesterday*    Recommendations:     Discharge recommendations: Inpatient Rehabilitation     Equipment recommendations:  TBD at next level of care    Barriers to Discharge: Not ready to discharge home from a mobility standpoint, needs further therapy.    Patient Information:     Recent Surgery: Procedure(s) (LRB):  ORIF, ANKLE (Left)  FIXATION, SYNDESMOSIS, ANKLE (Left)  REMOVAL, EXTERNAL FIXATION DEVICE (Left) 1 Day Post-Op    Diagnosis: Closed trimalleolar fracture of left ankle    Length of Stay: 9 days    General Precautions: Standard, fall  Orthopedic Precautions: LLE NWB, RLE WBAT with CAM boot  Brace: R CAM boot    Assessment:     Kalina Ryan is a 58 y.o. female admitted to [facility] on 8/7/2023 for Closed trimalleolar fracture of left ankle. Patient previously being seen by PT but orders discontinued secondary to returning to OR on 8/17 for L ankle ORIF and ex-fix removal. Kalina Rayn tolerated re-evaluation fair today. Reviewed orthopedic precautions (LLE NWB, RLE WBAT with CAM boot) with patient and spouse, verbalized understanding. Requires total (A) to don/doff R CAM boot in bed. Pain consistently at 8-9/10 at L ankle with all activity, some mild R ankle pain when standing (with CAM boot on). She sat up for ~8 minutes this morning, sits with supervision and no initial dizziness. Stood with very close stand-by assistance from EOB with rolling walker, maintaining LLE NWB. Attempted to take a step forward but she is unable to place enough weight onto her hands on walker to "hop" on R CAM boot; instead, she " "scoots/pivots R foot on floor in order to move laterally R along bed. After ~1.5 minutes in standing she started with significant increase in dizziness, resulting in need for sitting rest break. Dizziness did not resolve in sitting so assisted with lying back down and providing cool rag, fan which both greatly helped with her symptoms. Considering she was independent prior to her fall on 8/7, she is a fantastic IP rehab candidate (very motivated and eager to participate). Discussed PT role, POC, goals and recommendations (Inpatient Rehabilitation) with patient and spouse; verbalized understanding. Kalina Ryan would benefit from acute PT services to promote mobility during this admission and improve return to PLOF.    Problem List: weakness, decreased endurance, impaired self-care skills, impaired mobility, decreased sitting or standing balance, gait instability, orthopedic precautions, impaired cardiopulmonary response to activity, and pain    Rehab Prognosis: Good; patient would benefit from acute skilled PT services to address these deficits and reach maximum level of function.    Plan:     Patient to be seen 4 x/week to address the above listed problems via gait training, therapeutic activities, therapeutic exercises, neuromuscular re-education    Plan of Care Expires: 09/17/23  Plan of Care reviewed with: patient, spouse    Subjective:     Communicated with RN prior to re-evaluation, appropriate to see for re-evaluation.    Pt found supine in bed (HOB elevated) upon PT entry to room, agreeable to re-evaluation.    Patient commenting: "It hurts a little more today than it has been, I guess because I just had surgery yesterday."    Does this patient have any cultural, spiritual, Lutheran conflicts given the current situation? Patient has no barriers to learning. Patient verbalizes understanding of his/her program and goals and demonstrates them correctly. No cultural, spiritual, or educational needs " identified.    Past Medical History:   Diagnosis Date    Anxiety     CIDP (chronic inflammatory demyelinating polyneuropathy) 1/1/2009    full paralysis but no intubation; residual weakness and neuropathy to hands and feet - diagnosed by Neurologist back in 2009 and last seen by neurology in 2011    Guillain Barré syndrome 2009    full paralysis but no intubation; residual weakness and neuropathy to hands and feet - diagnosed by Neurologist back in 2009 and last seen by neurology in 2011    Hypertension     New onset type 2 diabetes mellitus 9/4/2019    Personal history of gastric bypass 9/4/2019    Reactive depression 10/19/2021      Past Surgical History:   Procedure Laterality Date    APPLICATION, EXTERNAL FIXATION DEVICE Left 8/9/2023    Procedure: APPLICATION, EXTERNAL FIXATION DEVICE left ankle, C-arm clock side, synthes;  Surgeon: Kali Santoyo MD;  Location: [address];  Service: Orthopedics;  Laterality: Left;    CHOLECYSTECTOMY  2009    CLOSED REDUCTION, FRACTURE, ANKLE, TRIMALLEOLAR Left 8/9/2023    Procedure: CLOSED REDUCTION, FRACTURE, ANKLE, TRIMALLEOLAR;  Surgeon: Kali Santoyo MD;  Location: [address];  Service: Orthopedics;  Laterality: Left;    COLONOSCOPY N/A 7/31/2018    Procedure: COLONOSCOPY;  Surgeon: Elpidio Fortune MD;  Location: [facility] ENDO;  Service: Endoscopy;  Laterality: N/A;    FIXATION OF SYNDESMOSIS OF ANKLE Left 8/17/2023    Procedure: FIXATION, SYNDESMOSIS, ANKLE;  Surgeon: Kali Santoyo MD;  Location: [address];  Service: Orthopedics;  Laterality: Left;    GASTRIC BYPASS  2009    HYSTERECTOMY  1999    OPEN REDUCTION AND INTERNAL FIXATION (ORIF) OF INJURY OF ANKLE Left 8/17/2023    Procedure: ORIF, ANKLE;  Surgeon: Kali Santoyo MD;  Location: [address];  Service: Orthopedics;  Laterality: Left;    REMOVAL OF EXTERNAL FIXATION DEVICE Left 8/17/2023    Procedure: REMOVAL, EXTERNAL FIXATION DEVICE;  Surgeon: Kali Santoyo MD;   "Location: [facility] OR [address] [place] [address];  Service: Orthopedics;  Laterality: Left;       Objective:     Patient found with: SCD, wound vac    Pain:  Pain Rating 1: 8/10 at generalized L ankle resting in bed, pre-medicated for pain ~1 hour prior  Pain Rating Post-Intervention 1: 8/10 (same, see above) post-standing    Cognitive Exam:  Patient is oriented to Person, Place, Time, and Situation.  Patient follows 100% of single-step commands.    Sensation:   Intact to LT in toes on L side    Lower Extremity Range of Motion:  Right Lower Extremity: WFL actively  Left Lower Extremity: WFL actively except ankle NT within splint    Lower Extremity Strength:  Right Lower Extremity: WFL  Left Lower Extremity: grossly 3+/5 via MMT except ankle NT    Functional Mobility:    Bed Mobility:  Supine to Sitting: contact-guard assist for trunk elevation  Sitting to Supine: contact-guard assist for LLE back into bed  Scooting towards HOB in supine: stand-by assistance via RLE bridging    Transfers:  Sit to Stand: close stand-by assistance from EOB with RW x 1 trial(s)    Gait:  Unable to take formal steps along EOB (due to UE weakness on walker, unable to maintain LLE NWB and simultaneously "hop" on R CAM boot)  She scoots/pivots R foot on floor in order to move laterally R along bed    Assist level: Contact-Guard Assist  Device: Rolling walker    Balance:  Static Sit: Supervision at EOB    Static Stand: Stand-By Assist with Rolling walker for 1-2 minutes (limited by dizziness), maintains LLE NWB    Additional Therapeutic Activity/Exercises:     1. Reviewed orthopedic precautions (LLE NWB, RLE WBAT with CAM boot) with patient and spouse, verbalized understanding. Requires total (A) to don/doff R CAM boot in bed. Pain consistently at 8-9/10 at L ankle with all activity, some mild R ankle pain when standing (with CAM boot on).    2. She sat up for ~8 minutes this morning, sits with supervision and no initial dizziness. Stood with very close stand-by " "assistance from EOB with rolling walker, maintaining LLE NWB. Attempted to take a step forward but she is unable to place enough weight onto her hands on walker to "hop" on R CAM boot; instead, she scoots/pivots R foot on floor in order to move laterally R along bed.    3. After ~1.5 minutes in standing she started with significant increase in dizziness, resulting in need for sitting rest break. Dizziness did not resolve in sitting so assisted with lying back down and providing cool rag, fan which both greatly helped with her symptoms.    4. Considering she was independent prior to her fall on 8/7, she is a fantastic IP rehab candidate (very motivated and eager to participate). Discussed PT role, POC, goals and recommendations (Inpatient Rehabilitation) with patient and spouse; verbalized understanding.    AM-PAC 6 CLICK MOBILITY  Turning over in bed (including adjusting bedclothes, sheets and blankets)?: 4  Sitting down on and standing up from a chair with arms (e.g., wheelchair, bedside commode, etc.): 3  Moving from lying on back to sitting on the side of the bed?: 3  Moving to and from a bed to a chair (including a wheelchair)?: 3  Need to walk in hospital room?: 2  Climbing 3-5 steps with a railing?: 1  Basic Mobility Total Score: 16    Patient was left supine in bed (HOB elevated) with LLE elevated on blankets, with all lines intact, call button in reach, and spouse present.    GOALS:   Multidisciplinary Problems       Physical Therapy Goals          Problem: Physical Therapy    Goal Priority Disciplines Outcome Goal Variances Interventions   Physical Therapy Goal     PT, PT/OT Ongoing, Progressing     Description: Goals to be met by 9/1/23:    1. Pt supine to sit with SBA - not met  2. Pt sit to supine with SBA - not met  3. Pt sit to stand with mod(A) with LRAD with NWB on LLE with WBAT on R LE with CAM Boot - MET (8/18)  4. Pt to perform gait 50 ft with mod(A) with LRAD with NWB on LLE with WBAT on R LE " with CAM Boot.-not met  5. Pt to go up/down curb step with mod(A) with LRAD with NWB on LLE with WBAT on R LE with CAM Boot.-not met    6. Added on 8/18: Sit to stand from bedside chair height with RW and SBA, within orthopedic precautions - Not met                     Alon Peralta, PT  8/18/2023

## 2023-08-18 NOTE — SUBJECTIVE & OBJECTIVE
"Principal Problem:Closed trimalleolar fracture of left ankle    Principal Orthopedic Problem: same as above s/p ORIF 8/17    Interval History: NAEO. VSS. Pain well controlled. Able to wiggle toes. Worked with PT today. Plan for DC today    Review of patient's allergies indicates:   Allergen Reactions    Amoxicillin Hives    Penicillins Hives       Current Facility-Administered Medications   Medication    acetaminophen tablet 1,000 mg    amLODIPine tablet 5 mg    aspirin chewable tablet 81 mg    bisacodyL suppository 10 mg    EScitalopram oxalate tablet 10 mg    glucagon (human recombinant) injection 1 mg    glucose chewable tablet 16 g    glucose chewable tablet 24 g    melatonin tablet 6 mg    methocarbamoL tablet 750 mg    naloxone 0.4 mg/mL injection 0.02 mg    ondansetron injection 4 mg    oxyCODONE immediate release tablet 5 mg    oxyCODONE immediate release tablet Tab 10 mg    polyethylene glycol packet 17 g    pregabalin capsule 75 mg    senna-docusate 8.6-50 mg per tablet 2 tablet    simethicone chewable tablet 80 mg    sodium chloride 0.9% flush 10 mL    sodium chloride 0.9% flush 10 mL    tirzepatide PnIj 5 mg    vitamin D 1000 units tablet 1,000 Units     Objective:     Vital Signs (Most Recent):  Temp: 96.3 °F (35.7 °C) (08/18/23 1139)  Pulse: 88 (08/18/23 1139)  Resp: 17 (08/18/23 1139)  BP: (!) 105/54 (08/18/23 1139)  SpO2: 97 % (08/18/23 1139) Vital Signs (24h Range):  Temp:  [96.2 °F (35.7 °C)-98.2 °F (36.8 °C)] 96.3 °F (35.7 °C)  Pulse:  [74-94] 88  Resp:  [17-20] 17  SpO2:  [93 %-97 %] 97 %  BP: (104-114)/(51-60) 105/54     Weight: 126 kg (277 lb 12.5 oz)  Height: 5' 3" (160 cm)  Body mass index is 49.21 kg/m².      Intake/Output Summary (Last 24 hours) at 8/18/2023 1330  Last data filed at 8/18/2023 0824  Gross per 24 hour   Intake 360 ml   Output 800 ml   Net -440 ml          Ortho/SPM Exam  LLE:   Wound vac to suction  Posterior slab splint in place  Cap refill <2s  Able to wiggle " toes  SILT    RLE:   Pain with ROM of the ankle, improving  TTP over 5th metatarsal base  SILT       Significant Labs: All pertinent labs within the past 24 hours have been reviewed.    Significant Imaging: I have reviewed and interpreted all pertinent imaging results/findings.

## 2023-08-18 NOTE — PROGRESS NOTES
[facility]  Physical Medicine & Rehab  Progress Note    Patient Name: Kalina Ryan  MRN: [id number]  Admission Date: 8/7/2023  Length of Stay: 9 days  Attending Physician: Edie Mark MD    Subjective:     Principal Problem:Closed trimalleolar fracture of left ankle    Hospital Course:   Per chart review,    PT- 08/14    Bed Mobility:    Supine > Sit: stand by assistance with HOB elevated  ? CAM boot donned before sitting EOB   Sit > Supine: minimum assistance for LLE management      Transfers:    Sit <> Stand Transfer: minimum assistance from EOB using rolling walker      Balance:    Sitting balance: FAIR+: Maintains balance through MINIMAL excursions of active trunk motion   Standing balance:   ? FAIR: Maintains without assist but unable to take challenges  ? POOR+: Needs MIN (minimal ) assist during gait                 Gait:   Distance: 4 lateral shuffle steps at EOB    Assistive Device: RW   Assistance Level: minimum assistance   Gait Assessment: Pt unable to clear RLE off ground, shuffles RLE instead.   OT- 08/15    Bed Mobility:     Patient completed Scooting/Bridging with stand by assistance   Patient completed Supine to Sit with stand by assistance      Functional Mobility/Transfers:   Patient completed Sit <> Stand Transfer with minimum assistance  with  rolling walker    Patient completed Bed > Chair Transfer using Step/Slide Transfer technique with contact guard assistance with rolling walker  ? No LOB or SOB noted  ? Pt able to maintain Wb'ng precautions to L LE with no cueing required        Activities of Daily Living:   Lower Body Dressing: minimum assistance required to initiate donning (R) sock with pt able to manipulate above heel in long sitting; max (A) to don (R) CAM boot EOB      Interval History 8/18/2023:  Patient is seen for follow-up PM&R evaluation and recommendations: Pt seen at bedside. Appears to have sensation to have BM. States pain is in control with  current oral regimen.   HPI, Past Medical, Family, and Social History remains the same as documented in the initial encounter.    Scheduled Medications:    acetaminophen  1,000 mg Oral Q8H    amLODIPine  5 mg Oral Daily    aspirin  81 mg Oral BID    EScitalopram oxalate  10 mg Oral Daily    methocarbamoL  750 mg Oral QID    morphine  2 mg Intravenous Once    polyethylene glycol  17 g Oral Daily    pregabalin  75 mg Oral BID    senna-docusate 8.6-50 mg  2 tablet Oral BID    tirzepatide  5 mg Subcutaneous Every Sun    vitamin D  1,000 Units Oral Daily       Diagnostic Results: Labs: Reviewed    PRN Medications: bisacodyL, glucagon (human recombinant), glucose, glucose, melatonin, naloxone, ondansetron, oxyCODONE, oxyCODONE, simethicone, sodium chloride 0.9%, sodium chloride 0.9%    Review of Systems   Constitutional:  Positive for activity change.   Musculoskeletal:  Positive for gait problem.   Skin:  Positive for wound.   Neurological:  Positive for weakness.     Objective:     Vital Signs (Most Recent):  Temp: 97.4 °F (36.3 °C) (08/18/23 0824)  Pulse: 84 (08/18/23 0824)  Resp: 20 (08/18/23 0928)  BP: (!) 106/54 (08/18/23 0824)  SpO2: 97 % (08/18/23 0824)    Vital Signs (24h Range):  Temp:  [96.2 °F (35.7 °C)-98.2 °F (36.8 °C)] 97.4 °F (36.3 °C)  Pulse:  [74-94] 84  Resp:  [17-20] 20  SpO2:  [93 %-97 %] 97 %  BP: (104-114)/(51-60) 106/54         Physical Exam  Vitals and nursing note reviewed.   Constitutional:       General: She is awake.      Appearance: Normal appearance. She is obese.   HENT:      Head: Normocephalic and atraumatic.   Eyes:      Extraocular Movements: Extraocular movements intact.   Pulmonary:      Effort: Pulmonary effort is normal. No respiratory distress.   Abdominal:      Palpations: Abdomen is soft.   Musculoskeletal:      Cervical back: Normal range of motion and neck supple.      Comments: LLE in cast. Able to move toes. Wound vac in place.  -RLE Limited ROM to toes. Boot on  for ambulation.   Skin:     General: Skin is warm and dry.      Comments: Wound vac   Neurological:      General: No focal deficit present.      Mental Status: She is alert and oriented to person, place, and time.      GCS: GCS eye subscore is 4. GCS verbal subscore is 5. GCS motor subscore is 6.      Coordination: Coordination abnormal. Heel to Shin Test abnormal. Impaired rapid alternating movements.   Psychiatric:         Mood and Affect: Mood normal.         Behavior: Behavior normal. Behavior is cooperative.          NEUROLOGICAL EXAMINATION:     MENTAL STATUS   Oriented to person, place, and time.         Assessment/Plan:      * Closed trimalleolar fracture of left ankle  -S/P ex-fix 08/09/2023 per ortho.  -Plan for possible defintive Sx on 08/17/2023.  -PT/OT currently. Recommending IPR. Pt doing well.   -Pain appears controlled with oral regimen.   -DVT PPX with ASA.   -Will follow for definitive Sx as per ortho.   -08/18- S/P ORIF 08/17. Post op BM pending. Pain in control with current oral regimen.  -PT/OT re-eval pending.     Benign essential HTN  -Stable.       CIDP (chronic inflammatory demyelinating polyneuropathy)  -Follow up out pt with Neurology.     Class 3 severe obesity due to excess calories with serious comorbidity and body mass index (BMI) of 45.0 to 49.9 in adult  -Consider nutrition consult.     Anxiety  -Stable.    PM&R Recommendation:     At this time, the PM&R team has reviewed this patient's ongoing medical case including inpatient diagnosis, medical history, clinical examination, labs, vitals, current social and functional history to provide the post-acute recommendation as follows:     RECOMMENDATIONS:  inpatient rehabilitation due to good motivation/participation with therapies, has been determined to tolerate 3 hours of therapy and good potential for recovery, once medically stable. Pending Post op BM.        The patient will be admitted for comprehensive interdisciplinary inpatient  rehabilitation to address the impairments due to  medical diagnosis of  S/P ORIF 08/17/23. The patient will benefit from an inpatient rehabilitation program to promote functional recovery, implement compensatory strategies and will undergo assessment for needs for durable medical equipment for safe discharge to the community. This patient will benefit from a coordinated interdisciplinary rehabilitation program services that require close monitoring and treatment with 24-hour rehabilitative nursing and  physical/occupational therapies for 3 hours/day for 5 days/week.  This interdisciplinary program will be performed under the direction of a physiatrist.        We will  sign off with the transfer of the patient to [facility] liaison who will continue to follow going forward until admission to [facility].        Therese Thomas NP  Department of Physical Medicine & Rehab   [facility]

## 2023-08-18 NOTE — ASSESSMENT & PLAN NOTE
Kalina Ryan is a 58 y.o. female with left ankle trimalleolar fracture and dislocation and base of 5th MT fx s/p L ankle ORIF 8/17.     Pain: MM  NWB LLE, WBAT RLE in boot  Pin site care BID  ASA 81 BID  Swelling stable and improved, plan for ORIF today.

## 2023-08-18 NOTE — PLAN OF CARE
Patient anticipated to be medically stable for d/c on 8/19/23. Accepted to [facility] and per Liaison will have an available bed for that day. Patient's worker's comp ([employer]) has pre approved her 1010 for 14 days of Inpatient rehab once stable to d/c.      Renetta Heaton RNCM  Case Management  [facility]  [phone]

## 2023-08-18 NOTE — PLAN OF CARE
"Kalina Ryan is a 58 y.o. female admitted to [facility] on 8/7/2023 for Closed trimalleolar fracture of left ankle. Patient previously being seen by PT but orders discontinued secondary to returning to OR on 8/17 for L ankle ORIF and ex-fix removal. Kalina Ryan tolerated re-evaluation fair today. Reviewed orthopedic precautions (LLE NWB, RLE WBAT with CAM boot) with patient and spouse, verbalized understanding. Requires total (A) to don/doff R CAM boot in bed. Pain consistently at 8-9/10 at L ankle with all activity, some mild R ankle pain when standing (with CAM boot on). She sat up for ~8 minutes this morning, sits with supervision and no initial dizziness. Stood with very close stand-by assistance from EOB with rolling walker, maintaining LLE NWB. Attempted to take a step forward but she is unable to place enough weight onto her hands on walker to "hop" on R CAM boot; instead, she scoots/pivots R foot on floor in order to move laterally R along bed. After ~1.5 minutes in standing she started with significant increase in dizziness, resulting in need for sitting rest break. Dizziness did not resolve in sitting so assisted with lying back down and providing cool rag, fan which both greatly helped with her symptoms. Considering she was independent prior to her fall on 8/7, she is a fantastic IP rehab candidate (very motivated and eager to participate). Discussed PT role, POC, goals and recommendations (Inpatient Rehabilitation) with patient and spouse; verbalized understanding. Kalina Ryan would benefit from acute PT services to promote mobility during this admission and improve return to PLOF.    Problem: Physical Therapy  Goal: Physical Therapy Goal  Description: Goals to be met by 9/1/23:    1. Pt supine to sit with SBA - not met  2. Pt sit to supine with SBA - not met  3. Pt sit to stand with mod(A) with LRAD with NWB on LLE with WBAT on R LE with CAM Boot - MET (8/18)  4. Pt to perform gait 50 ft with " mod(A) with LRAD with NWB on LLE with WBAT on R LE with CAM Boot.-not met  5. Pt to go up/down curb step with mod(A) with LRAD with NWB on LLE with WBAT on R LE with CAM Boot.-not met    6. Added on 8/18: Sit to stand from bedside chair height with RW and SBA, within orthopedic precautions - Not met  Outcome: Ongoing, Progressing    Alon Peralta, PT  8/18/2023

## 2023-08-18 NOTE — SUBJECTIVE & OBJECTIVE
Interval History 8/18/2023:  Patient is seen for follow-up PM&R evaluation and recommendations: Pt seen at bedside. Appears to have sensation to have BM. States pain is in control with current oral regimen.   HPI, Past Medical, Family, and Social History remains the same as documented in the initial encounter.    Scheduled Medications:    acetaminophen  1,000 mg Oral Q8H    amLODIPine  5 mg Oral Daily    aspirin  81 mg Oral BID    EScitalopram oxalate  10 mg Oral Daily    methocarbamoL  750 mg Oral QID    morphine  2 mg Intravenous Once    polyethylene glycol  17 g Oral Daily    pregabalin  75 mg Oral BID    senna-docusate 8.6-50 mg  2 tablet Oral BID    tirzepatide  5 mg Subcutaneous Every Sun    vitamin D  1,000 Units Oral Daily       Diagnostic Results: Labs: Reviewed    PRN Medications: bisacodyL, glucagon (human recombinant), glucose, glucose, melatonin, naloxone, ondansetron, oxyCODONE, oxyCODONE, simethicone, sodium chloride 0.9%, sodium chloride 0.9%    Review of Systems   Constitutional:  Positive for activity change.   Musculoskeletal:  Positive for gait problem.   Skin:  Positive for wound.   Neurological:  Positive for weakness.     Objective:     Vital Signs (Most Recent):  Temp: 97.4 °F (36.3 °C) (08/18/23 0824)  Pulse: 84 (08/18/23 0824)  Resp: 20 (08/18/23 0928)  BP: (!) 106/54 (08/18/23 0824)  SpO2: 97 % (08/18/23 0824)    Vital Signs (24h Range):  Temp:  [96.2 °F (35.7 °C)-98.2 °F (36.8 °C)] 97.4 °F (36.3 °C)  Pulse:  [74-94] 84  Resp:  [17-20] 20  SpO2:  [93 %-97 %] 97 %  BP: (104-114)/(51-60) 106/54         Physical Exam  Vitals and nursing note reviewed.   Constitutional:       General: She is awake.      Appearance: Normal appearance. She is obese.   HENT:      Head: Normocephalic and atraumatic.   Eyes:      Extraocular Movements: Extraocular movements intact.   Pulmonary:      Effort: Pulmonary effort is normal. No respiratory distress.   Abdominal:      Palpations: Abdomen is soft.    Musculoskeletal:      Cervical back: Normal range of motion and neck supple.      Comments: LLE in cast. Able to move toes. Wound vac in place.  -RLE Limited ROM to toes. Boot on for ambulation.   Skin:     General: Skin is warm and dry.      Comments: Wound vac   Neurological:      General: No focal deficit present.      Mental Status: She is alert and oriented to person, place, and time.      GCS: GCS eye subscore is 4. GCS verbal subscore is 5. GCS motor subscore is 6.      Coordination: Coordination abnormal. Heel to Shin Test abnormal. Impaired rapid alternating movements.   Psychiatric:         Mood and Affect: Mood normal.         Behavior: Behavior normal. Behavior is cooperative.          NEUROLOGICAL EXAMINATION:     MENTAL STATUS   Oriented to person, place, and time.

## 2023-08-18 NOTE — PLAN OF CARE
Problem: Adult Inpatient Plan of Care  Goal: Plan of Care Review  Outcome: Ongoing, Progressing  Goal: Patient-Specific Goal (Individualized)  Outcome: Ongoing, Progressing  Goal: Absence of Hospital-Acquired Illness or Injury  Outcome: Ongoing, Progressing  Goal: Optimal Comfort and Wellbeing  Outcome: Ongoing, Progressing  Goal: Readiness for Transition of Care  Outcome: Ongoing, Progressing     Problem: Bariatric Environmental Safety  Goal: Safety Maintained with Care  Outcome: Ongoing, Progressing     Problem: Diabetes Comorbidity  Goal: Blood Glucose Level Within Targeted Range  Outcome: Ongoing, Progressing     Problem: Skin Injury Risk Increased  Goal: Skin Health and Integrity  Outcome: Ongoing, Progressing     Problem: Impaired Wound Healing  Goal: Optimal Wound Healing  Outcome: Ongoing, Progressing     Problem: Fall Injury Risk  Goal: Absence of Fall and Fall-Related Injury  Outcome: Ongoing, Progressing     Patient medicated for pain through out shift, spoke with Dr. Mark and she adjusted meds, pt is scheduled to go to rehab tomorrow.

## 2023-08-18 NOTE — PLAN OF CARE
Problem: Occupational Therapy  Goal: Occupational Therapy Goal  Description: Goals to be met by: 8/20/23     Patient will increase functional independence with ADLs by performing:    UE Dressing with Minimal Assistance.  LE Dressing with Minimal Assistance.  Grooming while seated at sink with Supervision.  Toileting from bedside commode with Minimal Assistance for hygiene and clothing management.   Toilet transfer to bedside commode with Minimal Assistance.    Outcome: Ongoing, Progressing     Re-eval completed. Cont POC.

## 2023-08-18 NOTE — PLAN OF CARE
Problem: Adult Inpatient Plan of Care  Goal: Plan of Care Review  Outcome: Ongoing, Progressing  Flowsheets (Taken 8/17/2023 2351)  Plan of Care Reviewed With: patient  Goal: Patient-Specific Goal (Individualized)  Outcome: Ongoing, Progressing  Flowsheets (Taken 8/17/2023 2351)  Anxieties, Fears or Concerns: Pain Mannagement, post op care  Individualized Care Needs: Pain management  Goal: Absence of Hospital-Acquired Illness or Injury  Outcome: Ongoing, Progressing  Intervention: Identify and Manage Fall Risk  Flowsheets (Taken 8/17/2023 2351)  Safety Promotion/Fall Prevention:   side rails raised x 2   /camera at bedside   bed alarm set   nonskid shoes/socks when out of bed  Intervention: Prevent Skin Injury  Flowsheets (Taken 8/17/2023 2351)  Body Position:   30 degrees   supine  Skin Protection:   adhesive use limited   tubing/devices free from skin contact  Intervention: Prevent and Manage VTE (Venous Thromboembolism) Risk  Flowsheets (Taken 8/17/2023 2351)  Activity Management: Arm raise - L1  VTE Prevention/Management: remove, assess skin, and reapply sequential compression device  Range of Motion: active ROM (range of motion) encouraged  Goal: Optimal Comfort and Wellbeing  Outcome: Ongoing, Progressing  Goal: Readiness for Transition of Care  Outcome: Ongoing, Progressing

## 2023-08-18 NOTE — PLAN OF CARE
[facility]    FACILITY TRANSFER ORDERS      Patient Name: Kalina Ryan  YOB: 1965    PCP: Nany Hoff NP   PCP Address: [address] [place] [address] [address] / [place]  PCP Phone Number: [phone]  PCP Fax: [phone]    Encounter Date: 08/19/2023    Admit to: inpatient rehab    Vital Signs:  Routine    Diagnoses:   Active Hospital Problems    Diagnosis  POA    *Closed trimalleolar fracture of left ankle [S82.852A]  Yes    Leukocytosis [D72.829]  No    Weakness of both lower extremities [R29.898]  Yes    Closed nondisplaced fracture of fifth metatarsal bone of right foot with routine healing [S92.354D]  Not Applicable    Benign essential HTN [I10]  Yes    Closed dislocation of left ankle [S93.05XA]  Yes    Closed displaced fracture of lateral malleolus of right fibula with routine healing [S82.61XD]  Not Applicable    Type 2 diabetes mellitus without complication, without long-term current use of insulin [E11.9]  Yes    Class 3 severe obesity due to excess calories with serious comorbidity and body mass index (BMI) of 45.0 to 49.9 in adult [E66.01, Z68.42]  Not Applicable    Personal history of gastric bypass [Z98.84]  Not Applicable    Anxiety [F41.9]  Yes    CIDP (chronic inflammatory demyelinating polyneuropathy) [G61.81]  Yes     full paralysis but no intubation; residual weakness and neuropathy to hands and feet - diagnosed by Neurologist back in 2009 and last seen by neurology in 2011        Resolved Hospital Problems    Diagnosis Date Resolved POA    Urinary tract infection due to Klebsiella pneumoniae [N39.0, B96.89] 08/13/2023 Yes    Preoperative cardiovascular examination [Z01.810] 08/09/2023 Not Applicable       Allergies:  Review of patient's allergies indicates:   Allergen Reactions    Amoxicillin Hives    Penicillins Hives       Diet: diabetic diet: 2000 calorie    Activities: NWB to LLE, WBAT to RLE in boot    Keep prevena wound vac in place to LLE for 1  week.      Goals of Care Treatment Preferences:  Code Status: Full Code            CONSULTS:    Physical Therapy to evaluate and treat.  and Occupational Therapy to evaluate and treat.    MISCELLANEOUS CARE:  Routine Skin for Bedridden Patients: Apply moisture barrier cream to all skin folds and wet areas in perineal area daily and after baths and all bowel movements.      Medications: Review discharge medications with patient and family and provide education.         Medication List        START taking these medications      acetaminophen 500 MG tablet  Commonly known as: TYLENOL  Take 2 tablets (1,000 mg total) by mouth every 8 (eight) hours.     aspirin 81 MG Chew  Take 1 tablet (81 mg total) by mouth 2 (two) times a day. End dates 9 /29/23     methocarbamoL 1,000 mg Tab  Take 1,000 mg by mouth 4 (four) times daily.     ondansetron 4 MG Tbdl  Commonly known as: ZOFRAN-ODT  Take 2 tablets (8 mg total) by mouth every 6 (six) hours as needed (nausea).     * oxyCODONE 10 mg Tab immediate release tablet  Commonly known as: ROXICODONE  Take 1 tablet (10 mg total) by mouth every 3 (three) hours as needed (pain scale 7-10).     * oxyCODONE 5 MG immediate release tablet  Commonly known as: ROXICODONE  Take 1 tablet (5 mg total) by mouth every 3 (three) hours as needed (pain scale 4-6).     polyethylene glycol 17 gram Pwpk  Commonly known as: GLYCOLAX  Take 17 g by mouth once daily.  Start taking on: August 19, 2023     pregabalin 75 MG capsule  Commonly known as: LYRICA  Take 1 capsule (75 mg total) by mouth 2 (two) times daily.     senna-docusate 8.6-50 mg 8.6-50 mg per tablet  Commonly known as: PERICOLACE  Take 2 tablets by mouth 2 (two) times daily.     simethicone 80 MG chewable tablet  Commonly known as: MYLICON  Take 1 tablet (80 mg total) by mouth 4 (four) times daily as needed for Flatulence.     vitamin D 1000 units Tab  Commonly known as: VITAMIN D3  Take 1 tablet (1,000 Units total) by mouth once daily.  Start  taking on: August 19, 2023           * This list has 2 medication(s) that are the same as other medications prescribed for you. Read the directions carefully, and ask your doctor or other care provider to review them with you.                CHANGE how you take these medications      melatonin 3 mg tablet  Commonly known as: MELATIN  Take 2 tablets (6 mg total) by mouth nightly as needed for Insomnia.  What changed:   medication strength  how much to take  when to take this  reasons to take this     MOUNJARO 5 mg/0.5 mL Pnij  Generic drug: tirzepatide  Inject 5 mg into the skin every 7 days. -every Sunday. Okay to hold if not on formulary at rehab  What changed: additional instructions            CONTINUE taking these medications      amLODIPine 5 MG tablet  Commonly known as: NORVASC  TAKE 1 TABLET DAILY PO     EScitalopram oxalate 10 MG tablet  Commonly known as: LEXAPRO  Take 1 tablet (10 mg total) by mouth once daily.     ONE DAILY MULTIVITAMIN per tablet  Take 1 table PO daily  Generic drug: multivitamin            STOP taking these medications      ALEVE 220 MG tablet  Generic drug: naproxen sodium     CETIRIZINE ORAL     CYANOCOBALAMIN (VITAMIN B-12) ORAL     lisinopriL 40 MG tablet  Commonly known as: PRINIVIL,ZESTRIL     zinc gluconate 50 mg tablet               Where to Get Your Medications        Information about where to get these medications is not yet available    Ask your nurse or doctor about these medications  acetaminophen 500 MG tablet  aspirin 81 MG Chew  melatonin 3 mg tablet  methocarbamoL 1,000 mg Tab  MOUNJARO 5 mg/0.5 mL Pnij  ondansetron 4 MG Tbdl  oxyCODONE 10 mg Tab immediate release tablet  oxyCODONE 5 MG immediate release tablet  polyethylene glycol 17 gram Pwpk  pregabalin 75 MG capsule  senna-docusate 8.6-50 mg 8.6-50 mg per tablet  simethicone 80 MG chewable tablet  vitamin D 1000 units Tab         Immunizations Administered as of 8/18/2023       No immunizations on file.               _________________________________  Edie Mark MD  08/19/2023

## 2023-08-18 NOTE — PROGRESS NOTES
"[facility] - [facility]  Adult Nutrition  Progress Note    SUMMARY       Recommendations    1) Continue diabetic diet   2) If intake <50% add boost glucose control BID   3) Following    Goals: Meet % een/epn by next Rd f/u  Nutrition Goal Status: new  Communication of RD Recs: other (comment) (POC)    Assessment and Plan    Nutrition Problem  Increase protein needs    Related to (etiology):   Wound healing    Signs and Symptoms (as evidenced by):   fracture     Interventions/Recommendations (treatment strategy):  Collaboration with other providers     Nutrition Diagnosis Status:   New        Reason for Assessment    Reason For Assessment: length of stay  Diagnosis: other (see comments) (fracture)  Relevant Medical History: HTN, DM, Guillain barre syndrome,  gastric bypass   Interdisciplinary Rounds: did not attend  General Information Comments: LOS x 9. Pt endorses good appetite w/ % intake of meals Denies chew/swallowing issues, n/v/d/c. Good intake PTA. #. Pt doing gisela 5&1 for the past 4 week PTA. Appears nourished. Following   Nutrition Discharge Planning: adequate intake    Nutrition Risk Screen    Nutrition Risk Screen: no indicators present    Nutrition/Diet History    Patient Reported Diet/Restrictions/Preferences: general  Typical Food/Fluid Intake: 3 meals/day or gisela (5 &*1)  Spiritual, Cultural Beliefs, Jain Practices, Values that Affect Care: no  Food Allergies: NKFA    Anthropometrics    Temp: 96.3 °F (35.7 °C)  Height Method: Stated  Height: 5' 3" (160 cm)  Height (inches): 63 in  Weight: 125.6 kg (277 lb)  Weight (lb): 277 lb  Ideal Body Weight (IBW), Female: 115 lb  % Ideal Body Weight, Female (lb): 240.87 %  BMI (Calculated): 49.1  BMI Grade: greater than 40 - morbid obesity  Usual Body Weight (UBW), k kg  % Usual Body Weight: 99.93  % Weight Change From Usual Weight: -0.28 %       Lab/Procedures/Meds    Pertinent Labs Reviewed: reviewed  Pertinent Labs Comments: " H/H: 9.4/30.5, MCH: 26.6, MCHC: 30.8, BUN: 27  Pertinent Medications Reviewed: reviewed  Pertinent Medications Comments: polyethylene glycol, senna-docusate, vitamin D    Physical Findings/Assessment         Estimated/Assessed Needs    Weight Used For Calorie Calculations: 125.6 kg (277 lb)  Energy Calorie Requirements (kcal): 1800 (MSJ)  Energy Need Method: Weldon-St Jeor  Protein Requirements: 151 (1.2 g/kg)  Weight Used For Protein Calculations: 125.6 kg (277 lb)     Estimated Fluid Requirement Method: RDA Method  RDA Method (mL): 1800  CHO Requirement: 225      Nutrition Prescription Ordered    Current Diet Order: Diabetic 2000 kcal    Evaluation of Received Nutrient/Fluid Intake    I/O: -1.8 L since admit  Comments: LBM 8/15  Tolerance: tolerating  % Intake of Estimated Energy Needs: 75 - 100 %  % Meal Intake: 75 - 100 %    Nutrition Risk    Level of Risk/Frequency of Follow-up: low (f/u 1x/week)     Monitor and Evaluation    Food and Nutrient Intake: energy intake  Food and Nutrient Adminstration: diet order  Knowledge/Beliefs/Attitudes: food and nutrition knowledge/skill, beliefs and attitudes  Physical Activity and Function: nutrition-related ADLs and IADLs  Anthropometric Measurements: height/length, weight, weight change, body mass index  Biochemical Data, Medical Tests and Procedures: electrolyte and renal panel, gastrointestinal profile, glucose/endocrine profile, inflammatory profile, lipid profile  Nutrition-Focused Physical Findings: overall appearance     Nutrition Follow-Up    RD Follow-up?: Yes    Mary Guerra RD, LDN

## 2023-08-18 NOTE — PT/OT/SLP RE-EVAL
Occupational Therapy  [id number] and Treatment with PT    Name: Kalina Ryan  MRN: [id number]  Admitting Diagnosis:  Closed trimalleolar fracture of left ankle  Recent Surgery: Procedure(s) (LRB):  ORIF, ANKLE (Left)  FIXATION, SYNDESMOSIS, ANKLE (Left)  REMOVAL, EXTERNAL FIXATION DEVICE (Left) 1 Day Post-Op    Recommendations:     Discharge Recommendations: rehabilitation facility  Discharge Equipment Recommendations: to be determined by next level of care  Barriers to discharge:  None    Assessment:     Kalina Ryan is a 58 y.o. female with a medical diagnosis of Closed trimalleolar fracture of left ankle.  She presents with performance deficits affecting function are weakness, impaired endurance, impaired self care skills, impaired functional mobility, gait instability, impaired balance, pain, decreased lower extremity function, orthopedic precautions.  Pt agreeable to therapy. Pt performed bed mobility, dressing tasks, transfers and mobility as tolerated. Pt was limited by worsening nausea and dizziness. Pt otherwise performs seated tasks fairly well. Pt participates well and is motivated to regain functional independence in mobility and self care.    -Co-tx with PT performed due to need for education and assistance from two skilled therapy disciplines at pt's current functional level.     Rehab Prognosis:  Good; patient would benefit from acute skilled OT services to address these deficits and reach maximum level of function.       Plan:     Patient to be seen 4 x/week to address the above listed problems via self-care/home management, therapeutic activities, therapeutic exercises, neuromuscular re-education  Plan of Care Expires: 09/09/23  Plan of Care Reviewed with: patient, spouse    Subjective     Chief Complaint: Nausea; dizziness; pain  Patient/Family stated goals: Get well  Communicated with: RN prior to session.  Pain/Comfort:  Pain Rating 1: 8/10  Location - Side 1: Left  Location -  Orientation 1: lower  Location 1: leg  Pain Addressed 1: Pre-medicate for activity, Reposition, Distraction, Cessation of Activity  Pain Rating Post-Intervention 1: 8/10    Objective:     Communicated with: RN prior to session.  Patient found HOB elevated with: SCD, wound vac upon OT entry to room.    General Precautions: Standard, fall  Orthopedic Precautions: RLE weight bearing as tolerated, LLE non weight bearing  Braces:  (R CAM boot)  Respiratory Status: Room air    Occupational Performance:    Bed Mobility:    Patient completed Supine to Sit with contact guard assistance  Patient completed Sit to Supine with contact guard assistance    Functional Mobility/Transfers:  Patient completed Sit <> Stand Transfer with stand by assistance  with  rolling walker   Functional Mobility: Pivot to R and back to L, but limited by worsening nausea and dizziness; SBA with RW    Activities of Daily Living:  Upper Body Dressing: minimum assistance gown  Lower Body Dressing: maximal assistance CAM boot    Cognitive/Visual Perceptual:  Cognitive/Psychosocial Skills:     -       Oriented to: Person, Place, Time, and Situation   -       Follows Commands/attention:Follows multistep  commands  -       Safety awareness/insight to disability: intact     Physical Exam:  Upper Extremity Range of Motion:     -       Right Upper Extremity: WFL  -       Left Upper Extremity: WFL  Upper Extremity Strength:  limited strength related to ambulation with NWB status and RW, otherwise WFL  -       Right Upper Extremity: WFL  -       Left Upper Extremity: WFL   Strength:    -       Right Upper Extremity: WFL  -       Left Upper Extremity: WFL  Fine Motor Coordination:    -       Intact  Gross motor coordination:   WFL    AMPAC 6 Click:  AMPAC Total Score: 17    Treatment & Education:  Pt edu re OT role, POC and safety.  Pt edu re WB precautions.    Patient left HOB elevated with all lines intact, call button in reach, and   present    GOALS:   Multidisciplinary Problems       Occupational Therapy Goals          Problem: Occupational Therapy    Goal Priority Disciplines Outcome Interventions   Occupational Therapy Goal     OT, PT/OT Ongoing, Progressing    Description: Goals to be met by: 8/20/23     Patient will increase functional independence with ADLs by performing:    UE Dressing with Minimal Assistance.  LE Dressing with Minimal Assistance.  Grooming while seated at sink with Supervision.  Toileting from bedside commode with Minimal Assistance for hygiene and clothing management.   Toilet transfer to bedside commode with Minimal Assistance.                         History:     Past Medical History:   Diagnosis Date    Anxiety     CIDP (chronic inflammatory demyelinating polyneuropathy) 1/1/2009    full paralysis but no intubation; residual weakness and neuropathy to hands and feet - diagnosed by Neurologist back in 2009 and last seen by neurology in 2011    Guillain Barré syndrome 2009    full paralysis but no intubation; residual weakness and neuropathy to hands and feet - diagnosed by Neurologist back in 2009 and last seen by neurology in 2011    Hypertension     New onset type 2 diabetes mellitus 9/4/2019    Personal history of gastric bypass 9/4/2019    Reactive depression 10/19/2021         Past Surgical History:   Procedure Laterality Date    APPLICATION, EXTERNAL FIXATION DEVICE Left 8/9/2023    Procedure: APPLICATION, EXTERNAL FIXATION DEVICE left ankle, C-arm clock side, synthes;  Surgeon: Kali Santoyo MD;  Location: [facility] OR [address] [place] [address];  Service: Orthopedics;  Laterality: Left;    CHOLECYSTECTOMY  2009    CLOSED REDUCTION, FRACTURE, ANKLE, TRIMALLEOLAR Left 8/9/2023    Procedure: CLOSED REDUCTION, FRACTURE, ANKLE, TRIMALLEOLAR;  Surgeon: Kali Santoyo MD;  Location: [facility] OR [address] [place] [address];  Service: Orthopedics;  Laterality: Left;    COLONOSCOPY N/A 7/31/2018    Procedure: COLONOSCOPY;  Surgeon: Elpidio Forutne MD;   Location: [facility];  Service: Endoscopy;  Laterality: N/A;    FIXATION OF SYNDESMOSIS OF ANKLE Left 8/17/2023    Procedure: FIXATION, SYNDESMOSIS, ANKLE;  Surgeon: Kali Santoyo MD;  Location: [address];  Service: Orthopedics;  Laterality: Left;    GASTRIC BYPASS  2009    HYSTERECTOMY  1999    OPEN REDUCTION AND INTERNAL FIXATION (ORIF) OF INJURY OF ANKLE Left 8/17/2023    Procedure: ORIF, ANKLE;  Surgeon: Kali Santoyo MD;  Location: [address];  Service: Orthopedics;  Laterality: Left;    REMOVAL OF EXTERNAL FIXATION DEVICE Left 8/17/2023    Procedure: REMOVAL, EXTERNAL FIXATION DEVICE;  Surgeon: Kali Santoyo MD;  Location: [address];  Service: Orthopedics;  Laterality: Left;       Time Tracking:     OT Date of Treatment: 08/18/23  OT Start Time: 1041  OT Stop Time: 1059  OT Total Time (min): 18 min    Billable Minutes:Re-eval 10 minutes  Therapeutic Activity 8 minutes    RALPH Olson  8/18/2023  Pager: [phone]

## 2023-08-18 NOTE — SUBJECTIVE & OBJECTIVE
Interval History:  she reports rested yesterday after surgery and pain was fairly controlled, started feeling some tingling and pain in her foot early morning likely when nerve block wore off and so receieved some pain medication which helped control pain and feeling better once it kicked in. Awaiting breakfast this morning, discussed wound vac in place and has prevena in room with plan to keep on for a week once at rehab she reports ortho discussed with her, no exudate in vac now. Mild downtick in hg to 9 post op and eukocytosis yesterday on labs to 19 likely from steroids given intraop with resolution now to 11 on rpeat labs today. Has boot for right foot in room now and plan to transition into that for left side in 6 weeks with suture remoavl in 2 weeks. Plan for O-rehab for likely tomorrow if pain stable as has auth from insurance. Denies new issues this morning and pleasant on exam.        Review of Systems   Constitutional:  Negative for fever.   Respiratory:  Negative for cough and shortness of breath.    Cardiovascular:  Negative for chest pain.   Gastrointestinal:  Negative for abdominal pain, nausea and vomiting.   Musculoskeletal:  Positive for arthralgias (Left ankle; Right ankle and right hip) and myalgias (Left ankle area).   Neurological:  Positive for weakness (Bilateral lower extremities). Negative for dizziness.   Psychiatric/Behavioral:  Negative for agitation and confusion.      Objective:     Vital Signs (Most Recent):  Temp: 96.4 °F (35.8 °C) (08/16/23 1135)  Pulse: 83 (08/16/23 1135)  Resp: 17 (08/16/23 1135)  BP: 107/58 (08/16/23 1135)  SpO2: 98 % (08/16/23 1135) on room air Vital Signs (24h Range):  Temp:  [96.2 °F (35.7 °C)-98.2 °F (36.8 °C)] 97.4 °F (36.3 °C)  Pulse:  [74-94] 84  Resp:  [11-20] 20  SpO2:  [93 %-98 %] 97 %  BP: (104-121)/(51-71) 106/54     Weight: 126 kg (277 lb 12.5 oz)  Body mass index is 49.21 kg/m².    Intake/Output Summary (Last 24 hours) at 8/18/2023 1022  Last data  filed at 8/18/2023 0501  Gross per 24 hour   Intake 360 ml   Output 800 ml   Net -440 ml           Physical Exam  Vitals and nursing note reviewed.   Constitutional:       General: She is awake. She is not in acute distress.     Appearance: Normal appearance. She is well-developed. She is morbidly obese. She is not ill-appearing.      Comments: Pleasant.   Cardiovascular:      Rate and Rhythm: Normal rate and regular rhythm.      Heart sounds: Normal heart sounds. No murmur heard.     No friction rub. No gallop.   Pulmonary:      Effort: Pulmonary effort is normal. No respiratory distress.      Breath sounds: Normal breath sounds. No wheezing.   Abdominal:      General: Abdomen is flat. Bowel sounds are normal. There is no distension.      Palpations: Abdomen is soft.      Tenderness: There is no abdominal tenderness.   Musculoskeletal:         General: Swelling (Left ankle and right ankle and foot) present.      Comments: Hospital vac in place to LLE without exuate. Bandages to LLE post op   Skin:     General: Skin is warm.      Findings: Bruising (Right lateral ankle and lateral aspect of right foot) present. No erythema.   Neurological:      Mental Status: She is alert and oriented to person, place, and time.   Psychiatric:         Mood and Affect: Mood normal.         Behavior: Behavior normal. Behavior is cooperative.         Thought Content: Thought content normal.         Judgment: Judgment normal.             Significant Labs: All pertinent labs within the past 24 hours have been reviewed.    Significant Imaging: I have reviewed all pertinent imaging results/findings within the past 24 hours.

## 2023-08-19 VITALS
BODY MASS INDEX: 49.21 KG/M2 | HEART RATE: 85 BPM | DIASTOLIC BLOOD PRESSURE: 61 MMHG | TEMPERATURE: 98 F | SYSTOLIC BLOOD PRESSURE: 103 MMHG | RESPIRATION RATE: 20 BRPM | OXYGEN SATURATION: 97 % | HEIGHT: 63 IN | WEIGHT: 277.75 LBS

## 2023-08-19 LAB — POCT GLUCOSE: 94 MG/DL (ref 70–110)

## 2023-08-19 PROCEDURE — 25000003 PHARM REV CODE 250: Performed by: PHYSICIAN ASSISTANT

## 2023-08-19 PROCEDURE — 94150 VITAL CAPACITY TEST: CPT

## 2023-08-19 PROCEDURE — 94010 BREATHING CAPACITY TEST: CPT

## 2023-08-19 PROCEDURE — 97530 THERAPEUTIC ACTIVITIES: CPT | Mod: CQ

## 2023-08-19 PROCEDURE — 99900035 HC TECH TIME PER 15 MIN (STAT)

## 2023-08-19 PROCEDURE — 25000003 PHARM REV CODE 250: Performed by: HOSPITALIST

## 2023-08-19 PROCEDURE — 25000003 PHARM REV CODE 250

## 2023-08-19 PROCEDURE — 99239 PR HOSPITAL DISCHARGE DAY,>30 MIN: ICD-10-PCS | Mod: ,,, | Performed by: HOSPITALIST

## 2023-08-19 PROCEDURE — 99239 HOSP IP/OBS DSCHRG MGMT >30: CPT | Mod: ,,, | Performed by: HOSPITALIST

## 2023-08-19 PROCEDURE — 25000003 PHARM REV CODE 250: Performed by: INTERNAL MEDICINE

## 2023-08-19 RX ADMIN — OXYCODONE HYDROCHLORIDE 10 MG: 10 TABLET ORAL at 03:08

## 2023-08-19 RX ADMIN — OXYCODONE HYDROCHLORIDE 10 MG: 10 TABLET ORAL at 12:08

## 2023-08-19 RX ADMIN — METHOCARBAMOL 1000 MG: 500 TABLET ORAL at 09:08

## 2023-08-19 RX ADMIN — PREGABALIN 75 MG: 75 CAPSULE ORAL at 09:08

## 2023-08-19 RX ADMIN — OXYCODONE HYDROCHLORIDE 10 MG: 10 TABLET ORAL at 09:08

## 2023-08-19 RX ADMIN — ESCITALOPRAM OXALATE 10 MG: 5 TABLET, FILM COATED ORAL at 09:08

## 2023-08-19 RX ADMIN — ASPIRIN 81 MG 81 MG: 81 TABLET ORAL at 09:08

## 2023-08-19 RX ADMIN — CHOLECALCIFEROL TAB 25 MCG (1000 UNIT) 1000 UNITS: 25 TAB at 09:08

## 2023-08-19 RX ADMIN — SENNOSIDES AND DOCUSATE SODIUM 2 TABLET: 50; 8.6 TABLET ORAL at 09:08

## 2023-08-19 RX ADMIN — AMLODIPINE BESYLATE 5 MG: 5 TABLET ORAL at 09:08

## 2023-08-19 RX ADMIN — METHOCARBAMOL 1000 MG: 500 TABLET ORAL at 12:08

## 2023-08-19 RX ADMIN — ACETAMINOPHEN 1000 MG: 325 TABLET ORAL at 06:08

## 2023-08-19 RX ADMIN — POLYETHYLENE GLYCOL 3350 17 G: 17 POWDER, FOR SOLUTION ORAL at 09:08

## 2023-08-19 RX ADMIN — OXYCODONE HYDROCHLORIDE 10 MG: 10 TABLET ORAL at 06:08

## 2023-08-19 NOTE — DISCHARGE SUMMARY
DISCHARGE SUMMARY  Hospital Medicine    Team: [facility] HOSP MED K    Patient Name: Kalina Ryan  YOB: 1965    Admit Date: 8/7/2023    Discharge Date: 08/19/2023    Discharge Attending Physician: Edie Mark MD     Principal Diagnoses:  Active Hospital Problems    Diagnosis  POA    *Closed trimalleolar fracture of left ankle [S82.852A]  Yes    Leukocytosis [D72.829]  No    Weakness of both lower extremities [R29.898]  Yes    Closed nondisplaced fracture of fifth metatarsal bone of right foot with routine healing [S92.354D]  Not Applicable    Benign essential HTN [I10]  Yes    Closed dislocation of left ankle [S93.05XA]  Yes    Closed displaced fracture of lateral malleolus of right fibula with routine healing [S82.61XD]  Not Applicable    Type 2 diabetes mellitus without complication, without long-term current use of insulin [E11.9]  Yes    Class 3 severe obesity due to excess calories with serious comorbidity and body mass index (BMI) of 45.0 to 49.9 in adult [E66.01, Z68.42]  Not Applicable    Personal history of gastric bypass [Z98.84]  Not Applicable    Anxiety [F41.9]  Yes    CIDP (chronic inflammatory demyelinating polyneuropathy) [G61.81]  Yes     full paralysis but no intubation; residual weakness and neuropathy to hands and feet - diagnosed by Neurologist back in 2009 and last seen by neurology in 2011        Resolved Hospital Problems    Diagnosis Date Resolved POA    Urinary tract infection due to Klebsiella pneumoniae [N39.0, B96.89] 08/13/2023 Yes    Preoperative cardiovascular examination [Z01.810] 08/09/2023 Not Applicable       Discharged Condition:  improved    Interval history- she reports had a rough night as had some more pain and so didn't sleep well, increased pain meds yesterday around 5 pm to be q3 and increased robaxin to 1 gram so likely only got 1 dose of the higher dose of robaxin before bedtime and she said nrusing advised her to set an alarm to make sure she is  staying on top of the more frequent pain medications so she can avoid having the pain exacerabations especially overnight when she may be asleep and missed a dose and she plans to do so, if persists at rehab then can always consider increasing scaled to 10/15 if they need to do so as well and should start seeing some improvements slowly as likely still some inflammation from post operative pain causing pain now. Discussed long term that neuro plans to work up further for causes for falls as they recommended this earlier in stay when they saw her and that once shes out of rehab then they can do more of their work up as they discsused CIDP treatments long term. She said that her mom had cardiac involvement and asked about long term monitoring of that to ensure is not a contributor to falls and would recommend doing an OP holter either referral through neuro or PCP as monitoring here but doesn't always catch if you have any type of arrythmia or pause given ny a fixed time period of monitoring here and not in steady state while in the hosptial and so would wait to order this as an outpatient as if order it now then they likely will call her next week to set up mailing it to her house and may get forgotten about if deferred until out of rehab so recommed having it ordered once out of rehab through pcp or neuro as they will mail the holter to her house once ordered and then if specialists feel shed benefit from even longer term monitoring then there is something called a loop monitor that is implantable I told her about that does very long term monitoring of cardiac rhythm if there is concern after doing a holter that she would need further monitoring but usually that is for people with known chronic arrtyhtmias and aloop monitor is a reaonable first step for long term assessment. Stable for o-rehab today and prevena to be placed prior to discharge, no exudate in hospital vac since surgery. Ortho to monitor her while at  rehab and close f/u in addition with sutures out in 2 weeks. Discsussed mounjaro and that rehab may not be able to supply per rehab liason discussion yesterday. Discussed that she can up her dose long term with her PCP as she reports has had good a1c imrpovements on it but has not seen weight loss yet on it and can talk to her PCP about going up on the dose if she would like long term to see if it may help with this effect on a higher dose as well and she will look into this long term once acute issue settled down      Temp:  [97 °F (36.1 °C)-98.4 °F (36.9 °C)]   Pulse:  [85-89]   Resp:  [16-20]   BP: (103-133)/(56-63)   SpO2:  [93 %-100 %]      Physical Exam  Vitals and nursing note reviewed.   Constitutional:       General: She is awake. She is not in acute distress.     Appearance: Normal appearance. She is well-developed. She is morbidly obese. She is not ill-appearing.      Comments: Pleasant.   Cardiovascular:      Rate and Rhythm: Normal rate and regular rhythm.      Heart sounds: Normal heart sounds. No murmur heard.     No friction rub. No gallop.   Pulmonary:      Effort: Pulmonary effort is normal. No respiratory distress.      Breath sounds: Normal breath sounds. No wheezing.   Abdominal:      General: Abdomen is flat. Bowel sounds are normal. There is no distension.      Palpations: Abdomen is soft.      Tenderness: There is no abdominal tenderness.   Musculoskeletal:         General: Swelling (Left ankle and right ankle and foot) present.      Comments: Hospital vac in place to LLE without exuate in it. Bandages to LLE post op   Skin:     General: Skin is warm.      Findings: Bruising (Right lateral ankle and lateral aspect of right foot) present. No erythema.   Neurological:      Mental Status: She is alert and oriented to person, place, and time.   Psychiatric:         Mood and Affect: Mood normal.         Behavior: Behavior normal. Behavior is cooperative.         Thought Content: Thought content  normal.         Judgment: Judgment normal.               HOSPITAL COURSE:      Initial Presentation:    Kalina Ryan is a 58 y.o. 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 Patient states she twisted her ankle and fell down 3 step while going down the stairs at work. She had immediate pain and inability to bear weight. She noticed her left deformity and were screaming in pain. He coworker called EMS. Denies head injury or LOC. Denies prior injuries or surgeries to the left ankle. She is not on blood thinners. Denies other MSK pains or paresthesias. She walks without assistive devices at baseline. She lives at home with her .      ED course: afebrile and vitals stable. X-ray showed left ankle trimalleolar fracture and dislocation, and right ankle lateral malleolus fx. Patient seen by orthopedic and L ankle was Reduced and splinted in ER placed in short leg splint. Patient received profopol, fenatnyl and oxycodone in ED during conscious sedation for closed reduction of left ankle fracture.      During my interview patient is awake, conversant and not in distress. She reports pain and spasm in both ankles with right side hurting more than left. She denies tobacco, alcohol or other illicit drug use. She is able to ambulate few blocks at baseline without chest pain or SOB.              Course of Principle Problem for Admission:    Closed trimalleolar fracture of left ankle  - Patient s/p fall of 3 steps while going down the stairs while at work.   - Imaging showed left ankle trimalleolar fracture and dislocation. Patient seen by Orthopedic surgery and left ankle was reduced and splinted in ER placed in short leg splint.   - Patient taken to OR on 8/9/2023 and had ex fix placed to left ankle.  - Non weight bear to left lower  extremity.  - Ice and elevate left leg to help with swelling with plan to proceed with removal of ex fix and definitive fixation of left ankle fracture once left ankle swelling improved. Patient remained in hospital until next surgery. Ortho reassessed left ankle swelling daily to see when can proceed with definitve fixation surgery  and ultimately with   OR on 8/17 with Dr Santoyo with : Open reduction internal fixation left trimalleolar ankle fracture with fixation of medial and lateral malleoli, without fixation of posterior lip - 50452                          Open treatment of left ankle syndesmosis disruption with reduction internal fixation - 27829                          Removal, under anesthesia of external fixation left ankle - 20694                          Neurolysis left superficial peroneal nerve - 10941  - PT/OT consulted and working with patient in hospital and dc to  O-rehab on likely POD 2 for further rehabilitation  - Continue Aspirin 81 mg po BID for DVT prophylaxis for 6 weeks per ortho (sept 29th, which is when will be able to go into CAM boot and weight bear). Sutures out I n2 weeks  -hospital vac in place, change to prevena on dc and keep in place for 1 week. Has had no drainage while IP.  - Pain controlled. Continue scheduled Tylenol 1000 mg po TID + Robaxin inc to 1000 on day priot to dcpo 4 times daily and Lyrica 75 mg po BID and continue with Oxycodone IR prn for breakthrough pain-- inc to q3 on day prior to dc for better pain control. If persists at rehab can consider going to 10/15 mg       Leukocytosis  Likely from steroids as 19 post op on 8/15 with improvement to 11 now        Weakness of both lower extremities           Closed dislocation of left ankle  Patient s/p closed reduction by Orthopedic in ED with short leg splint placement.  -see above           Closed displaced fracture of lateral malleolus of right fibula with routine healing  Closed nondisplaced fracture of fifth  metatarsal bone of right foot with routine healing  Noted on admit on X-rays to have non-displaced closed fractures of right lateral malleolus and right 5th metatarsal. Orthopedics consulted and recommended non-operative management and patient okay to weight bear as tolerated to right lower extremity. Patient to ambulate in tall walking boot when out of bed.   Pain management- inc oxy to q3 on day of discharge for inc pain and inc robaxin to 1 gram   Aspirin 81 mg po BID for DVT prophylaxis.   Will need to reasddress in 6 weeks once in CAM boot on left as will hopefully be out of boot on right then as wont be able to mobilize in 2 boots     Benign essential HTN  Chronic and controlled. Continue home Amlodipine to treat. Home Lisinopril on hold. Target BP < 140/90.          CIDP (chronic inflammatory demyelinating polyneuropathy)  Bilateral lower extremity weakness   - Patient with known history of bilateral lower extremity weakness but normally able to ambulate without assistive device.   - Patient reports progressive weakness over past 3 months.   - Neurology consulted. Zinc fairly normal (58 lower limit normal), RPR negative, copper normal, folate normal, vit D normal, b12 normal  - Per Neurology: Patient reports history of CIDP and received steroids and IVIG in past and used to follow with Dr. Moses and no treatment since 2012. She has self-contained episodes in which she did not seek medical attention. These symptoms are similar. Want to avoid steroids now with planned surgery. Neurology recommends neuromuscular Neurologist for possible IVIG as outpatient upon discharge.  -plan for neuro f/u once out of rehab, patient reports mother had cardiac symptoms with neuro associated wekaness and would rec an OP holter ref ron PCP or neuro once in steady state and out of rehab     Class 3 severe obesity due to excess calories with serious comorbidity and body mass index (BMI) of 45.0 to 49.9 in adult  Personal  "history of gastric bypass   Body mass index is 49.21 kg/m². Morbid obesity complicates all aspects of disease management from diagnostic modalities to treatment. Weight loss encouraged and health benefits explained to patient.          Can consider increasing dose of moujaro she takes for DM long term and discuss further with PCP as outpatient           Type 2 diabetes mellitus without complication, without long-term current use of insulin  Patient's FSGs are controlled on current medication regimen. Patient on Mounjaro injections weekly at home to treat and continue at rehab if able to procure. Discussed long term can consider going up on dose and discus further with PCP.  Last A1c reviewed-         Lab Results   Component Value Date     HGBA1C 5.9 (H) 08/07/2023      Most recent fingerstick glucose reviewed-          Recent Labs   Lab 08/15/23  1534 08/15/23  2018 08/16/23  0733 08/16/23  1136   POCTGLUCOSE 93 116* 99 65*      Monitor blood sugars with meals and at bedtime in hospital.  Diabetic diet.  Target blood sugars 140-180 in hospital.      Anxiety  Chronic and controlled. Continue home Lexapro to treat.               Consults: ortho, neuro    Last CBC/BMP:    CBC/Anemia Labs: Coags:    Recent Labs   Lab 08/13/23  0551 08/17/23  1210 08/18/23  0514   WBC 8.32 19.57* 11.40   HGB 9.7* 11.1* 9.4*   HCT 30.5* 34.3* 30.5*    418 359   MCV 86 83 86   RDW 15.6* 14.9* 14.7*    No results for input(s): "PT", "INR", "APTT" in the last 168 hours.     Chemistries:   Recent Labs   Lab 08/13/23  0551 08/17/23  1210    137   K 4.4 5.0    105   CO2 27 23   BUN 16 27*   CREATININE 0.8 0.9   CALCIUM 8.9 8.6*   PROT  --  6.7   BILITOT  --  0.2   ALKPHOS  --  120   ALT  --  20   AST  --  18              Special Treatments/Procedures:   Procedure(s) (LRB):  ORIF, ANKLE (Left)  FIXATION, SYNDESMOSIS, ANKLE (Left)  REMOVAL, EXTERNAL FIXATION DEVICE (Left)     Disposition: Rehab Facility      Future Scheduled " Appointments:  Future Appointments   Date Time Provider Department Center   8/25/2023 10:00 AM [facility]   9/22/2023  8:45 AM [facility]   10/13/2023  7:10 AM [facility] [place]   10/18/2023  7:00 AM Nany Hoff NP [facility]   11/7/2023 12:00 PM Du Quintanilla MD [facility] NEURO [facility] [address]           Discharge Medication List:         Medication List        START taking these medications      acetaminophen 500 MG tablet  Commonly known as: TYLENOL  Take 2 tablets (1,000 mg total) by mouth every 8 (eight) hours.     aspirin 81 MG Chew  Take 1 tablet (81 mg total) by mouth 2 (two) times a day. End dates 9 /29/23     methocarbamoL 1,000 mg Tab  Take 1,000 mg by mouth 4 (four) times daily.     ondansetron 4 MG Tbdl  Commonly known as: ZOFRAN-ODT  Take 2 tablets (8 mg total) by mouth every 6 (six) hours as needed (nausea).     * oxyCODONE 10 mg Tab immediate release tablet  Commonly known as: ROXICODONE  Take 1 tablet (10 mg total) by mouth every 3 (three) hours as needed (pain scale 7-10).     * oxyCODONE 5 MG immediate release tablet  Commonly known as: ROXICODONE  Take 1 tablet (5 mg total) by mouth every 3 (three) hours as needed (pain scale 4-6).     polyethylene glycol 17 gram Pwpk  Commonly known as: GLYCOLAX  Take 17 g by mouth once daily.     pregabalin 75 MG capsule  Commonly known as: LYRICA  Take 1 capsule (75 mg total) by mouth 2 (two) times daily.     senna-docusate 8.6-50 mg 8.6-50 mg per tablet  Commonly known as: PERICOLACE  Take 2 tablets by mouth 2 (two) times daily.     simethicone 80 MG chewable tablet  Commonly known as: MYLICON  Take 1 tablet (80 mg total) by mouth 4 (four) times daily as needed for Flatulence.     vitamin D 1000 units Tab  Commonly known as: VITAMIN D3  Take 1 tablet (1,000 Units total) by mouth once daily.           * This list has 2 medication(s) that are the same as other medications  "prescribed for you. Read the directions carefully, and ask your doctor or other care provider to review them with you.                CHANGE how you take these medications      melatonin 3 mg tablet  Commonly known as: MELATIN  Take 2 tablets (6 mg total) by mouth nightly as needed for Insomnia.  What changed:   medication strength  how much to take  when to take this  reasons to take this     MOUNJARO 5 mg/0.5 mL Pnij  Generic drug: tirzepatide  Inject 5 mg into the skin every 7 days. -every Sunday. Okay to use home supply if not on formulary at rehab  What changed: additional instructions            CONTINUE taking these medications      amLODIPine 5 MG tablet  Commonly known as: NORVASC  TAKE 1 TABLET DAILY     EScitalopram oxalate 10 MG tablet  Commonly known as: LEXAPRO  Take 1 tablet (10 mg total) by mouth once daily.     ONE DAILY MULTIVITAMIN per tablet  Generic drug: multivitamin            STOP taking these medications      ALEVE 220 MG tablet  Generic drug: naproxen sodium     CETIRIZINE ORAL     CYANOCOBALAMIN (VITAMIN B-12) ORAL     lisinopriL 40 MG tablet  Commonly known as: PRINIVIL,ZESTRIL     zinc gluconate 50 mg tablet               Where to Get Your Medications        Information about where to get these medications is not yet available    Ask your nurse or doctor about these medications  acetaminophen 500 MG tablet  aspirin 81 MG Chew  melatonin 3 mg tablet  methocarbamoL 1,000 mg Tab  MOUNJARO 5 mg/0.5 mL Pnij  ondansetron 4 MG Tbdl  oxyCODONE 10 mg Tab immediate release tablet  oxyCODONE 5 MG immediate release tablet  polyethylene glycol 17 gram Pwpk  pregabalin 75 MG capsule  senna-docusate 8.6-50 mg 8.6-50 mg per tablet  simethicone 80 MG chewable tablet  vitamin D 1000 units Tab         Patient Instructions:  Discharge Procedure Orders   ORTHOPEDIC BRACING FOR HOME USE - LOWER EXTREMITY     Order Specific Question Answer Comments   Height: 5'2"    Weight: 126 kg (277 lb 12.5 oz)    Length of " need (1-99 months): 6    Laterality: Bilateral    Product(s) ordered: Crutches    Check any that apply: Patient requires assistive device for ambulation      Ambulatory referral/consult to Orthopedics   Standing Status: Future   Referral Priority: Routine Referral Type: Consultation   Requested Specialty: Orthopedic Surgery   Number of Visits Requested: 1     Notify your health care provider if you experience any of the following:  severe uncontrolled pain     Notify your health care provider if you experience any of the following:  redness, tenderness, or signs of infection (pain, swelling, redness, odor or green/yellow discharge around incision site)       At the time of discharge patient was told to take all medications as prescribed, to keep all followup appointments, and to call their primary care physician or return to the emergency room if they have any worsening or concerning symptoms.  I spent 35 minutes preparing the discharge      Signing Physician:  Edie Mark MD

## 2023-08-19 NOTE — PT/OT/SLP PROGRESS
Physical Therapy Treatment    Patient Name:  Kalina Ryan   MRN:  [id number]    Recommendations:     Discharge Recommendations: rehabilitation facility  Discharge Equipment Recommendations:   TBD at next level of care  Barriers to discharge:   Assessment:     Kalina Ryan is a 58 y.o. female admitted with a medical diagnosis of Closed trimalleolar fracture of left ankle.  She presents with the following impairments/functional limitations: weakness, impaired endurance, impaired self care skills, impaired functional mobility, gait instability, impaired balance, pain, decreased lower extremity function, decreased ROM, orthopedic precautions, impaired cardiopulmonary response to activity .Pt  tolerated treatment fairly well and is progressing slowly with mobility. pt limited due to fatigue and generalized weakness. Patient remains appropriate for continued skilled services within the acute environment and goals remain appropriate.      Rehab Prognosis: Good; patient would benefit from acute skilled PT services to address these deficits and reach maximum level of function.    Recent Surgery: Procedure(s) (LRB):  ORIF, ANKLE (Left)  FIXATION, SYNDESMOSIS, ANKLE (Left)  REMOVAL, EXTERNAL FIXATION DEVICE (Left) 2 Days Post-Op    Plan:     During this hospitalization, patient to be seen 4 x/week to address the identified rehab impairments via gait training, therapeutic activities, therapeutic exercises, neuromuscular re-education and progress toward the following goals:    Plan of Care Expires:  09/17/23    Subjective     Chief Complaint: I feel alttle light headed  Pain/Comfort:  Pain Rating 1: 7/10  Location - Side 1: Left  Location - Orientation 1: lower  Location 1: leg  Pain Addressed 1: Pre-medicate for activity, Reposition, Distraction, Cessation of Activity      Objective:     Communicated with RN prior to session.  Patient found HOB elevated with FCD, wound vac upon PT entry to room.     General Precautions:  Standard, fall  Orthopedic Precautions:  (RLE weight bearing as tolerated; LLE non weight bearing)  Braces:  (R CAM boot)  Respiratory Status: Room air     Functional Mobility:  Bed Mobility:     Scooting: contact guard assistance  Supine to Sit: contact guard assistance  Sit to Supine: contact guard assistance  Transfers:     Sit to Stand:  stand by assistance with rolling walker  Gait: pt able to perform scoots/pivots R foot on floor in order to move laterally R along bed with RW with CGA while NWB L LE      AM-PAC 6 CLICK MOBILITY  Turning over in bed (including adjusting bedclothes, sheets and blankets)?: 4  Sitting down on and standing up from a chair with arms (e.g., wheelchair, bedside commode, etc.): 3  Moving from lying on back to sitting on the side of the bed?: 3  Moving to and from a bed to a chair (including a wheelchair)?: 3  Need to walk in hospital room?: 2  Climbing 3-5 steps with a railing?: 1  Basic Mobility Total Score: 16       Treatment & Education:  Therapist provided instruction and educated of  patient on progress, safety,d/c,PT POC,   proper body mechanics, energy conservation, and fall prevention strategies during tasks listed above, on the effects of prolonged immobility and the importance of performing OOB activity and exercises to promote healing and reduce recovery time      Patient  facilitated therex  seated  LE AROM  LAQ, Hip Flexion. Patient required skilled PTA for instruction of exercises and appropriate cues to perform exercises safely, sequencing and appropriately.   Exercises performed to develop and maintain pt's strength, endurance and flexibility.  Updated white board with appropriate PT mobility information for medical team notification     Donned an extra gown     Bedside table in front of patient and area set up for function, convenience, and safety. RN aware of patient's mobility needs and status. Questions/concerns addressed within PTA scope of practice; patient  with  no further questions. Time was provided for active listening, discussion of health disposition, and discussion of safe discharge. Pt?verbalized?agreement     Patient left HOB elevated with all lines intact, call button in reach, and nsg notified..    GOALS:   Multidisciplinary Problems       Physical Therapy Goals          Problem: Physical Therapy    Goal Priority Disciplines Outcome Goal Variances Interventions   Physical Therapy Goal     PT, PT/OT Ongoing, Progressing     Description: Goals to be met by 9/1/23:    1. Pt supine to sit with SBA - not met  2. Pt sit to supine with SBA - not met  3. Pt sit to stand with mod(A) with LRAD with NWB on LLE with WBAT on R LE with CAM Boot - MET (8/18)  4. Pt to perform gait 50 ft with mod(A) with LRAD with NWB on LLE with WBAT on R LE with CAM Boot.-not met  5. Pt to go up/down curb step with mod(A) with LRAD with NWB on LLE with WBAT on R LE with CAM Boot.-not met    6. Added on 8/18: Sit to stand from bedside chair height with RW and SBA, within orthopedic precautions - Not met                       Time Tracking:     PT Received On: 08/19/23  PT Start Time: 1036     PT Stop Time: 1059  PT Total Time (min): 23 min     Billable Minutes: Therapeutic Activity 23    Treatment Type: Treatment  PT/PTA: PTA     Number of PTA visits since last PT visit: 1 08/19/2023

## 2023-08-19 NOTE — PROGRESS NOTES
Report called to Kameron at [facility], allowed time for questions, no further concerns voiced at this time, discharge instructions reviewed with patient allowed time for questions, no further concerns voiced, wound vac changed to Prevena, and it is functioning properly.

## 2023-08-19 NOTE — PROGRESS NOTES
[facility]  Orthopedics  Progress Note    Patient Name: Kalina Ryan  MRN: [id number]  Admission Date: 8/7/2023  Hospital Length of Stay: 10 days  Attending Provider: Edie Mark MD  Primary Care Provider: Nany Hoff NP  Follow-up For: Procedure(s) (LRB):  ORIF, ANKLE (Left)  FIXATION, SYNDESMOSIS, ANKLE (Left)  REMOVAL, EXTERNAL FIXATION DEVICE (Left)    Post-Operative Day: 2 Days Post-Op  Subjective:     Principal Problem:Closed trimalleolar fracture of left ankle    Principal Orthopedic Problem: same as above s/p ORIF 8/17    Interval History: NAEO. VSS. Pain well controlled. Able to wiggle toes. Worked with PT today. Plan for DC today    Review of patient's allergies indicates:   Allergen Reactions    Amoxicillin Hives    Penicillins Hives       Current Facility-Administered Medications   Medication    acetaminophen tablet 1,000 mg    amLODIPine tablet 5 mg    aspirin chewable tablet 81 mg    bisacodyL suppository 10 mg    EScitalopram oxalate tablet 10 mg    glucagon (human recombinant) injection 1 mg    glucose chewable tablet 16 g    glucose chewable tablet 24 g    melatonin tablet 6 mg    methocarbamoL tablet 750 mg    naloxone 0.4 mg/mL injection 0.02 mg    ondansetron injection 4 mg    oxyCODONE immediate release tablet 5 mg    oxyCODONE immediate release tablet Tab 10 mg    polyethylene glycol packet 17 g    pregabalin capsule 75 mg    senna-docusate 8.6-50 mg per tablet 2 tablet    simethicone chewable tablet 80 mg    sodium chloride 0.9% flush 10 mL    sodium chloride 0.9% flush 10 mL    tirzepatide PnIj 5 mg    vitamin D 1000 units tablet 1,000 Units     Objective:     Vital Signs (Most Recent):  Temp: 96.3 °F (35.7 °C) (08/18/23 1139)  Pulse: 88 (08/18/23 1139)  Resp: 17 (08/18/23 1139)  BP: (!) 105/54 (08/18/23 1139)  SpO2: 97 % (08/18/23 1139) Vital Signs (24h Range):  Temp:  [96.2 °F (35.7 °C)-98.2 °F (36.8 °C)] 96.3 °F (35.7 °C)  Pulse:  [74-94]  "88  Resp:  [17-20] 17  SpO2:  [93 %-97 %] 97 %  BP: (104-114)/(51-60) 105/54     Weight: 126 kg (277 lb 12.5 oz)  Height: 5' 3" (160 cm)  Body mass index is 49.21 kg/m².      Intake/Output Summary (Last 24 hours) at 8/18/2023 1330  Last data filed at 8/18/2023 0824  Gross per 24 hour   Intake 360 ml   Output 800 ml   Net -440 ml         Ortho/SPM Exam  LLE:   Wound vac to suction  Posterior slab splint in place  Cap refill <2s  Able to wiggle toes  SILT    RLE:   Pain with ROM of the ankle, improving  TTP over 5th metatarsal base  SILT       Significant Labs: All pertinent labs within the past 24 hours have been reviewed.    Significant Imaging: I have reviewed and interpreted all pertinent imaging results/findings.    Assessment/Plan:     * Closed trimalleolar fracture of left ankle  Kalina Ryan is a 58 y.o. female with left ankle trimalleolar fracture and dislocation and base of 5th MT fx s/p L ankle ORIF 8/17.     Pain: MM  NWB LLE, WBAT RLE in boot  Pin site care BID  ASA 81 BID  Swelling stable and improved, plan for ORIF today.               Kali Mckeon MD  Orthopedics  [facility] - Surgery  "

## 2023-08-19 NOTE — PLAN OF CARE
[facility]  Discharge Final Note    Primary Care Provider: Nany Hoff NP    Expected Discharge Date: 8/19/2023    Final Discharge Note (most recent)       Final Note - 08/19/23 1115          Final Note    Assessment Type Final Discharge Note     Anticipated Discharge Disposition Rehab Facility     Hospital Resources/Appts/Education Provided Provided patient/caregiver with written discharge plan information;Post-Acute resouces added to AVS        Post-Acute Status    Post-Acute Authorization Placement     Post-Acute Placement Status Set-up Complete/Auth obtained     Discharge Delays None known at this time                     Important Message from Medicare              Follow-up providers       [facility] [address] [address]   Specialty: Orthopedics    [address]  [facility] [address]   Phone: [phone]       Next Steps: Follow up    Instructions: 2 weeks for hospital follow up              After-discharge care                Destination       [facility]   Service: Inpatient Rehabilitation    [address]  [facility] [address]   Phone: [phone]                             Pt discharged to [facility].    ARLENE Gayle, LCSW  Weekend -[facility]  [facility] [phone]

## 2023-08-28 ENCOUNTER — TELEPHONE (OUTPATIENT)
Dept: FAMILY MEDICINE | Facility: CLINIC | Age: 58
End: 2023-08-28
Payer: OTHER GOVERNMENT

## 2023-08-28 NOTE — TELEPHONE ENCOUNTER
----- Message from Shama Abdullahi sent at 8/28/2023  3:43 PM CDT -----  Type:  Sooner Apoointment Request    Caller is requesting a sooner appointment.  Caller declined first available appointment listed below.  Caller will not accept being placed on the waitlist and is requesting a message be sent to doctor.  Name of Caller: [place]  When is the first available appointment? N/a  Symptoms: @ [facility] F/U.  Pt being discharged on 8/31/23  Would the patient rather a call back or a response [facility]? call  Best Call Back Number: [phone]  Additional Information:

## 2023-08-31 ENCOUNTER — OFFICE VISIT (OUTPATIENT)
Dept: ORTHOPEDICS | Facility: CLINIC | Age: 58
End: 2023-08-31
Payer: OTHER GOVERNMENT

## 2023-08-31 DIAGNOSIS — S93.05XD CLOSED DISLOCATION OF LEFT ANKLE, SUBSEQUENT ENCOUNTER: Primary | ICD-10-CM

## 2023-08-31 DIAGNOSIS — S82.852A TRIMALLEOLAR FRACTURE OF ANKLE, CLOSED, LEFT, INITIAL ENCOUNTER: ICD-10-CM

## 2023-08-31 DIAGNOSIS — S82.61XD CLOSED DISPLACED FRACTURE OF LATERAL MALLEOLUS OF RIGHT FIBULA WITH ROUTINE HEALING: ICD-10-CM

## 2023-08-31 PROCEDURE — 99999 PR PBB SHADOW E&M-EST. PATIENT-LVL IV: CPT | Mod: PBBFAC,,, | Performed by: PHYSICIAN ASSISTANT

## 2023-08-31 PROCEDURE — 99999 PR PBB SHADOW E&M-EST. PATIENT-LVL IV: ICD-10-PCS | Mod: PBBFAC,,, | Performed by: PHYSICIAN ASSISTANT

## 2023-08-31 PROCEDURE — 29705 RMVL/BIVLV FULL ARM/LEG CAST: CPT | Mod: PBBFAC

## 2023-08-31 PROCEDURE — 99214 OFFICE O/P EST MOD 30 MIN: CPT | Mod: PBBFAC | Performed by: PHYSICIAN ASSISTANT

## 2023-08-31 PROCEDURE — 99024 PR POST-OP FOLLOW-UP VISIT: ICD-10-PCS | Mod: ,,, | Performed by: PHYSICIAN ASSISTANT

## 2023-08-31 PROCEDURE — 99024 POSTOP FOLLOW-UP VISIT: CPT | Mod: ,,, | Performed by: PHYSICIAN ASSISTANT

## 2023-08-31 NOTE — PROGRESS NOTES
LT plaster posterior splint removal ordered by TASHA Mcdonnell. Skin intact with no redness or bruising.

## 2023-09-01 PROCEDURE — G0180 PR HOME HEALTH MD CERTIFICATION: ICD-10-PCS | Mod: ,,, | Performed by: NURSE PRACTITIONER

## 2023-09-01 PROCEDURE — G0180 MD CERTIFICATION HHA PATIENT: HCPCS | Mod: ,,, | Performed by: NURSE PRACTITIONER

## 2023-09-08 ENCOUNTER — TELEPHONE (OUTPATIENT)
Dept: ORTHOPEDICS | Facility: CLINIC | Age: 58
End: 2023-09-08
Payer: OTHER GOVERNMENT

## 2023-09-08 ENCOUNTER — OFFICE VISIT (OUTPATIENT)
Dept: ORTHOPEDICS | Facility: CLINIC | Age: 58
End: 2023-09-08
Payer: OTHER GOVERNMENT

## 2023-09-08 VITALS — WEIGHT: 277.75 LBS | HEIGHT: 63 IN | BODY MASS INDEX: 49.21 KG/M2

## 2023-09-08 DIAGNOSIS — S82.61XD CLOSED DISPLACED FRACTURE OF LATERAL MALLEOLUS OF RIGHT FIBULA WITH ROUTINE HEALING: Primary | ICD-10-CM

## 2023-09-08 DIAGNOSIS — S93.05XD CLOSED DISLOCATION OF LEFT ANKLE, SUBSEQUENT ENCOUNTER: ICD-10-CM

## 2023-09-08 PROCEDURE — 99024 POSTOP FOLLOW-UP VISIT: CPT | Mod: ,,, | Performed by: PHYSICIAN ASSISTANT

## 2023-09-08 PROCEDURE — 99213 OFFICE O/P EST LOW 20 MIN: CPT | Mod: PBBFAC | Performed by: PHYSICIAN ASSISTANT

## 2023-09-08 PROCEDURE — 99999 PR PBB SHADOW E&M-EST. PATIENT-LVL III: ICD-10-PCS | Mod: PBBFAC,,, | Performed by: PHYSICIAN ASSISTANT

## 2023-09-08 PROCEDURE — 99999 PR PBB SHADOW E&M-EST. PATIENT-LVL III: CPT | Mod: PBBFAC,,, | Performed by: PHYSICIAN ASSISTANT

## 2023-09-08 PROCEDURE — 99024 PR POST-OP FOLLOW-UP VISIT: ICD-10-PCS | Mod: ,,, | Performed by: PHYSICIAN ASSISTANT

## 2023-09-08 NOTE — TELEPHONE ENCOUNTER
----- Message from Bassam Jaime sent at 9/8/2023  3:20 PM CDT -----  Regarding: Advisement  Contact: @[phone]  The [place] insurance group is calling because they faxed over paper work for the patient that needs to be filled out and wanted to see if it was received. Please call and advise @[phone]

## 2023-09-13 DIAGNOSIS — F41.9 ANXIETY: ICD-10-CM

## 2023-09-13 RX ORDER — ESCITALOPRAM OXALATE 10 MG/1
10 TABLET ORAL
Qty: 90 TABLET | Refills: 3 | Status: SHIPPED | OUTPATIENT
Start: 2023-09-13

## 2023-09-18 ENCOUNTER — TELEPHONE (OUTPATIENT)
Dept: ORTHOPEDICS | Facility: CLINIC | Age: 58
End: 2023-09-18
Payer: OTHER GOVERNMENT

## 2023-09-18 NOTE — PROGRESS NOTES
Principal Orthopedic Problem:  Left trimalleolar ankle fracture status post closed reduction and spanning external fixation                          Left ankle syndesmosis disruption     08/17/23: :    Open reduction internal fixation left trimalleolar ankle fracture with fixation of medial and lateral malleoli, without fixation of posterior lip - 16735                          Open treatment of left ankle syndesmosis disruption with reduction internal fixation - 73773                          Removal, under anesthesia of external fixation left ankle - 41564                          Neurolysis left superficial peroneal nerve - 88023    Ms. Ryan is here today for a post-operative visit    Interval History:  she reports that she is doing ok.   Pain is controlled.  she is  taking pain medication.    she denies fever, chills, and sweats .     Physical exam:    Patient arrives to exam room: wheelchair.  Patient is  accompanied    Dressing taken down.  Incision is clean, dry and intact.  Sutures removed without difficulty.   Healing well no signs of breakdown or infection.    RADS: All pertinent images were reviewed by myself:   none done today    Assessment:  Post-op visit ( 2 weeks)    Plan:  Current care, treatment plan, precautions, activity level/ modifications, limitations, rehabilitation exercises and proposed future treatment were discussed with the patient. We discussed the need to monitor for changes in symptoms and condition and report them to the physician.  Discussed importance of compliance with all appointments and follow up examinations.     WOUND CARE :  - The patient was advised to keep the incision clean and dry for the next 24 hours after which she may wash the area with antibacterial soap in the shower. Will not submerge until the incision is completely healed  -Patient was advised to monitor wound closely and multiple times daily for any problems. Call clinic immediately or report to ED  for immediate medical attention for any complications including reopening of wound, drainage, purulence, redness, streaking, odor, pain out of proportion, fever, chills, etc.       ACTIVITY:   - light  -range of motion as tolerated    - NWB 6 weeks pending healing      -PT/OT, Patient is responsible to establish and continue care      PAIN MEDICATION:   - Multimodal pain control  - Pain medication: refill was needed  - Pain medication refill policy provided to patient for review, yes.    - Patient was informed a multi-modal approach is used to treat their pain. With the goal to get off of narcotic pain medication and discontinue as soon as possible.   - ice and elevation to reduce pain and swelling     DVT PROPHYLAXIS:   - ASA 81 mg bid    FOLLOW UP:   - Patient will follow up in the clinic in 4 weeks, sooner if any concerns.  - X-ray of her ankle is needed.  NWB  - Pending healing at time consider advance weight bear       If there are any questions prior to scheduled follow up, the patient was instructed to contact the office

## 2023-09-20 DIAGNOSIS — S82.61XD CLOSED DISPLACED FRACTURE OF LATERAL MALLEOLUS OF RIGHT FIBULA WITH ROUTINE HEALING: Primary | ICD-10-CM

## 2023-09-20 RX ORDER — METHOCARBAMOL 1000 MG/1
1000 TABLET, COATED ORAL 4 TIMES DAILY
Qty: 30 TABLET | Refills: 0 | Status: SHIPPED | OUTPATIENT
Start: 2023-09-20 | End: 2023-09-22

## 2023-09-20 RX ORDER — OXYCODONE HYDROCHLORIDE 10 MG/1
10 TABLET ORAL EVERY 4 HOURS PRN
Qty: 42 TABLET | Refills: 0 | Status: SHIPPED | OUTPATIENT
Start: 2023-09-20 | End: 2023-09-27

## 2023-09-21 ENCOUNTER — PATIENT MESSAGE (OUTPATIENT)
Dept: ORTHOPEDICS | Facility: CLINIC | Age: 58
End: 2023-09-21
Payer: OTHER GOVERNMENT

## 2023-09-21 NOTE — ADDENDUM NOTE
Addendum  created 09/21/23 1250 by Ernst Lan MD    Clinical Note Signed, Diagnosis association updated, Intraprocedure Blocks edited, SmartForm saved      
show

## 2023-09-22 RX ORDER — METHOCARBAMOL 500 MG/1
500 TABLET, FILM COATED ORAL 3 TIMES DAILY
Qty: 42 TABLET | Refills: 0 | Status: SHIPPED | OUTPATIENT
Start: 2023-09-22 | End: 2023-10-02

## 2023-09-25 NOTE — PROGRESS NOTES
Principal Orthopedic Problem:   Left trimalleolar ankle fracture status post closed reduction and spanning external fixation                          Left ankle syndesmosis disruption     Relevant Medical History: according to chart    PMHX:  DM (A1c 5.9), diabetic neuropathy, Guillain barre syndrome , gastric bypass    Lives at home with   Occupation:   Prior to injury  Independent community ambulator, gait aids   Denies history of stroke, heart attack, cancer, blood clot,   +diabetes  Denies tobacco use  Denied chronic pain medication use prior to injury    HPI: Ms. Ryan is a 58 year old female who presented to the ED on 08/07/23 after falling 5 feet.   RADS:   XR of the L ankle show lateral dislocation of the ankle trimalleolar ankle fracture  XR of the R ankle shows possible lateral malleolus fx  XR of the R foot shows base of 5th metatarsal fx    R treated non-op  Left:  08/09/23: : left ankle external fixation  08/17/23: :   Open reduction internal fixation left trimalleolar ankle fracture with fixation of medial and lateral malleoli, without fixation of posterior lip - 06864   Open treatment of left ankle syndesmosis disruption with reduction internal fixation - 43977   Removal, under anesthesia of external fixation left ankle - 00133  Neurolysis left superficial peroneal nerve - 73415    Ms. Ryan is here today for a post-operative visit    Interval History:  she reports that she is doing ok.   she was D/c from rehab today . she was participating in PT/OT. Home health ordered  Pain is controlled.  she is  taking pain medication.    she denies fever, chills, and sweats .     Physical exam:    Patient arrives to exam room:  wheelchair.  Patient is  accompanied    Dressing taken down.  Incision is clean, dry and intact.  Sutures left in place.   Healing well no signs of breakdown or infection.    RADS: All pertinent images were reviewed by myself:   none done  today    Assessment:  Post-op visit ( 2 weeks)    Plan:  Current care, treatment plan, precautions, activity level/ modifications, limitations, rehabilitation exercises and proposed future treatment were discussed with the patient. We discussed the need to monitor for changes in symptoms and condition and report them to the physician.  Discussed importance of compliance with all appointments and follow up examinations.     WOUND CARE :  - keep dry and covered.        ACTIVITY:   - light  -range of motion as tolerated hold until suture removal   - NWB 6 weeks pending healing      -PT/OT, home health , Patient is responsible to establish and continue care      PAIN MEDICATION:   - Multimodal pain control  - Pain medication: refill was not needed  - Pain medication refill policy provided to patient for review, yes.    - Patient was informed a multi-modal approach is used to treat their pain. With the goal to get off of narcotic pain medication and discontinue as soon as possible.   - ice and elevation to reduce pain and swelling     DVT PROPHYLAXIS:   - ASA 81 mg bid      FOLLOW UP:   - Patient will follow up in the clinic in 1 weeks, sooner if any concerns.  - X-ray of her ANKLE is NOT needed.    - Pending healing at time consider removal of sutures      If there are any questions prior to scheduled follow up, the patient was instructed to contact the office    RTW: unable to RTW at this time

## 2023-09-27 NOTE — PROGRESS NOTES
Principal Orthopedic Problem:  Left trimalleolar ankle fracture status post closed reduction and spanning external fixation                          Left ankle syndesmosis disruption  Relevant Medical History: according to chart    PMHX:  DM (A1c 5.9), diabetic neuropathy, Guillain barre syndrome    Lives at home with   Occupation:   Prior to injury  Independent community ambulator, gait aids   Denies history of stroke, heart attack, cancer, blood clot, diabetes  Denies tobacco use  Denied chronic pain medication use prior to injury    HPI: Ms. Ryan is a 58 year old female who fell 5 feet. Suffered the following orthopedic injuries     XR of the R ankle shows possible lateral malleolus fx  XR of the R foot shows base of 5th metatarsal fx    XR of the L ankle show lateral dislocation of the ankle trimalleolar ankle fracture  08/17/23: :    Open reduction internal fixation left trimalleolar ankle fracture with fixation of medial and lateral malleoli, without fixation of posterior lip - 47088   Open treatment of left ankle syndesmosis disruption with reduction internal fixation - 52299  Removal, under anesthesia of external fixation left ankle - 32567  Neurolysis left superficial peroneal nerve - 76012    Ms. Ryan is here today for a post-operative visit    Interval History:  she reports that she is doing ok.   Pain is controlled.  she is  taking pain medication.    She is getting PT, home health. Using boot bilateral feet.   she denies fever, chills, and sweats .     Physical exam:    Patient arrives to exam room: wheelchair.  Patient is  accompanied    Dressing taken down.  Incision is clean, dry and intact.  Sutures removed without difficulty.   Healing well no signs of breakdown or infection.    RADS: All pertinent images were reviewed by myself:   ANKLE: On the right, fracture distal fibula likely subacute.  Degenerative changes about the tibiotalar joint.  Mild soft tissue  swelling.  Degenerative change talonavicular joint.  Small osteophyte at the insertion of the Achilles tendon and plantar fascia on the right.  Degenerative change about the tarsal bones are not well visualized.  Questionable fracture base 5th metatarsal.     On the left surgical plate and screws fixate the fibula.  Hardware in the tibia as well.  Alignment is satisfactory.  Significant soft tissue swelling.    Assessment:  Post-op visit ( 6 weeks)    Plan:  Current care, treatment plan, precautions, activity level/ modifications, limitations, rehabilitation exercises and proposed future treatment were discussed with the patient. We discussed the need to monitor for changes in symptoms and condition and report them to the physician.  Discussed importance of compliance with all appointments and follow up examinations.     WOUND CARE :  - You may use vitamin E (break the capsule open), aloe, scar cream (mederma) or hand lotion to rub the scar.  You must rub across the scar and in the line of the scar.  The goal is to make the scar not tender and make the scar not adhere (stick to) the tissues below the scar.      -Patient was advised to monitor wound closely and multiple times daily for any problems. Call clinic immediately or report to ED for immediate medical attention for any complications including reopening of wound, drainage, purulence, redness, streaking, odor, pain out of proportion, fever, chills, etc.       ACTIVITY:   - light  -range of motion as tolerated    - weight bearing as tolerated, BLE     Right boot  Left, hard sole shoe      -PT/OT, Patient is responsible to establish and continue care      PAIN MEDICATION:   - Multimodal pain control  - Pain medication: refill was needed  - Pain medication refill policy provided to patient for review, yes.    - Patient was informed a multi-modal approach is used to treat their pain. With the goal to get off of narcotic pain medication and discontinue as soon as  possible.   - ice and elevation to reduce pain and swelling     DVT PROPHYLAXIS:   - ASA 81 mg bid    FOLLOW UP:   - Patient will follow up in the clinic in 4 weeks, sooner if any concerns.  - X-ray of her ankle bilateral and right foot  is needed.  NWB     RTW, unable to RTW at this time   If there are any questions prior to scheduled follow up, the patient was instructed to contact the office

## 2023-09-28 ENCOUNTER — HOSPITAL ENCOUNTER (OUTPATIENT)
Dept: RADIOLOGY | Facility: HOSPITAL | Age: 58
Discharge: HOME | End: 2023-09-28
Attending: PHYSICIAN ASSISTANT
Payer: OTHER GOVERNMENT

## 2023-09-28 ENCOUNTER — OFFICE VISIT (OUTPATIENT)
Dept: ORTHOPEDICS | Facility: CLINIC | Age: 58
End: 2023-09-28
Payer: OTHER GOVERNMENT

## 2023-09-28 DIAGNOSIS — M25.572 ACUTE BILATERAL ANKLE PAIN: ICD-10-CM

## 2023-09-28 DIAGNOSIS — S82.61XD CLOSED DISPLACED FRACTURE OF LATERAL MALLEOLUS OF RIGHT FIBULA WITH ROUTINE HEALING: ICD-10-CM

## 2023-09-28 DIAGNOSIS — M79.672 LEFT FOOT PAIN: ICD-10-CM

## 2023-09-28 DIAGNOSIS — S82.61XD CLOSED DISPLACED FRACTURE OF LATERAL MALLEOLUS OF RIGHT FIBULA WITH ROUTINE HEALING: Primary | ICD-10-CM

## 2023-09-28 DIAGNOSIS — M25.571 ACUTE BILATERAL ANKLE PAIN: ICD-10-CM

## 2023-09-28 PROCEDURE — 73610 X-RAY EXAM OF ANKLE: CPT | Mod: 26,50,, | Performed by: RADIOLOGY

## 2023-09-28 PROCEDURE — 99212 OFFICE O/P EST SF 10 MIN: CPT | Mod: PBBFAC | Performed by: PHYSICIAN ASSISTANT

## 2023-09-28 PROCEDURE — 99024 PR POST-OP FOLLOW-UP VISIT: ICD-10-PCS | Mod: ,,, | Performed by: PHYSICIAN ASSISTANT

## 2023-09-28 PROCEDURE — 99999 PR PBB SHADOW E&M-EST. PATIENT-LVL II: CPT | Mod: PBBFAC,,, | Performed by: PHYSICIAN ASSISTANT

## 2023-09-28 PROCEDURE — 99024 POSTOP FOLLOW-UP VISIT: CPT | Mod: ,,, | Performed by: PHYSICIAN ASSISTANT

## 2023-09-28 PROCEDURE — 73610 X-RAY EXAM OF ANKLE: CPT | Mod: TC,50

## 2023-09-28 PROCEDURE — 73630 X-RAY EXAM OF FOOT: CPT | Mod: 26,LT,, | Performed by: RADIOLOGY

## 2023-09-28 PROCEDURE — 73610 XR ANKLE COMPLETE 3 VIEW BILATERAL: ICD-10-PCS | Mod: 26,50,, | Performed by: RADIOLOGY

## 2023-09-28 PROCEDURE — 73630 XR FOOT COMPLETE 3 VIEW LEFT: ICD-10-PCS | Mod: 26,LT,, | Performed by: RADIOLOGY

## 2023-09-28 PROCEDURE — 73630 X-RAY EXAM OF FOOT: CPT | Mod: TC,LT

## 2023-09-28 PROCEDURE — 99999 PR PBB SHADOW E&M-EST. PATIENT-LVL II: ICD-10-PCS | Mod: PBBFAC,,, | Performed by: PHYSICIAN ASSISTANT

## 2023-09-29 ENCOUNTER — TELEPHONE (OUTPATIENT)
Dept: ORTHOPEDICS | Facility: CLINIC | Age: 58
End: 2023-09-29
Payer: OTHER GOVERNMENT

## 2023-09-29 NOTE — TELEPHONE ENCOUNTER
----- Message from Roslyn Reynolds sent at 9/29/2023 10:41 AM CDT -----  Regarding: pt advice  Contact: [phone]  Mary Ellen marc/Rj Maier calling in need clinical notes, work status note and physical capability eval from 9.28. Please call    Fax: [phone]

## 2023-10-02 RX ORDER — METHOCARBAMOL 500 MG/1
TABLET, FILM COATED ORAL
Qty: 42 TABLET | Refills: 0 | Status: SHIPPED | OUTPATIENT
Start: 2023-10-02 | End: 2024-03-28

## 2023-10-03 ENCOUNTER — PATIENT MESSAGE (OUTPATIENT)
Dept: ORTHOPEDICS | Facility: CLINIC | Age: 58
End: 2023-10-03
Payer: OTHER GOVERNMENT

## 2023-10-04 ENCOUNTER — PATIENT OUTREACH (OUTPATIENT)
Dept: ADMINISTRATIVE | Facility: HOSPITAL | Age: 58
End: 2023-10-04
Payer: OTHER GOVERNMENT

## 2023-10-04 ENCOUNTER — PATIENT MESSAGE (OUTPATIENT)
Dept: ADMINISTRATIVE | Facility: HOSPITAL | Age: 58
End: 2023-10-04
Payer: OTHER GOVERNMENT

## 2023-10-04 DIAGNOSIS — Z12.31 ENCOUNTER FOR SCREENING MAMMOGRAM FOR MALIGNANT NEOPLASM OF BREAST: Primary | ICD-10-CM

## 2023-10-04 DIAGNOSIS — E11.9 TYPE 2 DIABETES MELLITUS WITHOUT COMPLICATION, WITHOUT LONG-TERM CURRENT USE OF INSULIN: ICD-10-CM

## 2023-10-06 PROBLEM — R29.898 WEAKNESS OF BOTH LEGS: Status: ACTIVE | Noted: 2023-10-06

## 2023-10-06 PROBLEM — Z87.81 STATUS POST ORIF OF FRACTURE OF ANKLE: Status: ACTIVE | Noted: 2023-10-06

## 2023-10-06 PROBLEM — M25.671 DECREASED RANGE OF MOTION OF BOTH ANKLES: Status: ACTIVE | Noted: 2023-10-06

## 2023-10-06 PROBLEM — Z98.890 STATUS POST ORIF OF FRACTURE OF ANKLE: Status: ACTIVE | Noted: 2023-10-06

## 2023-10-06 PROBLEM — M25.672 DECREASED RANGE OF MOTION OF BOTH ANKLES: Status: ACTIVE | Noted: 2023-10-06

## 2023-10-10 ENCOUNTER — EXTERNAL HOME HEALTH (OUTPATIENT)
Dept: HOME HEALTH SERVICES | Facility: HOSPITAL | Age: 58
End: 2023-10-10
Payer: OTHER GOVERNMENT

## 2023-10-18 ENCOUNTER — PATIENT MESSAGE (OUTPATIENT)
Dept: GASTROENTEROLOGY | Facility: CLINIC | Age: 58
End: 2023-10-18
Payer: OTHER GOVERNMENT

## 2023-10-18 ENCOUNTER — OFFICE VISIT (OUTPATIENT)
Dept: FAMILY MEDICINE | Facility: CLINIC | Age: 58
End: 2023-10-18
Payer: OTHER GOVERNMENT

## 2023-10-18 VITALS
TEMPERATURE: 97 F | SYSTOLIC BLOOD PRESSURE: 132 MMHG | OXYGEN SATURATION: 98 % | HEIGHT: 62 IN | BODY MASS INDEX: 50.81 KG/M2 | HEART RATE: 92 BPM | DIASTOLIC BLOOD PRESSURE: 86 MMHG

## 2023-10-18 DIAGNOSIS — E66.01 CLASS 3 SEVERE OBESITY DUE TO EXCESS CALORIES WITH SERIOUS COMORBIDITY AND BODY MASS INDEX (BMI) OF 50.0 TO 59.9 IN ADULT: ICD-10-CM

## 2023-10-18 DIAGNOSIS — Z98.84 PERSONAL HISTORY OF GASTRIC BYPASS: ICD-10-CM

## 2023-10-18 DIAGNOSIS — Z87.81 STATUS POST ORIF OF FRACTURE OF ANKLE: ICD-10-CM

## 2023-10-18 DIAGNOSIS — F41.9 ANXIETY: ICD-10-CM

## 2023-10-18 DIAGNOSIS — E11.59 HYPERTENSION ASSOCIATED WITH DIABETES: ICD-10-CM

## 2023-10-18 DIAGNOSIS — Z12.11 COLON CANCER SCREENING: ICD-10-CM

## 2023-10-18 DIAGNOSIS — Z98.890 STATUS POST ORIF OF FRACTURE OF ANKLE: ICD-10-CM

## 2023-10-18 DIAGNOSIS — Z82.49 FAMILY HISTORY OF HEART DISEASE: ICD-10-CM

## 2023-10-18 DIAGNOSIS — S82.61XD CLOSED DISPLACED FRACTURE OF LATERAL MALLEOLUS OF RIGHT FIBULA WITH ROUTINE HEALING: ICD-10-CM

## 2023-10-18 DIAGNOSIS — S82.852D CLOSED TRIMALLEOLAR FRACTURE OF LEFT ANKLE WITH ROUTINE HEALING, SUBSEQUENT ENCOUNTER: ICD-10-CM

## 2023-10-18 DIAGNOSIS — E11.40 TYPE 2 DIABETES MELLITUS WITH DIABETIC NEUROPATHY, WITHOUT LONG-TERM CURRENT USE OF INSULIN: Primary | ICD-10-CM

## 2023-10-18 DIAGNOSIS — E11.69 HYPERLIPIDEMIA ASSOCIATED WITH TYPE 2 DIABETES MELLITUS: ICD-10-CM

## 2023-10-18 DIAGNOSIS — Z86.010 PERSONAL HISTORY OF COLONIC POLYPS: ICD-10-CM

## 2023-10-18 DIAGNOSIS — G62.89 OTHER POLYNEUROPATHY: ICD-10-CM

## 2023-10-18 DIAGNOSIS — G61.81 CIDP (CHRONIC INFLAMMATORY DEMYELINATING POLYNEUROPATHY): ICD-10-CM

## 2023-10-18 DIAGNOSIS — E78.5 HYPERLIPIDEMIA ASSOCIATED WITH TYPE 2 DIABETES MELLITUS: ICD-10-CM

## 2023-10-18 DIAGNOSIS — I15.2 HYPERTENSION ASSOCIATED WITH DIABETES: ICD-10-CM

## 2023-10-18 DIAGNOSIS — R29.6 FREQUENT FALLS: ICD-10-CM

## 2023-10-18 PROBLEM — D72.829 LEUKOCYTOSIS: Status: RESOLVED | Noted: 2023-08-17 | Resolved: 2023-10-18

## 2023-10-18 PROCEDURE — 99215 OFFICE O/P EST HI 40 MIN: CPT | Mod: S$PBB,,, | Performed by: NURSE PRACTITIONER

## 2023-10-18 PROCEDURE — 99215 OFFICE O/P EST HI 40 MIN: CPT | Mod: PBBFAC,PN | Performed by: NURSE PRACTITIONER

## 2023-10-18 PROCEDURE — 99999 PR PBB SHADOW E&M-EST. PATIENT-LVL V: ICD-10-PCS | Mod: PBBFAC,,, | Performed by: NURSE PRACTITIONER

## 2023-10-18 PROCEDURE — 99215 PR OFFICE/OUTPT VISIT, EST, LEVL V, 40-54 MIN: ICD-10-PCS | Mod: S$PBB,,, | Performed by: NURSE PRACTITIONER

## 2023-10-18 PROCEDURE — 99999 PR PBB SHADOW E&M-EST. PATIENT-LVL V: CPT | Mod: PBBFAC,,, | Performed by: NURSE PRACTITIONER

## 2023-10-18 RX ORDER — ACETAMINOPHEN, DIPHENHYDRAMINE HCL, PHENYLEPHRINE HCL 325; 25; 5 MG/1; MG/1; MG/1
TABLET ORAL
COMMUNITY
Start: 2023-09-01

## 2023-10-18 RX ORDER — PREGABALIN 75 MG/1
75 CAPSULE ORAL 2 TIMES DAILY
Qty: 180 CAPSULE | Refills: 1 | Status: SHIPPED | OUTPATIENT
Start: 2023-10-18 | End: 2024-01-24 | Stop reason: SDUPTHER

## 2023-10-18 RX ORDER — SODIUM PICOSULFATE, MAGNESIUM OXIDE, AND ANHYDROUS CITRIC ACID 12; 3.5; 1 G/175ML; G/175ML; MG/175ML
1 LIQUID ORAL ONCE
Qty: 175 ML | Refills: 0 | Status: SHIPPED | OUTPATIENT
Start: 2023-10-18 | End: 2023-10-18

## 2023-10-18 RX ORDER — ROSUVASTATIN CALCIUM 10 MG/1
10 TABLET, COATED ORAL DAILY
Qty: 90 TABLET | Refills: 1 | Status: SHIPPED | OUTPATIENT
Start: 2023-10-18 | End: 2023-10-26

## 2023-10-18 RX ORDER — OXYCODONE HYDROCHLORIDE 15 MG/1
TABLET ORAL
COMMUNITY
Start: 2023-08-31 | End: 2023-11-10

## 2023-10-18 RX ORDER — ASCORBIC ACID 125 MG
1 TABLET,CHEWABLE ORAL DAILY
COMMUNITY
Start: 2023-09-01 | End: 2023-12-19

## 2023-10-18 RX ORDER — LISINOPRIL 5 MG/1
5 TABLET ORAL DAILY
Qty: 30 TABLET | Refills: 0 | Status: SHIPPED | OUTPATIENT
Start: 2023-10-18 | End: 2023-10-26
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
## 2023-10-18 NOTE — TELEPHONE ENCOUNTER
Endoscopy Scheduling Questionnaire:         Are you taking any blood thinners? no               If Yes  Have you been on them for longer than one year? N/a    2. Have you been diagnosed with Diverticulitis in past three months?  no    3. Are you on dialysis or have Kidney Disease? no    4. Previous Colonoscopy?  yes         If yes Do you have a history of colon polyps?  yes    5. Family History of Colon Cancer: no         Relation: n/a       Age at Diagnosis: n/a      6. Are you a diabetic?  yes    7. Do you have a history of constipation?  no    8. Are you taking a GLP-1 Agonist/Adipex (weight loss drugs)? Yes Mounjaro -   Dulaglutide (Trulicity) (weekly)  Exenatide extended release (Bydureon bcise) (weekly)  Exenatide (Byetta) (twice daily)  Semaglutide (Ozempic) (weekly)  Liraglutide (Victoza, Saxenda) (daily)  Lixisenatide (Adlyxin) (daily)  Semaglutide (Rybelsus) (taken by mouth once daily)    Hold GLP-1 agonists on the day of the procedure/surgery for patients who take the medication daily.    Hold GLP-1 agonists a week prior to the procedure/surgery for patients who take the medication weekly.    Procedure scheduled with Dr. Fortune  on  12/21/2023    The prep being used is CLENPIQ     The patient's prep instructions were sent via patient portal    Ph[facility] is [facility] in [place]    **MOUNJARO hold - last dose will be taken on 12/3. May resume injections after procedure is complete.**    CLENPIQ Instructions    You are scheduled for a colonoscopy with Dr. Fortune on 12/21/2023 at [facility]. Enter through the [facility] and check in at [facility] Day [facility].  To ensure that your test is accurate and complete, you MUST follow these instructions listed below.  If you have any questions, please call our office at [phone].  Plan on being at the hospital for your procedure for 3-4 hours.       IF YOU HAVE ANY QUESTIONS ABOUT YOUR ARRIVAL TIME YOU CAN CALL [phone].    1.  Follow a CLEAR LIQUID  DIET for the entire day before your scheduled colonoscopy.  This means no solid food the entire day starting when you wake.  You may have as much of the clear liquids as you want throughout the day.   CLEAR LIQUID DIET:      - NO DAIRY   - You can have:  Coffee with sugar (no creamer), tea, water, soda, apple or white grape juice, chicken or beef broth/bouillon (no meat, noodles, or veggies), popsicles, Jell-O, lemonade.    2.  AT 5 pm the evening before your colonoscopy, OPEN ONE (1) BOTTLE OF CLENPIQ AND DRINK THE ENTIRE BOTTLE.  DRINK FIVE (5) 8-OUNCE GLASSES OF WATER (40 OUNCES TOTAL) OVER THE NEXT FIVE (5) HOURS.     3.  The endoscopy department will call you 1 day before your colonoscopy to tell you the exact time to arrive, AND to tell you the exact time to drink the 2nd portion of your prep (which will be FIVE HOURS BEFORE YOUR ARRIVAL TIME).  At this time given to you, OPEN THE OTHER ONE (1) BOTTLE OF CLENPIQ AND DRINK THE ENTIRE BOTTLE.  DRINK THREE (3) 8-OUNCE GLASSES OF WATER (24 OUNCES TOTAL) OVER THE NEXT THREE (3) HOURS. Once this is complete, you can not have anything else by mouth!    4.  You must have someone with you to DRIVE YOU HOME since you will be receiving IV sedation for the colonoscopy.    5.  It is ok to take MOST of your REGULAR MEDICATIONS  in the morning of your test with a SIP of water.  THE ONLY MEDS YOU NEED TO HOLD ARE YOUR DIABETES MEDICATIONS,  SOME BLOOD PRESSURE MEDS, AND BLOOD THINNERS IF OK'D BY YOUR DOCTOR.  Do NOT have anything else to eat or drink the morning of your colonoscopy.  It is ok to brush your teeth.    6.  If you are on blood thinners THAT YOU HAVE BEEN INSTRUCTED TO HOLD BY YOUR DOCTOR FOR THIS PROCEDURE, then do NOT take this the morning of your colonoscopy.  Do NOT stop these medications on your own, they must be approved to be held by your doctor.  Your colonoscopy can NOT be done if you are on these medications.  Examples of blood thinners include:  Coumadin, Aggrenox, Plavix, Pradaxa, Reapro, Pletal, Xarelto, Ticagrelor, Brilinta, Eliquis, and high dose aspirin (325 mg).  You do not have to stop baby aspirin 81 mg.    7.  IF YOU ARE DIABETIC:  NO INSULIN OR ORAL MEDICATIONS THE MORNING OF THE COLONOSCOPY.  TAKE ONLY HALF THE DOSE OF YOUR INSULIN THE DAY BEFORE THE COLONOSCOPY.  DO NOT TAKE ANY ORAL DIABETIC MEDICATIONS THE DAY BEFORE THE COLONOSCOPY.  IF YOU ARE AN INSULIN DEPENDENT DIABETIC WITH UNSTABLE BLOOD SUGARS, NOTIFY YOUR PRIMARY CARE PHYSICIAN FOR INSTRUCTIONS.    8.  Please DO use your inhalers the morning of your procedure.

## 2023-10-25 DIAGNOSIS — I15.2 HYPERTENSION ASSOCIATED WITH DIABETES: ICD-10-CM

## 2023-10-25 DIAGNOSIS — E11.69 HYPERLIPIDEMIA ASSOCIATED WITH TYPE 2 DIABETES MELLITUS: ICD-10-CM

## 2023-10-25 DIAGNOSIS — E11.59 HYPERTENSION ASSOCIATED WITH DIABETES: ICD-10-CM

## 2023-10-25 DIAGNOSIS — E78.5 HYPERLIPIDEMIA ASSOCIATED WITH TYPE 2 DIABETES MELLITUS: ICD-10-CM

## 2023-10-26 RX ORDER — ROSUVASTATIN CALCIUM 10 MG/1
10 TABLET, COATED ORAL DAILY
Qty: 90 TABLET | Refills: 1 | Status: SHIPPED | OUTPATIENT
Start: 2023-10-26

## 2023-10-26 RX ORDER — LISINOPRIL 5 MG/1
5 TABLET ORAL DAILY
Qty: 90 TABLET | Refills: 1 | Status: SHIPPED | OUTPATIENT
Start: 2023-10-26 | End: 2024-03-14

## 2023-10-27 ENCOUNTER — TELEPHONE (OUTPATIENT)
Dept: ORTHOPEDICS | Facility: CLINIC | Age: 58
End: 2023-10-27
Payer: OTHER GOVERNMENT

## 2023-10-27 ENCOUNTER — HOSPITAL ENCOUNTER (OUTPATIENT)
Dept: RADIOLOGY | Facility: HOSPITAL | Age: 58
Discharge: HOME | End: 2023-10-27
Attending: PHYSICIAN ASSISTANT
Payer: OTHER GOVERNMENT

## 2023-10-27 ENCOUNTER — OFFICE VISIT (OUTPATIENT)
Dept: ORTHOPEDICS | Facility: CLINIC | Age: 58
End: 2023-10-27
Payer: OTHER GOVERNMENT

## 2023-10-27 ENCOUNTER — DOCUMENTATION ONLY (OUTPATIENT)
Dept: ORTHOPEDICS | Facility: CLINIC | Age: 58
End: 2023-10-27
Payer: OTHER GOVERNMENT

## 2023-10-27 DIAGNOSIS — S93.05XD CLOSED DISLOCATION OF LEFT ANKLE, SUBSEQUENT ENCOUNTER: Primary | ICD-10-CM

## 2023-10-27 DIAGNOSIS — M25.572 ACUTE BILATERAL ANKLE PAIN: ICD-10-CM

## 2023-10-27 DIAGNOSIS — M79.671 RIGHT FOOT PAIN: ICD-10-CM

## 2023-10-27 DIAGNOSIS — M79.672 LEFT FOOT PAIN: ICD-10-CM

## 2023-10-27 DIAGNOSIS — M25.571 ACUTE BILATERAL ANKLE PAIN: ICD-10-CM

## 2023-10-27 PROCEDURE — 99999 PR PBB SHADOW E&M-EST. PATIENT-LVL IV: ICD-10-PCS | Mod: PBBFAC,,, | Performed by: PHYSICIAN ASSISTANT

## 2023-10-27 PROCEDURE — 73630 X-RAY EXAM OF FOOT: CPT | Mod: 26,RT,, | Performed by: RADIOLOGY

## 2023-10-27 PROCEDURE — 73610 X-RAY EXAM OF ANKLE: CPT | Mod: 26,50,, | Performed by: RADIOLOGY

## 2023-10-27 PROCEDURE — 99024 PR POST-OP FOLLOW-UP VISIT: ICD-10-PCS | Mod: ,,, | Performed by: PHYSICIAN ASSISTANT

## 2023-10-27 PROCEDURE — 99214 OFFICE O/P EST MOD 30 MIN: CPT | Mod: PBBFAC | Performed by: PHYSICIAN ASSISTANT

## 2023-10-27 PROCEDURE — 73630 X-RAY EXAM OF FOOT: CPT | Mod: TC,RT

## 2023-10-27 PROCEDURE — 73630 XR FOOT COMPLETE 3 VIEW RIGHT: ICD-10-PCS | Mod: 26,RT,, | Performed by: RADIOLOGY

## 2023-10-27 PROCEDURE — 73610 XR ANKLE COMPLETE 3 VIEW BILATERAL: ICD-10-PCS | Mod: 26,50,, | Performed by: RADIOLOGY

## 2023-10-27 PROCEDURE — 73610 X-RAY EXAM OF ANKLE: CPT | Mod: TC,50

## 2023-10-27 PROCEDURE — 99024 POSTOP FOLLOW-UP VISIT: CPT | Mod: ,,, | Performed by: PHYSICIAN ASSISTANT

## 2023-10-27 PROCEDURE — 99999 PR PBB SHADOW E&M-EST. PATIENT-LVL IV: CPT | Mod: PBBFAC,,, | Performed by: PHYSICIAN ASSISTANT

## 2023-10-27 NOTE — PROGRESS NOTES
Principal Orthopedic Problem:  Left trimalleolar ankle fracture status post closed reduction and spanning external fixation                          Left ankle syndesmosis disruption  Relevant Medical History: according to chart    PMHX:  DM (A1c 5.9), diabetic neuropathy, Guillain barre syndrome    Lives at home with   Occupation:   Prior to injury  Independent community ambulator, gait aids   Denies history of stroke, heart attack, cancer, blood clot, diabetes  Denies tobacco use  Denied chronic pain medication use prior to injury    HPI: Ms. Ryan is a 58 year old female who fell 5 feet. Suffered the following orthopedic injuries     XR of the R ankle shows possible lateral malleolus fx  XR of the R foot shows base of 5th metatarsal fx    XR of the L ankle show lateral dislocation of the ankle trimalleolar ankle fracture  08/17/23: :    Open reduction internal fixation left trimalleolar ankle fracture with fixation of medial and lateral malleoli, without fixation of posterior lip - 60602   Open treatment of left ankle syndesmosis disruption with reduction internal fixation - 78670  Removal, under anesthesia of external fixation left ankle - 74260  Neurolysis left superficial peroneal nerve - 83506    Ms. Ryan is here today for a post-operative visit    Interval History:  she reports that she is doing ok.   Pain is controlled.  she is taking pain medication.    She is getting PT. She continues to have issues with standing and walking longer distances as well as steps and getting on and off the toilet. Using walker.   she denies fever, chills, and sweats .     Physical exam:    Patient arrives to exam room: wheelchair.  Patient is  accompanied    RIGHT: mild swelling no TTP.     LEFT: Incision is clean, dry and intact.     Healing well no signs of breakdown or infection. Mild to moderate swelling, decreased range of motion in all planes.     RADS: All pertinent images were reviewed by  myself:   ANKLE:  RIGHT: No new fractures.  Stable nondisplaced subacute intra-articular fracture involving the 5th metatarsal base.  Stable cortical irregularity about the superior anterior talus and superior posterior navicular.  Tiny minimally displaced avulsion fracture of the dorsal talar head possible as before.  Stable appearance of the distal fibula felt related to variability in bone density about the distal diametaphyseal region or in the appropriate clinical setting, subacute nondisplaced fracture.  If important to confirm or exclude distal fibular fracture could consider cross-sectional imaging.  Preserved tibiotalar articulation and talar dome.  Mild bimalleolar soft tissue swelling.  Some stable small foci of heterotopic calcification adjacent to medial and lateral malleoli, likely sequela of remote trauma.  Reconfirmed os trigonum and os navicular.  Reconfirmed calcaneal spurring at Achilles tendon and plantar aponeurosis attachments.  Some stable hindfoot and midfoot osteoarthritic changes.  Right ankle findings unchanged to earlier exam.     Left ankle:     Stable of postsurgical hardware in association with the distal fibula and tibia, no findings of hardware malfunction.  No acute fractures.  Preserved tibiotalar articulation and talar dome.  Bimalleolar soft tissue swelling.  Extensive patchy osteoporosis involving the midfoot and hindfoot the osseous structures noted on lateral exam.  Calcaneal spurring at Achilles tendon and plantar aponeurosis attachments.  Some stable hindfoot and midfoot osteoarthritic changes.  Left ankle findings unchanged to earlier exam.     FOOT, right : Reconfirmed now subacute nondisplaced fracture involving the base of the 5th metatarsal.  Components of fracture line still evident.  No new fractures.  Stable bunion changes with mild metatarsus primus varus, hallux valgus, and no significant uncovering of 1st metatarsal head.  Stable osteoarthritic changes involving  the 1st MTP and sesamoid 1st metatarsal head articulations.  Some stable midfoot osteoarthritic changes based on the lateral exam.  Calcaneal spurring at Achilles tendon and plantar aponeurosis attachments.  Soft tissue swelling dorsally at the level of the metatarsals less so midfoot and MTP regions.  Question tiny avulsion fracture involving the anterior superior talus as before.  Right foot findings appear similar to earlier exam.    Assessment:  Post-op visit ( 10 weeks)    Plan:  Current care, treatment plan, precautions, activity level/ modifications, limitations, rehabilitation exercises and proposed future treatment were discussed with the patient. We discussed the need to monitor for changes in symptoms and condition and report them to the physician.  Discussed importance of compliance with all appointments and follow up examinations.     WOUND CARE :  - You may use vitamin E (break the capsule open), aloe, scar cream (mederma) or hand lotion to rub the scar.  You must rub across the scar and in the line of the scar.  The goal is to make the scar not tender and make the scar not adhere (stick to) the tissues below the scar.      -Patient was advised to monitor wound closely and multiple times daily for any problems. Call clinic immediately or report to ED for immediate medical attention for any complications including reopening of wound, drainage, purulence, redness, streaking, odor, pain out of proportion, fever, chills, etc.       ACTIVITY:   - light  -range of motion as tolerated    - weight bearing as tolerated, BLE     Right boot ok to wean, ok to use ankle brace to assist with transition.   Left, hard sole shoe      -PT/OT, Patient is responsible to establish and continue care      PAIN MEDICATION:   - Multimodal pain control  - Pain medication: refill was not needed  - Pain medication refill policy provided to patient for review, yes.    - Patient was informed a multi-modal approach is used to treat  their pain. With the goal to get off of narcotic pain medication and discontinue as soon as possible.   - ice and elevation to reduce pain and swelling     DVT PROPHYLAXIS:   - ASA 81 mg bid    FOLLOW UP:   - Patient will follow up in the clinic in 6 weeks, sooner if any concerns.  - X-ray of her ankle bilateral and right foot  is needed.  NWB     RTW, unable to RTW at this time     If there are any questions prior to scheduled follow up, the patient was instructed to contact the office

## 2023-10-27 NOTE — TELEPHONE ENCOUNTER
Left a voice message to Wilber [phone].   Disability Insurance claim forms and FMLA form request to the [facility] disability Department located at [facility] on the 1st floor next to [employer].  Office Hours: Monday - Friday, 8:00 a.m. - 5:00 p.m.  ATTENTION: New After Hours for Drop Off  Drop off Disability/FMLA forms in the appropriate box.  Processing of forms take 7-10 Business Days.  Follow up on your request by [phone] ( Select Option 2 )  After normal business hours callers, will be directed to voice mail and the call will be returned by the next business day or you may email:  [email or web address] or fax forms to [phone]

## 2023-10-27 NOTE — TELEPHONE ENCOUNTER
----- Message from Dina Sullivan sent at 10/27/2023  3:34 PM CDT -----  Regarding: [facility]  Contact: Wilber [phone]  Wilber from [facility] is calling to speak with someone in provider office  in regards to checking the status of a plan of care sent over for the pt's appt with the provider on 10/27 Wilber is asking for a return call please call her at  [phone]

## 2023-11-01 ENCOUNTER — TELEPHONE (OUTPATIENT)
Dept: ORTHOPEDICS | Facility: CLINIC | Age: 58
End: 2023-11-01
Payer: OTHER GOVERNMENT

## 2023-11-01 NOTE — TELEPHONE ENCOUNTER
"----- Message from Roslyn Reynolds sent at 11/1/2023 11:27 AM CDT -----  Regarding: pt advice  Contact: [phone]  Name Of Caller: [employer] [facility]     Contact Preference?: [phone]     What is the nature of the call?: following up on plan of care, work status, and office notes needed for appt on 10.27 and physical capabilities form they sent     Additional Notes:  Fax:[phone]    "Thank you for all that you do for our patients"        "

## 2023-11-01 NOTE — TELEPHONE ENCOUNTER
Left a voice message to [employer] [phone] that she can reach out to [facility] release of information department [phone]. For any medical records.

## 2023-11-03 ENCOUNTER — TELEPHONE (OUTPATIENT)
Dept: ORTHOPEDICS | Facility: CLINIC | Age: 58
End: 2023-11-03
Payer: OTHER GOVERNMENT

## 2023-11-03 NOTE — TELEPHONE ENCOUNTER
Left a message for Khoi marc/ kat [phone] regarding LOV is fax  to #[phone] . Kat need to fax FCE to [phone].

## 2023-11-06 ENCOUNTER — TELEPHONE (OUTPATIENT)
Dept: NEUROLOGY | Facility: CLINIC | Age: 58
End: 2023-11-06

## 2023-11-06 ENCOUNTER — PATIENT MESSAGE (OUTPATIENT)
Dept: NEUROLOGY | Facility: CLINIC | Age: 58
End: 2023-11-06
Payer: OTHER GOVERNMENT

## 2023-11-06 NOTE — TELEPHONE ENCOUNTER
Left a message for the patient to let her know her appointment time is for 1:00 instead of 12:00. Patient was asked to call back to confirm.

## 2023-11-07 ENCOUNTER — LAB VISIT (OUTPATIENT)
Dept: LAB | Facility: HOSPITAL | Age: 58
End: 2023-11-07
Attending: PSYCHIATRY & NEUROLOGY
Payer: OTHER GOVERNMENT

## 2023-11-07 ENCOUNTER — OFFICE VISIT (OUTPATIENT)
Dept: NEUROLOGY | Facility: CLINIC | Age: 58
End: 2023-11-07
Payer: OTHER GOVERNMENT

## 2023-11-07 VITALS
BODY MASS INDEX: 48.65 KG/M2 | SYSTOLIC BLOOD PRESSURE: 123 MMHG | DIASTOLIC BLOOD PRESSURE: 67 MMHG | HEART RATE: 88 BPM | HEIGHT: 63 IN | WEIGHT: 274.56 LBS

## 2023-11-07 DIAGNOSIS — E11.40 TYPE 2 DIABETES MELLITUS WITH DIABETIC NEUROPATHY, WITHOUT LONG-TERM CURRENT USE OF INSULIN: ICD-10-CM

## 2023-11-07 DIAGNOSIS — G62.9 SENSORIMOTOR NEUROPATHY: ICD-10-CM

## 2023-11-07 DIAGNOSIS — G62.9 SENSORIMOTOR NEUROPATHY: Primary | ICD-10-CM

## 2023-11-07 PROCEDURE — 99417 PR PROLONGED SVC, OUTPT, W/WO DIRECT PT CONTACT,  EA ADDTL 15 MIN: ICD-10-PCS | Mod: S$PBB,,, | Performed by: PSYCHIATRY & NEUROLOGY

## 2023-11-07 PROCEDURE — 99999 PR PBB SHADOW E&M-EST. PATIENT-LVL IV: ICD-10-PCS | Mod: PBBFAC,,, | Performed by: PSYCHIATRY & NEUROLOGY

## 2023-11-07 PROCEDURE — 86334 IMMUNOFIX E-PHORESIS SERUM: CPT | Mod: 26,,, | Performed by: PATHOLOGY

## 2023-11-07 PROCEDURE — 84255 ASSAY OF SELENIUM: CPT | Performed by: PSYCHIATRY & NEUROLOGY

## 2023-11-07 PROCEDURE — 84590 ASSAY OF VITAMIN A: CPT | Performed by: PSYCHIATRY & NEUROLOGY

## 2023-11-07 PROCEDURE — 86334 IMMUNOFIX E-PHORESIS SERUM: CPT | Performed by: PSYCHIATRY & NEUROLOGY

## 2023-11-07 PROCEDURE — 99214 OFFICE O/P EST MOD 30 MIN: CPT | Mod: PBBFAC | Performed by: PSYCHIATRY & NEUROLOGY

## 2023-11-07 PROCEDURE — 84425 ASSAY OF VITAMIN B-1: CPT | Performed by: PSYCHIATRY & NEUROLOGY

## 2023-11-07 PROCEDURE — 86334 PATHOLOGIST INTERPRETATION IFE: ICD-10-PCS | Mod: 26,,, | Performed by: PATHOLOGY

## 2023-11-07 PROCEDURE — 99417 PROLNG OP E/M EACH 15 MIN: CPT | Mod: S$PBB,,, | Performed by: PSYCHIATRY & NEUROLOGY

## 2023-11-07 PROCEDURE — 36415 COLL VENOUS BLD VENIPUNCTURE: CPT | Performed by: PSYCHIATRY & NEUROLOGY

## 2023-11-07 PROCEDURE — 99215 PR OFFICE/OUTPT VISIT, EST, LEVL V, 40-54 MIN: ICD-10-PCS | Mod: S$PBB,,, | Performed by: PSYCHIATRY & NEUROLOGY

## 2023-11-07 PROCEDURE — 99999 PR PBB SHADOW E&M-EST. PATIENT-LVL IV: CPT | Mod: PBBFAC,,, | Performed by: PSYCHIATRY & NEUROLOGY

## 2023-11-07 PROCEDURE — 82300 ASSAY OF CADMIUM: CPT | Performed by: PSYCHIATRY & NEUROLOGY

## 2023-11-07 PROCEDURE — 99215 OFFICE O/P EST HI 40 MIN: CPT | Mod: S$PBB,,, | Performed by: PSYCHIATRY & NEUROLOGY

## 2023-11-07 PROCEDURE — 84165 PROTEIN E-PHORESIS SERUM: CPT | Performed by: PSYCHIATRY & NEUROLOGY

## 2023-11-07 PROCEDURE — 84165 PROTEIN E-PHORESIS SERUM: CPT | Mod: 26,,, | Performed by: PATHOLOGY

## 2023-11-07 PROCEDURE — 86038 ANTINUCLEAR ANTIBODIES: CPT | Performed by: PSYCHIATRY & NEUROLOGY

## 2023-11-07 PROCEDURE — 83521 IG LIGHT CHAINS FREE EACH: CPT | Mod: 59 | Performed by: PSYCHIATRY & NEUROLOGY

## 2023-11-07 PROCEDURE — 84165 PATHOLOGIST INTERPRETATION SPE: ICD-10-PCS | Mod: 26,,, | Performed by: PATHOLOGY

## 2023-11-07 NOTE — PROGRESS NOTES
[facility] - NEUROLOGY 7TH [place] [facility], [place]    Date: 11/7/23  Patient Name: Kalina Ryan   MRN: [id number]   Referring Provider: Lionel Allen DO    Thank you so much Lionel Allen DO for your patient referral to Neuromuscular team at [facility]. We take pride in our care coordination and look forward to your feedback and questions.    Assessment:   Kalina Ryan is a 58 y.o. 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  I reviewed chronology of detail with the patient but there is no NCS/EMG in the system for review but I reviewed her CSF analysis.  I guided her to repeat NCS/EMG to solidify her diagnosis.  I will complete polyneuropathy workup especially in the setting of gastric bypass surgery.    I discussed about fall precautions and need for Physiotherapy. I addressed her complaints. I provided information about fall precautions and healthy lifestyle. I would wish her very best for improvement/recovery in her condition.    Future direction based on feedback:    Plan:     Problem List Items Addressed This Visit          Endocrine    Type 2 diabetes mellitus with diabetic neuropathy, without long-term current use of insulin     Other Visit Diagnoses       Sensorimotor neuropathy    -  Primary    Relevant Orders    EMG W/ ULTRASOUND AND NERVE CONDUCTION TEST 3 Extremities    WALI    IMMUNOFIXATION ELECTROPHORESIS, SERUM    IMMUNOGLOBULIN FREE LT CHAINS BLOOD    PROTEIN ELECTROPHORESIS, SERUM    SELENIUM SERUM    HEAVY METALS SCREEN, BLOOD (QUANTITATIVE)    VITAMIN A    VITAMIN B1            Du Quintanilla MD    This evaluation was completed in >90  Minutes over 50% of the time spent on education & counseling. This includes face to face time and non-face to face time preparing to see the patient (eg, review of tests),  obtaining and/or reviewing separately obtained history, documenting clinical information in the electronic or other health record, independently interpreting results and communicating results to the patient/family/caregiver, or care coordinator.    Patient note was created using MModal Dictation.  Any errors in syntax or even information may not have been identified and edited on initial review prior to signing this note.    Details provided by:    Patient  Family-     Reason for visit:  Concern about CIDP    HISTORY OF PRESENT ILLNESS   Ms. Kalina Ryan is a 58 y.o. female presenting for evaluation of possible CIDP.  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      The detail was confirmed with the patient who mentioned she had gastric bypass surgery in August of 2009, cholecystectomy was done 8-10 weeks later in October 2009.  In November 2009, she noticed weakness in lower extremity which progressed over a couple of hours.  She was in the football game for her son and noticed weakness and same night she went to emergency department because she was not able to stand as she was trying to go to restroom.  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  She remembers having NCS/EMG at that time but record is not available for review.  She was diagnosed with GBS and started on IVIG which she received once session for couple of days but she did not walk until hospital admission.  He started rehabilitation in December 2009.  In February 2010, she started walking with walker and she started walking without walker in May 2010.  She did not receive any IVIG since then. She received steroids in April 2010 and she mentioned some improvement in her strength he is her arms were weak in 2009  but normalized in 2010 with rehabilitation.  Currently she is not having any symptoms in her arm. In October 2022, she had her 1st fall.  Second fall happened in May 2023.  Her worst fall was in August of 2023 with ankle fracture and since then she is using walker now.    There is no history of double vision, swallowing or breathing difficulty.  She mentioned some breathing difficulty on exertion. There is no history of smoking and she rarely drinks alcohol.  She is a  in [employer].  She has 2 kids.  Her mother, father and brother are diagnosed with heart condition.  There is no family history of neuromuscular condition.      Chart Review:  Neurology note on 08/14/2023  Has been progressively worsening reportedly for several months, complicated by multiple falls. She is currently hospitalized due to a fall down several stairs during which she sustained fractures to both the left and right ankles. Left ankle fracture was associated with dislocation, reduced per orthopedics in the ER and headed to OR w/ortho today for external fixation.    Starting in 2009, received IVIG 4-5 rounds of treatment, and steroids. Had followed with Dr. Moses in the past, but states that she has neither seen a neurologist nor undergone additional treatment since 2012. Has had several episodes of weakness from then until current presentation, but describes these as self-limited and she did not seek medical attention. Starting 8 months ago, has experienced progressively worsening lower extremity weakness, bilateral and symmetric, both proximal and distal (ankle and foot) as well as intermittent distal upper/lower extremity numbness and tingling. This has resulted in several falls, the most recent of which resulted in ankle fracture that led her [place] [facility] Went to the OR today w/orthopedic surgery.     Pertinent work up based on chart review for current condition:  Comprehensive metabolic panel with creatinine 0.8, BUN 23    CBC hemoglobin 11.6, hematocrit 37.  MCV 82      TSH 1.7   Hemoglobin A1c 5.4 highest has been 6.9   Vitamin E level normal   RPR negative   zinc level 58 low normal   Vitamin B12 level 1107   Folate level 8.7 normal   Vitamin D level 29   CSF analysis in 2009.     CSF protein 72   Glucose 51   Cells 0   Oligoclonal bands 3, serum bands 1,  IgG index normal    Review of Systems:  12 system review of systems is negative except for the symptoms mentioned in HPI.       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    full paralysis but no intubation; residual weakness and neuropathy to hands and feet - diagnosed by Neurologist back in 2009 and last seen by neurology in 2011    Guillain Barré syndrome 2009    full paralysis but no intubation; residual weakness and neuropathy to hands and feet - diagnosed by Neurologist back in 2009 and last seen by neurology in 2011    Hypertension     New onset type 2 diabetes mellitus 9/4/2019    Personal history of gastric bypass 9/4/2019    Reactive depression 10/19/2021      Social History     Socioeconomic History    Marital status:    Occupational History    Occupation: work at bank   Tobacco Use    Smoking status: Never     Passive exposure: Never    Smokeless tobacco: Never   Substance and Sexual Activity    Alcohol use: Yes     Alcohol/week: 1.0 standard drink of alcohol     Types: 1 Drinks containing 0.5 oz of alcohol per week     Comment: every other weekend - 1 drink per occasion    Drug use: No    Sexual activity: Not Currently     Partners: Male     Birth control/protection: Partner-Vasectomy     Social Determinants of Health     Financial Resource Strain: Low Risk  (11/4/2023)    Overall Financial Resource Strain (CARDIA)     Difficulty of Paying Living Expenses: Not hard at all   Food Insecurity: No Food Insecurity (11/4/2023)    Hunger Vital Sign      Worried About Running Out of Food in the Last Year: Never true     Ran Out of Food in the Last Year: Never true   Transportation Needs: No Transportation Needs (2023)    PRAPARE - Transportation     Lack of Transportation (Medical): No     Lack of Transportation (Non-Medical): No   Physical Activity: Inactive (2023)    Exercise Vital Sign     Days of Exercise per Week: 0 days     Minutes of Exercise per Session: 0 min   Stress: No Stress Concern Present (2023)    [employer] [place] [employer] - Occupational Stress Questionnaire     Feeling of Stress : Only a little   Recent Concern: Stress - Stress Concern Present (10/16/2023)    [employer] [place] [employer] - Occupational Stress Questionnaire     Feeling of Stress : To some extent   Social Connections: Unknown (2023)    Social Connection and Isolation Panel [NHANES]     Frequency of Communication with Friends and Family: Once a week     Frequency of Social Gatherings with Friends and Family: Once a week     Active Member of Clubs or Organizations: No     Attends Club or Organization Meetings: Never     Marital Status:    Housing Stability: Low Risk  (2023)    Housing Stability Vital Sign     Unable to Pay for Housing in the Last Year: No     Number of Places Lived in the Last Year: 1     Unstable Housing in the Last Year: No        Family History Past Surgical History   Family History   Problem Relation Age of Onset    Heart disease Mother     Hypertension Mother     Diabetes Mother 65            Heart disease Father 60        Massive MI; DEFIB/ CABG    Diabetes Father             Hypertension Father     Dementia Father     Stroke Father     Pancreatic cancer Brother 54    Stomach cancer Brother     Cancer Brother             No Known Problems Daughter     No Known Problems Son     Heart disease Brother 56        maintain with medication - no surgery    Hypertension Brother      Hypertension Brother      Past Surgical History:   Procedure Laterality Date    APPLICATION, EXTERNAL FIXATION DEVICE Left 8/9/2023    Procedure: APPLICATION, EXTERNAL FIXATION DEVICE left ankle, C-arm clock side, synthes;  Surgeon: Kali Santoyo MD;  [facility];  Service: Orthopedics;  Laterality: Left;    CHOLECYSTECTOMY  2009    CLOSED REDUCTION, FRACTURE, ANKLE, TRIMALLEOLAR Left 8/9/2023    Procedure: CLOSED REDUCTION, FRACTURE, ANKLE, TRIMALLEOLAR;  Surgeon: Kali Santoyo MD;  [facility];  Service: Orthopedics;  Laterality: Left;    COLONOSCOPY N/A 7/31/2018    Procedure: COLONOSCOPY;  Surgeon: Elpidio Fortune MD;  Location: [facility] ENDO;  Service: Endoscopy;  Laterality: N/A;    FIXATION OF SYNDESMOSIS OF ANKLE Left 8/17/2023    Procedure: FIXATION, SYNDESMOSIS, ANKLE;  Surgeon: Kali Santoyo MD;  [facility];  Service: Orthopedics;  Laterality: Left;    GASTRIC BYPASS  2009    HYSTERECTOMY  1999    OPEN REDUCTION AND INTERNAL FIXATION (ORIF) OF INJURY OF ANKLE Left 8/17/2023    Procedure: ORIF, ANKLE;  Surgeon: Kali Santoyo MD;  [facility];  Service: Orthopedics;  Laterality: Left;    REMOVAL OF EXTERNAL FIXATION DEVICE Left 8/17/2023    Procedure: REMOVAL, EXTERNAL FIXATION DEVICE;  Surgeon: Kali Santoyo MD;  [facility];  Service: Orthopedics;  Laterality: Left;        Allergies    Review of patient's allergies indicates:   Allergen Reactions    Amoxicillin Hives    Penicillins Hives            Medications     Current Outpatient Medications   Medication Sig Dispense Refill    acetaminophen (TYLENOL) 500 MG tablet Take 2 tablets (1,000 mg total) by mouth every 8 (eight) hours.  0    amLODIPine (NORVASC) 5 MG tablet TAKE 1 TABLET DAILY 90 tablet 3    aspirin 81 MG Chew Take 1 tablet (81 mg total) by mouth 2 (two) times a day. End dates 9 /29/23  0    cholecalciferol, vitamin D3, (VITAMIN D3) 25 mcg (1,000  "unit) Chew Take 1 tablet by mouth once daily.      EScitalopram oxalate (LEXAPRO) 10 MG tablet TAKE 1 TABLET DAILY 90 tablet 3    lisinopriL (PRINIVIL,ZESTRIL) 5 MG tablet Take 1 tablet (5 mg total) by mouth once daily. 90 tablet 1    melatonin 10 mg Tab 1 tablet BEDTIME (route: oral)      methocarbamoL (ROBAXIN) 500 MG Tab TAKE ONE TABLET BY MOUTH THREE TIMES DAILY FOR 14 DAYS 42 tablet 0    multivitamin (ONE DAILY MULTIVITAMIN) per tablet Take 1 tablet by mouth once daily.      oxyCODONE (ROXICODONE) 15 MG Tab Take by mouth.      pregabalin (LYRICA) 75 MG capsule Take 1 capsule (75 mg total) by mouth 2 (two) times daily. 180 capsule 1    rosuvastatin (CRESTOR) 10 MG tablet Take 1 tablet (10 mg total) by mouth once daily. 90 tablet 1    tirzepatide (MOUNJARO) 5 mg/0.5 mL PnIj Inject 5 mg into the skin every 7 days. -every Sunday. Okay to use home supply if not on formulary at rehab 4 pen 5     No current facility-administered medications for this visit.         LABS   Last CBC Results:   Lab Results   Component Value Date    WBC 7.89 10/13/2023    HGB 11.6 (L) 10/13/2023    HCT 37.3 10/13/2023     10/13/2023       Last CMP Results  Lab Results   Component Value Date     10/13/2023    K 3.9 10/13/2023     10/13/2023    CO2 27 10/13/2023    BUN 23 (H) 10/13/2023    CREATININE 0.84 10/13/2023    CALCIUM 8.9 10/13/2023    ALBUMIN 4.0 10/13/2023    AST 21 10/13/2023    ALT 18 10/13/2023       Last Lipids  Lab Results   Component Value Date    CHOL 159 10/13/2023    TRIG 79 10/13/2023    HDL 52 10/13/2023    LDLCALC 91.2 10/13/2023       Last A1C  Lab Results   Component Value Date    HGBA1C 5.4 10/13/2023       Last TSH  Lab Results   Component Value Date    TSH 1.780 10/13/2023         PHYSICAL EXAMINATION     Vitals:    11/07/23 1208   BP: 123/67   Pulse: 88   Weight: 124.6 kg (274 lb 9.3 oz)   Height: 5' 3" (1.6 m)     Wt Readings from Last 3 Encounters:   09/08/23 1021 126 kg (277 lb 12.5 oz) "   08/18/23 2046 126 kg (277 lb 12.5 oz)   08/18/23 1441 125.6 kg (277 lb)   08/09/23 0836 126 kg (277 lb 12.5 oz)   08/07/23 1833 126 kg (277 lb 12.5 oz)   06/12/23 0815 122.5 kg (270 lb)     Body mass index is 48.64 kg/m².     General: No acute distress, calm & cooperative  Head: Atraumatic, normocephalic  HEENT: PERRLA, EOMI, Oral mucosa moist   Neck: Supple, trachea midline  Cardiovascular: Regular rate and rhythm.  Pulmonary: CTAB, no increased work of breathing, no rhonchi or wheezing  Extremities: Warm, well-perfused, no significant edema  Psychiatric: Normal mood & affect; behavior normal & appropriate  Skin: No jaundice, rashes    GENERAL/CONSTITUTIONAL:    -Well appearing; well nourished    HIGHER INTEGRATIVE FUNCTIONS:   -Attention & concentration: Normal   -Orientation: Oriented to person, place & time  -Memory: Normal  -Language: Normal   -Fund of Knowledge: Normal     CRANIAL NERVES:   -CN 2: Visual fields full  -CN 2,3: PERRL  -CN 3,4,6: EOMI  -CN 5: Facial sensation intact bilaterally  -CN 7: Facial strength/movement intact bilaterally  -CN 8: Hearing normal bilaterally  -CN 9,10: Palate elevates symmetrically  -CN 11: Normal shoulder shrug and head turn.  Good neck flexion/extension strength  -CN 12: Tongue protrudes midline     MOTOR:   -Tone: normal in upper and lower extremities  -UE/LE motor:       Upper Ext Right Left Lower Ext Right Left   Shoulder Abd 5 5 Hip flexion 5 5   Elbow flexion 5 5 Knee extension 5 5   Elbow extension 5 5 Knee flexion 5 5   Fingers abduction 5 5 Ankle dorsiflexion 5 5   Wrist extension   Ankle plantar flexion     Wrist flexion   Great toe dorsiflexion +4 3   Finger extension   Thigh adduction     Finger flexion   Thigh abduction     Thumb abduction            REFLEXES:      R L  R L   Triceps 2 2 Knee 2 2   Biceps 2 2 Ankle 2 2   BR 2 2        -Flexor plantar reflex bilaterally     SENSATION:   -impaired pinprick up to mid shin bilaterally.    -impaired vibration up  to ankles bilaterally    COORDINATION:   -FNF normal bilaterally    GAIT:   -walk with Rollator walker.  -left ankle scar from recent surgery    Scheduled Follow-up :  Future Appointments   Date Time Provider Department Center   11/7/2023 12:00 PM Du Quintanilla MD [facility] NEURO [address]   11/9/2023  8:20 AM Cadence Butron, PT [facility] OP RHB [facility]   11/10/2023  8:00 AM Dontrell Cooper MD [facility] CARDIO [place]   11/13/2023  7:00 AM Nany Hoff, [facility] [place]   11/14/2023  8:20 AM Cadence Burton, PT [facility] OP [facility]   11/16/2023  8:20 AM Cadence Burton, PT [facility] OP [facility]   12/11/2023  7:30 AM Kaye Mcdonnell PA-C [facility] ORTHO [facility]   12/18/2023  9:30 AM DESH OIC [id number] DESH MAMMO [place]   4/12/2024  7:00 AM LAB, [facility] LAB [place]   4/12/2024  7:10 AM SPECIMAN LAB, [place] DES[facility] SPECLAB [place]   4/16/2024  7:30 AM Nany Hoff, [facility] FAMCT[facility] [place]       After Visit Medication List :     Medication List            Accurate as of November 7, 2023 11:55 AM. If you have any questions, ask your nurse or doctor.                CONTINUE taking these medications      acetaminophen 500 MG tablet  Commonly known as: TYLENOL  Take 2 tablets (1,000 mg total) by mouth every 8 (eight) hours.     amLODIPine 5 MG tablet  Commonly known as: NORVASC  TAKE 1 TABLET DAILY     aspirin 81 MG Chew  Take 1 tablet (81 mg total) by mouth 2 (two) times a day. End dates 9 /29/23     EScitalopram oxalate 10 MG tablet  Commonly known as: LEXAPRO  TAKE 1 TABLET DAILY     lisinopriL 5 MG tablet  Commonly known as: PRINIVIL,ZESTRIL  Take 1 tablet (5 mg total) by mouth once daily.     melatonin 10 mg Tab     methocarbamoL 500 MG Tab  Commonly known as: ROBAXIN  TAKE ONE TABLET BY MOUTH THREE TIMES DAILY FOR 14 DAYS     MOUNJARO 5 mg/0.5 mL Pnij  Generic drug: tirzepatide  Inject 5 mg into the skin every 7 days. -every Sunday. Okay to use home supply if not on formulary at rehab     ONE DAILY MULTIVITAMIN per  tablet  Generic drug: multivitamin     oxyCODONE 15 MG Tab  Commonly known as: ROXICODONE     pregabalin 75 MG capsule  Commonly known as: LYRICA  Take 1 capsule (75 mg total) by mouth 2 (two) times daily.     rosuvastatin 10 MG tablet  Commonly known as: CRESTOR  Take 1 tablet (10 mg total) by mouth once daily.     VITAMIN D3 25 mcg (1,000 unit) Chew  Generic drug: cholecalciferol (vitamin D3)              Signing Physician:          Du Quintanilla MD  , [employer]  Neurology Consultant. [facility].   [address] [address]th [place] [address].

## 2023-11-07 NOTE — PATIENT INSTRUCTIONS
Walk and exercise  Physiotherapy  Fall precautions  In case of any question, plz contact through [facility].

## 2023-11-08 ENCOUNTER — PATIENT MESSAGE (OUTPATIENT)
Dept: ORTHOPEDICS | Facility: CLINIC | Age: 58
End: 2023-11-08
Payer: OTHER GOVERNMENT

## 2023-11-08 LAB
ALBUMIN SERPL ELPH-MCNC: 3.72 G/DL (ref 3.35–5.55)
ALPHA1 GLOB SERPL ELPH-MCNC: 0.4 G/DL (ref 0.17–0.41)
ALPHA2 GLOB SERPL ELPH-MCNC: 0.99 G/DL (ref 0.43–0.99)
ANA SER QL IF: NORMAL
ARSENIC BLD-MCNC: 1 NG/ML
B-GLOBULIN SERPL ELPH-MCNC: 1.04 G/DL (ref 0.5–1.1)
CADMIUM BLD-MCNC: <0.2 NG/ML
CITY: NORMAL
COUNTY: NORMAL
GAMMA GLOB SERPL ELPH-MCNC: 1.06 G/DL (ref 0.67–1.58)
GUARDIAN FIRST NAME: NORMAL
GUARDIAN LAST NAME: NORMAL
HOME PHONE: NORMAL
INTERPRETATION SERPL IFE-IMP: NORMAL
KAPPA LC SER QL IA: 5.39 MG/DL (ref 0.33–1.94)
KAPPA LC/LAMBDA SER IA: 2.71 (ref 0.26–1.65)
LAMBDA LC SER QL IA: 1.99 MG/DL (ref 0.57–2.63)
LEAD BLD-MCNC: 1 MCG/DL
MERCURY BLD-MCNC: <1 NG/ML
PATHOLOGIST INTERPRETATION IFE: NORMAL
PATHOLOGIST INTERPRETATION SPE: NORMAL
PROT SERPL-MCNC: 7.2 G/DL (ref 6–8.4)
RACE: NORMAL
STATE: NORMAL
STREET ADDRESS: NORMAL
VENOUS/CAPILLARY: NORMAL
ZIP: NORMAL

## 2023-11-09 LAB — SELENIUM SERPL-MCNC: 128 MCG/L (ref 110–165)

## 2023-11-10 ENCOUNTER — OFFICE VISIT (OUTPATIENT)
Dept: CARDIOLOGY | Facility: CLINIC | Age: 58
End: 2023-11-10
Payer: OTHER GOVERNMENT

## 2023-11-10 VITALS
SYSTOLIC BLOOD PRESSURE: 137 MMHG | OXYGEN SATURATION: 95 % | WEIGHT: 270.63 LBS | HEART RATE: 82 BPM | DIASTOLIC BLOOD PRESSURE: 78 MMHG | BODY MASS INDEX: 47.94 KG/M2

## 2023-11-10 DIAGNOSIS — Z13.6 ENCOUNTER FOR SCREENING FOR CARDIOVASCULAR DISORDERS: Primary | ICD-10-CM

## 2023-11-10 DIAGNOSIS — I15.2 HYPERTENSION ASSOCIATED WITH DIABETES: ICD-10-CM

## 2023-11-10 DIAGNOSIS — Z82.49 FAMILY HISTORY OF CARDIOVASCULAR DISEASE: ICD-10-CM

## 2023-11-10 DIAGNOSIS — E66.01 CLASS 3 SEVERE OBESITY DUE TO EXCESS CALORIES WITH SERIOUS COMORBIDITY AND BODY MASS INDEX (BMI) OF 50.0 TO 59.9 IN ADULT: ICD-10-CM

## 2023-11-10 DIAGNOSIS — Z82.49 FAMILY HISTORY OF HEART DISEASE: ICD-10-CM

## 2023-11-10 DIAGNOSIS — R29.898 WEAKNESS OF BOTH LOWER EXTREMITIES: ICD-10-CM

## 2023-11-10 DIAGNOSIS — E11.40 TYPE 2 DIABETES MELLITUS WITH DIABETIC NEUROPATHY, WITHOUT LONG-TERM CURRENT USE OF INSULIN: ICD-10-CM

## 2023-11-10 DIAGNOSIS — E11.69 HYPERLIPIDEMIA ASSOCIATED WITH TYPE 2 DIABETES MELLITUS: ICD-10-CM

## 2023-11-10 DIAGNOSIS — E11.59 HYPERTENSION ASSOCIATED WITH DIABETES: ICD-10-CM

## 2023-11-10 DIAGNOSIS — R29.6 FREQUENT FALLS: ICD-10-CM

## 2023-11-10 DIAGNOSIS — E78.5 HYPERLIPIDEMIA ASSOCIATED WITH TYPE 2 DIABETES MELLITUS: ICD-10-CM

## 2023-11-10 PROCEDURE — 99204 PR OFFICE/OUTPT VISIT, NEW, LEVL IV, 45-59 MIN: ICD-10-PCS | Mod: S$PBB,,, | Performed by: INTERNAL MEDICINE

## 2023-11-10 PROCEDURE — 99204 OFFICE O/P NEW MOD 45 MIN: CPT | Mod: S$PBB,,, | Performed by: INTERNAL MEDICINE

## 2023-11-10 PROCEDURE — 99214 OFFICE O/P EST MOD 30 MIN: CPT | Mod: PBBFAC,PN | Performed by: INTERNAL MEDICINE

## 2023-11-10 PROCEDURE — 99999 PR PBB SHADOW E&M-EST. PATIENT-LVL IV: ICD-10-PCS | Mod: PBBFAC,,, | Performed by: INTERNAL MEDICINE

## 2023-11-10 PROCEDURE — 99999 PR PBB SHADOW E&M-EST. PATIENT-LVL IV: CPT | Mod: PBBFAC,,, | Performed by: INTERNAL MEDICINE

## 2023-11-10 NOTE — ASSESSMENT & PLAN NOTE
A calcium score is ordered.  Education provided to the patient regarding coronary artery disease workup and management.  She is asymptomatic.

## 2023-11-10 NOTE — PROGRESS NOTES
[facility] Cardiology     Subjective:    Patient ID:  Kalina Ryan is a 58 y.o. female who presents for evaluation of Hypertension, Hyperlipidemia, and Diabetes Mellitus    Review of patient's allergies indicates:   Allergen Reactions    Amoxicillin Hives    Penicillins Hives      She is here for cardiac assessment.  There is a family history of cardiovascular disease.  She is not a cigarette smoker.  She is on treatment for diabetes hypertension and hyperlipidemia.  Her LDL was 91 and treatment just initiated.  She is been diabetic for a year.  She is tolerating Crestor well.  Lisinopril was added to amlodipine for hypertension management.  She does have urinary protein deposition.  She is on Mounjaro.    She has had 4 episodes of collapse.  She does not have confirms syncope, 2 of the events were witnessed by family members and there was no loss of consciousness.  She has not had seizure activity.  She is working with Neurology.  She has a history of Guillain-Geary syndrome in remission years ago.  She has a left ankle fracture requiring surgery.  She is still walking with a walker.  She is off work currently.  Seizures do not appear to be her problem.        Review of Systems   Constitutional: Negative for chills, decreased appetite, diaphoresis, fever, malaise/fatigue, night sweats, weight gain and weight loss.   HENT:  Negative for congestion, ear discharge, ear pain, hearing loss, hoarse voice, nosebleeds, odynophagia, sore throat, stridor and tinnitus.    Eyes:  Negative for blurred vision, discharge, double vision, pain, photophobia, redness, vision loss in left eye, vision loss in right eye, visual disturbance and visual halos.   Cardiovascular:  Negative for chest pain, claudication, cyanosis, dyspnea on exertion, irregular heartbeat, leg swelling, near-syncope, orthopnea, palpitations, paroxysmal nocturnal dyspnea and syncope.    Respiratory:  Negative for cough, hemoptysis, shortness of breath, sleep disturbances due to breathing, snoring, sputum production and wheezing.    Endocrine: Negative for cold intolerance, heat intolerance, polydipsia, polyphagia and polyuria.   Hematologic/Lymphatic: Negative for adenopathy and bleeding problem. Does not bruise/bleed easily.   Skin:  Negative for color change, dry skin, flushing, itching, nail changes, poor wound healing, rash, skin cancer, suspicious lesions and unusual hair distribution.   Musculoskeletal:  Positive for joint pain. Negative for arthritis, back pain, falls, gout, joint swelling, muscle cramps, muscle weakness, myalgias, neck pain and stiffness.   Gastrointestinal:  Negative for bloating, abdominal pain, anorexia, change in bowel habit, bowel incontinence, constipation, diarrhea, dysphagia, excessive appetite, flatus, heartburn, hematemesis, hematochezia, hemorrhoids, jaundice, melena, nausea and vomiting.   Genitourinary:  Negative for bladder incontinence, decreased libido, dysuria, flank pain, frequency, genital sores, hematuria, hesitancy, incomplete emptying, nocturia and urgency.   Neurological:  Positive for weakness. Negative for aphonia, brief paralysis, difficulty with concentration, disturbances in coordination, excessive daytime sleepiness, dizziness, focal weakness, headaches, light-headedness, loss of balance, numbness, paresthesias, seizures, sensory change, tremors and vertigo.   Psychiatric/Behavioral:  Negative for altered mental status, depression, hallucinations, memory loss, substance abuse, suicidal ideas and thoughts of violence. The patient does not have insomnia and is not nervous/anxious.    Allergic/Immunologic: Negative for hives and persistent infections.        Objective:       Vitals:    11/10/23 0806   BP: 137/78   BP Location: Left arm   Patient Position: Sitting   BP Method: Medium (Automatic)   Pulse: 82   SpO2: 95%   Weight: 122.7 kg (270 lb  9.8 oz)    Physical Exam  Constitutional:       General: She is not in acute distress.     Appearance: She is well-developed. She is not diaphoretic.   HENT:      Head: Normocephalic and atraumatic.      Nose: Nose normal.   Eyes:      General: No scleral icterus.        Right eye: No discharge.      Conjunctiva/sclera: Conjunctivae normal.      Pupils: Pupils are equal, round, and reactive to light.   Neck:      Thyroid: No thyromegaly.      Vascular: No JVD.      Trachea: No tracheal deviation.   Cardiovascular:      Rate and Rhythm: Normal rate and regular rhythm.      Pulses:           Carotid pulses are 2+ on the right side and 2+ on the left side.       Radial pulses are 2+ on the right side and 2+ on the left side.        Dorsalis pedis pulses are 2+ on the right side and 2+ on the left side.        Posterior tibial pulses are 2+ on the right side and 2+ on the left side.      Heart sounds: Normal heart sounds. No murmur heard.     No friction rub. No gallop.   Pulmonary:      Effort: Pulmonary effort is normal. No respiratory distress.      Breath sounds: Normal breath sounds. No stridor. No wheezing or rales.   Chest:      Chest wall: No tenderness.   Abdominal:      General: Bowel sounds are normal. There is no distension.      Palpations: Abdomen is soft. There is no mass.      Tenderness: There is no abdominal tenderness. There is no guarding or rebound.   Musculoskeletal:         General: No tenderness. Normal range of motion.      Cervical back: Normal range of motion and neck supple.   Lymphadenopathy:      Cervical: No cervical adenopathy.   Skin:     General: Skin is warm and dry.      Coloration: Skin is not pale.      Findings: No erythema or rash.   Neurological:      Mental Status: She is alert and oriented to person, place, and time.      Cranial Nerves: No cranial nerve deficit.      Coordination: Coordination normal.   Psychiatric:         Behavior: Behavior normal.         Thought Content:  Thought content normal.         Judgment: Judgment normal.           Assessment:       1. Encounter for screening for cardiovascular disorders    2. Family history of heart disease    3. Frequent falls    4. Hyperlipidemia associated with type 2 diabetes mellitus    5. Hypertension associated with diabetes    6. Class 3 severe obesity due to excess calories with serious comorbidity and body mass index (BMI) of 50.0 to 59.9 in adult    7. Type 2 diabetes mellitus with diabetic neuropathy, without long-term current use of insulin    8. Weakness of both lower extremities    9. Family history of cardiovascular disease      Results for orders placed or performed in visit on 11/07/23   WALI   Result Value Ref Range    WALI Screen Negative <1:80 Negative <1:80   IMMUNOFIXATION ELECTROPHORESIS, SERUM   Result Value Ref Range    Immunofix Interp. SEE COMMENT    IMMUNOGLOBULIN FREE LT CHAINS BLOOD   Result Value Ref Range    Kappa Free Light Chains 5.39 (H) 0.33 - 1.94 mg/dL    Lambda Free Light Chains 1.99 0.57 - 2.63 mg/dL    Kappa/Lambda FLC Ratio 2.71 (H) 0.26 - 1.65   PROTEIN ELECTROPHORESIS, SERUM   Result Value Ref Range    Protein, Serum 7.2 6.0 - 8.4 g/dL    Albumin 3.72 3.35 - 5.55 g/dL    Alpha-1 0.40 0.17 - 0.41 g/dL    Alpha-2 0.99 0.43 - 0.99 g/dL    Beta 1.04 0.50 - 1.10 g/dL    Gamma 1.06 0.67 - 1.58 g/dL   SELENIUM SERUM   Result Value Ref Range    Selenium 128 110 - 165 mcg/L   HEAVY METALS SCREEN, BLOOD (QUANTITATIVE)   Result Value Ref Range    Arsenic 1 <13 ng/mL    Lead 1.0 <5.0 mcg/dL    Cadmium <0.2 <5.0 ng/mL    Mercury <1 <10 ng/mL    Venous/Capillary JOSE M     Street Address Test Not Performed     City Test Not Performed     State Test Not Performed     Zip Test Not Performed     County Test Not Performed     Guardian First Name Test Not Performed     Guardian Last Name Test Not Performed     Home Phone Test Not Performed     Race Test Not Performed    Pathologist Interpretation EMETERIO   Result Value Ref  Range    Pathologist Interpretation EMETERIO REVIEWED    Pathologist Interpretation SPE   Result Value Ref Range    Pathologist Interpretation SPE REVIEWED          Current Outpatient Medications:     acetaminophen (TYLENOL) 500 MG tablet, Take 2 tablets (1,000 mg total) by mouth every 8 (eight) hours., Disp: , Rfl: 0    amLODIPine (NORVASC) 5 MG tablet, TAKE 1 TABLET DAILY, Disp: 90 tablet, Rfl: 3    aspirin 81 MG Chew, Take 1 tablet (81 mg total) by mouth 2 (two) times a day. End dates 9 /29/23, Disp: , Rfl: 0    cholecalciferol, vitamin D3, (VITAMIN D3) 25 mcg (1,000 unit) Chew, Take 1 tablet by mouth once daily., Disp: , Rfl:     EScitalopram oxalate (LEXAPRO) 10 MG tablet, TAKE 1 TABLET DAILY, Disp: 90 tablet, Rfl: 3    lisinopriL (PRINIVIL,ZESTRIL) 5 MG tablet, Take 1 tablet (5 mg total) by mouth once daily., Disp: 90 tablet, Rfl: 1    melatonin 10 mg Tab, 1 tablet BEDTIME (route: oral), Disp: , Rfl:     methocarbamoL (ROBAXIN) 500 MG Tab, TAKE ONE TABLET BY MOUTH THREE TIMES DAILY FOR 14 DAYS, Disp: 42 tablet, Rfl: 0    multivitamin (ONE DAILY MULTIVITAMIN) per tablet, Take 1 tablet by mouth once daily., Disp: , Rfl:     pregabalin (LYRICA) 75 MG capsule, Take 1 capsule (75 mg total) by mouth 2 (two) times daily., Disp: 180 capsule, Rfl: 1    rosuvastatin (CRESTOR) 10 MG tablet, Take 1 tablet (10 mg total) by mouth once daily., Disp: 90 tablet, Rfl: 1    tirzepatide (MOUNJARO) 5 mg/0.5 mL PnIj, Inject 5 mg into the skin every 7 days. -every Sunday. Okay to use home supply if not on formulary at rehab, Disp: 4 pen , Rfl: 5    oxyCODONE (ROXICODONE) 15 MG Tab, Take by mouth., Disp: , Rfl:      Lab Results   Component Value Date    WBC 7.89 10/13/2023    RBC 4.57 10/13/2023    HGB 11.6 (L) 10/13/2023    HCT 37.3 10/13/2023    MCV 82 10/13/2023    MCH 25.4 (L) 10/13/2023    MCHC 31.1 (L) 10/13/2023    RDW 14.3 10/13/2023     10/13/2023    MPV 8.7 (L) 10/13/2023    GRAN 4.4 10/13/2023    GRAN 55.3 10/13/2023     LYMPH 2.9 10/13/2023    LYMPH 36.1 10/13/2023    MONO 0.4 10/13/2023    MONO 4.8 10/13/2023    EOS 0.3 10/13/2023    BASO 0.04 10/13/2023    EOSINOPHIL 3.2 10/13/2023    BASOPHIL 0.5 10/13/2023    MG 2.1 08/10/2023        CMP  Lab Results   Component Value Date     10/13/2023    K 3.9 10/13/2023     10/13/2023    CO2 27 10/13/2023    GLU 99 10/13/2023    BUN 23 (H) 10/13/2023    CREATININE 0.84 10/13/2023    CALCIUM 8.9 10/13/2023    PROT 7.7 10/13/2023    ALBUMIN 4.0 10/13/2023    BILITOT 0.2 10/13/2023    ALKPHOS 102 10/13/2023    AST 21 10/13/2023    ALT 18 10/13/2023    ANIONGAP 10 10/13/2023    ESTGFRAFRICA >60.0 07/25/2022    EGFRNONAA >60.0 07/25/2022        Lab Results   Component Value Date    LABBLOO NO GROWTH - FINAL REPORT. 11/10/2009    LABURIN KLEBSIELLA PNEUMONIAE  >100,000 cfu/ml   (A) 08/08/2023            Results for orders placed or performed during the hospital encounter of 08/07/23   EKG 12-lead    Collection Time: 08/08/23  6:15 AM    Narrative    Test Reason : Z01.810,    Vent. Rate : 077 BPM     Atrial Rate : 077 BPM     P-R Int : 148 ms          QRS Dur : 082 ms      QT Int : 394 ms       P-R-T Axes : 026 -08 013 degrees     QTc Int : 445 ms    Normal sinus rhythm  Low voltage QRS  Low anterior forces- Cannot rule out Anterior infarct ,age undetermined but  doubt possibly lead placement  Abnormal ECG  When compared with ECG of 17-NOV-2009 13:02,  QRS voltage has decreased  Anterior forces are less  Confirmed by Dong FAULKNER MD [id number]) on 8/8/2023 7:44:03 AM    Referred By: [email or web address]   SELF           Confirmed By:Dong FAULKNER MD        X-Ray Foot Complete Right 10/27/2023 [id number] Final   X-Ray Ankle Complete Bilateral 10/27/2023 [id number] Final   X-Ray Foot Complete Left 09/28/2023 [id number] Final            Plan:       Problem List Items Addressed This Visit          Cardiac/Vascular    Hypertension associated with diabetes     Amlodipine lisinopril to continue.  Some of her blood  pressures have been mildly elevated including today's readings 137/78 mm Hg.  No changes made.         Hyperlipidemia associated with type 2 diabetes mellitus     Crestor 10 mg recently initiated.  Pretreatment LDL 91 mg%.  Follow-up labs planned.  Thus far tolerated well.  Agree with treatment.  She is considered primary prevention at this time.         Family history of cardiovascular disease     A calcium score is ordered.  Education provided to the patient regarding coronary artery disease workup and management.  She is asymptomatic.            Endocrine    Type 2 diabetes mellitus with diabetic neuropathy, without long-term current use of insulin     Her urinalysis does show proteinuria.  She is on Mounjaro.  She was diagnosed 1 year ago.  Most recent hemoglobin A1c 5.4.  GFR greater than 60.         Class 3 severe obesity due to excess calories with serious comorbidity and body mass index (BMI) of 50.0 to 59.9 in adult     Weight loss encouraged.            Orthopedic    Weakness of both lower extremities     Neurologic assessment underway.  I told the patient that I do not think arrhythmias are the trigger for her collapse episodes.          Other Visit Diagnoses       Encounter for screening for cardiovascular disorders    -  Primary    Relevant Orders    CT Cardiac Scoring    Family history of heart disease        Frequent falls        Relevant Orders    Echo               Echocardiogram and calcium score ordered.  Agree with current management medication wise for her hypertension and hyperlipidemia.  She is taking aspirin currently because of her immobile state recommended by orthopedics.    I will give her phone reports of the tests ordered today.    Thank you for allowing me to participate in your patient's care.               Dontrell Cooper MD  11/10/2023   8:39 AM

## 2023-11-10 NOTE — ASSESSMENT & PLAN NOTE
Her urinalysis does show proteinuria.  She is on Mounjaro.  She was diagnosed 1 year ago.  Most recent hemoglobin A1c 5.4.  GFR greater than 60.

## 2023-11-10 NOTE — ASSESSMENT & PLAN NOTE
Amlodipine lisinopril to continue.  Some of her blood pressures have been mildly elevated including today's readings 137/78 mm Hg.  No changes made.

## 2023-11-10 NOTE — ASSESSMENT & PLAN NOTE
Neurologic assessment underway.  I told the patient that I do not think arrhythmias are the trigger for her collapse episodes.

## 2023-11-10 NOTE — ASSESSMENT & PLAN NOTE
Crestor 10 mg recently initiated.  Pretreatment LDL 91 mg%.  Follow-up labs planned.  Thus far tolerated well.  Agree with treatment.  She is considered primary prevention at this time.

## 2023-11-13 ENCOUNTER — PATIENT MESSAGE (OUTPATIENT)
Dept: NEUROLOGY | Facility: CLINIC | Age: 58
End: 2023-11-13
Payer: OTHER GOVERNMENT

## 2023-11-13 ENCOUNTER — OFFICE VISIT (OUTPATIENT)
Dept: FAMILY MEDICINE | Facility: CLINIC | Age: 58
End: 2023-11-13
Payer: OTHER GOVERNMENT

## 2023-11-13 VITALS
TEMPERATURE: 98 F | DIASTOLIC BLOOD PRESSURE: 74 MMHG | HEART RATE: 93 BPM | BODY MASS INDEX: 48.05 KG/M2 | SYSTOLIC BLOOD PRESSURE: 120 MMHG | HEIGHT: 63 IN | OXYGEN SATURATION: 98 % | WEIGHT: 271.19 LBS

## 2023-11-13 DIAGNOSIS — E11.59 HYPERTENSION ASSOCIATED WITH DIABETES: Primary | ICD-10-CM

## 2023-11-13 DIAGNOSIS — I15.2 HYPERTENSION ASSOCIATED WITH DIABETES: Primary | ICD-10-CM

## 2023-11-13 LAB
VIT A SERPL-MCNC: 48 UG/DL (ref 38–106)
VIT B1 BLD-MCNC: 56 UG/L (ref 38–122)

## 2023-11-13 PROCEDURE — 99214 OFFICE O/P EST MOD 30 MIN: CPT | Mod: PBBFAC,PN | Performed by: NURSE PRACTITIONER

## 2023-11-13 PROCEDURE — 99213 PR OFFICE/OUTPT VISIT, EST, LEVL III, 20-29 MIN: ICD-10-PCS | Mod: S$PBB,,, | Performed by: NURSE PRACTITIONER

## 2023-11-13 PROCEDURE — 99999 PR PBB SHADOW E&M-EST. PATIENT-LVL IV: CPT | Mod: PBBFAC,,, | Performed by: NURSE PRACTITIONER

## 2023-11-13 PROCEDURE — 99213 OFFICE O/P EST LOW 20 MIN: CPT | Mod: S$PBB,,, | Performed by: NURSE PRACTITIONER

## 2023-11-13 PROCEDURE — 99999 PR PBB SHADOW E&M-EST. PATIENT-LVL IV: ICD-10-PCS | Mod: PBBFAC,,, | Performed by: NURSE PRACTITIONER

## 2023-11-13 RX ORDER — SODIUM PICOSULFATE, MAGNESIUM OXIDE, AND ANHYDROUS CITRIC ACID 12; 3.5; 1 G/175ML; G/175ML; MG/175ML
LIQUID ORAL
Status: ON HOLD | COMMUNITY
Start: 2023-10-18 | End: 2023-12-21 | Stop reason: CLARIF

## 2023-11-13 NOTE — PROGRESS NOTES
"Subjective:       Patient ID: Kalina Ryan is a 58 y.o. female.    Chief Complaint: Blood Pressure Check (4 weeks F/U BP)    HPI    Patient is a 58-year-old white female with Type 2 Diabetes diagnosed in September 2019, hypertension, anxiety, Herlinda Lovely syndrome diagnosed in 2009 with generalized weakness and neuropathy to hands and feet, personal history of gastric bypass in 2009, obesity, and frequent fall s/p fractures to both lower extremities in August 2023 that is here today for blood pressure check    Hypertension   Currently taking Amlodipine 5 mg daily and Lisinopril 5 mg daily  Lisinopril 40 mg daily stopped during hospitalization in August 2023 due to low BP  Blood pressure stable on medications above  /74   Pulse 93   Temp 98.1 °F (36.7 °C) (Temporal)   Ht 5' 3" (1.6 m)   Wt 123 kg (271 lb 2.7 oz)   SpO2 98%   BMI 48.03 kg/m²       Review of Systems   HENT: Negative.     Respiratory: Negative.     Cardiovascular: Negative.    Gastrointestinal: Negative.          Objective:     Vitals:    11/13/23 0705   BP: 120/74   Pulse: 93   Temp: 98.1 °F (36.7 °C)   TempSrc: Temporal   SpO2: 98%   Weight: 123 kg (271 lb 2.7 oz)   Height: 5' 3" (1.6 m)          Physical Exam  Constitutional:       General: She is not in acute distress.     Appearance: Normal appearance. She is obese. She is not toxic-appearing or diaphoretic.      Comments: Body mass index is 48.03 kg/m².     Eyes:      Extraocular Movements: Extraocular movements intact.      Conjunctiva/sclera: Conjunctivae normal.   Cardiovascular:      Rate and Rhythm: Normal rate and regular rhythm.   Pulmonary:      Effort: Pulmonary effort is normal. No respiratory distress.   Musculoskeletal:      Comments: Ambulating with walker   Neurological:      Mental Status: She is alert.   Psychiatric:         Mood and Affect: Mood normal.         Behavior: Behavior normal.         Thought Content: Thought content normal.         Judgment: Judgment " normal.           Assessment:         ICD-10-CM ICD-9-CM   1. Hypertension associated with diabetes  E11.59 250.80    I15.2 401.9       Plan:       Hypertension associated with diabetes  Controlled on meds.      Follow up in about 5 months (around 4/13/2024) for fasting labs and follow up.     Patient's Medications   New Prescriptions    No medications on file   Previous Medications    ACETAMINOPHEN (TYLENOL) 500 MG TABLET    Take 2 tablets (1,000 mg total) by mouth every 8 (eight) hours.    AMLODIPINE (NORVASC) 5 MG TABLET    TAKE 1 TABLET DAILY    ASPIRIN 81 MG CHEW    Take 1 tablet (81 mg total) by mouth 2 (two) times a day. End dates 9 /29/23    CHOLECALCIFEROL, VITAMIN D3, (VITAMIN D3) 25 MCG (1,000 UNIT) CHEW    Take 1 tablet by mouth once daily.    CLENPIQ 10 MG-3.5 GRAM- 12 GRAM/175 ML SOLN    Take by mouth.    ESCITALOPRAM OXALATE (LEXAPRO) 10 MG TABLET    TAKE 1 TABLET DAILY    LISINOPRIL (PRINIVIL,ZESTRIL) 5 MG TABLET    Take 1 tablet (5 mg total) by mouth once daily.    MELATONIN 10 MG TAB    1 tablet BEDTIME (route: oral)    METHOCARBAMOL (ROBAXIN) 500 MG TAB    TAKE ONE TABLET BY MOUTH THREE TIMES DAILY FOR 14 DAYS    MULTIVITAMIN (ONE DAILY MULTIVITAMIN) PER TABLET    Take 1 tablet by mouth once daily.    PREGABALIN (LYRICA) 75 MG CAPSULE    Take 1 capsule (75 mg total) by mouth 2 (two) times daily.    ROSUVASTATIN (CRESTOR) 10 MG TABLET    Take 1 tablet (10 mg total) by mouth once daily.    TIRZEPATIDE (MOUNJARO) 5 MG/0.5 ML PNIJ    Inject 5 mg into the skin every 7 days. -every Sunday. 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    full paralysis but no intubation; residual weakness and neuropathy to hands and feet - diagnosed by Neurologist back in 2009 and last  seen by neurology in     Guillain Barré syndrome     full paralysis but no intubation; residual weakness and neuropathy to hands and feet - diagnosed by Neurologist back in  and last seen by neurology in     New onset type 2 diabetes mellitus 2019    Personal history of gastric bypass 2019    Reactive depression 10/19/2021       Past Surgical History:   Procedure Laterality Date    APPLICATION, EXTERNAL FIXATION DEVICE Left 2023    Procedure: APPLICATION, EXTERNAL FIXATION DEVICE left ankle, C-arm clock side, synthes;  Surgeon: Kali Santoyo MD;  Location: [address];  Service: Orthopedics;  Laterality: Left;    CHOLECYSTECTOMY  2009    CLOSED REDUCTION, FRACTURE, ANKLE, TRIMALLEOLAR Left 2023    Procedure: CLOSED REDUCTION, FRACTURE, ANKLE, TRIMALLEOLAR;  Surgeon: Kali Santoyo MD;  Location: [address];  Service: Orthopedics;  Laterality: Left;    COLONOSCOPY N/A 2018    Procedure: COLONOSCOPY;  Surgeon: Elpidio Fortune MD;  Location: [facility];  Service: Endoscopy;  Laterality: N/A;    FIXATION OF SYNDESMOSIS OF ANKLE Left 2023    Procedure: FIXATION, SYNDESMOSIS, ANKLE;  Surgeon: Kali Santoyo MD;  Location: [address];  Service: Orthopedics;  Laterality: Left;    GASTRIC BYPASS      HYSTERECTOMY      OPEN REDUCTION AND INTERNAL FIXATION (ORIF) OF INJURY OF ANKLE Left 2023    Procedure: ORIF, ANKLE;  Surgeon: Kali Santoyo MD;  Location: [address];  Service: Orthopedics;  Laterality: Left;    REMOVAL OF EXTERNAL FIXATION DEVICE Left 2023    Procedure: REMOVAL, EXTERNAL FIXATION DEVICE;  Surgeon: Kali Santoyo MD;  Location: [address];  Service: Orthopedics;  Laterality: Left;       Family History   Problem Relation Age of Onset    Heart disease Mother     Hypertension Mother     Diabetes Mother 65            Heart disease Father 60        Massive MI; DEFIB/ CABG    Diabetes Father              Hypertension Father     Dementia Father     Stroke Father     Pancreatic cancer Brother 54    Stomach cancer Brother     Cancer Brother             No Known Problems Daughter     No Known Problems Son     Heart disease Brother 56        maintain with medication - no surgery    Hypertension Brother     Hypertension Brother        Social History     Socioeconomic History    Marital status:    Occupational History    Occupation: work at bank   Tobacco Use    Smoking status: Never     Passive exposure: Never    Smokeless tobacco: Never   Substance and Sexual Activity    Alcohol use: Yes     Alcohol/week: 1.0 standard drink of alcohol     Types: 1 Drinks containing 0.5 oz of alcohol per week     Comment: every other weekend - 1 drink per occasion    Drug use: No    Sexual activity: Not Currently     Partners: Male     Birth control/protection: Partner-Vasectomy     Social Determinants of Health     Financial Resource Strain: Low Risk  (11/10/2023)    Overall Financial Resource Strain (CARDIA)     Difficulty of Paying Living Expenses: Not hard at all   Food Insecurity: No Food Insecurity (11/10/2023)    Hunger Vital Sign     Worried About Running Out of Food in the Last Year: Never true     Ran Out of Food in the Last Year: Never true   Transportation Needs: No Transportation Needs (11/10/2023)    PRAPARE - Transportation     Lack of Transportation (Medical): No     Lack of Transportation (Non-Medical): No   Physical Activity: Inactive (11/10/2023)    Exercise Vital Sign     Days of Exercise per Week: 0 days     Minutes of Exercise per Session: 0 min   Stress: No Stress Concern Present (11/10/2023)    [employer] [place] [employer] - Occupational Stress Questionnaire     Feeling of Stress : Only a little   Recent Concern: Stress - Stress Concern Present (10/16/2023)    [employer] [place] [employer] - Occupational Stress Questionnaire     Feeling of Stress : To some extent    Social Connections: Unknown (11/10/2023)    Social Connection and Isolation Panel [NHANES]     Frequency of Communication with Friends and Family: Once a week     Frequency of Social Gatherings with Friends and Family: Once a week     Active Member of Clubs or Organizations: No     Attends Club or Organization Meetings: Never     Marital Status:    Housing Stability: Low Risk  (11/10/2023)    Housing Stability Vital Sign     Unable to Pay for Housing in the Last Year: No     Number of Places Lived in the Last Year: 1     Unstable Housing in the Last Year: No

## 2023-11-14 ENCOUNTER — HOSPITAL ENCOUNTER (OUTPATIENT)
Dept: RADIOLOGY | Facility: HOSPITAL | Age: 58
Discharge: HOME | End: 2023-11-14
Attending: INTERNAL MEDICINE
Payer: OTHER GOVERNMENT

## 2023-11-14 DIAGNOSIS — Z13.6 ENCOUNTER FOR SCREENING FOR CARDIOVASCULAR DISORDERS: ICD-10-CM

## 2023-11-14 PROCEDURE — 75571 CT HRT W/O DYE W/CA TEST: CPT | Mod: 26,,, | Performed by: STUDENT IN AN ORGANIZED HEALTH CARE EDUCATION/TRAINING PROGRAM

## 2023-11-14 PROCEDURE — 75571 CT HRT W/O DYE W/CA TEST: CPT | Mod: TC

## 2023-11-14 PROCEDURE — 75571 CT CALCIUM SCORING CARDIAC: ICD-10-PCS | Mod: 26,,, | Performed by: STUDENT IN AN ORGANIZED HEALTH CARE EDUCATION/TRAINING PROGRAM

## 2023-11-15 ENCOUNTER — PATIENT MESSAGE (OUTPATIENT)
Dept: FAMILY MEDICINE | Facility: CLINIC | Age: 58
End: 2023-11-15
Payer: OTHER GOVERNMENT

## 2023-11-15 ENCOUNTER — PATIENT MESSAGE (OUTPATIENT)
Dept: CARDIOLOGY | Facility: HOSPITAL | Age: 58
End: 2023-11-15
Payer: OTHER GOVERNMENT

## 2023-11-16 ENCOUNTER — PATIENT MESSAGE (OUTPATIENT)
Dept: ORTHOPEDICS | Facility: CLINIC | Age: 58
End: 2023-11-16
Payer: OTHER GOVERNMENT

## 2023-11-17 DIAGNOSIS — S93.05XD CLOSED DISLOCATION OF LEFT ANKLE, SUBSEQUENT ENCOUNTER: Primary | ICD-10-CM

## 2023-11-17 DIAGNOSIS — S82.61XD CLOSED DISPLACED FRACTURE OF LATERAL MALLEOLUS OF RIGHT FIBULA WITH ROUTINE HEALING: ICD-10-CM

## 2023-11-19 DIAGNOSIS — E11.9 TYPE 2 DIABETES MELLITUS WITHOUT COMPLICATION, WITHOUT LONG-TERM CURRENT USE OF INSULIN: ICD-10-CM

## 2023-11-21 RX ORDER — TIRZEPATIDE 5 MG/.5ML
INJECTION, SOLUTION SUBCUTANEOUS
Qty: 12 PEN | Refills: 1 | Status: SHIPPED | OUTPATIENT
Start: 2023-11-21

## 2023-11-30 ENCOUNTER — OFFICE VISIT (OUTPATIENT)
Dept: HEMATOLOGY/ONCOLOGY | Facility: CLINIC | Age: 58
End: 2023-11-30
Payer: OTHER GOVERNMENT

## 2023-11-30 VITALS
HEIGHT: 63 IN | HEART RATE: 82 BPM | WEIGHT: 271.81 LBS | OXYGEN SATURATION: 98 % | TEMPERATURE: 98 F | BODY MASS INDEX: 48.16 KG/M2 | SYSTOLIC BLOOD PRESSURE: 129 MMHG | DIASTOLIC BLOOD PRESSURE: 82 MMHG | RESPIRATION RATE: 20 BRPM

## 2023-11-30 DIAGNOSIS — Z98.84 PERSONAL HISTORY OF GASTRIC BYPASS: ICD-10-CM

## 2023-11-30 DIAGNOSIS — D50.9 MICROCYTIC ANEMIA: ICD-10-CM

## 2023-11-30 DIAGNOSIS — R76.8 ELEVATED SERUM IMMUNOGLOBULIN FREE LIGHT CHAIN LEVEL: Primary | ICD-10-CM

## 2023-11-30 DIAGNOSIS — G61.81 CIDP (CHRONIC INFLAMMATORY DEMYELINATING POLYNEUROPATHY): ICD-10-CM

## 2023-11-30 PROCEDURE — 99999 PR PBB SHADOW E&M-EST. PATIENT-LVL IV: ICD-10-PCS | Mod: PBBFAC,,, | Performed by: NURSE PRACTITIONER

## 2023-11-30 PROCEDURE — 99214 OFFICE O/P EST MOD 30 MIN: CPT | Mod: PBBFAC,PO | Performed by: NURSE PRACTITIONER

## 2023-11-30 PROCEDURE — 99999 PR PBB SHADOW E&M-EST. PATIENT-LVL IV: CPT | Mod: PBBFAC,,, | Performed by: NURSE PRACTITIONER

## 2023-11-30 PROCEDURE — 99215 PR OFFICE/OUTPT VISIT, EST, LEVL V, 40-54 MIN: ICD-10-PCS | Mod: S$PBB,,, | Performed by: NURSE PRACTITIONER

## 2023-11-30 PROCEDURE — 99215 OFFICE O/P EST HI 40 MIN: CPT | Mod: S$PBB,,, | Performed by: NURSE PRACTITIONER

## 2023-11-30 NOTE — PROGRESS NOTES
Answers submitted by the patient for this visit:  Review of Systems Questionnaire (Submitted on 2023)  appetite change : No  unexpected weight change: No  mouth sores: No  visual disturbance: No  cough: No  shortness of breath: No  chest pain: No  abdominal pain: No  diarrhea: No  frequency: No  back pain: No  rash: No  headaches: No  adenopathy: No  nervous/ anxious: No      Patient ID: Kalina Ryan is a 58 y.o. female.    Chief Complaint: Elevated kappa free light chain          History     HPI    Kalina Ryan is a 58yr old female, new to hemoc and this provider, referred for elevated free light chain labsas per neuro findings/Dr MICHELLE Quintanilla.    Comobird diagnoses includes T2DM with sensory motor polyneuropathy, 2009 gastric bypass, weakness started after cholecystectomy leading to  diagnosis of GBS treated with IVIG, . She is being evaluated by neruology for CIDP (chronic inflammatory demyelinating polyneuropathy).     Pt states symptom wise her only issues is increased falls since 10/2022, she had been stable several years re GBS in . She denies HA, dizziness, cp, sob, abdominal pain, n/v/d, hematemesis, melena, hematuria, lymph adenoathy. She has had night sweats, hormonal related, not drenching. No bone pain. No sudden weight loss.     Pt with multiple falls this year secondary to progressive weakness with most recent 2023 resulting in ankle fracture and is now using walker.     CRAB symptoms re MGUS/MM: pt has had no elevated calcium labs, no ckd, mild microcytic anemia evidence, no bone pain.        Family history  Brother pancreatic and stomach cancer  age 52yrs old, brother with cardiac disease, brother with htn and kidney stones; parents cardiac disease and diabetes, both passed from cardiac complications. No family history of blood disorders, MM, lymphoma, leukemias.    Past Medical History:   Diagnosis Date    Anxiety     CIDP (chronic inflammatory demyelinating  polyneuropathy) 1/1/2009    full paralysis but no intubation; residual weakness and neuropathy to hands and feet - diagnosed by Neurologist back in 2009 and last seen by neurology in 2011    Guillain Barré syndrome 2009    full paralysis but no intubation; residual weakness and neuropathy to hands and feet - diagnosed by Neurologist back in 2009 and last seen by neurology in 2011    New onset type 2 diabetes mellitus 9/4/2019    Personal history of gastric bypass 9/4/2019    Reactive depression 10/19/2021       Past Surgical History:   Procedure Laterality Date    APPLICATION, EXTERNAL FIXATION DEVICE Left 8/9/2023    Procedure: APPLICATION, EXTERNAL FIXATION DEVICE left ankle, C-arm clock side, synthes;  Surgeon: Kali Santoyo MD;  Location: [address];  Service: Orthopedics;  Laterality: Left;    CHOLECYSTECTOMY  2009    CLOSED REDUCTION, FRACTURE, ANKLE, TRIMALLEOLAR Left 8/9/2023    Procedure: CLOSED REDUCTION, FRACTURE, ANKLE, TRIMALLEOLAR;  Surgeon: Kali Santoyo MD;  Location: [address];  Service: Orthopedics;  Laterality: Left;    COLONOSCOPY N/A 7/31/2018    Procedure: COLONOSCOPY;  Surgeon: Elpidio Fortune MD;  Location: [facility];  Service: Endoscopy;  Laterality: N/A;    FIXATION OF SYNDESMOSIS OF ANKLE Left 8/17/2023    Procedure: FIXATION, SYNDESMOSIS, ANKLE;  Surgeon: Kali Santoyo MD;  Location: [address];  Service: Orthopedics;  Laterality: Left;    GASTRIC BYPASS  2009    HYSTERECTOMY  1999    OPEN REDUCTION AND INTERNAL FIXATION (ORIF) OF INJURY OF ANKLE Left 8/17/2023    Procedure: ORIF, ANKLE;  Surgeon: Kali Santoyo MD;  Location: [address];  Service: Orthopedics;  Laterality: Left;    REMOVAL OF EXTERNAL FIXATION DEVICE Left 8/17/2023    Procedure: REMOVAL, EXTERNAL FIXATION DEVICE;  Surgeon: Kali Santoyo MD;  Location: [address];  Service: Orthopedics;  Laterality: Left;       Oncology History:  Oncology History    No history exists.  "       Review of Systems:  Review of Systems   Constitutional:  Negative for activity change, appetite change, chills, fatigue, fever and unexpected weight change.   HENT:  Negative for mouth sores.    Eyes:  Negative for photophobia and visual disturbance.   Respiratory:  Negative for cough and shortness of breath.    Cardiovascular:  Negative for chest pain and leg swelling.   Gastrointestinal:  Negative for abdominal pain, diarrhea, nausea and vomiting.   Genitourinary:  Negative for frequency and hematuria.   Musculoskeletal:  Negative for back pain, gait problem, joint swelling and myalgias.        + falls, 8/2023 bilat ankle fractures, left ankle surgery, right boot re treatment   Integumentary:  Negative for pallor and rash.   Neurological:  Positive for weakness. Negative for dizziness, syncope and headaches.   Hematological:  Negative for adenopathy.   Psychiatric/Behavioral:  Negative for sleep disturbance. The patient is not nervous/anxious.           Physical Exam   Vitals:  /82 (BP Location: Left arm, Patient Position: Sitting, BP Method: Large (Automatic))   Pulse 82   Temp 98.3 °F (36.8 °C) (Oral)   Resp 20   Ht 5' 3" (1.6 m)   Wt 123.3 kg (271 lb 13.2 oz)   SpO2 98%   BMI 48.15 kg/m²     Labs:  Lab Results   Component Value Date    WBC 9.05 11/30/2023    HGB 10.8 (L) 11/30/2023    HCT 34.8 (L) 11/30/2023    MCV 79 (L) 11/30/2023     11/30/2023     Lab Results   Component Value Date    UIBC 270 08/03/2010    IRON 79 08/03/2010    TIBC 349 08/03/2010    FESATURATED 23 08/03/2010             Physical Exam:  Physical Exam  Vitals and nursing note reviewed.   Constitutional:       General: She is not in acute distress.     Appearance: She is not toxic-appearing.   HENT:      Head: Normocephalic and atraumatic.      Right Ear: External ear normal.      Left Ear: External ear normal.   Eyes:      General: No scleral icterus.     Pupils: Pupils are equal, round, and reactive to light. "   Cardiovascular:      Rate and Rhythm: Normal rate and regular rhythm.      Pulses: Normal pulses.      Heart sounds: Normal heart sounds. No murmur heard.  Pulmonary:      Effort: Pulmonary effort is normal. No respiratory distress.      Breath sounds: Normal breath sounds.   Abdominal:      General: Bowel sounds are normal. There is no distension.      Palpations: Abdomen is soft.      Tenderness: There is no abdominal tenderness. There is no guarding.   Musculoskeletal:      Cervical back: Normal range of motion and neck supple. No rigidity.   Lymphadenopathy:      Cervical: No cervical adenopathy.   Skin:     General: Skin is warm and dry.      Capillary Refill: Capillary refill takes less than 2 seconds.      Coloration: Skin is not jaundiced or pale.      Findings: No bruising or erythema.   Neurological:      Mental Status: She is alert and oriented to person, place, and time.      Comments: Ambulatory with walker   Psychiatric:         Mood and Affect: Mood normal.         Behavior: Behavior normal.          ECOG:   ECOG SCORE    1 - Restricted in strenuous activity-ambulatory and able to carry out work of a light nature            PROCEDURES/IMAGING      Discussion     Problem List:  Problem List Items Addressed This Visit          Neuro    CIDP (chronic inflammatory demyelinating polyneuropathy)    Overview     full paralysis but no intubation; residual weakness and neuropathy to hands and feet - diagnosed by Neurologist back in 2009 and last seen by neurology in 2011            Endocrine    Personal history of gastric bypass    Relevant Orders    CBC Auto Differential    Iron and TIBC    Ferritin     Other Visit Diagnoses       Elevated serum immunoglobulin free light chain level    -  Primary    Relevant Orders    CBC Auto Differential    Iron and TIBC    Ferritin    Immunoglobulin Free LT Chains Blood    Immunoglobulins (IgG, IgA, IgM) Quantitative    Protein electrophoresis, serum    EMETERIO    C-REACTIVE  PROTEIN    Sedimentation rate    Lactate Dehydrogenase    Pathologist Interpretation Differential    Uric Acid    Microcytic anemia                 Elevated kappa free light chain, CIDP  - 11/7/23 elevated kappa FLC, normal ratio, normal SPEP and EMETERIO, no prior to compare with  - CRAB symptoms re MGUS/MM: pt has had no elevated calcium labs, no ckd, mild microcytic anemia evidence, no bone pain.    - History gastric bypass  - Will do labs to rule out anemia relation to MALA  - Will complete inflammatory markers and serum immunoglobulins today, repeat labs in 4 months to trend  - continue follow up with neurology re CIDP history and current workup  - Educated on MGUS evaluation, questions answered with pt and      Microcytic anemia, history of gastric bypass  - 10/13/23 hgb 11.6 g/dL, microcytic  - no CKD  - gastric bypass 2009  - will do iron studies, call with results, order iv iron if indicated            Bonnie Markham, NP-C  [facility]  Hematology/Oncology  [address]  [place]  [id number]  [phone]

## 2023-12-01 ENCOUNTER — TELEPHONE (OUTPATIENT)
Dept: HEMATOLOGY/ONCOLOGY | Facility: CLINIC | Age: 58
End: 2023-12-01
Payer: OTHER GOVERNMENT

## 2023-12-01 DIAGNOSIS — D50.8 IRON DEFICIENCY ANEMIA SECONDARY TO INADEQUATE DIETARY IRON INTAKE: Primary | ICD-10-CM

## 2023-12-01 RX ORDER — EPINEPHRINE 0.3 MG/.3ML
0.3 INJECTION SUBCUTANEOUS ONCE AS NEEDED
Status: CANCELLED | OUTPATIENT
Start: 2023-12-19

## 2023-12-01 RX ORDER — DIPHENHYDRAMINE HYDROCHLORIDE 50 MG/ML
50 INJECTION INTRAMUSCULAR; INTRAVENOUS ONCE AS NEEDED
Status: CANCELLED | OUTPATIENT
Start: 2023-12-19

## 2023-12-01 RX ORDER — SODIUM CHLORIDE 0.9 % (FLUSH) 0.9 %
10 SYRINGE (ML) INJECTION
Status: CANCELLED | OUTPATIENT
Start: 2023-12-19

## 2023-12-01 RX ORDER — HEPARIN 100 UNIT/ML
500 SYRINGE INTRAVENOUS
Status: CANCELLED | OUTPATIENT
Start: 2023-12-19

## 2023-12-01 RX ORDER — SODIUM CHLORIDE 0.9 % (FLUSH) 0.9 %
10 SYRINGE (ML) INJECTION
Status: CANCELLED | OUTPATIENT
Start: 2023-12-29

## 2023-12-01 RX ORDER — EPINEPHRINE 0.3 MG/.3ML
0.3 INJECTION SUBCUTANEOUS ONCE AS NEEDED
Status: CANCELLED | OUTPATIENT
Start: 2023-12-08

## 2023-12-01 RX ORDER — SODIUM CHLORIDE 0.9 % (FLUSH) 0.9 %
10 SYRINGE (ML) INJECTION
Status: CANCELLED | OUTPATIENT
Start: 2023-12-08

## 2023-12-01 RX ORDER — HEPARIN 100 UNIT/ML
500 SYRINGE INTRAVENOUS
Status: CANCELLED | OUTPATIENT
Start: 2023-12-29

## 2023-12-01 RX ORDER — EPINEPHRINE 0.3 MG/.3ML
0.3 INJECTION SUBCUTANEOUS ONCE AS NEEDED
Status: CANCELLED | OUTPATIENT
Start: 2024-01-05

## 2023-12-01 RX ORDER — HEPARIN 100 UNIT/ML
500 SYRINGE INTRAVENOUS
Status: CANCELLED | OUTPATIENT
Start: 2024-01-05

## 2023-12-01 RX ORDER — DIPHENHYDRAMINE HYDROCHLORIDE 50 MG/ML
50 INJECTION INTRAMUSCULAR; INTRAVENOUS ONCE AS NEEDED
Status: CANCELLED | OUTPATIENT
Start: 2023-12-29

## 2023-12-01 RX ORDER — HEPARIN 100 UNIT/ML
500 SYRINGE INTRAVENOUS
Status: CANCELLED | OUTPATIENT
Start: 2023-12-08

## 2023-12-01 RX ORDER — SODIUM CHLORIDE 0.9 % (FLUSH) 0.9 %
10 SYRINGE (ML) INJECTION
Status: CANCELLED | OUTPATIENT
Start: 2024-01-05

## 2023-12-01 RX ORDER — EPINEPHRINE 0.3 MG/.3ML
0.3 INJECTION SUBCUTANEOUS ONCE AS NEEDED
Status: CANCELLED | OUTPATIENT
Start: 2023-12-29

## 2023-12-01 RX ORDER — DIPHENHYDRAMINE HYDROCHLORIDE 50 MG/ML
50 INJECTION INTRAMUSCULAR; INTRAVENOUS ONCE AS NEEDED
Status: CANCELLED | OUTPATIENT
Start: 2023-12-08

## 2023-12-01 RX ORDER — DIPHENHYDRAMINE HYDROCHLORIDE 50 MG/ML
50 INJECTION INTRAMUSCULAR; INTRAVENOUS ONCE AS NEEDED
Status: CANCELLED | OUTPATIENT
Start: 2024-01-05

## 2023-12-01 NOTE — TELEPHONE ENCOUNTER
Lab Results   Component Value Date    UIBC 270 08/03/2010    IRON 27 (L) 11/30/2023    TRANSFERRIN 342 11/30/2023    TIBC 506 (H) 11/30/2023    FESATURATED 5 (L) 11/30/2023      Lab Results   Component Value Date    FERRITIN 11 (L) 11/30/2023     Left voicemail re iron studies low, microcytic anemia. Iron infusions recommended as discussed in clinic appt.

## 2023-12-05 ENCOUNTER — PATIENT MESSAGE (OUTPATIENT)
Dept: HEMATOLOGY/ONCOLOGY | Facility: CLINIC | Age: 58
End: 2023-12-05
Payer: OTHER GOVERNMENT

## 2023-12-08 ENCOUNTER — TELEPHONE (OUTPATIENT)
Dept: FAMILY MEDICINE | Facility: CLINIC | Age: 58
End: 2023-12-08
Payer: OTHER GOVERNMENT

## 2023-12-08 DIAGNOSIS — D50.8 IRON DEFICIENCY ANEMIA SECONDARY TO INADEQUATE DIETARY IRON INTAKE: Primary | ICD-10-CM

## 2023-12-08 NOTE — TELEPHONE ENCOUNTER
I have placed a referral to Hematology/Oncology Dr. Prince as per your request - this referral has already been completed.    Patient was referred to Hematology/Oncology by NEUROLOGIST Dr. Quintanilla on 11/13/2023 due to lab abnormalities so referral was already in by another specialist BUT since I know some insurances require the referral by PCP - I have also placed this referral as per your request.

## 2023-12-08 NOTE — TELEPHONE ENCOUNTER
From: Vianney Cesar   Sent: 12/7/2023  11:29 AM CST   To: Nany Hoff NP; Kavya Ayon Staff   Subject: REFERRAL TO RECEIVE SERVICES                     Good morning     I am sending this in basket to request a referral to be faxed to [facility] for your patient Ms. Kalina Ryan (MRN [id number]) to receive services at [facility] under the care of Dr. Farhad Prince (Hematology Oncology). Dr. Prince submitted a request for Ms. Ryan to receive a Venofer iron infusion and we received a letter from  stating that the pt must obtain a referral from the Primary Care Provider requesting approval for the pt to be treated by Dr. Farhad Prince. Please fax referral to [facility] ([facility]) so that the request can be completed. Please inform me via in basket so that I can follow up on the status of the request. Thank you so much for your assistance with this matter.     Vianney CHANCE RN   [facility]   [phone] (phone)   [phone] (fax)

## 2023-12-11 ENCOUNTER — PATIENT MESSAGE (OUTPATIENT)
Dept: ADMINISTRATIVE | Facility: HOSPITAL | Age: 58
End: 2023-12-11
Payer: OTHER GOVERNMENT

## 2023-12-11 ENCOUNTER — OFFICE VISIT (OUTPATIENT)
Dept: ORTHOPEDICS | Facility: CLINIC | Age: 58
End: 2023-12-11
Payer: OTHER GOVERNMENT

## 2023-12-11 ENCOUNTER — HOSPITAL ENCOUNTER (OUTPATIENT)
Dept: RADIOLOGY | Facility: HOSPITAL | Age: 58
Discharge: HOME | End: 2023-12-11
Attending: PHYSICIAN ASSISTANT
Payer: OTHER GOVERNMENT

## 2023-12-11 VITALS — WEIGHT: 271.81 LBS | HEIGHT: 63 IN | BODY MASS INDEX: 48.16 KG/M2

## 2023-12-11 DIAGNOSIS — S82.852A TRIMALLEOLAR FRACTURE OF ANKLE, CLOSED, LEFT, INITIAL ENCOUNTER: ICD-10-CM

## 2023-12-11 DIAGNOSIS — S92.354D CLOSED NONDISPLACED FRACTURE OF FIFTH METATARSAL BONE OF RIGHT FOOT WITH ROUTINE HEALING: ICD-10-CM

## 2023-12-11 DIAGNOSIS — M25.571 ACUTE BILATERAL ANKLE PAIN: Primary | ICD-10-CM

## 2023-12-11 DIAGNOSIS — M25.572 ACUTE BILATERAL ANKLE PAIN: ICD-10-CM

## 2023-12-11 DIAGNOSIS — M25.572 ACUTE BILATERAL ANKLE PAIN: Primary | ICD-10-CM

## 2023-12-11 DIAGNOSIS — M25.571 ACUTE BILATERAL ANKLE PAIN: ICD-10-CM

## 2023-12-11 DIAGNOSIS — S82.61XD CLOSED DISPLACED FRACTURE OF LATERAL MALLEOLUS OF RIGHT FIBULA WITH ROUTINE HEALING: ICD-10-CM

## 2023-12-11 DIAGNOSIS — S93.05XD CLOSED DISLOCATION OF LEFT ANKLE, SUBSEQUENT ENCOUNTER: ICD-10-CM

## 2023-12-11 PROCEDURE — 73610 X-RAY EXAM OF ANKLE: CPT | Mod: TC,50

## 2023-12-11 PROCEDURE — 73610 X-RAY EXAM OF ANKLE: CPT | Mod: 26,50,, | Performed by: RADIOLOGY

## 2023-12-11 PROCEDURE — 99214 OFFICE O/P EST MOD 30 MIN: CPT | Mod: PBBFAC | Performed by: PHYSICIAN ASSISTANT

## 2023-12-11 PROCEDURE — 99213 OFFICE O/P EST LOW 20 MIN: CPT | Mod: S$PBB,,, | Performed by: PHYSICIAN ASSISTANT

## 2023-12-11 PROCEDURE — 73610 XR ANKLE COMPLETE 3 VIEW BILATERAL: ICD-10-PCS | Mod: 26,50,, | Performed by: RADIOLOGY

## 2023-12-11 PROCEDURE — 99213 PR OFFICE/OUTPT VISIT, EST, LEVL III, 20-29 MIN: ICD-10-PCS | Mod: S$PBB,,, | Performed by: PHYSICIAN ASSISTANT

## 2023-12-11 PROCEDURE — 99999 PR PBB SHADOW E&M-EST. PATIENT-LVL IV: CPT | Mod: PBBFAC,,, | Performed by: PHYSICIAN ASSISTANT

## 2023-12-11 PROCEDURE — 99999 PR PBB SHADOW E&M-EST. PATIENT-LVL IV: ICD-10-PCS | Mod: PBBFAC,,, | Performed by: PHYSICIAN ASSISTANT

## 2023-12-11 NOTE — PROGRESS NOTES
Principal Orthopedic Problem:  Left trimalleolar ankle fracture status post closed reduction and spanning external fixation                          Left ankle syndesmosis disruption  Relevant Medical History: according to chart    PMHX:  DM (A1c 5.9), diabetic neuropathy, Guillain barre syndrome    Lives at home with   Occupation:   Prior to injury  Independent community ambulator, gait aids   Denies history of stroke, heart attack, cancer, blood clot, diabetes  Denies tobacco use  Denied chronic pain medication use prior to injury    HPI: Ms. Ryan is a 58 year old female who fell 5 feet. Suffered the following orthopedic injuries     XR of the R ankle shows possible lateral malleolus fx  XR of the R foot shows base of 5th metatarsal fx    XR of the L ankle show lateral dislocation of the ankle trimalleolar ankle fracture  08/17/23: :    Open reduction internal fixation left trimalleolar ankle fracture with fixation of medial and lateral malleoli, without fixation of posterior lip - 33321   Open treatment of left ankle syndesmosis disruption with reduction internal fixation - 24404  Removal, under anesthesia of external fixation left ankle - 19199  Neurolysis left superficial peroneal nerve - 26593    Ms. Ryan is here today for a post-operative visit    Interval History:  she reports that she is doing better over all.   Pain is controlled.  she is not taking pain medication.    She is getting PT. Not using any assistive device.  She continues to have issues with fatigue and balance.   she denies fever, chills, and sweats .     Physical exam:    Patient arrives to exam room: walked in unassisted Patient is  accompanied      RIGHT: mild swelling no TTP.     LEFT: Incision is clean, dry and intact.     Healing well no signs of breakdown or infection. Mild to moderate swelling, decreased range of motion in all planes. TTP to peroneals     RADS: All pertinent images were reviewed by  myself:   ANKLE:  Patient has had surgery in the tibia and fibula on the left with plates and screws alignment is satisfactory.  Ankle mortise is preserved.  Degenerative changes are seen in the tarsal bones on the left.     On the right ankle mortise is preserved.  A little irregularity of the tip of the fibular can be seen.  Degenerative changes seen in the tarsal bones.     Assessment:  Post-op visit ( 16 weeks)    Plan:  Current care, treatment plan, precautions, activity level/ modifications, limitations, rehabilitation exercises and proposed future treatment were discussed with the patient. We discussed the need to monitor for changes in symptoms and condition and report them to the physician.  Discussed importance of compliance with all appointments and follow up examinations.     WOUND CARE :  - You may use vitamin E (break the capsule open), aloe, scar cream (mederma) or hand lotion to rub the scar.  You must rub across the scar and in the line of the scar.  The goal is to make the scar not tender and make the scar not adhere (stick to) the tissues below the scar.      -Patient was advised to monitor wound closely and multiple times daily for any problems. Call clinic immediately or report to ED for immediate medical attention for any complications including reopening of wound, drainage, purulence, redness, streaking, odor, pain out of proportion, fever, chills, etc.       ACTIVITY:   - light  -range of motion as tolerated    - weight bearing as tolerated, BLE      -PT/OT, [facility],  Patient is responsible to establish and continue care      PAIN MEDICATION:   - Multimodal pain control  - Pain medication: refill was not needed  - Pain medication refill policy provided to patient for review, yes.    - Patient was informed a multi-modal approach is used to treat their pain. With the goal to get off of narcotic pain medication and discontinue as soon as possible.   - ice and elevation to reduce pain and  swelling     DVT PROPHYLAXIS:   - ASA 81 mg bid; pos top regime completed.     FOLLOW UP:   - Patient will follow up in the clinic in 8 weeks with  , sooner if any concerns.  - X-ray of her ankle bilateral and right foot  is needed.  SUNITAB     RTW, 01/02/24,light duty,  already dicussed with her employer per patient.   She does have some issues with stairs.      If there are any questions prior to scheduled follow up, the patient was instructed to contact the office

## 2023-12-12 ENCOUNTER — PATIENT MESSAGE (OUTPATIENT)
Dept: NEUROLOGY | Facility: CLINIC | Age: 58
End: 2023-12-12
Payer: OTHER GOVERNMENT

## 2023-12-12 ENCOUNTER — PATIENT OUTREACH (OUTPATIENT)
Dept: ADMINISTRATIVE | Facility: HOSPITAL | Age: 58
End: 2023-12-12
Payer: OTHER GOVERNMENT

## 2024-01-17 ENCOUNTER — PATIENT MESSAGE (OUTPATIENT)
Dept: HEMATOLOGY/ONCOLOGY | Facility: CLINIC | Age: 59
End: 2024-01-17
Payer: OTHER GOVERNMENT

## 2024-01-22 ENCOUNTER — OFFICE VISIT (OUTPATIENT)
Dept: URGENT CARE | Facility: CLINIC | Age: 59
End: 2024-01-22
Payer: OTHER GOVERNMENT

## 2024-01-22 VITALS
WEIGHT: 270 LBS | DIASTOLIC BLOOD PRESSURE: 54 MMHG | BODY MASS INDEX: 47.84 KG/M2 | OXYGEN SATURATION: 99 % | TEMPERATURE: 99 F | HEIGHT: 63 IN | RESPIRATION RATE: 16 BRPM | HEART RATE: 90 BPM | SYSTOLIC BLOOD PRESSURE: 100 MMHG

## 2024-01-22 DIAGNOSIS — R09.81 SINUS CONGESTION: ICD-10-CM

## 2024-01-22 DIAGNOSIS — R50.9 FEVER, UNSPECIFIED FEVER CAUSE: ICD-10-CM

## 2024-01-22 DIAGNOSIS — J11.1 FLU: Primary | ICD-10-CM

## 2024-01-22 DIAGNOSIS — R05.1 ACUTE COUGH: ICD-10-CM

## 2024-01-22 LAB
CTP QC/QA: YES
CTP QC/QA: YES
POC MOLECULAR INFLUENZA A AGN: NEGATIVE
POC MOLECULAR INFLUENZA B AGN: POSITIVE
SARS-COV-2 AG RESP QL IA.RAPID: NEGATIVE

## 2024-01-22 PROCEDURE — 87811 SARS-COV-2 COVID19 W/OPTIC: CPT | Mod: QW,S$GLB,, | Performed by: PHYSICIAN ASSISTANT

## 2024-01-22 PROCEDURE — 99214 OFFICE O/P EST MOD 30 MIN: CPT | Mod: S$GLB,,, | Performed by: PHYSICIAN ASSISTANT

## 2024-01-22 PROCEDURE — 87502 INFLUENZA DNA AMP PROBE: CPT | Mod: QW,S$GLB,, | Performed by: PHYSICIAN ASSISTANT

## 2024-01-22 RX ORDER — OSELTAMIVIR PHOSPHATE 75 MG/1
75 CAPSULE ORAL 2 TIMES DAILY
Qty: 10 CAPSULE | Refills: 0 | Status: SHIPPED | OUTPATIENT
Start: 2024-01-22 | End: 2024-01-27

## 2024-01-22 RX ORDER — PROMETHAZINE HYDROCHLORIDE AND DEXTROMETHORPHAN HYDROBROMIDE 6.25; 15 MG/5ML; MG/5ML
5 SYRUP ORAL EVERY 8 HOURS PRN
Qty: 118 ML | Refills: 0 | Status: SHIPPED | OUTPATIENT
Start: 2024-01-22 | End: 2024-02-01

## 2024-01-22 NOTE — PROGRESS NOTES
"Subjective:      Patient ID: Kalina Ryan is a 58 y.o. female.    Vitals:  height is 5' 3" (1.6 m) and weight is 122.5 kg (270 lb). Her oral temperature is 98.7 °F (37.1 °C). Her blood pressure is 100/54 (abnormal) and her pulse is 90. Her respiration is 16 and oxygen saturation is 99%.     Chief Complaint: Cough    58 year old female patient presenting with cough, sore throat, ear pain, fever, body aches x 2 days, patient states her family has the flu 1-2 weeks ago..  No fever chills.  Her main complaint of sinus congestion and sore throat.    Cough  This is a new problem. The current episode started in the past 7 days (2 days ago). The problem has been gradually worsening. The problem occurs every few minutes. The cough is Non-productive. Associated symptoms include chills, ear pain, a fever, headaches, nasal congestion, postnasal drip and a sore throat. Pertinent negatives include no ear congestion or wheezing. Nothing aggravates the symptoms. Treatments tried: thera flu, robitussin. The treatment provided mild relief. There is no history of asthma, bronchitis or pneumonia.       Constitution: Positive for chills and fever.   HENT:  Positive for ear pain, postnasal drip and sore throat.    Respiratory:  Positive for cough. Negative for wheezing.    Skin:  Negative for erythema.   Neurological:  Positive for headaches.      Objective:     Physical Exam   Constitutional: She is oriented to person, place, and time. She appears well-developed. She is cooperative.  Non-toxic appearance. She does not appear ill. No distress.   HENT:   Head: Normocephalic and atraumatic.   Ears:   Right Ear: Hearing, tympanic membrane, external ear and ear canal normal.   Left Ear: Hearing, tympanic membrane, external ear and ear canal normal.   Nose: Congestion present. No mucosal edema, rhinorrhea or nasal deformity. No epistaxis. Right sinus exhibits no maxillary sinus tenderness and no frontal sinus tenderness. Left sinus " exhibits no maxillary sinus tenderness and no frontal sinus tenderness.   Mouth/Throat: Uvula is midline, oropharynx is clear and moist and mucous membranes are normal. No trismus in the jaw. Normal dentition. No uvula swelling. No oropharyngeal exudate, posterior oropharyngeal edema or posterior oropharyngeal erythema.   Eyes: Conjunctivae, EOM and lids are normal. Pupils are equal, round, and reactive to light. Right eye exhibits no discharge. Left eye exhibits no discharge. No scleral icterus.   Neck: Trachea normal and phonation normal. Neck supple. No JVD present. No tracheal deviation present. No thyromegaly present. No edema present. No erythema present. No neck rigidity present.   Cardiovascular: Normal rate, regular rhythm, normal heart sounds and normal pulses.   No murmur heard.Exam reveals no gallop and no friction rub.   Pulmonary/Chest: Effort normal and breath sounds normal. No stridor. No respiratory distress. She has no decreased breath sounds. She has no wheezes. She has no rhonchi. She has no rales. She exhibits no tenderness.   Abdominal: Normal appearance. She exhibits no distension. Soft. There is no abdominal tenderness. There is no rebound and no guarding.   Musculoskeletal: Normal range of motion.         General: No deformity. Normal range of motion.   Neurological: She is alert and oriented to person, place, and time. She exhibits normal muscle tone. Coordination normal.   Skin: Skin is warm, dry, intact, not diaphoretic, not pale and no rash. Capillary refill takes less than 2 seconds. No erythema   Psychiatric: Her speech is normal and behavior is normal. Judgment and thought content normal.   Nursing note and vitals reviewed.    Results for orders placed or performed in visit on 01/22/24   POCT Influenza A/B Molecular   Result Value Ref Range    POC Molecular Influenza A Ag Negative Negative, Not Reported    POC Molecular Influenza B Ag Positive (A) Negative, Not Reported    Quality  Control Acceptable Yes    SARS Coronavirus 2 Antigen, POCT Manual Read   Result Value Ref Range    SARS Coronavirus 2 Antigen Negative Negative     Acceptable Yes     No results found.     Assessment:     1. Flu    2. Fever, unspecified fever cause    3. Sinus congestion    4. Acute cough        Plan:       Flu  -     oseltamivir (TAMIFLU) 75 MG capsule; Take 1 capsule (75 mg total) by mouth 2 (two) times daily. for 5 days  Dispense: 10 capsule; Refill: 0    Fever, unspecified fever cause  -     POCT Influenza A/B Molecular    Sinus congestion  -     SARS Coronavirus 2 Antigen, POCT Manual Read    Acute cough  -     promethazine-dextromethorphan (PROMETHAZINE-DM) 6.25-15 mg/5 mL Syrp; Take 5 mLs by mouth every 8 (eight) hours as needed (Cough).  Dispense: 118 mL; Refill: 0    Follow up if symptoms worsen or fail to improve, for F/U with PCP or ED.   Patient Instructions   Patient Education       Flu, Adult ED   General Information   You came to the Emergency Department (ED) for the flu. The flu, or influenza, is an infection that is caused by a virus. It is easy to spread from person to person. Most people get over the flu without any long-term problems. However, some people are more likely to get very sick from the flu.  You may need antiviral medicine to treat the flu. If so, it is important to take all of the medicine, even if you start to feel better. Antibiotics do not work on the flu.  What care is needed at home?   Call your regular doctor to let them know you were in the ED. Make a follow-up appointment if you were told to.  Drink lots of water, juice, or broth to replace fluids lost in runny nose and fever.  Take warm, steamy showers to help soothe the cough.  Use hard candy or cough drops to soothe sore throat and cough.  Wash your hands often. This will help keep others healthy.  Try to thin mucus.  Drink lots of liquids.  Use a cool mist humidifier to avoid dry air.  Use saline nose drops  to relieve stuffiness.  You may want to take drugs like ibuprofen, naproxen, or acetaminophen to help with fever and body aches.  When do I need to get emergency help?   Call for an ambulance right away if:   You are having so much trouble breathing that you can only say one or two words at a time.  You need to sit upright at all times to be able to breathe and or cannot lie down.  You are very tired from working to catch your breath or you are sweating from trying to breathe.  Return to the ED if:   You have trouble breathing when talking or sitting still.  You have severe chest discomfort.  You feel confused or disoriented.  When do I need to call the doctor?   You are throwing up and cant keep liquids down.  You develop early signs of fluid loss, such as:  Dark-colored urine.  Dry mouth.  Muscle cramps.  Lack of energy.  Feeling lightheaded when you get up.  You have new or worsening symptoms.  Last Reviewed Date   2020-09-24  Consumer Information Use and Disclaimer   This information is not specific medical advice and does not replace information you receive from your health care provider. This is only a brief summary of general information. It does NOT include all information about conditions, illnesses, injuries, tests, procedures, treatments, therapies, discharge instructions or life-style choices that may apply to you. You must talk with your health care provider for complete information about your health and treatment options. This information should not be used to decide whether or not to accept your health care providers advice, instructions or recommendations. Only your health care provider has the knowledge and training to provide advice that is right for you.  Copyright   Copyright © 2021 [employer] and its affiliates and/or licensors. All rights reserved.

## 2024-01-22 NOTE — PATIENT INSTRUCTIONS
Patient Education       Flu, Adult ED   General Information   You came to the Emergency Department (ED) for the flu. The flu, or influenza, is an infection that is caused by a virus. It is easy to spread from person to person. Most people get over the flu without any long-term problems. However, some people are more likely to get very sick from the flu.  You may need antiviral medicine to treat the flu. If so, it is important to take all of the medicine, even if you start to feel better. Antibiotics do not work on the flu.  What care is needed at home?   Call your regular doctor to let them know you were in the ED. Make a follow-up appointment if you were told to.  Drink lots of water, juice, or broth to replace fluids lost in runny nose and fever.  Take warm, steamy showers to help soothe the cough.  Use hard candy or cough drops to soothe sore throat and cough.  Wash your hands often. This will help keep others healthy.  Try to thin mucus.  Drink lots of liquids.  Use a cool mist humidifier to avoid dry air.  Use saline nose drops to relieve stuffiness.  You may want to take drugs like ibuprofen, naproxen, or acetaminophen to help with fever and body aches.  When do I need to get emergency help?   Call for an ambulance right away if:   You are having so much trouble breathing that you can only say one or two words at a time.  You need to sit upright at all times to be able to breathe and or cannot lie down.  You are very tired from working to catch your breath or you are sweating from trying to breathe.  Return to the ED if:   You have trouble breathing when talking or sitting still.  You have severe chest discomfort.  You feel confused or disoriented.  When do I need to call the doctor?   You are throwing up and cant keep liquids down.  You develop early signs of fluid loss, such as:  Dark-colored urine.  Dry mouth.  Muscle cramps.  Lack of energy.  Feeling lightheaded when you get up.  You have new or worsening  symptoms.  Last Reviewed Date   2020-09-24  Consumer Information Use and Disclaimer   This information is not specific medical advice and does not replace information you receive from your health care provider. This is only a brief summary of general information. It does NOT include all information about conditions, illnesses, injuries, tests, procedures, treatments, therapies, discharge instructions or life-style choices that may apply to you. You must talk with your health care provider for complete information about your health and treatment options. This information should not be used to decide whether or not to accept your health care providers advice, instructions or recommendations. Only your health care provider has the knowledge and training to provide advice that is right for you.  Copyright   Copyright © 2021 [employer], [employer] and its affiliates and/or licensors. All rights reserved.

## 2024-01-22 NOTE — LETTER
January 22, 2024      [facility]  [address], [address]  [place] [address]  Phone: [phone]  Fax: [phone]       Patient: Kalina Ryan   YOB: 1965  Date of Visit: 01/22/2024    To Whom It May Concern:    Karthikeyan Ryan  was at [facility] on 01/22/2024. The patient may return to work/school on January 24, 2024 with no restrictions. If you have any questions or concerns, or if I can be of further assistance, please do not hesitate to contact me.    Sincerely,    Manpreet Curtis PA

## 2024-01-22 NOTE — LETTER
January 23, 2024      [facility]  [address], [address]  [place] [address]  Phone: [phone]  Fax: [phone]       Patient: Kalina Ryan   YOB: 1965  Date of Visit: 01/23/2024    To Whom It May Concern:    Karthikeyan Ryan  was at [facility] on 01/23/2024. The patient may return to work/school on January 29, 2024 with no restrictions. If you have any questions or concerns, or if I can be of further assistance, please do not hesitate to contact me.    Sincerely,    Manpreet Curtis PA

## 2024-01-23 ENCOUNTER — TELEPHONE (OUTPATIENT)
Dept: URGENT CARE | Facility: CLINIC | Age: 59
End: 2024-01-23
Payer: OTHER GOVERNMENT

## 2024-01-23 ENCOUNTER — PATIENT MESSAGE (OUTPATIENT)
Dept: URGENT CARE | Facility: CLINIC | Age: 59
End: 2024-01-23
Payer: OTHER GOVERNMENT

## 2024-01-24 ENCOUNTER — PATIENT MESSAGE (OUTPATIENT)
Dept: FAMILY MEDICINE | Facility: CLINIC | Age: 59
End: 2024-01-24
Payer: OTHER GOVERNMENT

## 2024-01-24 DIAGNOSIS — G62.89 OTHER POLYNEUROPATHY: ICD-10-CM

## 2024-01-24 DIAGNOSIS — E11.40 TYPE 2 DIABETES MELLITUS WITH DIABETIC NEUROPATHY, WITHOUT LONG-TERM CURRENT USE OF INSULIN: ICD-10-CM

## 2024-01-24 RX ORDER — PREGABALIN 75 MG/1
75 CAPSULE ORAL 2 TIMES DAILY
Qty: 30 CAPSULE | Refills: 0 | Status: SHIPPED | OUTPATIENT
Start: 2024-01-24 | End: 2024-01-25 | Stop reason: SDUPTHER

## 2024-01-24 NOTE — TELEPHONE ENCOUNTER
I called pt to inform her that her extended doctor's note was ready. There was no answer. Message left.

## 2024-01-25 DIAGNOSIS — E11.40 TYPE 2 DIABETES MELLITUS WITH DIABETIC NEUROPATHY, WITHOUT LONG-TERM CURRENT USE OF INSULIN: ICD-10-CM

## 2024-01-25 DIAGNOSIS — G62.89 OTHER POLYNEUROPATHY: ICD-10-CM

## 2024-01-25 RX ORDER — PREGABALIN 75 MG/1
75 CAPSULE ORAL 2 TIMES DAILY
Qty: 180 CAPSULE | Refills: 0 | Status: SHIPPED | OUTPATIENT
Start: 2024-01-25 | End: 2024-04-23

## 2024-02-01 DIAGNOSIS — M25.572 ACUTE BILATERAL ANKLE PAIN: Primary | ICD-10-CM

## 2024-02-01 DIAGNOSIS — M25.571 ACUTE BILATERAL ANKLE PAIN: Primary | ICD-10-CM

## 2024-02-06 ENCOUNTER — OFFICE VISIT (OUTPATIENT)
Dept: ORTHOPEDICS | Facility: CLINIC | Age: 59
End: 2024-02-06
Payer: OTHER MISCELLANEOUS

## 2024-02-06 ENCOUNTER — HOSPITAL ENCOUNTER (OUTPATIENT)
Dept: RADIOLOGY | Facility: HOSPITAL | Age: 59
Discharge: HOME | End: 2024-02-06
Attending: ORTHOPAEDIC SURGERY
Payer: OTHER MISCELLANEOUS

## 2024-02-06 DIAGNOSIS — M25.572 ACUTE BILATERAL ANKLE PAIN: Primary | ICD-10-CM

## 2024-02-06 DIAGNOSIS — M25.572 ACUTE BILATERAL ANKLE PAIN: ICD-10-CM

## 2024-02-06 DIAGNOSIS — M25.571 ACUTE BILATERAL ANKLE PAIN: Primary | ICD-10-CM

## 2024-02-06 DIAGNOSIS — M25.571 ACUTE BILATERAL ANKLE PAIN: ICD-10-CM

## 2024-02-06 DIAGNOSIS — S92.354D CLOSED NONDISPLACED FRACTURE OF FIFTH METATARSAL BONE OF RIGHT FOOT WITH ROUTINE HEALING: ICD-10-CM

## 2024-02-06 DIAGNOSIS — S82.852D CLOSED TRIMALLEOLAR FRACTURE OF LEFT ANKLE WITH ROUTINE HEALING, SUBSEQUENT ENCOUNTER: ICD-10-CM

## 2024-02-06 DIAGNOSIS — S82.64XD CLOSED NONDISPLACED FRACTURE OF LATERAL MALLEOLUS OF RIGHT FIBULA WITH ROUTINE HEALING, SUBSEQUENT ENCOUNTER: Primary | ICD-10-CM

## 2024-02-06 PROCEDURE — 99214 OFFICE O/P EST MOD 30 MIN: CPT | Mod: S$GLB,,, | Performed by: ORTHOPAEDIC SURGERY

## 2024-02-06 PROCEDURE — 73610 X-RAY EXAM OF ANKLE: CPT | Mod: TC,50

## 2024-02-06 PROCEDURE — 73630 X-RAY EXAM OF FOOT: CPT | Mod: TC,RT

## 2024-02-06 PROCEDURE — 99999 PR PBB SHADOW E&M-EST. PATIENT-LVL III: CPT | Mod: PBBFAC,,, | Performed by: ORTHOPAEDIC SURGERY

## 2024-02-06 PROCEDURE — 73630 X-RAY EXAM OF FOOT: CPT | Mod: 26,RT,, | Performed by: INTERNAL MEDICINE

## 2024-02-06 PROCEDURE — 73610 X-RAY EXAM OF ANKLE: CPT | Mod: 26,50,, | Performed by: INTERNAL MEDICINE

## 2024-02-06 NOTE — PROGRESS NOTES
HPI: Fell 5 ft from a ladder sustaining a left trimalleolar ankle fracture and nondisplaced right Eric B lateral malleolus fracture and a right 5th metatarsal base fracture..  She was treated initially with spanning external fixation of the left ankle on 08/09/2023 and returned to the operating room on 08/17/2023 for definitive fixation.  She is done fairly well physical therapy and wear supportive tennis shoes most of the time.  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  She is some occasional swelling in the left ankle and does have stiffness not having got past neutral dorsiflexion with physical therapy.  She also has a long history of bilateral pes planus.  She does, however, state that she worked mostly on strengthening and stability and not a lot on stretching.    Past Medical History:   Diagnosis Date    Anxiety     CIDP (chronic inflammatory demyelinating polyneuropathy) 1/1/2009    full paralysis but no intubation; residual weakness and neuropathy to hands and feet - diagnosed by Neurologist back in 2009 and last seen by neurology in 2011    Guillain Barré syndrome 2009    full paralysis but no intubation; residual weakness and neuropathy to hands and feet - diagnosed by Neurologist back in 2009 and last seen by neurology in 2011    New onset type 2 diabetes mellitus 9/4/2019    Personal history of gastric bypass 9/4/2019    Reactive depression 10/19/2021       Current Outpatient Medications on File Prior to Visit   Medication Sig Dispense Refill    acetaminophen (TYLENOL) 500 MG tablet Take 2 tablets (1,000 mg total) by mouth every 8 (eight) hours.  0    amLODIPine (NORVASC) 5 MG tablet TAKE 1 TABLET DAILY 90 tablet 3    ascorbic acid, vitamin C, (VITAMIN C) 250 mg Chew Take by mouth once daily.      aspirin 81 MG Chew Take 1 tablet (81 mg total) by mouth 2 (two) times a day. End dates 9 /29/23  0    calcium-vitamin D 250 mg-2.5 mcg (100  unit) per tablet Take 1 tablet by mouth once daily.      EScitalopram oxalate (LEXAPRO) 10 MG tablet TAKE 1 TABLET DAILY 90 tablet 3    ferrous sulfate (FEOSOL) 325 mg (65 mg iron) Tab tablet Take 325 mg by mouth once daily.      lisinopriL (PRINIVIL,ZESTRIL) 5 MG tablet Take 1 tablet (5 mg total) by mouth once daily. 90 tablet 1    melatonin 10 mg Tab 1 tablet BEDTIME (route: oral)      methocarbamoL (ROBAXIN) 500 MG Tab TAKE ONE TABLET BY MOUTH THREE TIMES DAILY FOR 14 DAYS 42 tablet 0    multivitamin (ONE DAILY MULTIVITAMIN) per tablet Take 1 tablet by mouth once daily.      pregabalin (LYRICA) 75 MG capsule Take 1 capsule (75 mg total) by mouth 2 (two) times daily. 180 capsule 0    rosuvastatin (CRESTOR) 10 MG tablet Take 1 tablet (10 mg total) by mouth once daily. 90 tablet 1    tirzepatide (MOUNJARO) 5 mg/0.5 mL PnIj INJECT 5 MG UNDER THE SKIN EVERY 7 DAYS 12 Pen 1     No current facility-administered medications on file prior to visit.       Social History     Socioeconomic History    Marital status:    Occupational History    Occupation: work at bank   Tobacco Use    Smoking status: Never     Passive exposure: Never    Smokeless tobacco: Never   Substance and Sexual Activity    Alcohol use: Yes     Alcohol/week: 1.0 standard drink of alcohol     Types: 1 Drinks containing 0.5 oz of alcohol per week     Comment: every other weekend - 1 drink per occasion    Drug use: Never    Sexual activity: Yes     Partners: Male     Birth control/protection: Partner-Vasectomy     Social Determinants of Health     Financial Resource Strain: Low Risk  (11/10/2023)    Overall Financial Resource Strain (CARDIA)     Difficulty of Paying Living Expenses: Not hard at all   Food Insecurity: No Food Insecurity (11/10/2023)    Hunger Vital Sign     Worried About Running Out of Food in the Last Year: Never true     Ran Out of Food in the Last Year: Never true   Transportation Needs: No Transportation Needs (11/10/2023)     PRAPARE - Transportation     Lack of Transportation (Medical): No     Lack of Transportation (Non-Medical): No   Physical Activity: Inactive (11/10/2023)    Exercise Vital Sign     Days of Exercise per Week: 0 days     Minutes of Exercise per Session: 0 min   Stress: No Stress Concern Present (11/10/2023)    [employer] [place] [employer] - Occupational Stress Questionnaire     Feeling of Stress : Only a little   Recent Concern: Stress - Stress Concern Present (10/16/2023)    [employer] [place] [employer] - Occupational Stress Questionnaire     Feeling of Stress : To some extent   Social Connections: Unknown (11/10/2023)    Social Connection and Isolation Panel [NHANES]     Frequency of Communication with Friends and Family: Once a week     Frequency of Social Gatherings with Friends and Family: Once a week     Active Member of Clubs or Organizations: No     Attends Club or Organization Meetings: Never     Marital Status:    Housing Stability: Low Risk  (11/10/2023)    Housing Stability Vital Sign     Unable to Pay for Housing in the Last Year: No     Number of Places Lived in the Last Year: 1     Unstable Housing in the Last Year: No         ROS:  Patient denies constitutional symptoms, cardiac symptoms, respiratory symptoms, GI symptoms, Urinary symptoms, skin issues, allergic issues  The remainder of the musculoskeletal ROS is included in the HPI.    PE:    AA&O x 4.  NAD.  Normal mood and affect.  HEENT:  NCAT, sclera nonicteric  Lungs:  Respirations are equal and unlabored.  Abd: Non distended  :  No evidence of incontinence  CV:  2+ bilateral upper and lower extremity pulses.  Skin:  Intact throughout.    MS:  Right lower extremity with no significant swelling.  No tenderness to palpation about the lateral malleolus or the ATFL, CFL, deltoid.  5° dorsiflexion to 35° plantar flexion.  Hindfoot valgus with no significant when last mechanism.  No tenderness to palpation at the 5th  metatarsal base.  Left lower extremity with neutral dorsiflexion to 35° plantar flexion.  I am able to get her to 5° dorsiflexion with the knee at 90° flexion.  Some swelling in the area but no erythema or fluctuance.  Well-healed surgical incisions.  Significantly decreased light touch sensation.  No tenderness to palpation at the ATFL, CFL or deltoid.  No significant when last mechanism.  Mild hindfoot valgus, less than on the right.  Unable to do a single heel rise bilaterally.    Rads:  Reviewed and interpreted today by me.  Intact hardware on the left ankle with almost completely healed fractures.  Well-aligned mortise.  On the right side I can barely see the fracture line in the lateral malleolus although still present.  Right 5th metatarsal base fracture healed.    A/P:  58-year-old female 6 months status post fall from ladder with ORIF of left trimalleolar ankle fracture and non operative treatment of right Eric B lateral malleolus fracture.  Overall she is improving quite well.  She would also have bilateral pes planus which makes her rehab a little bit more difficult as well as bilateral neuropathy which is multifactorial.  I have encouraged her to work on significant stretching exercises for bilateral heel cords, most specifically on the left which is tighter than the right at present.  She is also going to continue wearing her compression stockings and supportive athletic shoes.  I encouraged her to get Spenco total MAC support shoe inserts bilaterally given the bilateral ankle injuries and pes planus.  I will have her follow up in 3 months' time with x-rays of bilateral ankles.  Sooner if she has any issues.

## 2024-03-12 ENCOUNTER — PATIENT OUTREACH (OUTPATIENT)
Dept: ADMINISTRATIVE | Facility: HOSPITAL | Age: 59
End: 2024-03-12
Payer: OTHER GOVERNMENT

## 2024-03-12 NOTE — PROGRESS NOTES
Population Health Chart Review & Patient Outreach Details      Additional [facility] Health Notes:               Updates Requested / Reviewed:      Updated Care Coordination Note, Care Everywhere, Care Team Updated, and Immunizations Reconciliation Completed or Queried: [place]         Health Maintenance Topics Overdue:      [facility] Score: 0     Patient is not due for any topics at this time.                       Health Maintenance Topic(s) Outreach Outcomes & Actions Taken:    Lab(s) - Outreach Outcomes & Actions Taken  :

## 2024-03-13 DIAGNOSIS — E11.59 HYPERTENSION ASSOCIATED WITH DIABETES: ICD-10-CM

## 2024-03-13 DIAGNOSIS — I15.2 HYPERTENSION ASSOCIATED WITH DIABETES: ICD-10-CM

## 2024-03-14 RX ORDER — LISINOPRIL 5 MG/1
5 TABLET ORAL
Qty: 90 TABLET | Refills: 0 | Status: SHIPPED | OUTPATIENT
Start: 2024-03-14 | End: 2024-06-12

## 2024-03-28 ENCOUNTER — OFFICE VISIT (OUTPATIENT)
Dept: HEMATOLOGY/ONCOLOGY | Facility: CLINIC | Age: 59
End: 2024-03-28
Payer: OTHER GOVERNMENT

## 2024-03-28 VITALS
HEART RATE: 78 BPM | OXYGEN SATURATION: 98 % | DIASTOLIC BLOOD PRESSURE: 83 MMHG | HEIGHT: 63 IN | SYSTOLIC BLOOD PRESSURE: 129 MMHG | BODY MASS INDEX: 48.24 KG/M2 | WEIGHT: 272.25 LBS | RESPIRATION RATE: 20 BRPM | TEMPERATURE: 98 F

## 2024-03-28 DIAGNOSIS — R76.8 ELEVATED SERUM IMMUNOGLOBULIN FREE LIGHT CHAIN LEVEL: Primary | ICD-10-CM

## 2024-03-28 DIAGNOSIS — R19.8 ALTERNATING CONSTIPATION AND DIARRHEA: ICD-10-CM

## 2024-03-28 DIAGNOSIS — G61.81 CIDP (CHRONIC INFLAMMATORY DEMYELINATING POLYNEUROPATHY): ICD-10-CM

## 2024-03-28 DIAGNOSIS — D50.8 IRON DEFICIENCY ANEMIA SECONDARY TO INADEQUATE DIETARY IRON INTAKE: ICD-10-CM

## 2024-03-28 DIAGNOSIS — R29.898 WEAKNESS OF BOTH LEGS: ICD-10-CM

## 2024-03-28 PROCEDURE — 99215 OFFICE O/P EST HI 40 MIN: CPT | Mod: PBBFAC,PN | Performed by: NURSE PRACTITIONER

## 2024-03-28 PROCEDURE — 99214 OFFICE O/P EST MOD 30 MIN: CPT | Mod: S$PBB,,, | Performed by: NURSE PRACTITIONER

## 2024-03-28 PROCEDURE — 99999 PR PBB SHADOW E&M-EST. PATIENT-LVL V: CPT | Mod: PBBFAC,,, | Performed by: NURSE PRACTITIONER

## 2024-03-28 RX ORDER — EPINEPHRINE 0.3 MG/.3ML
0.3 INJECTION SUBCUTANEOUS ONCE AS NEEDED
Status: CANCELLED | OUTPATIENT
Start: 2024-04-20

## 2024-03-28 RX ORDER — SODIUM CHLORIDE 0.9 % (FLUSH) 0.9 %
10 SYRINGE (ML) INJECTION
Status: CANCELLED | OUTPATIENT
Start: 2024-04-20

## 2024-03-28 RX ORDER — HEPARIN 100 UNIT/ML
500 SYRINGE INTRAVENOUS
Status: CANCELLED | OUTPATIENT
Start: 2024-04-20

## 2024-03-28 RX ORDER — SODIUM CHLORIDE 0.9 % (FLUSH) 0.9 %
10 SYRINGE (ML) INJECTION
Status: CANCELLED | OUTPATIENT
Start: 2024-04-04

## 2024-03-28 RX ORDER — EPINEPHRINE 0.3 MG/.3ML
0.3 INJECTION SUBCUTANEOUS ONCE AS NEEDED
Status: CANCELLED | OUTPATIENT
Start: 2024-04-13

## 2024-03-28 RX ORDER — SODIUM CHLORIDE 0.9 % (FLUSH) 0.9 %
10 SYRINGE (ML) INJECTION
Status: CANCELLED | OUTPATIENT
Start: 2024-04-13

## 2024-03-28 RX ORDER — DIPHENHYDRAMINE HYDROCHLORIDE 50 MG/ML
50 INJECTION INTRAMUSCULAR; INTRAVENOUS ONCE AS NEEDED
Status: CANCELLED | OUTPATIENT
Start: 2024-04-27

## 2024-03-28 RX ORDER — EPINEPHRINE 0.3 MG/.3ML
0.3 INJECTION SUBCUTANEOUS ONCE AS NEEDED
Status: CANCELLED | OUTPATIENT
Start: 2024-04-04

## 2024-03-28 RX ORDER — SODIUM CHLORIDE 0.9 % (FLUSH) 0.9 %
10 SYRINGE (ML) INJECTION
Status: CANCELLED | OUTPATIENT
Start: 2024-04-27

## 2024-03-28 RX ORDER — DIPHENHYDRAMINE HYDROCHLORIDE 50 MG/ML
50 INJECTION INTRAMUSCULAR; INTRAVENOUS ONCE AS NEEDED
Status: CANCELLED | OUTPATIENT
Start: 2024-04-20

## 2024-03-28 RX ORDER — HEPARIN 100 UNIT/ML
500 SYRINGE INTRAVENOUS
Status: CANCELLED | OUTPATIENT
Start: 2024-04-13

## 2024-03-28 RX ORDER — EPINEPHRINE 0.3 MG/.3ML
0.3 INJECTION SUBCUTANEOUS ONCE AS NEEDED
Status: CANCELLED | OUTPATIENT
Start: 2024-04-27

## 2024-03-28 RX ORDER — DIPHENHYDRAMINE HYDROCHLORIDE 50 MG/ML
50 INJECTION INTRAMUSCULAR; INTRAVENOUS ONCE AS NEEDED
Status: CANCELLED | OUTPATIENT
Start: 2024-04-04

## 2024-03-28 RX ORDER — DIPHENHYDRAMINE HYDROCHLORIDE 50 MG/ML
50 INJECTION INTRAMUSCULAR; INTRAVENOUS ONCE AS NEEDED
Status: CANCELLED | OUTPATIENT
Start: 2024-04-13

## 2024-03-28 RX ORDER — HEPARIN 100 UNIT/ML
500 SYRINGE INTRAVENOUS
Status: CANCELLED | OUTPATIENT
Start: 2024-04-27

## 2024-03-28 RX ORDER — HEPARIN 100 UNIT/ML
500 SYRINGE INTRAVENOUS
Status: CANCELLED | OUTPATIENT
Start: 2024-04-04

## 2024-03-28 NOTE — PROGRESS NOTES
Answers submitted by the patient for this visit:  Review of Systems Questionnaire (Submitted on 3/21/2024)  appetite change : No  unexpected weight change: No  mouth sores: No  visual disturbance: No  cough: No  shortness of breath: No  chest pain: No  abdominal pain: No  diarrhea: No  frequency: No  back pain: No  rash: No  headaches: No  adenopathy: No  nervous/ anxious: No        Patient ID: Kalina Ryan is a 59 y.o. female.    Chief Complaint: MALA, elevated FLC level          History     HPI    Kalina Ryan is a 58yr old female, new to hemoc and this provider, referred for elevated free light chain labsas per neuro findings/Dr MICHELLE Quintanilla.    Comobird diagnoses includes T2DM with sensory motor polyneuropathy, 2009 gastric bypass, weakness started after cholecystectomy leading to  diagnosis of GBS treated with IVIG, . She is being evaluated by neruology for CIDP (chronic inflammatory demyelinating polyneuropathy).     Pt states symptom wise her only issues is increased falls since 10/2022, she had been stable several years re GBS in . She denies HA, dizziness, cp, sob, abdominal pain, n/v/d, hematemesis, melena, hematuria, lymph adenoathy. She has had night sweats, hormonal related, not drenching. No bone pain. No sudden weight loss.     Pt with multiple falls this year secondary to progressive weakness with most recent 2023 resulting in ankle fracture and is now using walker.     CRAB symptoms re MGUS/MM: pt has had no elevated calcium labs, no ckd, mild microcytic anemia evidence, no bone pain.        Family history  Brother pancreatic and stomach cancer  age 52yrs old, brother with cardiac disease, brother with htn and kidney stones; parents cardiac disease and diabetes, both passed from cardiac complications. No family history of blood disorders, MM, lymphoma, leukemias.    INTERVAL  - pt here for follow up of MALA and elevated FLC 3/28/24  - iron sucrose x4  Dec 23/Jan24, tolerated  well  - states she is still dealing with bilat ankle weakness and will follow up again with neurology, no recent falls; healing to ankles she states did not occur as per ortho hopes   - denies HA, dizziness, cp, sob, abdominal pain, n/v/d, hematemesis, melena, hematuria, lymph adenoathy. She has had night sweats, hormonal related, not drenching. No bone pain. No sudden weight loss.           Past Medical History:   Diagnosis Date    Anxiety     CIDP (chronic inflammatory demyelinating polyneuropathy) 1/1/2009    full paralysis but no intubation; residual weakness and neuropathy to hands and feet - diagnosed by Neurologist back in 2009 and last seen by neurology in 2011    Guillain Barré syndrome 2009    full paralysis but no intubation; residual weakness and neuropathy to hands and feet - diagnosed by Neurologist back in 2009 and last seen by neurology in 2011    New onset type 2 diabetes mellitus 9/4/2019    Personal history of gastric bypass 9/4/2019    Reactive depression 10/19/2021       Past Surgical History:   Procedure Laterality Date    APPLICATION, EXTERNAL FIXATION DEVICE Left 8/9/2023    Procedure: APPLICATION, EXTERNAL FIXATION DEVICE left ankle, C-arm clock side, synthes;  Surgeon: Kali Santoyo MD;  Location: [facility] OR [address] [place] [address];  Service: Orthopedics;  Laterality: Left;    CHOLECYSTECTOMY  2009    CLOSED REDUCTION, FRACTURE, ANKLE, TRIMALLEOLAR Left 8/9/2023    Procedure: CLOSED REDUCTION, FRACTURE, ANKLE, TRIMALLEOLAR;  Surgeon: Kali Santoyo MD;  Location: [facility] OR [address] [place] [address];  Service: Orthopedics;  Laterality: Left;    COLONOSCOPY N/A 7/31/2018    Procedure: COLONOSCOPY;  Surgeon: Elpidio Fortune MD;  Location: [facility] ENDO;  Service: Endoscopy;  Laterality: N/A;    COLONOSCOPY N/A 12/21/2023    Procedure: COLONOSCOPY;  Surgeon: FANY Fortune MD;  Location: [facility] ENDO;  Service: Endoscopy;  Laterality: N/A;    FIXATION OF SYNDESMOSIS OF ANKLE Left 8/17/2023    Procedure: FIXATION,  "SYNDESMOSIS, ANKLE;  Surgeon: Kali Santoyo MD;  Location: [address];  Service: Orthopedics;  Laterality: Left;    GASTRIC BYPASS  2009    HYSTERECTOMY  1999    OPEN REDUCTION AND INTERNAL FIXATION (ORIF) OF INJURY OF ANKLE Left 8/17/2023    Procedure: ORIF, ANKLE;  Surgeon: Kali Santoyo MD;  Location: [address];  Service: Orthopedics;  Laterality: Left;    REMOVAL OF EXTERNAL FIXATION DEVICE Left 8/17/2023    Procedure: REMOVAL, EXTERNAL FIXATION DEVICE;  Surgeon: Kali Santoyo MD;  Location: [address];  Service: Orthopedics;  Laterality: Left;       Oncology History:  Oncology History    No history exists.        Review of Systems:  Review of Systems   Constitutional:  Negative for activity change, appetite change, chills, fatigue, fever and unexpected weight change.   HENT:  Negative for mouth sores.    Eyes:  Negative for photophobia and visual disturbance.   Respiratory:  Negative for cough and shortness of breath.    Cardiovascular:  Negative for chest pain and leg swelling.   Gastrointestinal:  Negative for abdominal pain, diarrhea, nausea and vomiting.   Genitourinary:  Negative for frequency and hematuria.   Musculoskeletal:  Negative for back pain, gait problem, joint swelling and myalgias.        + falls, 8/2023 bilat ankle fractures, left ankle surgery, no longer right boot   Integumentary:  Negative for pallor and rash.   Neurological:  Positive for weakness. Negative for dizziness, syncope and headaches.   Hematological:  Negative for adenopathy.   Psychiatric/Behavioral:  Negative for sleep disturbance. The patient is not nervous/anxious.           Physical Exam   Vitals:  /83 (BP Location: Left arm, Patient Position: Sitting, BP Method: Large (Automatic))   Pulse 78   Temp 97.7 °F (36.5 °C) (Oral)   Resp 20   Ht 5' 3" (1.6 m)   Wt 123.5 kg (272 lb 4.3 oz)   SpO2 98%   BMI 48.23 kg/m²     Labs:  Lab Results   Component Value Date    WBC 7.24 03/19/2024    " HGB 13.8 03/19/2024    HCT 41.8 03/19/2024    MCV 87 03/19/2024     03/19/2024     Lab Results   Component Value Date    UIBC 270 08/03/2010    IRON 50 03/19/2024    TRANSFERRIN 254 03/19/2024    TIBC 376 03/19/2024    FESATURATED 13 (L) 03/19/2024             Physical Exam:  Physical Exam  Vitals and nursing note reviewed.   Constitutional:       General: She is not in acute distress.     Appearance: She is not toxic-appearing.   HENT:      Head: Normocephalic and atraumatic.      Right Ear: External ear normal.      Left Ear: External ear normal.   Eyes:      General: No scleral icterus.     Pupils: Pupils are equal, round, and reactive to light.   Cardiovascular:      Rate and Rhythm: Normal rate and regular rhythm.      Pulses: Normal pulses.      Heart sounds: Normal heart sounds. No murmur heard.  Pulmonary:      Effort: Pulmonary effort is normal. No respiratory distress.      Breath sounds: Normal breath sounds.   Abdominal:      General: Bowel sounds are normal. There is no distension.      Palpations: Abdomen is soft.      Tenderness: There is no abdominal tenderness. There is no guarding.   Musculoskeletal:      Cervical back: Normal range of motion and neck supple. No rigidity.   Lymphadenopathy:      Cervical: No cervical adenopathy.   Skin:     General: Skin is warm and dry.      Capillary Refill: Capillary refill takes less than 2 seconds.      Coloration: Skin is not jaundiced or pale.      Findings: No bruising or erythema.   Neurological:      Mental Status: She is alert and oriented to person, place, and time. Mental status is at baseline.      Gait: Gait normal.      Comments: Ambulatory with steady gait, no further walker use, no boot to rle    Psychiatric:         Mood and Affect: Mood normal.         Behavior: Behavior normal.         Thought Content: Thought content normal.          ECOG:   ECOG SCORE    1 - Restricted in strenuous activity-ambulatory and able to carry out work of a  light nature            PROCEDURES/IMAGING      Discussion     Problem List:  Problem List Items Addressed This Visit          Neuro    CIDP (chronic inflammatory demyelinating polyneuropathy)    Overview     full paralysis but no intubation; residual weakness and neuropathy to hands and feet - diagnosed by Neurologist back in 2009 and last seen by neurology in 2011            Oncology    Iron deficiency anemia secondary to inadequate dietary iron intake       Orthopedic    Weakness of both legs     Other Visit Diagnoses       Elevated serum immunoglobulin free light chain level    -  Primary    Alternating constipation and diarrhea        Relevant Orders    Ambulatory referral/consult to Gastroenterology             Elevated kappa free light chain, CIDP  - 11/7/23 elevated kappa FLC, normal ratio, normal SPEP and EMETERIO, no prior to compare with  - CRAB symptoms re MGUS/MM: pt has had no elevated calcium labs, no ckd, mild microcytic anemia evidence, no bone pain.    - History gastric bypass  - Will do labs to rule out anemia relation to MALA  - Will complete inflammatory markers and serum immunoglobulins today, repeat labs in 4 months to trend  - continue follow up with neurology re CIDP history and current workup  - Educated on MGUS evaluation, questions answered with pt and    - 3/19/24 spep normal, EMETERIO no monoclonal peaks, anemia resolved    Microcytic anemia, history of gastric bypass  - 10/13/23 hgb 11.6 g/dL, microcytic  - no CKD  - gastric bypass 2009  - received iron sucrose x4 Dec 23/ [place] 24, tolerated well  - 3/19/24 cbc normal h/h, hgb improvement from 10.8 to 13.8, normocytic, microcytic resolved; iron improved with sat now 13%, tibc normal at 376 ug/dL ( previously 506), I do feel she will benefit from repeat iv iron, at least 2 more doses of iron sucrose, pt in agreement, orders placed  - rtc 3 months with labs    Alternating constipation and diarrhea  - no notable gi blood loss  - pt would like  referral to GI, orders placed    Weakness of both legs, CIDP  - stable  - pt would like neuro to do emg as this has not been done she reports  - to continue follow up with neurology          Bonnie Markham, NP-C  [facility]  Hematology/Oncology  [address] [address]  [place]  [phone]

## 2024-04-12 ENCOUNTER — PATIENT MESSAGE (OUTPATIENT)
Dept: FAMILY MEDICINE | Facility: CLINIC | Age: 59
End: 2024-04-12
Payer: OTHER GOVERNMENT

## 2024-04-12 DIAGNOSIS — E11.40 TYPE 2 DIABETES MELLITUS WITH DIABETIC NEUROPATHY, WITHOUT LONG-TERM CURRENT USE OF INSULIN: ICD-10-CM

## 2024-04-12 DIAGNOSIS — E78.5 HYPERLIPIDEMIA ASSOCIATED WITH TYPE 2 DIABETES MELLITUS: Primary | ICD-10-CM

## 2024-04-12 DIAGNOSIS — E11.69 HYPERLIPIDEMIA ASSOCIATED WITH TYPE 2 DIABETES MELLITUS: Primary | ICD-10-CM

## 2024-04-12 NOTE — TELEPHONE ENCOUNTER
Spoke with patient and made her lab apt for 4/13/2024 for 8:30 am at [facility].

## 2024-04-16 ENCOUNTER — OFFICE VISIT (OUTPATIENT)
Dept: FAMILY MEDICINE | Facility: CLINIC | Age: 59
End: 2024-04-16
Payer: OTHER GOVERNMENT

## 2024-04-16 VITALS
SYSTOLIC BLOOD PRESSURE: 120 MMHG | DIASTOLIC BLOOD PRESSURE: 70 MMHG | HEART RATE: 96 BPM | HEIGHT: 63 IN | WEIGHT: 273.69 LBS | OXYGEN SATURATION: 98 % | BODY MASS INDEX: 48.49 KG/M2 | TEMPERATURE: 98 F

## 2024-04-16 DIAGNOSIS — E11.59 HYPERTENSION ASSOCIATED WITH DIABETES: ICD-10-CM

## 2024-04-16 DIAGNOSIS — G61.81 CIDP (CHRONIC INFLAMMATORY DEMYELINATING POLYNEUROPATHY): ICD-10-CM

## 2024-04-16 DIAGNOSIS — E11.69 HYPERLIPIDEMIA ASSOCIATED WITH TYPE 2 DIABETES MELLITUS: ICD-10-CM

## 2024-04-16 DIAGNOSIS — Z86.010 PERSONAL HISTORY OF COLONIC POLYPS: ICD-10-CM

## 2024-04-16 DIAGNOSIS — R29.6 FREQUENT FALLS: ICD-10-CM

## 2024-04-16 DIAGNOSIS — Z82.49 FAMILY HISTORY OF CARDIOVASCULAR DISEASE: ICD-10-CM

## 2024-04-16 DIAGNOSIS — Z13.0 SCREENING FOR DEFICIENCY ANEMIA: ICD-10-CM

## 2024-04-16 DIAGNOSIS — E78.5 HYPERLIPIDEMIA ASSOCIATED WITH TYPE 2 DIABETES MELLITUS: ICD-10-CM

## 2024-04-16 DIAGNOSIS — G62.89 OTHER POLYNEUROPATHY: ICD-10-CM

## 2024-04-16 DIAGNOSIS — E66.01 CLASS 3 SEVERE OBESITY DUE TO EXCESS CALORIES WITH SERIOUS COMORBIDITY AND BODY MASS INDEX (BMI) OF 45.0 TO 49.9 IN ADULT: ICD-10-CM

## 2024-04-16 DIAGNOSIS — I15.2 HYPERTENSION ASSOCIATED WITH DIABETES: ICD-10-CM

## 2024-04-16 DIAGNOSIS — F32.9 REACTIVE DEPRESSION: ICD-10-CM

## 2024-04-16 DIAGNOSIS — Z00.00 ENCOUNTER FOR BLOOD TEST FOR ROUTINE GENERAL PHYSICAL EXAMINATION: ICD-10-CM

## 2024-04-16 DIAGNOSIS — E11.40 TYPE 2 DIABETES MELLITUS WITH DIABETIC NEUROPATHY, WITHOUT LONG-TERM CURRENT USE OF INSULIN: Primary | ICD-10-CM

## 2024-04-16 DIAGNOSIS — F41.9 ANXIETY: ICD-10-CM

## 2024-04-16 DIAGNOSIS — Z13.29 THYROID DISORDER SCREEN: ICD-10-CM

## 2024-04-16 DIAGNOSIS — E11.9 TYPE 2 DIABETES MELLITUS WITHOUT COMPLICATION, WITHOUT LONG-TERM CURRENT USE OF INSULIN: ICD-10-CM

## 2024-04-16 DIAGNOSIS — D50.8 IRON DEFICIENCY ANEMIA SECONDARY TO INADEQUATE DIETARY IRON INTAKE: ICD-10-CM

## 2024-04-16 PROBLEM — S93.05XA CLOSED DISLOCATION OF LEFT ANKLE: Status: RESOLVED | Noted: 2023-08-08 | Resolved: 2024-04-16

## 2024-04-16 PROBLEM — Z86.0100 PERSONAL HISTORY OF COLONIC POLYPS: Status: ACTIVE | Noted: 2024-04-16

## 2024-04-16 PROBLEM — S82.61XD CLOSED DISPLACED FRACTURE OF LATERAL MALLEOLUS OF RIGHT FIBULA WITH ROUTINE HEALING: Status: RESOLVED | Noted: 2023-08-08 | Resolved: 2024-04-16

## 2024-04-16 PROBLEM — S92.354D CLOSED NONDISPLACED FRACTURE OF FIFTH METATARSAL BONE OF RIGHT FOOT WITH ROUTINE HEALING: Status: RESOLVED | Noted: 2023-08-09 | Resolved: 2024-04-16

## 2024-04-16 PROCEDURE — 99215 OFFICE O/P EST HI 40 MIN: CPT | Mod: S$PBB,,, | Performed by: NURSE PRACTITIONER

## 2024-04-16 PROCEDURE — 99999 PR PBB SHADOW E&M-EST. PATIENT-LVL IV: CPT | Mod: PBBFAC,,, | Performed by: NURSE PRACTITIONER

## 2024-04-16 PROCEDURE — 99214 OFFICE O/P EST MOD 30 MIN: CPT | Mod: PBBFAC,PN | Performed by: NURSE PRACTITIONER

## 2024-04-16 RX ORDER — TIRZEPATIDE 5 MG/.5ML
5 INJECTION, SOLUTION SUBCUTANEOUS
Qty: 12 PEN | Refills: 1 | Status: SHIPPED | OUTPATIENT
Start: 2024-04-16 | End: 2024-06-18

## 2024-04-16 RX ORDER — ROSUVASTATIN CALCIUM 10 MG/1
10 TABLET, COATED ORAL DAILY
Qty: 90 TABLET | Refills: 1 | Status: SHIPPED | OUTPATIENT
Start: 2024-04-16

## 2024-04-16 NOTE — PROGRESS NOTES
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    Procedure: ORIF, ANKLE;  Surgeon: Kali Santoyo MD;  [facility];  Service: Orthopedics;  Laterality: Left;    REMOVAL OF EXTERNAL FIXATION DEVICE Left 2023    Procedure: REMOVAL, EXTERNAL FIXATION DEVICE;  Surgeon: Kali Santoyo MD;  [facility];  Service: Orthopedics;  Laterality: Left;       Family History   Problem Relation Name Age of Onset    Heart disease Mother G Lowry     Hypertension Mother G Lowry     Diabetes Mother G Lowry 65            Heart disease Father P Lowry 60        Massive MI; DEFIB/ CABG    Diabetes Father P Lowry             Hypertension Father P Lowry     Dementia Father P Lowry     Stroke Father P Lowry     Pancreatic cancer Brother P Lowry 54    Stomach cancer Brother P Lowry     Cancer Brother P Lowry             No Known Problems Daughter      No Known Problems Son      Heart disease Brother N/A 56        maintain with medication - no surgery    Hypertension Brother N/A     Hypertension Brother Keaton        Social History     Socioeconomic History    Marital status:    Occupational History    Occupation: work at bank   Tobacco Use    Smoking status: Never     Passive exposure: Never    Smokeless tobacco: Never   Substance and Sexual  Activity    Alcohol use: Yes     Alcohol/week: 1.0 standard drink of alcohol     Types: 1 Drinks containing 0.5 oz of alcohol per week     Comment: every other weekend - 1 drink per occasion    Drug use: Never    Sexual activity: Yes     Partners: Male     Birth control/protection: Partner-Vasectomy     Social Determinants of Health     Financial Resource Strain: Low Risk  (4/15/2024)    Overall Financial Resource Strain (CARDIA)     Difficulty of Paying Living Expenses: Not hard at all   Food Insecurity: No Food Insecurity (4/15/2024)    Hunger Vital Sign     Worried About Running Out of Food in the Last Year: Never true     Ran Out of Food in the Last Year: Never true   Transportation Needs: No Transportation Needs (4/15/2024)    PRAPARE - Transportation     Lack of Transportation (Medical): No     Lack of Transportation (Non-Medical): No   Physical Activity: Insufficiently Active (4/15/2024)    Exercise Vital Sign     Days of Exercise per Week: 1 day     Minutes of Exercise per Session: 20 min   Stress: No Stress Concern Present (4/15/2024)    [employer] [place] [employer] - Occupational Stress Questionnaire     Feeling of Stress : Not at all   Social Connections: Unknown (4/15/2024)    Social Connection and Isolation Panel [NHANES]     Frequency of Communication with Friends and Family: Once a week     Frequency of Social Gatherings with Friends and Family: Once a week     Active Member of Clubs or Organizations: No     Attends Club or Organization Meetings: Never     Marital Status:    Housing Stability: Low Risk  (11/10/2023)    Housing Stability Vital Sign     Unable to Pay for Housing in the Last Year: No     Number of Places Lived in the Last Year: 1     Unstable Housing in the Last Year: No

## 2024-04-17 ENCOUNTER — OFFICE VISIT (OUTPATIENT)
Dept: OPTOMETRY | Facility: CLINIC | Age: 59
End: 2024-04-17
Payer: OTHER GOVERNMENT

## 2024-04-17 DIAGNOSIS — H43.811 PVD (POSTERIOR VITREOUS DETACHMENT), RIGHT: ICD-10-CM

## 2024-04-17 DIAGNOSIS — H52.7 REFRACTIVE ERROR: ICD-10-CM

## 2024-04-17 DIAGNOSIS — H25.13 NUCLEAR SCLEROSIS OF BOTH EYES: ICD-10-CM

## 2024-04-17 DIAGNOSIS — H04.123 DRY EYE SYNDROME OF BOTH EYES: ICD-10-CM

## 2024-04-17 DIAGNOSIS — E11.9 TYPE 2 DIABETES MELLITUS WITHOUT RETINOPATHY: Primary | ICD-10-CM

## 2024-04-17 PROCEDURE — 99213 OFFICE O/P EST LOW 20 MIN: CPT | Mod: PBBFAC,PN | Performed by: OPTOMETRIST

## 2024-04-17 PROCEDURE — 92014 COMPRE OPH EXAM EST PT 1/>: CPT | Mod: S$PBB,,, | Performed by: OPTOMETRIST

## 2024-04-17 PROCEDURE — 92015 DETERMINE REFRACTIVE STATE: CPT | Mod: ,,, | Performed by: OPTOMETRIST

## 2024-04-17 PROCEDURE — 99999 PR PBB SHADOW E&M-EST. PATIENT-LVL III: CPT | Mod: PBBFAC,,, | Performed by: OPTOMETRIST

## 2024-04-17 NOTE — PROGRESS NOTES
HPI     Diabetic Eye Exam     Additional comments: DM chk           Comments    CC: Patient states that vision seems about the same as last visit in both   eyes.    CASIMIRO: 12/19/2022    (-) Changes in vision   (-) Pain  (-) Irritation   (-) Itching   (-) Flashes  (+) Floaters, longstanding   (+) Glasses wearer  (-) CL wearer  (-) Uses eye gtts    Does patient want a refraction today? Yes    (-) Eye injury  (-) Eye surgery   (+)POHx- See Chart Note  (-)FOHx    (+)DM  Hemoglobin A1C       Date                     Value               Ref Range             Status                04/12/2024               5.8 (H)             4.0 - 5.6 %           Final                  10/13/2023               5.4                 4.0 - 5.6 %           Final                  08/07/2023               5.9 (H)             4.0 - 5.6 %           Final                        Last edited by Sadie Sanches, OD on 4/17/2024 10:01 AM.            Assessment /Plan     For exam results, see Encounter Report.    Type 2 diabetes mellitus without retinopathy    PVD (posterior vitreous detachment), right    Nuclear sclerosis of both eyes    Dry eye syndrome of both eyes    Refractive error      No retinopathy noted, OU. Continue proper BS control and annual diabetic eye exams. Monitor yearly.      2. Educated pt on findings. No holes, tears, detachments 360 OU. (-)ho sign. Small vitreous tuft with surrounding pigment OD - longstanding. Educated on s/s of RD and to RTC ASAP if occur. Monitor 1 month unless changes noted sooner.      3. Educated pt on findings. Not visually significant. No need for removal at this time. Monitor yearly.      4. Educated pt on findings. Recommended ATs TID-QID for added lubrication and comfort. Monitor.    5. Updated SRx. Monitor yearly.          RTC in 1 year for annual eye exam or sooner if needed.

## 2024-04-18 DIAGNOSIS — E11.40 TYPE 2 DIABETES MELLITUS WITH DIABETIC NEUROPATHY, WITHOUT LONG-TERM CURRENT USE OF INSULIN: ICD-10-CM

## 2024-04-18 DIAGNOSIS — G62.89 OTHER POLYNEUROPATHY: ICD-10-CM

## 2024-04-23 RX ORDER — PREGABALIN 75 MG/1
75 CAPSULE ORAL 2 TIMES DAILY
Qty: 180 CAPSULE | Refills: 0 | Status: SHIPPED | OUTPATIENT
Start: 2024-04-23

## 2024-05-03 ENCOUNTER — OFFICE VISIT (OUTPATIENT)
Dept: GASTROENTEROLOGY | Facility: CLINIC | Age: 59
End: 2024-05-03
Payer: OTHER GOVERNMENT

## 2024-05-03 VITALS
HEIGHT: 63 IN | DIASTOLIC BLOOD PRESSURE: 81 MMHG | BODY MASS INDEX: 49.92 KG/M2 | SYSTOLIC BLOOD PRESSURE: 133 MMHG | WEIGHT: 281.75 LBS | HEART RATE: 84 BPM

## 2024-05-03 DIAGNOSIS — R19.8 ALTERNATING CONSTIPATION AND DIARRHEA: ICD-10-CM

## 2024-05-03 PROCEDURE — 99203 OFFICE O/P NEW LOW 30 MIN: CPT | Mod: S$PBB,,, | Performed by: NURSE PRACTITIONER

## 2024-05-03 PROCEDURE — 99215 OFFICE O/P EST HI 40 MIN: CPT | Mod: PBBFAC,PN | Performed by: NURSE PRACTITIONER

## 2024-05-03 PROCEDURE — 99999 PR PBB SHADOW E&M-EST. PATIENT-LVL V: CPT | Mod: PBBFAC,,, | Performed by: NURSE PRACTITIONER

## 2024-05-03 NOTE — PROGRESS NOTES
Subjective:       Patient ID: Kalina Ryan is a 59 y.o. female.    Chief Complaint: Diarrhea and Constipation    58 y/o female with hx of T2DM, cholecystectomy, GBS (2009), gastric bypass (2009), and MALA referred by hem/onc for alternating constipation and diarrhea. Patient reports irregular bowel habits for years. Has generalized abdominal cramping with almost immediate relief after BM. Often goes 2-4 days without BM followed by 1 solid stool then multiple episodes of diarrhea. Diarrhea also triggered by eating lettuce and fried foods. Denies nausea, vomiting, blood in stool, or unintentional weight loss. Colonoscopy in 12/2023 was normal.                Past Medical History:   Diagnosis Date    Anxiety     CIDP (chronic inflammatory demyelinating polyneuropathy) 01/01/2009    full paralysis but no intubation; residual weakness and neuropathy to hands and feet - diagnosed by Neurologist back in 2009 and last seen by neurology in 2011    Guillain Barré syndrome 2009    full paralysis but no intubation; residual weakness and neuropathy to hands and feet - diagnosed by Neurologist back in 2009 and last seen by neurology in 2011    Iron deficiency anemia, unspecified 2024    treated with iron infusions with Hematology    New onset type 2 diabetes mellitus 09/04/2019    Personal history of gastric bypass 09/04/2019    Reactive depression 10/19/2021       Past Surgical History:   Procedure Laterality Date    APPLICATION, EXTERNAL FIXATION DEVICE Left 8/9/2023    Procedure: APPLICATION, EXTERNAL FIXATION DEVICE left ankle, C-arm clock side, synthes;  Surgeon: Kali Santoyo MD;  Location: [address];  Service: Orthopedics;  Laterality: Left;    CHOLECYSTECTOMY  2009    CLOSED REDUCTION, FRACTURE, ANKLE, TRIMALLEOLAR Left 8/9/2023    Procedure: CLOSED REDUCTION, FRACTURE, ANKLE, TRIMALLEOLAR;  Surgeon: Kali Santoyo MD;  Location: [facility] OR [address] [place] [address];  Service: Orthopedics;  Laterality: Left;    COLONOSCOPY  N/A 2018    Procedure: COLONOSCOPY;  Surgeon: Elpidio Fortune MD;  Location: [facility] ENDO;  Service: Endoscopy;  Laterality: N/A;    COLONOSCOPY N/A 2023    Procedure: COLONOSCOPY;  Surgeon: FANY Fortune MD;  Location: [facility] ENDO;  Service: Endoscopy;  Laterality: N/A;    FIXATION OF SYNDESMOSIS OF ANKLE Left 2023    Procedure: FIXATION, SYNDESMOSIS, ANKLE;  Surgeon: Kali Santoyo MD;  Location: [facility] OR [address] [place] [address];  Service: Orthopedics;  Laterality: Left;    GASTRIC BYPASS      HYSTERECTOMY      OPEN REDUCTION AND INTERNAL FIXATION (ORIF) OF INJURY OF ANKLE Left 2023    Procedure: ORIF, ANKLE;  Surgeon: Kali Santoyo MD;  [facility];  Service: Orthopedics;  Laterality: Left;    REMOVAL OF EXTERNAL FIXATION DEVICE Left 2023    Procedure: REMOVAL, EXTERNAL FIXATION DEVICE;  Surgeon: Kali Santoyo MD;  Location: [facility] OR [address] [place] [address];  Service: Orthopedics;  Laterality: Left;       Family History   Problem Relation Name Age of Onset    Heart disease Mother G Lowry     Hypertension Mother G Lowry     Diabetes Mother G Lowry 65            Heart disease Father P Lowry 60        Massive MI; DEFIB/ CABG    Diabetes Father P Lowry             Hypertension Father P Lowry     Dementia Father P Lowry     Stroke Father P Lowry     Pancreatic cancer Brother P Lowry 54    Stomach cancer Brother P Lowry     Cancer Brother P Lowry             No Known Problems Daughter      No Known Problems Son      Heart disease Brother N/A 56        maintain with medication - no surgery    Hypertension Brother N/A     Hypertension Brother Keaton        Social History     Socioeconomic History    Marital status:    Occupational History    Occupation: work at bank   Tobacco Use    Smoking status: Never     Passive exposure: Never    Smokeless tobacco: Never   Substance and Sexual Activity    Alcohol use: Yes     Alcohol/week: 1.0 standard  "drink of alcohol     Types: 1 Drinks containing 0.5 oz of alcohol per week     Comment: every other weekend - 1 drink per occasion    Drug use: Never    Sexual activity: Yes     Partners: Male     Birth control/protection: Partner-Vasectomy     Social Determinants of Health     Financial Resource Strain: Low Risk  (4/15/2024)    Overall Financial Resource Strain (CARDIA)     Difficulty of Paying Living Expenses: Not hard at all   Food Insecurity: No Food Insecurity (4/15/2024)    Hunger Vital Sign     Worried About Running Out of Food in the Last Year: Never true     Ran Out of Food in the Last Year: Never true   Transportation Needs: No Transportation Needs (4/15/2024)    PRAPARE - Transportation     Lack of Transportation (Medical): No     Lack of Transportation (Non-Medical): No   Physical Activity: Insufficiently Active (4/15/2024)    Exercise Vital Sign     Days of Exercise per Week: 1 day     Minutes of Exercise per Session: 20 min   Stress: No Stress Concern Present (4/15/2024)    [employer] [place] [employer] - Occupational Stress Questionnaire     Feeling of Stress : Not at all   Housing Stability: Low Risk  (11/10/2023)    Housing Stability Vital Sign     Unable to Pay for Housing in the Last Year: No     Number of Places Lived in the Last Year: 1     Unstable Housing in the Last Year: No       Review of Systems   Constitutional:  Negative for appetite change and unexpected weight change.   HENT:  Negative for trouble swallowing.    Respiratory:  Negative for shortness of breath.    Cardiovascular:  Negative for chest pain.   Gastrointestinal:  Positive for abdominal pain, constipation and diarrhea.   Psychiatric/Behavioral:  Negative for dysphoric mood.          Objective:     Vitals:    05/03/24 0837   BP: 133/81   BP Location: Left arm   Patient Position: Sitting   BP Method: Large (Automatic)   Pulse: 84   Weight: 127.8 kg (281 lb 12 oz)   Height: 5' 3" (1.6 m)          Physical " Exam  Constitutional:       General: She is not in acute distress.     Appearance: Normal appearance.   HENT:      Head: Normocephalic.   Eyes:      Conjunctiva/sclera: Conjunctivae normal.   Pulmonary:      Effort: Pulmonary effort is normal. No respiratory distress.   Musculoskeletal:         General: No swelling.      Cervical back: Normal range of motion.   Skin:     General: Skin is warm and dry.   Neurological:      Mental Status: She is alert and oriented to person, place, and time.   Psychiatric:         Mood and Affect: Mood normal.         Behavior: Behavior normal.               Assessment:         ICD-10-CM ICD-9-CM   1. Alternating constipation and diarrhea  R19.8 787.99       Plan:       Alternating constipation and diarrhea  -     Ambulatory referral/consult to Gastroenterology  -     Celiac Disease Panel; Future; Expected date: 05/03/2024  -     Miralax EOD and fiber supplement daily      Follow up in 1 month (on 6/3/2024) for medication management, If symptoms worsen or fail to improve.     Patient's Medications   New Prescriptions    No medications on file   Previous Medications    ACETAMINOPHEN (TYLENOL) 500 MG TABLET    Take 2 tablets (1,000 mg total) by mouth every 8 (eight) hours.    AMLODIPINE (NORVASC) 5 MG TABLET    TAKE 1 TABLET DAILY    ASCORBIC ACID, VITAMIN C, (VITAMIN C) 250 MG CHEW    Take by mouth once daily.    ASPIRIN 81 MG CHEW    Take 1 tablet (81 mg total) by mouth 2 (two) times a day. End dates 9 /29/23    CALCIUM-VITAMIN D 250 MG-2.5 MCG (100 UNIT) PER TABLET    Take 1 tablet by mouth once daily.    ESCITALOPRAM OXALATE (LEXAPRO) 10 MG TABLET    TAKE 1 TABLET DAILY    FERROUS SULFATE (FEOSOL) 325 MG (65 MG IRON) TAB TABLET    Take 325 mg by mouth once daily.    LISINOPRIL (PRINIVIL,ZESTRIL) 5 MG TABLET    TAKE 1 TABLET DAILY    MELATONIN 10 MG TAB    1 tablet BEDTIME (route: oral)    MULTIVITAMIN (ONE DAILY MULTIVITAMIN) PER TABLET    Take 1 tablet by mouth once daily.     PREGABALIN (LYRICA) 75 MG CAPSULE    TAKE 1 CAPSULE TWICE A DAY    ROSUVASTATIN (CRESTOR) 10 MG TABLET    Take 1 tablet (10 mg total) by mouth once daily.    TIRZEPATIDE (MOUNJARO) 5 MG/0.5 ML PNIJ    Inject 5 mg into the skin every 7 days.   Modified Medications    No medications on file   Discontinued Medications    No medications on file

## 2024-05-03 NOTE — PATIENT INSTRUCTIONS
- Miralax 1 capful in 6-8 ounces of water or juice every other day  - Fiber supplement daily (such as Citrucel or Metamucil capsules or powder once daily).

## 2024-05-07 ENCOUNTER — PATIENT MESSAGE (OUTPATIENT)
Dept: CARDIOLOGY | Facility: CLINIC | Age: 59
End: 2024-05-07
Payer: OTHER GOVERNMENT

## 2024-05-08 ENCOUNTER — HOSPITAL ENCOUNTER (OUTPATIENT)
Dept: RADIOLOGY | Facility: HOSPITAL | Age: 59
Discharge: HOME | End: 2024-05-08
Attending: ORTHOPAEDIC SURGERY
Payer: OTHER MISCELLANEOUS

## 2024-05-08 ENCOUNTER — OFFICE VISIT (OUTPATIENT)
Dept: ORTHOPEDICS | Facility: CLINIC | Age: 59
End: 2024-05-08
Payer: OTHER MISCELLANEOUS

## 2024-05-08 DIAGNOSIS — M25.571 ACUTE BILATERAL ANKLE PAIN: ICD-10-CM

## 2024-05-08 DIAGNOSIS — M25.572 ACUTE BILATERAL ANKLE PAIN: ICD-10-CM

## 2024-05-08 DIAGNOSIS — S82.852D CLOSED TRIMALLEOLAR FRACTURE OF LEFT ANKLE WITH ROUTINE HEALING, SUBSEQUENT ENCOUNTER: Primary | ICD-10-CM

## 2024-05-08 DIAGNOSIS — S82.61XD CLOSED DISPLACED FRACTURE OF LATERAL MALLEOLUS OF RIGHT FIBULA WITH ROUTINE HEALING: ICD-10-CM

## 2024-05-08 PROCEDURE — 73610 X-RAY EXAM OF ANKLE: CPT | Mod: 26,50,, | Performed by: RADIOLOGY

## 2024-05-08 PROCEDURE — 73610 X-RAY EXAM OF ANKLE: CPT | Mod: TC,50

## 2024-05-08 PROCEDURE — 99214 OFFICE O/P EST MOD 30 MIN: CPT | Mod: S$GLB,,, | Performed by: ORTHOPAEDIC SURGERY

## 2024-05-08 PROCEDURE — 99999 PR PBB SHADOW E&M-EST. PATIENT-LVL III: CPT | Mod: PBBFAC,,, | Performed by: ORTHOPAEDIC SURGERY

## 2024-05-08 NOTE — PROGRESS NOTES
HPI: Fell 5 ft from a ladder sustaining a left trimalleolar ankle fracture and nondisplaced right Eric B lateral malleolus fracture and a right 5th metatarsal base fracture..  She was treated initially with spanning external fixation of the left ankle on 08/09/2023 and returned to the operating room on 08/17/2023 for definitive fixation.  She is done fairly well physical therapy and wear supportive tennis shoes most of the time.  She does have a history of Guillain-Aguilar as well as some diabetic neuropathy and does not have great feeling.  She is some occasional swelling in the left ankle and does have stiffness not having got past neutral dorsiflexion with physical therapy.  She also has a long history of bilateral pes planus.  She does, however, state that she worked mostly on strengthening and stability and not a lot on stretching.    05/08/2024:  Patient is doing very well overall.  She is ambulating fairly normally.  She is back at work full-time and performing full duty.  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  She does have some stiffness with her gait and occasional pain.  She finished physical therapy some time ago.    Past Medical History:   Diagnosis Date    Anxiety     CIDP (chronic inflammatory demyelinating polyneuropathy) 1/1/2009    full paralysis but no intubation; residual weakness and neuropathy to hands and feet - diagnosed by Neurologist back in 2009 and last seen by neurology in 2011    Guillain Barré syndrome 2009    full paralysis but no intubation; residual weakness and neuropathy to hands and feet - diagnosed by Neurologist back in 2009 and last seen by neurology in 2011    New onset type 2 diabetes mellitus 9/4/2019    Personal history of gastric bypass 9/4/2019    Reactive depression 10/19/2021       Current Outpatient Medications on File Prior to Visit   Medication Sig Dispense Refill    acetaminophen (TYLENOL) 500 MG tablet Take 2 tablets (1,000 mg total) by mouth every 8 (eight) hours.   0    amLODIPine (NORVASC) 5 MG tablet TAKE 1 TABLET DAILY 90 tablet 3    ascorbic acid, vitamin C, (VITAMIN C) 250 mg Chew Take by mouth once daily.      aspirin 81 MG Chew Take 1 tablet (81 mg total) by mouth 2 (two) times a day. End dates 9 /29/23  0    calcium-vitamin D 250 mg-2.5 mcg (100 unit) per tablet Take 1 tablet by mouth once daily.      EScitalopram oxalate (LEXAPRO) 10 MG tablet TAKE 1 TABLET DAILY 90 tablet 3    ferrous sulfate (FEOSOL) 325 mg (65 mg iron) Tab tablet Take 325 mg by mouth once daily.      lisinopriL (PRINIVIL,ZESTRIL) 5 MG tablet Take 1 tablet (5 mg total) by mouth once daily. 90 tablet 1    melatonin 10 mg Tab 1 tablet BEDTIME (route: oral)      methocarbamoL (ROBAXIN) 500 MG Tab TAKE ONE TABLET BY MOUTH THREE TIMES DAILY FOR 14 DAYS 42 tablet 0    multivitamin (ONE DAILY MULTIVITAMIN) per tablet Take 1 tablet by mouth once daily.      pregabalin (LYRICA) 75 MG capsule Take 1 capsule (75 mg total) by mouth 2 (two) times daily. 180 capsule 0    rosuvastatin (CRESTOR) 10 MG tablet Take 1 tablet (10 mg total) by mouth once daily. 90 tablet 1    tirzepatide (MOUNJARO) 5 mg/0.5 mL PnIj INJECT 5 MG UNDER THE SKIN EVERY 7 DAYS 12 Pen 1     No current facility-administered medications on file prior to visit.       Social History     Socioeconomic History    Marital status:    Occupational History    Occupation: work at bank   Tobacco Use    Smoking status: Never     Passive exposure: Never    Smokeless tobacco: Never   Substance and Sexual Activity    Alcohol use: Yes     Alcohol/week: 1.0 standard drink of alcohol     Types: 1 Drinks containing 0.5 oz of alcohol per week     Comment: every other weekend - 1 drink per occasion    Drug use: Never    Sexual activity: Yes     Partners: Male     Birth control/protection: Partner-Vasectomy     Social Determinants of Health     Financial Resource Strain: Low Risk  (11/10/2023)    Overall Financial Resource Strain (CARDIA)     Difficulty of  Paying Living Expenses: Not hard at all   Food Insecurity: No Food Insecurity (11/10/2023)    Hunger Vital Sign     Worried About Running Out of Food in the Last Year: Never true     Ran Out of Food in the Last Year: Never true   Transportation Needs: No Transportation Needs (11/10/2023)    PRAPARE - Transportation     Lack of Transportation (Medical): No     Lack of Transportation (Non-Medical): No   Physical Activity: Inactive (11/10/2023)    Exercise Vital Sign     Days of Exercise per Week: 0 days     Minutes of Exercise per Session: 0 min   Stress: No Stress Concern Present (11/10/2023)    [employer] [place] [employer] - Occupational Stress Questionnaire     Feeling of Stress : Only a little   Recent Concern: Stress - Stress Concern Present (10/16/2023)    [employer] [place] [employer] - Occupational Stress Questionnaire     Feeling of Stress : To some extent   Social Connections: Unknown (11/10/2023)    Social Connection and Isolation Panel [NHANES]     Frequency of Communication with Friends and Family: Once a week     Frequency of Social Gatherings with Friends and Family: Once a week     Active Member of Clubs or Organizations: No     Attends Club or Organization Meetings: Never     Marital Status:    Housing Stability: Low Risk  (11/10/2023)    Housing Stability Vital Sign     Unable to Pay for Housing in the Last Year: No     Number of Places Lived in the Last Year: 1     Unstable Housing in the Last Year: No         ROS:  Patient denies constitutional symptoms, cardiac symptoms, respiratory symptoms, GI symptoms, Urinary symptoms, skin issues, allergic issues  The remainder of the musculoskeletal ROS is included in the HPI.    PE:    AA&O x 4.  NAD.  Normal mood and affect.  HEENT:  NCAT, sclera nonicteric  Lungs:  Respirations are equal and unlabored.  Abd: Non distended  :  No evidence of incontinence  CV:  2+ bilateral upper and lower extremity pulses.  Skin:  Intact  throughout.    MS:  Right lower extremity with no significant swelling.  No tenderness to palpation about the lateral malleolus or the ATFL, CFL, deltoid.  5° dorsiflexion to 35° plantar flexion.  Hindfoot valgus with no significant when last mechanism.  No tenderness to palpation at the 5th metatarsal base.  Left lower extremity with neutral dorsiflexion to 35° plantar flexion.  I am able to get her to 5° dorsiflexion with the knee at 90° flexion.  No swelling or fluctuance.  Well-healed surgical incisions.  Light touch sensation intact.  Some hindfoot valgus.  Negative Windlass    mechanism  Rads:  Reviewed and interpreted today by me.  Intact hardware on the left ankle with  healed fractures.  Well-aligned mortise.  On the right side I can barely see the fracture line in the lateral malleolus although still present.      A/P:  58-year-old female 9 months status post fall from ladder with ORIF of left trimalleolar ankle fracture and non operative treatment of right Eric B lateral malleolus fracture.  She is doing well overall.  She will continue to work on her physical therapy exercises and stretching at home.  At this point I think she is at MMI from her injury.  She is already back at work full-time.  No restrictions.

## 2024-06-12 DIAGNOSIS — E11.59 HYPERTENSION ASSOCIATED WITH DIABETES: ICD-10-CM

## 2024-06-12 DIAGNOSIS — I15.2 HYPERTENSION ASSOCIATED WITH DIABETES: ICD-10-CM

## 2024-06-12 RX ORDER — LISINOPRIL 5 MG/1
5 TABLET ORAL
Qty: 90 TABLET | Refills: 3 | Status: SHIPPED | OUTPATIENT
Start: 2024-06-12

## 2024-06-17 DIAGNOSIS — I15.2 HYPERTENSION ASSOCIATED WITH DIABETES: ICD-10-CM

## 2024-06-17 DIAGNOSIS — E11.59 HYPERTENSION ASSOCIATED WITH DIABETES: ICD-10-CM

## 2024-06-17 RX ORDER — AMLODIPINE BESYLATE 5 MG/1
TABLET ORAL
Qty: 90 TABLET | Refills: 3 | Status: SHIPPED | OUTPATIENT
Start: 2024-06-17

## 2024-06-18 DIAGNOSIS — E11.9 TYPE 2 DIABETES MELLITUS WITHOUT COMPLICATION, WITHOUT LONG-TERM CURRENT USE OF INSULIN: ICD-10-CM

## 2024-06-18 DIAGNOSIS — F41.9 ANXIETY: ICD-10-CM

## 2024-06-18 RX ORDER — ESCITALOPRAM OXALATE 10 MG/1
10 TABLET ORAL
Qty: 90 TABLET | Refills: 3 | Status: SHIPPED | OUTPATIENT
Start: 2024-06-18

## 2024-06-18 RX ORDER — TIRZEPATIDE 5 MG/.5ML
INJECTION, SOLUTION SUBCUTANEOUS
Qty: 6 ML | Refills: 3 | Status: SHIPPED | OUTPATIENT
Start: 2024-06-18

## 2024-06-25 ENCOUNTER — OFFICE VISIT (OUTPATIENT)
Dept: GASTROENTEROLOGY | Facility: CLINIC | Age: 59
End: 2024-06-25
Payer: OTHER GOVERNMENT

## 2024-06-25 ENCOUNTER — TELEPHONE (OUTPATIENT)
Dept: HEMATOLOGY/ONCOLOGY | Facility: CLINIC | Age: 59
End: 2024-06-25
Payer: OTHER GOVERNMENT

## 2024-06-25 VITALS
HEART RATE: 79 BPM | DIASTOLIC BLOOD PRESSURE: 80 MMHG | SYSTOLIC BLOOD PRESSURE: 124 MMHG | WEIGHT: 276 LBS | BODY MASS INDEX: 48.9 KG/M2 | HEIGHT: 63 IN

## 2024-06-25 DIAGNOSIS — E66.01 CLASS 3 SEVERE OBESITY DUE TO EXCESS CALORIES WITH SERIOUS COMORBIDITY AND BODY MASS INDEX (BMI) OF 45.0 TO 49.9 IN ADULT: ICD-10-CM

## 2024-06-25 DIAGNOSIS — R19.8 ALTERNATING CONSTIPATION AND DIARRHEA: Primary | ICD-10-CM

## 2024-06-25 PROCEDURE — 99214 OFFICE O/P EST MOD 30 MIN: CPT | Mod: PBBFAC,PN | Performed by: NURSE PRACTITIONER

## 2024-06-25 PROCEDURE — 99999 PR PBB SHADOW E&M-EST. PATIENT-LVL IV: CPT | Mod: PBBFAC,,, | Performed by: NURSE PRACTITIONER

## 2024-06-25 PROCEDURE — 99214 OFFICE O/P EST MOD 30 MIN: CPT | Mod: S$PBB,,, | Performed by: NURSE PRACTITIONER

## 2024-06-25 NOTE — PROGRESS NOTES
Subjective:       Patient ID: Kalina Ryan is a 59 y.o. female.    Chief Complaint: Follow-up    60 y/o female with hx of T2DM, cholecystectomy, GBS (2009), gastric bypass (2009), and MALA presents for follow up for irregular bowel habits. Initially seen last month and was started on regimen of Miralax and Metamucil. Today she is feeling well. No abdominal pain. Has normal BM daily or every other day.  referred by hem/onc for alternating constipation and diarrhea. Patient reports irregular bowel habits for years. Denies nausea, vomiting, blood in stool, or unintentional weight loss.         Past Medical History:   Diagnosis Date    Anxiety     CIDP (chronic inflammatory demyelinating polyneuropathy) 01/01/2009    full paralysis but no intubation; residual weakness and neuropathy to hands and feet - diagnosed by Neurologist back in 2009 and last seen by neurology in 2011    Guillain Barré syndrome 2009    full paralysis but no intubation; residual weakness and neuropathy to hands and feet - diagnosed by Neurologist back in 2009 and last seen by neurology in 2011    Iron deficiency anemia, unspecified 2024    treated with iron infusions with Hematology    New onset type 2 diabetes mellitus 09/04/2019    Personal history of gastric bypass 09/04/2019    Reactive depression 10/19/2021       Past Surgical History:   Procedure Laterality Date    APPLICATION, EXTERNAL FIXATION DEVICE Left 8/9/2023    Procedure: APPLICATION, EXTERNAL FIXATION DEVICE left ankle, C-arm clock side, synthes;  Surgeon: Kali Santoyo MD;  Location: [address];  Service: Orthopedics;  Laterality: Left;    CHOLECYSTECTOMY  2009    CLOSED REDUCTION, FRACTURE, ANKLE, TRIMALLEOLAR Left 8/9/2023    Procedure: CLOSED REDUCTION, FRACTURE, ANKLE, TRIMALLEOLAR;  Surgeon: Kali Santoyo MD;  Location: [facility] OR [address] [place] [address];  Service: Orthopedics;  Laterality: Left;    COLONOSCOPY N/A 7/31/2018    Procedure: COLONOSCOPY;  Surgeon: Elpidio ENGLAND  MD Devika;  Location: [facility] ENDO;  Service: Endoscopy;  Laterality: N/A;    COLONOSCOPY N/A 2023    Procedure: COLONOSCOPY;  Surgeon: FANY Fortune MD;  Location: [facility] ENDO;  Service: Endoscopy;  Laterality: N/A;    FIXATION OF SYNDESMOSIS OF ANKLE Left 2023    Procedure: FIXATION, SYNDESMOSIS, ANKLE;  Surgeon: Kali Santoyo MD;  Location: [facility] OR [address] [place] [address];  Service: Orthopedics;  Laterality: Left;    GASTRIC BYPASS      HYSTERECTOMY      OPEN REDUCTION AND INTERNAL FIXATION (ORIF) OF INJURY OF ANKLE Left 2023    Procedure: ORIF, ANKLE;  Surgeon: Kali Santoyo MD;  [facility];  Service: Orthopedics;  Laterality: Left;    REMOVAL OF EXTERNAL FIXATION DEVICE Left 2023    Procedure: REMOVAL, EXTERNAL FIXATION DEVICE;  Surgeon: Kali Santoyo MD;  Location: [facility] OR [address] [place] [address];  Service: Orthopedics;  Laterality: Left;       Family History   Problem Relation Name Age of Onset    Heart disease Mother G Lowry     Hypertension Mother G Lowry     Diabetes Mother G Lowry 65            Heart disease Father P Lowry 60        Massive MI; DEFIB/ CABG    Diabetes Father P Lowry             Hypertension Father P Lowry     Dementia Father P Lowry     Stroke Father P Lowry     Pancreatic cancer Brother P Lowry 54    Stomach cancer Brother P Lowry     Cancer Brother P Lowry             No Known Problems Daughter      No Known Problems Son      Heart disease Brother N/A 56        maintain with medication - no surgery    Hypertension Brother N/A     Hypertension Brother Keaton        Social History     Socioeconomic History    Marital status:    Occupational History    Occupation: work at bank   Tobacco Use    Smoking status: Never     Passive exposure: Never    Smokeless tobacco: Never   Substance and Sexual Activity    Alcohol use: Yes     Alcohol/week: 1.0 standard drink of alcohol     Types: 1 Drinks containing 0.5 oz of  "alcohol per week     Comment: every other weekend - 1 drink per occasion    Drug use: Never    Sexual activity: Yes     Partners: Male     Birth control/protection: Partner-Vasectomy     Social Determinants of Health     Financial Resource Strain: Low Risk  (4/15/2024)    Overall Financial Resource Strain (CARDIA)     Difficulty of Paying Living Expenses: Not hard at all   Food Insecurity: No Food Insecurity (4/15/2024)    Hunger Vital Sign     Worried About Running Out of Food in the Last Year: Never true     Ran Out of Food in the Last Year: Never true   Transportation Needs: No Transportation Needs (4/15/2024)    PRAPARE - Transportation     Lack of Transportation (Medical): No     Lack of Transportation (Non-Medical): No   Physical Activity: Insufficiently Active (4/15/2024)    Exercise Vital Sign     Days of Exercise per Week: 1 day     Minutes of Exercise per Session: 20 min   Stress: No Stress Concern Present (4/15/2024)    [employer] [place] [employer] - Occupational Stress Questionnaire     Feeling of Stress : Not at all   Housing Stability: Low Risk  (11/10/2023)    Housing Stability Vital Sign     Unable to Pay for Housing in the Last Year: No     Number of Places Lived in the Last Year: 1     Unstable Housing in the Last Year: No       Review of Systems   Constitutional:  Negative for appetite change and unexpected weight change.   HENT:  Negative for trouble swallowing.    Respiratory:  Negative for shortness of breath.    Cardiovascular:  Negative for chest pain.   Gastrointestinal:  Positive for constipation and diarrhea. Negative for abdominal pain.   Psychiatric/Behavioral:  Negative for dysphoric mood.          Objective:     Vitals:    06/25/24 0901   BP: 124/80   BP Location: Left arm   Patient Position: Sitting   BP Method: Large (Automatic)   Pulse: 79   Weight: 125.2 kg (276 lb 0.3 oz)   Height: 5' 3" (1.6 m)          Physical Exam  Constitutional:       General: She is not in acute " distress.     Appearance: Normal appearance.   HENT:      Head: Normocephalic.   Eyes:      Conjunctiva/sclera: Conjunctivae normal.   Pulmonary:      Effort: Pulmonary effort is normal. No respiratory distress.   Musculoskeletal:         General: No swelling.      Cervical back: Normal range of motion.   Skin:     General: Skin is warm and dry.   Neurological:      Mental Status: She is alert and oriented to person, place, and time.   Psychiatric:         Mood and Affect: Mood normal.         Behavior: Behavior normal.               Assessment:       Alternating constipation and diarrhea    Class 3 severe obesity due to excess calories with serious comorbidity and body mass index (BMI) of 45.0 to 49.9 in adult          Plan:       Alternating constipation and diarrhea  - Continue Miralax EOD and fiber supplement daily     Class 3 severe obesity due to excess calories with serious comorbidity and body mass index (BMI) of 45.0 to 49.9 in adult  - Weight loss advised. Dietary and exercise counseling done.      Follow up if symptoms worsen or fail to improve.     Patient's Medications   New Prescriptions    No medications on file   Previous Medications    ACETAMINOPHEN (TYLENOL) 500 MG TABLET    Take 2 tablets (1,000 mg total) by mouth every 8 (eight) hours.    AMLODIPINE (NORVASC) 5 MG TABLET    TAKE 1 TABLET DAILY    ASCORBIC ACID, VITAMIN C, (VITAMIN C) 250 MG CHEW    Take by mouth once daily.    ASPIRIN 81 MG CHEW    Take 1 tablet (81 mg total) by mouth 2 (two) times a day. End dates 9 /29/23    CALCIUM-VITAMIN D 250 MG-2.5 MCG (100 UNIT) PER TABLET    Take 1 tablet by mouth once daily.    ESCITALOPRAM OXALATE (LEXAPRO) 10 MG TABLET    TAKE 1 TABLET DAILY    FERROUS SULFATE (FEOSOL) 325 MG (65 MG IRON) TAB TABLET    Take 325 mg by mouth once daily.    LISINOPRIL (PRINIVIL,ZESTRIL) 5 MG TABLET    TAKE 1 TABLET DAILY    MELATONIN 10 MG TAB    1 tablet BEDTIME (route: oral)    MOUNJARO 5 MG/0.5 ML PNIJ    INJECT 5 MG  UNDER THE SKIN EVERY 7 DAYS    MULTIVITAMIN (ONE DAILY MULTIVITAMIN) PER TABLET    Take 1 tablet by mouth once daily.    PREGABALIN (LYRICA) 75 MG CAPSULE    TAKE 1 CAPSULE TWICE A DAY    ROSUVASTATIN (CRESTOR) 10 MG TABLET    Take 1 tablet (10 mg total) by mouth once daily.   Modified Medications    No medications on file   Discontinued Medications    No medications on file

## 2024-06-26 ENCOUNTER — OFFICE VISIT (OUTPATIENT)
Dept: HEMATOLOGY/ONCOLOGY | Facility: CLINIC | Age: 59
End: 2024-06-26
Payer: OTHER GOVERNMENT

## 2024-06-26 VITALS
BODY MASS INDEX: 48.69 KG/M2 | RESPIRATION RATE: 20 BRPM | OXYGEN SATURATION: 97 % | HEIGHT: 63 IN | WEIGHT: 274.81 LBS | DIASTOLIC BLOOD PRESSURE: 79 MMHG | HEART RATE: 78 BPM | SYSTOLIC BLOOD PRESSURE: 125 MMHG | TEMPERATURE: 98 F

## 2024-06-26 DIAGNOSIS — R19.8 ALTERNATING CONSTIPATION AND DIARRHEA: ICD-10-CM

## 2024-06-26 DIAGNOSIS — Z98.84 PERSONAL HISTORY OF GASTRIC BYPASS: ICD-10-CM

## 2024-06-26 DIAGNOSIS — R29.898 WEAKNESS OF BOTH LEGS: ICD-10-CM

## 2024-06-26 DIAGNOSIS — G61.81 CIDP (CHRONIC INFLAMMATORY DEMYELINATING POLYNEUROPATHY): ICD-10-CM

## 2024-06-26 DIAGNOSIS — R76.8 ELEVATED SERUM IMMUNOGLOBULIN FREE LIGHT CHAIN LEVEL: Primary | ICD-10-CM

## 2024-06-26 DIAGNOSIS — D50.8 IRON DEFICIENCY ANEMIA SECONDARY TO INADEQUATE DIETARY IRON INTAKE: ICD-10-CM

## 2024-06-26 PROCEDURE — 99214 OFFICE O/P EST MOD 30 MIN: CPT | Mod: S$PBB,,, | Performed by: NURSE PRACTITIONER

## 2024-06-26 PROCEDURE — 99999 PR PBB SHADOW E&M-EST. PATIENT-LVL IV: CPT | Mod: PBBFAC,,, | Performed by: NURSE PRACTITIONER

## 2024-06-26 PROCEDURE — 99214 OFFICE O/P EST MOD 30 MIN: CPT | Mod: PBBFAC,PN | Performed by: NURSE PRACTITIONER

## 2024-06-26 NOTE — PROGRESS NOTES
Answers submitted by the patient for this visit:  Review of Systems Questionnaire (Submitted on 2024)  appetite change : No  unexpected weight change: No  mouth sores: No  visual disturbance: No  cough: No  shortness of breath: No  chest pain: No  abdominal pain: No  diarrhea: No  frequency: No  back pain: No  rash: No  headaches: No  adenopathy: No  nervous/ anxious: No        Patient ID: Kalina Ryan is a 59 y.o. female.    Chief Complaint: MALA      History     HPI    Kalina Ryan is a 58yr old female, new to hemoc and this provider, referred for elevated free light chain labsas per neuro findings/Dr MICHELLE Quintanilla.    Comobird diagnoses includes T2DM with sensory motor polyneuropathy, 2009 gastric bypass, weakness started after cholecystectomy leading to  diagnosis of GBS treated with IVIG, . She is being evaluated by neruology for CIDP (chronic inflammatory demyelinating polyneuropathy).     Pt states symptom wise her only issues is increased falls since 10/2022, she had been stable several years re GBS in . She denies HA, dizziness, cp, sob, abdominal pain, n/v/d, hematemesis, melena, hematuria, lymph adenoathy. She has had night sweats, hormonal related, not drenching. No bone pain. No sudden weight loss.     Pt with multiple falls this year secondary to progressive weakness with most recent 2023 resulting in ankle fracture and is now using walker.     CRAB symptoms re MGUS/MM: pt has had no elevated calcium labs, no ckd, mild microcytic anemia evidence, no bone pain.        Family history  Brother pancreatic and stomach cancer  age 52yrs old, brother with cardiac disease, brother with htn and kidney stones; parents cardiac disease and diabetes, both passed from cardiac complications. No family history of blood disorders, MM, lymphoma, leukemias.    INTERVAL  - pt here for follow up of MALA and elevated FLC 24  - iron sucrose x4  Dec 23/Jan24, tolerated well  - iron sucrose x4  Apr/May 2024  - reports she is doing well right now, retired, water aerobics, and helping with 3 grandchildren, feeling so much better she states  - last ortho visit 5/8/24 for left trimalleolar ORIF, continuing exercises/stretches at home, doing well, returned to work FT with no restrictions  - following gi, last appt 6/25/24, doing well on miralax and metamucil, no abdominal pain, normal bm, no gi blood loss  - denies HA, dizziness, cp, sob, abdominal pain, n/v/d, hematemesis, melena, hematuria, lymph adenoathy. She has had night sweats, hormonal related, not drenching. No bone pain. No sudden weight loss.           Past Medical History:   Diagnosis Date    Anxiety     CIDP (chronic inflammatory demyelinating polyneuropathy) 01/01/2009    full paralysis but no intubation; residual weakness and neuropathy to hands and feet - diagnosed by Neurologist back in 2009 and last seen by neurology in 2011    Guillain Barré syndrome 2009    full paralysis but no intubation; residual weakness and neuropathy to hands and feet - diagnosed by Neurologist back in 2009 and last seen by neurology in 2011    Iron deficiency anemia, unspecified 2024    treated with iron infusions with Hematology    New onset type 2 diabetes mellitus 09/04/2019    Personal history of gastric bypass 09/04/2019    Reactive depression 10/19/2021       Past Surgical History:   Procedure Laterality Date    APPLICATION, EXTERNAL FIXATION DEVICE Left 8/9/2023    Procedure: APPLICATION, EXTERNAL FIXATION DEVICE left ankle, C-arm clock side, synthes;  Surgeon: Kali Santoyo MD;  Location: [facility] OR [address] [place] [address];  Service: Orthopedics;  Laterality: Left;    CHOLECYSTECTOMY  2009    CLOSED REDUCTION, FRACTURE, ANKLE, TRIMALLEOLAR Left 8/9/2023    Procedure: CLOSED REDUCTION, FRACTURE, ANKLE, TRIMALLEOLAR;  Surgeon: Kali Santoyo MD;  Location: [facility] OR [address] [place] [address];  Service: Orthopedics;  Laterality: Left;    COLONOSCOPY N/A 7/31/2018    Procedure: COLONOSCOPY;   Surgeon: Elpidio Fortune MD;  Location: [facility] ENDO;  Service: Endoscopy;  Laterality: N/A;    COLONOSCOPY N/A 12/21/2023    Procedure: COLONOSCOPY;  Surgeon: FANY Fortune MD;  Location: [facility] ENDO;  Service: Endoscopy;  Laterality: N/A;    FIXATION OF SYNDESMOSIS OF ANKLE Left 8/17/2023    Procedure: FIXATION, SYNDESMOSIS, ANKLE;  Surgeon: Kali Santoyo MD;  Location: [facility] OR [address] [place] [address];  Service: Orthopedics;  Laterality: Left;    GASTRIC BYPASS  2009    HYSTERECTOMY  1999    OPEN REDUCTION AND INTERNAL FIXATION (ORIF) OF INJURY OF ANKLE Left 8/17/2023    Procedure: ORIF, ANKLE;  Surgeon: Kali Santoyo MD;  Location: [facility] OR [address] [place] [address];  Service: Orthopedics;  Laterality: Left;    REMOVAL OF EXTERNAL FIXATION DEVICE Left 8/17/2023    Procedure: REMOVAL, EXTERNAL FIXATION DEVICE;  Surgeon: Kali Santoyo MD;  Location: [facility] OR [address] [place] [address];  Service: Orthopedics;  Laterality: Left;       Oncology History:  Oncology History    No history exists.        Review of Systems:  Review of Systems   Constitutional:  Negative for activity change, appetite change, chills, fatigue, fever and unexpected weight change.   HENT:  Negative for mouth sores.    Eyes:  Negative for photophobia and visual disturbance.   Respiratory:  Negative for cough and shortness of breath.    Cardiovascular:  Negative for chest pain and leg swelling.   Gastrointestinal:  Negative for abdominal pain, diarrhea, nausea and vomiting.   Genitourinary:  Negative for frequency and hematuria.   Musculoskeletal:  Negative for back pain, gait problem, joint swelling and myalgias.        + falls, 8/2023 bilat ankle fractures, left ankle surgery, no longer right boot   Integumentary:  Negative for pallor and rash.   Neurological:  Positive for weakness. Negative for dizziness, syncope and headaches.   Hematological:  Negative for adenopathy.   Psychiatric/Behavioral:  Negative for sleep disturbance. The patient is not nervous/anxious.      "      Physical Exam   Vitals:  /79 (BP Location: Right arm, Patient Position: Sitting, BP Method: Large (Automatic))   Pulse 78   Temp 98.1 °F (36.7 °C) (Oral)   Resp 20   Ht 5' 3" (1.6 m)   Wt 124.6 kg (274 lb 12.9 oz)   SpO2 97%   BMI 48.68 kg/m²     Labs:  Lab Results   Component Value Date    WBC 9.69 06/24/2024    HGB 14.3 06/24/2024    HCT 43.7 06/24/2024    MCV 92 06/24/2024     06/24/2024     Lab Results   Component Value Date    UIBC 270 08/03/2010    IRON 111 06/24/2024    TRANSFERRIN 233 06/24/2024    TIBC 345 06/24/2024    FESATURATED 32 06/24/2024             Physical Exam:  Physical Exam  Vitals and nursing note reviewed.   Constitutional:       General: She is not in acute distress.     Appearance: She is not toxic-appearing.   HENT:      Head: Normocephalic and atraumatic.      Right Ear: External ear normal.      Left Ear: External ear normal.   Eyes:      General: No scleral icterus.     Pupils: Pupils are equal, round, and reactive to light.   Cardiovascular:      Rate and Rhythm: Normal rate and regular rhythm.      Pulses: Normal pulses.      Heart sounds: Normal heart sounds. No murmur heard.  Pulmonary:      Effort: Pulmonary effort is normal. No respiratory distress.      Breath sounds: Normal breath sounds.   Abdominal:      General: Bowel sounds are normal. There is no distension.      Palpations: Abdomen is soft.      Tenderness: There is no abdominal tenderness. There is no guarding.   Musculoskeletal:      Cervical back: Normal range of motion and neck supple. No rigidity.   Lymphadenopathy:      Cervical: No cervical adenopathy.   Skin:     General: Skin is warm and dry.      Capillary Refill: Capillary refill takes less than 2 seconds.      Coloration: Skin is not jaundiced or pale.      Findings: No bruising or erythema.   Neurological:      Mental Status: She is alert and oriented to person, place, and time. Mental status is at baseline.      Gait: Gait normal.    "   Comments: Ambulatory with steady gait, no further walker use, no boot to rle    Psychiatric:         Mood and Affect: Mood normal.         Behavior: Behavior normal.         Thought Content: Thought content normal.          ECOG:   ECOG SCORE    0 - Fully active-able to carry on all pre-disease performance without restriction            PROCEDURES/IMAGING      Discussion     Problem List:  Problem List Items Addressed This Visit          Neuro    CIDP (chronic inflammatory demyelinating polyneuropathy)    Overview     Personal history of Herlinda [place] syndrome with CIDP with CHRONIC Peripheral Neuropathy  diagnosed in 2009 by [facility].    She reports she was completely paralyzed but did not require intubation.   She reports residual generalized weakness and neuropathy to her hands and feet.    She reports an occasional foot drop.    She is able to ambulate without difficulty and without assistance.   She has chronic decreased sensation and tingling sensation to the toes of both feet.  States started on Lyrica 75 mg twice daily in August 2023 for leg pains that improved symptoms - Seen [facility] on 11/7/2023 Dr. 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 I reviewed chronology of detail with the patient but there is no NCS/EMG in the system for review but I reviewed her CSF analysis. I guided her to repeat NCS/EMG to solidify her diagnosis. I will complete polyneuropathy workup especially in the setting of gastric bypass surgery.   EMG ordered at 11/7/23 visit but was never scheduled - sending message to Dr. Quintanilla today to get scheduled               Oncology    Iron deficiency anemia secondary to inadequate dietary iron intake    Overview     Iron Deficiency Anemia and Elevated Serum Immunoglobulin Free Light Chain Level  Patient referred to  Hematology from Neurologist in November 2023 due to elevated immunoglobulin  Seen by Hematology on 11/30/2023 and workup completed.  + iron deficiency noted and infusions scheduled.  Iron sucrose x4 Dec 23/Jan24, tolerated well   Last Hematology appt 3/28/24 and addition iron infusions are ordered  Follow up with Hematology in June 2024.  Last colonoscopy was okay 12/21/2023            Endocrine    Personal history of gastric bypass       Orthopedic    Weakness of both legs     Other Visit Diagnoses       Elevated serum immunoglobulin free light chain level    -  Primary    Alternating constipation and diarrhea                   Elevated kappa free light chain, CIDP  - 11/7/23 elevated kappa FLC, normal ratio, normal SPEP and EMETERIO, no prior to compare with  - CRAB symptoms re MGUS/MM: pt has had no elevated calcium labs, no ckd, mild microcytic anemia evidence, no bone pain.    - History gastric bypass  - Will do labs to rule out anemia relation to MALA  - Will complete inflammatory markers and serum immunoglobulins today, repeat labs in 4 months to trend  - continue follow up with neurology re CIDP history and current workup  - Educated on MGUS evaluation, questions answered with pt and    - 3/19/24 spep normal, EMETERIO no monoclonal peaks, anemia resolved  - 6/24/24 labs, anemia remains resolved, doing well, no B symptoms  - repeat labs yearly, due 3/2025     Microcytic anemia, history of gastric bypass  - 10/13/23 hgb 11.6 g/dL, microcytic  - no CKD  - gastric bypass 2009  - iron sucrose x4 Dec 23/ [place] 24, tolerated well  - 3/19/24 cbc normal h/h, hgb improvement from 10.8 to 13.8, normocytic, microcytic resolved; iron improved with sat now 13%, tibc normal at 376 ug/dL ( previously 506)  - iron sucrose x4 Apr/May 2024  - Labs 6/24/24 h/h normal, normocytic, iron normal with iron sat 32%, ferritin elevated given recent iron infusions, this is fine as she will over time use this   - she will take oral iron  m/w/f  - rtc 4 months with labs, sooner any questions or concerns    Alternating constipation and diarrhea  - following gi, last appt 6/25/24, doing well on miralax and metamucil, no abdominal pain, normal bm, no gi blood loss  - management deferred to gastro    Weakness of both legs, CIDP  - stable  - water aerobics is helping pt she feels with lower extremity strength  - to continue follow up with neurology    Total time of this visit, including time spent face to face with patient and/or via video/audio, and also in preparing for today's visit for MDM and documentation. (Medical Decision Making, including consideration of possible diagnoses, management options, complex medical record review, review of diagnostic tests and information, consideration and discussion of significant complications based on comorbidities, and discussion with providers involved with the care of the patient) 33 minutes. Greater than 50% was spent face to face with the patient counseling and coordinating care.    Addendum: added information to AVS for iron rich foods and vit c rich foods      Bonnie Markham NP-C  [facility]  Hematology/Oncology  [address] [place] [id number]  ALIVIA Ochoa  [phone]

## 2024-06-26 NOTE — PATIENT INSTRUCTIONS
Here is the list of information we talked about.     Iron rich foods listed and then Vit C rich foods listed. Combine vit C rich foods with iron rich foods to enhance absorption.    IRON RICH list from [employer]cross[employer].org  Meat and Eggs    Beef  Lamb  Ham  Turkey  Chicken  Veal  Pork  Dried beef  Liver  Liverwurst  Eggs (any style)  Seafood    Shrimp  Clams  Scallops  Oysters  Tuna  Sardines  Lupillo  Mackerel  Vegetables    Spinach  Sweet potatoes  Peas  Broccoli  String beans  Beet greens  Dandelion greens  Collards  Kale  Chard  Bread and Cereals    White bread (enriched)  Whole wheat bread  Enriched pasta  Wheat products  Bran cereals  Corn meal  Oat cereal  Cream of Wheat  Rye bread  Enriched rice  Fruit    Strawberries  Watermelon  Raisins  Dates  Figs  Prunes  Prune juice  Dried apricots  Dried peaches  Beans and Other Foods    Tofu  Beans (kidney, garbanzo, or white, canned)  Tomato products (e.g., paste)  Dried peas  Dried beans  Lentils  Instant breakfast  Corn syrup  Maple syrup  Molasses      Vit C RICH FOODS  citrus fruit, such as oranges and orange juice, jina, pineapple.  Peppers.  tomatoes  strawberries.  Guava, kiwi, papaya  Blackcurrants, blackberries.  broccoli.  brussels sprouts.  Mustard greens.  Red cabbage.  Kale.  potatoes.

## 2024-08-07 DIAGNOSIS — G62.89 OTHER POLYNEUROPATHY: ICD-10-CM

## 2024-08-07 DIAGNOSIS — E11.40 TYPE 2 DIABETES MELLITUS WITH DIABETIC NEUROPATHY, WITHOUT LONG-TERM CURRENT USE OF INSULIN: ICD-10-CM

## 2024-08-07 RX ORDER — PREGABALIN 75 MG/1
75 CAPSULE ORAL 2 TIMES DAILY
Qty: 180 CAPSULE | Refills: 0 | Status: SHIPPED | OUTPATIENT
Start: 2024-08-07

## 2024-09-25 DIAGNOSIS — E78.5 HYPERLIPIDEMIA ASSOCIATED WITH TYPE 2 DIABETES MELLITUS: ICD-10-CM

## 2024-09-25 DIAGNOSIS — E11.69 HYPERLIPIDEMIA ASSOCIATED WITH TYPE 2 DIABETES MELLITUS: ICD-10-CM

## 2024-09-26 RX ORDER — ROSUVASTATIN CALCIUM 10 MG/1
10 TABLET, COATED ORAL
Qty: 90 TABLET | Refills: 3 | Status: SHIPPED | OUTPATIENT
Start: 2024-09-26

## 2024-10-21 ENCOUNTER — OFFICE VISIT (OUTPATIENT)
Dept: FAMILY MEDICINE | Facility: CLINIC | Age: 59
End: 2024-10-21
Payer: OTHER GOVERNMENT

## 2024-10-21 ENCOUNTER — PATIENT MESSAGE (OUTPATIENT)
Dept: ADMINISTRATIVE | Facility: HOSPITAL | Age: 59
End: 2024-10-21
Payer: OTHER GOVERNMENT

## 2024-10-21 VITALS
BODY MASS INDEX: 48.65 KG/M2 | HEIGHT: 63 IN | OXYGEN SATURATION: 95 % | SYSTOLIC BLOOD PRESSURE: 114 MMHG | HEART RATE: 102 BPM | DIASTOLIC BLOOD PRESSURE: 72 MMHG | TEMPERATURE: 97 F | WEIGHT: 274.56 LBS

## 2024-10-21 DIAGNOSIS — E11.9 TYPE 2 DIABETES MELLITUS WITHOUT COMPLICATION, WITHOUT LONG-TERM CURRENT USE OF INSULIN: ICD-10-CM

## 2024-10-21 DIAGNOSIS — I15.2 HYPERTENSION ASSOCIATED WITH DIABETES: ICD-10-CM

## 2024-10-21 DIAGNOSIS — G62.89 OTHER POLYNEUROPATHY: ICD-10-CM

## 2024-10-21 DIAGNOSIS — F41.9 ANXIETY: ICD-10-CM

## 2024-10-21 DIAGNOSIS — R29.6 FREQUENT FALLS: ICD-10-CM

## 2024-10-21 DIAGNOSIS — E11.40 TYPE 2 DIABETES MELLITUS WITH DIABETIC NEUROPATHY, WITHOUT LONG-TERM CURRENT USE OF INSULIN: ICD-10-CM

## 2024-10-21 DIAGNOSIS — E66.813 CLASS 3 SEVERE OBESITY DUE TO EXCESS CALORIES WITH SERIOUS COMORBIDITY AND BODY MASS INDEX (BMI) OF 45.0 TO 49.9 IN ADULT: ICD-10-CM

## 2024-10-21 DIAGNOSIS — Z86.0100 HISTORY OF COLONIC POLYPS: ICD-10-CM

## 2024-10-21 DIAGNOSIS — E11.59 HYPERTENSION ASSOCIATED WITH DIABETES: ICD-10-CM

## 2024-10-21 DIAGNOSIS — Z00.00 ANNUAL PHYSICAL EXAM: Primary | ICD-10-CM

## 2024-10-21 DIAGNOSIS — E78.5 HYPERLIPIDEMIA ASSOCIATED WITH TYPE 2 DIABETES MELLITUS: ICD-10-CM

## 2024-10-21 DIAGNOSIS — F32.9 REACTIVE DEPRESSION: ICD-10-CM

## 2024-10-21 DIAGNOSIS — D50.8 IRON DEFICIENCY ANEMIA SECONDARY TO INADEQUATE DIETARY IRON INTAKE: ICD-10-CM

## 2024-10-21 DIAGNOSIS — E11.69 HYPERLIPIDEMIA ASSOCIATED WITH TYPE 2 DIABETES MELLITUS: ICD-10-CM

## 2024-10-21 DIAGNOSIS — G61.81 CIDP (CHRONIC INFLAMMATORY DEMYELINATING POLYNEUROPATHY): ICD-10-CM

## 2024-10-21 DIAGNOSIS — Z12.31 ENCOUNTER FOR SCREENING MAMMOGRAM FOR MALIGNANT NEOPLASM OF BREAST: ICD-10-CM

## 2024-10-21 DIAGNOSIS — E66.01 CLASS 3 SEVERE OBESITY DUE TO EXCESS CALORIES WITH SERIOUS COMORBIDITY AND BODY MASS INDEX (BMI) OF 45.0 TO 49.9 IN ADULT: ICD-10-CM

## 2024-10-21 PROCEDURE — 99215 OFFICE O/P EST HI 40 MIN: CPT | Mod: PBBFAC,PN | Performed by: NURSE PRACTITIONER

## 2024-10-21 PROCEDURE — 99396 PREV VISIT EST AGE 40-64: CPT | Mod: S$PBB,,, | Performed by: NURSE PRACTITIONER

## 2024-10-21 PROCEDURE — 99999 PR PBB SHADOW E&M-EST. PATIENT-LVL V: CPT | Mod: PBBFAC,,, | Performed by: NURSE PRACTITIONER

## 2024-10-21 RX ORDER — TIRZEPATIDE 5 MG/.5ML
5 INJECTION, SOLUTION SUBCUTANEOUS
Qty: 6 ML | Refills: 3 | Status: SHIPPED | OUTPATIENT
Start: 2024-10-21
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
## 2024-10-22 ENCOUNTER — PATIENT MESSAGE (OUTPATIENT)
Dept: FAMILY MEDICINE | Facility: CLINIC | Age: 59
End: 2024-10-22
Payer: OTHER GOVERNMENT

## 2024-10-22 ENCOUNTER — TELEPHONE (OUTPATIENT)
Dept: FAMILY MEDICINE | Facility: CLINIC | Age: 59
End: 2024-10-22
Payer: OTHER GOVERNMENT

## 2024-10-22 DIAGNOSIS — E11.40 TYPE 2 DIABETES MELLITUS WITH DIABETIC NEUROPATHY, WITHOUT LONG-TERM CURRENT USE OF INSULIN: ICD-10-CM

## 2024-10-22 DIAGNOSIS — G62.89 OTHER POLYNEUROPATHY: ICD-10-CM

## 2024-10-22 RX ORDER — PREGABALIN 75 MG/1
75 CAPSULE ORAL 2 TIMES DAILY
Qty: 180 CAPSULE | Refills: 3 | Status: SHIPPED | OUTPATIENT
Start: 2024-10-22
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
## 2024-10-23 ENCOUNTER — OFFICE VISIT (OUTPATIENT)
Dept: PSYCHIATRY | Facility: CLINIC | Age: 59
End: 2024-10-23
Payer: OTHER GOVERNMENT

## 2024-10-23 DIAGNOSIS — F32.9 REACTIVE DEPRESSION: ICD-10-CM

## 2024-10-23 DIAGNOSIS — F33.2 MAJOR DEPRESSIVE DISORDER, RECURRENT EPISODE, SEVERE WITH ANXIOUS DISTRESS: Primary | ICD-10-CM

## 2024-10-23 PROCEDURE — 99212 OFFICE O/P EST SF 10 MIN: CPT | Mod: PBBFAC,PN

## 2024-10-23 NOTE — PROGRESS NOTES
"Psychiatry Initial Visit (PhD/LCSW)  Diagnostic Interview - CPT 64744    Date: 10/23/2024    Site: [place]    Referral source: Primary Care NP - Nany Hoff NP    Clinical status of patient: Outpatient    Kalina Ryan, a 59 y.o. female, for initial evaluation visit.  Met with patient.    Chief complaint/reason for encounter: depression, anxiety, and interpersonal    History of present illness: Patient presents as a 59 y.o. female referred for psychotherapy services by her Primary Care NP due to depression related to an interpersonal issue with her . Patient has no prior history of psychiatric treatment aside from medication management (Lexapro) by NP over the last few years. This is her first time seeking counseling. Patient reports that she found out her  was having an affair in August of this year with the same individual he had an affair with previously in their marriage. States she asked  to move out of their home since finding out about the affair but he has not done so. Reports they continue to live together in the the home, but she has moved into another room in the house and they do not communicate with one another. In addition to the above, patient retired in May of this year after 20 years at her previous job. 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 States she felt forced to retire early due to this. Onset of depression was approximately 1 year ago following her accident at work and onset of anxiety approximately 6 months ago with early USP. Both have been exacerbated by 's affair. Discussed how she held certain expectations for what the rest of her life would look like, especially following USP, and 's affair has "shattered" that. States that she is seeking therapy in order to process the affair, " "explore her identity post-FDC/affair, and identify next steps for herself. Patient denies current suicidal/homicidal ideations. Reports last SI she experienced was after discovering 's initial affair in . Denies history of suicide attempts. She presents with fair insight/judgment and appears motivated for treatment.      Pain: noncontributory    Symptoms:   Mood: depressed mood, diminished interest, insomnia, fatigue, worthlessness/guilt, poor concentration, and decreased appetite  Anxiety: excessive anxiety/worry, restlessness/keyed up, irritability, and trouble relaxing, not being able to control worrying.  Substance abuse: denied  Cognitive functioning: denied  Health behaviors:  Reports decreased appetite with some "stress" eating and sleep disturbances-insomnia and broken sleep. Denies issues with ADL's. Just getting back into exercise-signed up for water aerobics recently. Maintains regular medical appointments for her physical health and denies substance abuse/dependence.     Psychiatric history: psychotropic management by PCP. Reports she was started on Lexapro by her Primary Care NP several years ago and has found it to be helpful.    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 Reports onset of depression last year after breaking her ankles due to a fall at work.     Family history of psychiatric illness:  Maternal grandfather: reports history, but unable to recall details. Mother: anxiety, managed by medications.    Social history (marriage, employment, etc.): Patient lives in [place], [place] with her . She is originally from [place], [place] and has lived in several different places over her lifetime as  served 24 years in the [employer]. She has 2 adult children (1 daughter, 1 son) and 3 grandchildren. She reports being "very close" with both of her children and her grandchildren. Her parents were  but both are now . Reports " "being close with her mother as the only daughter in the family and having a "good" relationship with dad, though not as close with him. She has 3 brothers, 1 is  from cancer, and another brother was diagnosed with cancer, which she identifies as a stressor. She maintains regular contact with her brothers monthly. She has 3 best friends who do not live close to her; maintains contact with them monthly but does not see them very often due to the above. Her primary means of financial support is her alf and inheritance from her mother. Denies financial stressors at this time. She identifies as Adventism but does not attend Orthodoxy regularly; she does pray.     Substance use:   Alcohol:  Occasional; 1 drink every couple of weeks.   Drugs: none   Tobacco: none   Caffeine: Daily coffee drinker.    Current medications and drug reactions (include OTC, herbal): see medication list     Strengths and liabilities: Strength: Patient accepts guidance/feedback, Strength: Patient is expressive/articulate., Strength: Patient is intelligent., Strength: Patient is motivated for change., Strength: Patient has positive support network., Liability: Patient lacks coping skills.    Current Evaluation:     Mental Status Exam:  General Appearance:  unremarkable, age appropriate   Speech: normal tone, normal rate, normal pitch, normal volume      Level of Cooperation: cooperative      Thought Processes: normal and logical   Mood: steady      Thought Content: normal, no suicidality, no homicidality, delusions, or paranoia   Affect: congruent and appropriate   Orientation: Oriented x3   Memory: recent >  intact, remote >  intact   Attention Span & Concentration: intact   Fund of General Knowledge: intact and appropriate to age and level of education   Abstract Reasoning: interpretation of similarities was abstract   Judgment & Insight: fair     Language  intact     Diagnostic Impression - Plan:       ICD-10-CM ICD-9-CM   1. Reactive " depression  F32.9 300.4       Plan:individual psychotherapy and medication management by physician    Return to Clinic: 1 week    Length of Service (minutes): 60    PHQ-9:  1.  Little interest or pleasure in doing things: More than half of days  = 2   2.  Feeling down, depressed or hopeless: More than half of days  = 2   3.  Trouble falling or staying asleep, or sleeping too much: Nearly every day           = 3   4.  Feeling tired or having little energy: Nearly every day           = 3   5.  Poor appetite or overeating: Nearly every day           = 3   6.  Feeling bad about yourself - or that you are a failure or have let yourself or your family down: Nearly every day           = 3   7.  Trouble concentrating on things, such as reading the newspaper or watching television: Nearly every day           = 3   8.  Moving or speaking so slowly that other people could have noticed.  Or the opposite - being fidgety or restless that you have been moving around a lot more than usual: More than half of days  = 2   9.  Thoughts that you would be better off dead, or of hurting yourself: Not at all                       = 0    PHQ-9 Total:  21   If you checked off any problems, how difficult have these made it for you to do your work, take care of things at home, or get along with other people? Somewhat Difficult    VANESSA-7:  Not at all-0 Several days-1 Over half the days-2 Nearly every day-3   1. Feeling nervous, anxious, or on edge. 3  2. Not being able to stop or control worrying. 3  3. Worrying too much about different things. 3  4. Trouble relaxing. 3  5. Being so restless that it's hard to sit still. 2  6.Becoming easily annoyed or irritable. 3  7. Feeling afraid as if something awful might happen. 1  Total Score: 18  If you checked off any problems, how difficult have these made it for you to do your work, take care of things at home, or get along with other people? Somewhat Difficult

## 2024-10-24 PROBLEM — F33.2 MAJOR DEPRESSIVE DISORDER, RECURRENT EPISODE, SEVERE WITH ANXIOUS DISTRESS: Status: ACTIVE | Noted: 2024-10-24

## 2024-10-28 NOTE — ASSESSMENT & PLAN NOTE
-remote history with mild residual lower extremity weakness   -ambulates w/o assisting device      Patient oriented to room and ED throughput process.  Safety measures with ED bed locked in lowest position and call light in reach.  Patient educated on all orders, including any medications.  Patient educated on chief complaint/symptoms. Patient encouraged to ask questions regarding care, medications or treatment plan.  Patient aware of how to reach staff with questions/concerns.

## 2024-10-29 ENCOUNTER — OFFICE VISIT (OUTPATIENT)
Facility: CLINIC | Age: 59
End: 2024-10-29
Payer: OTHER GOVERNMENT

## 2024-10-29 VITALS
HEIGHT: 63 IN | SYSTOLIC BLOOD PRESSURE: 120 MMHG | DIASTOLIC BLOOD PRESSURE: 79 MMHG | BODY MASS INDEX: 48.57 KG/M2 | HEART RATE: 76 BPM | WEIGHT: 274.13 LBS

## 2024-10-29 DIAGNOSIS — R29.6 FREQUENT FALLS: ICD-10-CM

## 2024-10-29 DIAGNOSIS — G61.81 CIDP (CHRONIC INFLAMMATORY DEMYELINATING POLYNEUROPATHY): ICD-10-CM

## 2024-10-29 DIAGNOSIS — G62.9 SENSORIMOTOR NEUROPATHY: Primary | ICD-10-CM

## 2024-10-29 PROCEDURE — 99999 PR PBB SHADOW E&M-EST. PATIENT-LVL IV: CPT | Mod: PBBFAC,,, | Performed by: STUDENT IN AN ORGANIZED HEALTH CARE EDUCATION/TRAINING PROGRAM

## 2024-10-29 PROCEDURE — 99214 OFFICE O/P EST MOD 30 MIN: CPT | Mod: PBBFAC,PN | Performed by: STUDENT IN AN ORGANIZED HEALTH CARE EDUCATION/TRAINING PROGRAM

## 2024-10-30 ENCOUNTER — OFFICE VISIT (OUTPATIENT)
Dept: PSYCHIATRY | Facility: CLINIC | Age: 59
End: 2024-10-30
Payer: OTHER GOVERNMENT

## 2024-10-30 DIAGNOSIS — F33.2 MAJOR DEPRESSIVE DISORDER, RECURRENT EPISODE, SEVERE WITH ANXIOUS DISTRESS: Primary | ICD-10-CM

## 2024-10-30 PROCEDURE — 99211 OFF/OP EST MAY X REQ PHY/QHP: CPT | Mod: PBBFAC,PN

## 2024-10-30 PROCEDURE — 99999 PR PBB SHADOW E&M-EST. PATIENT-LVL I: CPT | Mod: PBBFAC,,,

## 2024-11-05 ENCOUNTER — PROCEDURE VISIT (OUTPATIENT)
Dept: NEUROLOGY | Facility: CLINIC | Age: 59
End: 2024-11-05
Payer: OTHER GOVERNMENT

## 2024-11-05 DIAGNOSIS — G62.9 SENSORIMOTOR NEUROPATHY: ICD-10-CM

## 2024-11-05 PROCEDURE — 95885 MUSC TST DONE W/NERV TST LIM: CPT | Mod: PBBFAC | Performed by: PSYCHIATRY & NEUROLOGY

## 2024-11-05 PROCEDURE — 95913 NRV CNDJ TEST 13/> STUDIES: CPT | Mod: PBBFAC | Performed by: PSYCHIATRY & NEUROLOGY

## 2024-11-05 PROCEDURE — 95913 NRV CNDJ TEST 13/> STUDIES: CPT | Mod: 26,S$PBB,, | Performed by: PSYCHIATRY & NEUROLOGY

## 2024-11-06 ENCOUNTER — PATIENT MESSAGE (OUTPATIENT)
Dept: PSYCHIATRY | Facility: CLINIC | Age: 59
End: 2024-11-06
Payer: OTHER GOVERNMENT

## 2024-11-06 ENCOUNTER — PATIENT MESSAGE (OUTPATIENT)
Facility: CLINIC | Age: 59
End: 2024-11-06
Payer: OTHER GOVERNMENT

## 2024-11-06 NOTE — PROCEDURES
Department of Neurology  Phone No: [phone], Fax: [phone]    Neurography & Electromyography Report        Full Name: Kalina Ryan Gender: Female  Patient ID: [id number]      Visit Date: 11/5/2024 2:56 PM  Examining Physician: Du Quintanilla MD  Height: 5 feet 3 inch  Weight: 274 lbs        Kalina Ryan [id number] 11/5/2024 2:56 PM     Reason for Referral:    Concern for demyelinating polyneuropathy.      Relevant medical diagnoses:    Diabetes mellitus.  Highest A1c 6.9  GBS in 2009    Surgical procedures:    Left ankle for surgical repair.    Gastric bypass surgery 2009    Kalina Ryan [id number] 11/5/2024 2:56 PM       History and Examination:    59-year-old female with concern for CIDP.  On examination, bilateral great toe dorsiflexion weakness with intact deep tendon reflexes.  Impaired vibration and pinprick sensation in lower extremities.      Technical Difficulties:    None.      Interpretation:     Abnormal study.  There is electrodiagnostic evidence of mild sensory axonal polyneuropathy and mild bilateral median mononeuropathy (carpal tunnel syndrome) at wrist.  There is no evidence of demyelinating polyneuropathy, bilateral lumbosacral radiculopathy, left cervical radiculopathy, myopathy.    Please correlate clinically.    Du Quintanilla MD, FRCPE, FCPS, CHCQM  Neuromuscular Consultant  [facility]    Kalina Ryan [id number] 11/5/2024 2:56 PM          Sensory NCS      Nerve / Sites Rec. Site Segments Onset Lat Peak Lat Onset Apolinar Temp. Amp Distance      ms ms m/s °C µV mm   L Median - Dig II (Antidromic)      Wrist Index Wrist - Index 2.60 4.32 53.8 32.4 7.5 140   R Median - Dig II (Antidromic)      Wrist Index Wrist - Index 3.91 5.73 35.8 34.4 30.4 140   L Ulnar - Dig V (Antidromic)      Wrist Dig V Wrist - Dig V 2.14 3.33 65.6 32.7 2.9 140   R Ulnar - Dig V (Antidromic)      Wrist Dig V Wrist - Dig V 2.29 3.02 61.1 34.4 11.5 140   L Radial - Superficial (Antidromic)       Forearm Wrist Forearm - Wrist 1.61 2.55 61.9 32.1 31.0 100   R Radial - Superficial (Antidromic)      Forearm Wrist Forearm - Wrist 1.82 2.50 54.9 34.5 24.4 100   L Sural - (Antidromic)      Calf Ankle Calf - Ankle NR NR NR 31.2    R Sural - (Antidromic)      Calf Ankle Calf - Ankle NR NR NR 31.3    L Median, Ulnar - Transcarpal comparison      Median Palm Wrist Median Palm - Wrist 2.14 3.18 37.5 32 15.7 80      Ulnar Palm Wrist Ulnar Palm - Wrist 1.41 1.93 56.9 31.9 9.2 80     Median Palm - Ulnar Palm             Motor NCS      Nerve / Sites Muscle Segments Latency Velocity Amplitude Temp. Dur. Distance      ms m/s mV °C ms mm   L Median - APB      Wrist APB Wrist - APB 4.04  4.7 31.7 6.7 80      Elbow APB Elbow - Wrist 8.06 52 3.7 31.6 7.0 210   R Median - APB      Wrist APB Wrist - APB 4.81  4.2 34.4 6.7 80      Elbow APB Elbow - Wrist 8.96 51 3.0 34.4 6.8 210   L Ulnar - ADM      Wrist ADM Wrist - ADM 2.75  7.4 31.2 6.2 80      B.Elbow ADM B.Elbow - Wrist 6.17 61 6.4 31.1 5.8 210      A.Elbow ADM A.Elbow - B.Elbow 7.98 55 6.0 31.1 6.3 100     A.Elbow - Wrist    31.1     R Ulnar - ADM      Wrist ADM Wrist - ADM 2.40  8.9 34.4 5.6 80      B.Elbow ADM B.Elbow - Wrist 5.94 56 7.4 34.5 5.3 200      A.Elbow ADM A.Elbow - B.Elbow 7.60 66 7.3 34.5 5.3 110     A.Elbow - Wrist    34.5     L Peroneal - EDB      Ankle EDB Ankle - EDB 2.60  4.0 30.7 5.9 80      B. Fib Head EDB B. Fib Head - Ankle 9.60 44 3.7 30.7 6.5 310      A. Fib Head EDB A. Fib Head - B. Fib Head 11.50 53 3.4 30.7 6.3 100   R Peroneal - EDB      Ankle EDB Ankle - EDB 3.85  1.7 31.3 5.3 80      B. Fib Head EDB B. Fib Head - Ankle 10.65 46 1.5 31.2 6.4 315      A. Fib Head EDB A. Fib Head - B. Fib Head 12.54 47 1.4 31.2 5.9 90   L Tibial - AH      Ankle AH Ankle - AH 3.98  6.6 31.2 6.1 80   R Tibial - AH      Ankle AH Ankle - AH 4.54  4.4 31.2 5.8 80       F  Wave      Nerve F Latency M Latency F - M Lat    ms ms ms   L Median - APB 26.1 4.7  21.4   L Ulnar - ADM 25.6 3.2 22.3   L Peroneal - EDB 47.2 3.8 43.4   L Tibial - AH 49.3 4.4 44.9   R Peroneal - EDB 50.0 4.3 45.7   R Tibial - AH 47.9 4.6 43.3   R Median - APB 28.5 5.2 23.3   R Ulnar - ADM 25.4 2.9 22.4       EMG Summary Table     Spontaneous Recruitment MUAP   Muscle Nerve Roots IA Fib PSW Fasc Other Pattern Amp Dur. PPP   R. Tibialis anterior Deep peroneal (Fibular) L4-L5 N None None None N N N N N   L. Gastrocnemius Tibial S1-S2 N None None None N N N N N   R. Quadriceps Femoral L2-L4 N None None None N N N N N   L. Tibialis anterior Deep peroneal (Fibular) L4-L5 N None None None N N N N N   R. Gastrocnemius Tibial S1-S2 N None None None N N N N N   L. First dorsal interosseous Ulnar C8-T1 N None None None N N N N N   L. Abductor pollicis brevis Median C8-T1 N None None None N N N N N

## 2024-11-12 NOTE — PROGRESS NOTES
Individual Psychotherapy (PhD/LCSW)    11/13/2024    Site:  [place]        Therapeutic Intervention: Met with patient.  Outpatient - Supportive psychotherapy 60 min - CPT Code 55787    Chief complaint/reason for encounter: depression, anxiety, and interpersonal     Interval history and content of current session: Patient was last seen for session 2 weeks ago. Began today's session checking in about the last 2 weeks. She reports that things remain the same with minimal communication with her  at home. They did have a conversation when he returned from a work trip she was supposed accompany him on, but she reports it ended up in an argument and her shutting down. Reports she faced an obstacle on Halloween night where she had to inform her daughter that she wouldn't be joining her and her grandchildren for festivities because her  would be present. She decided to spend the night with her son and grandson; her daughter's feelings were hurt and she was confused by patient's decision. States they recently had an open and honest conversation about it and shared their feelings around the current circumstances of patient's marriage. She also reached out to her son to check in with him and recently felt validated by his support of her. She hopes to remain open with both of her children as she continues to navigate throughout this journey. Reports she has been engaging in self-reflection and being away from  this weekend helped solidify for her that their marriage is over. She plans to retain a  as her next action step. Processed her feelings around making this decision. Discussed grief that is still there, but she reports feeling relieved and confident that she is making the best decision for herself in taking this next step. Processed how she cannot continue to live the way she is living with him and how she desires more for herself. Reports that the fear she has had around not being dependent  "on  has decreased. States she navigated an incident with a flat tire last week that she otherwise would have called her  for. She was able to manage the situation independently and reports feeling empowered by doing so. She acknowledges that there will continue to be uncertainty throughout this process, but she no longer feels "stuck" like she was 2 weeks ago. Discussed resources she can tap into for finding an ; she identifies having friends who have navigated divorce whom she can reach out to for insight. Her goal is to have at least identified a few attorneys she would like to reach out to by next session.     Treatment plan:  Target symptoms: depression, anxiety , adjustment  Why chosen therapy is appropriate versus another modality: relevant to diagnosis, patient responds to this modality  Outcome monitoring methods: self-report, observation  Therapeutic intervention type: supportive psychotherapy    Risk parameters:  Patient reports no suicidal ideation  Patient reports no homicidal ideation  Patient reports no self-injurious behavior  Patient reports no violent behavior    Verbal deficits: None    Patient's response to intervention:  The patient's response to intervention is accepting, motivated.    Progress toward goals and other mental status changes:  The patient's progress toward goals is fair .    Diagnosis:     ICD-10-CM ICD-9-CM   1. Major depressive disorder, recurrent episode, severe with anxious distress  F33.2 296.33       Plan:  individual psychotherapy and medication management by physician    Return to clinic: 1 week    Length of Service (minutes): 60        "

## 2024-11-13 ENCOUNTER — OFFICE VISIT (OUTPATIENT)
Dept: PSYCHIATRY | Facility: CLINIC | Age: 59
End: 2024-11-13
Payer: OTHER GOVERNMENT

## 2024-11-13 DIAGNOSIS — F33.2 MAJOR DEPRESSIVE DISORDER, RECURRENT EPISODE, SEVERE WITH ANXIOUS DISTRESS: Primary | ICD-10-CM

## 2024-12-05 ENCOUNTER — OFFICE VISIT (OUTPATIENT)
Facility: CLINIC | Age: 59
End: 2024-12-05
Payer: OTHER GOVERNMENT

## 2024-12-05 VITALS
SYSTOLIC BLOOD PRESSURE: 132 MMHG | WEIGHT: 272.94 LBS | BODY MASS INDEX: 48.36 KG/M2 | HEIGHT: 63 IN | DIASTOLIC BLOOD PRESSURE: 80 MMHG

## 2024-12-05 DIAGNOSIS — E67.2 HYPERVITAMINOSIS B6: ICD-10-CM

## 2024-12-05 DIAGNOSIS — G56.03 BILATERAL CARPAL TUNNEL SYNDROME: ICD-10-CM

## 2024-12-05 DIAGNOSIS — G62.9 SENSORY NEUROPATHY: Primary | ICD-10-CM

## 2024-12-05 PROCEDURE — 99213 OFFICE O/P EST LOW 20 MIN: CPT | Mod: PBBFAC,PN | Performed by: STUDENT IN AN ORGANIZED HEALTH CARE EDUCATION/TRAINING PROGRAM

## 2024-12-05 PROCEDURE — 99999 PR PBB SHADOW E&M-EST. PATIENT-LVL III: CPT | Mod: PBBFAC,,, | Performed by: STUDENT IN AN ORGANIZED HEALTH CARE EDUCATION/TRAINING PROGRAM

## 2024-12-05 NOTE — PROGRESS NOTES
[facility] NEUROLOGY  [facility], [place]    Date: 12/5/24  Patient Name: Kalina Ryan   MRN: [id number]   PCP: Nany Hoff  Referring Provider: No ref. provider found    Assessment:   Kalina Ryan is a 59 y.o. female presenting for neuropathy follow up. Case most consistent with sensory axonal neuropathy + bilateral carpal tunnel syndrome. Possible ethologies include B6 toxicity and pre DM/DM. Will continue same dose of lyrica for now. Will repeat B6 levels in 2 months. She was encouraged to keep her glucose under control. Will try wrist brace for the R hand first. If improvement will try L hand as well. Also suggested. Topical either lidocaine cream or capsaicin cream. Follow up in 3 months     Plan:     - Wrist brace for the R hand first. If improvement will try L hand as well.   - Pregabalin 75 mg BID now. Can increase dose if after a month theres is no improvement.   - Topical either lidocaine cream or capsaicin cream.   - Repeat B6 levels in 2 months   - Follow up in 3 months     Problem List Items Addressed This Visit          Neuro    Sensory neuropathy - Primary    Overview                Bilateral carpal tunnel syndrome       Endocrine    Hypervitaminosis B6    Relevant Orders    VITAMIN B6     Filiberto Amin MD    Subjective:   Patient seen in consultation at the request of No ref. provider found for the evaluation of neuropathy follow up. . A copy of this note will be sent to the referring physician.      Interval history 12/5/24:   Ms. Kalina Ryan is a 59 y.o. female presenting for neuropathy follow up. Labs showed elevated B6, and normal range A1C as compared to before (Pre DM and DM). EMG was not suggestive of GBS/CIDP, but showed mild sensory axonal polyneuropathy and mild bilateral median mononeuropathy (carpal tunnel syndrome) at wrist. She stopped taking her B complex vitamins after she saw my message. She has not noticed significant improvement but is  "happy she does not have GBS/CIDP. She is taking lyrica 75 mg BID. She denied any significant side effects. Diet is balanced.     Per previous by myself on 10/29/24:    Assessment:   Kalina Ryan is a 59 y.o. female presenting for "needs EMG". Case most consistent but not limited to sensory motor peripheral neuropathy in the setting of DM +/- bilateral ankle fracture leading to falls. Besides this, there is concerned for GBS/CIDP. Later can still be a possibility nonetheless I would find it odd for her symptoms to "disappear" for 10 years and now them starting to come back again. DM damage gets worse with time, and ankle fractures are also more recent. I will start work up with neuropathy labs. She has an EMG scheduled next month. This will help rule out/in GBS/CIDP. Pain seems controlled at this time so we will continue same dose of pregabalin. We will plan a follow up in 1 month. The patient verbalized understanding and agreed with the plan.      Plan:      - Neuropathy work up   - Will continue same dose of pregabalin at this time since pain is controlled.   - EMG already scheduled for next month  - Follow up with me in 1 month.     PAST MEDICAL HISTORY:  Past Medical History:   Diagnosis Date    Anxiety     CIDP (chronic inflammatory demyelinating polyneuropathy) 01/01/2009    full paralysis but no intubation; residual weakness and neuropathy to hands and feet - diagnosed by Neurologist back in 2009 and last seen by neurology in 2011    Guillain Barré syndrome 2009    full paralysis but no intubation; residual weakness and neuropathy to hands and feet - diagnosed by Neurologist back in 2009 and last seen by neurology in 2011    Iron deficiency anemia, unspecified 2024    treated with iron infusions with Hematology    New onset type 2 diabetes mellitus 09/04/2019    Personal history of gastric bypass 09/04/2019    Reactive depression 10/19/2021       PAST SURGICAL HISTORY:  Past Surgical History:   Procedure " Laterality Date    APPLICATION, EXTERNAL FIXATION DEVICE Left 8/9/2023    Procedure: APPLICATION, EXTERNAL FIXATION DEVICE left ankle, C-arm clock side, synthes;  Surgeon: Kali Santoyo MD;  [facility];  Service: Orthopedics;  Laterality: Left;    CHOLECYSTECTOMY  2009    CLOSED REDUCTION, FRACTURE, ANKLE, TRIMALLEOLAR Left 8/9/2023    Procedure: CLOSED REDUCTION, FRACTURE, ANKLE, TRIMALLEOLAR;  Surgeon: Kali Santoyo MD;  [facility];  Service: Orthopedics;  Laterality: Left;    COLONOSCOPY N/A 7/31/2018    Procedure: COLONOSCOPY;  Surgeon: Elpidio Fortune MD;  Location: [facility] ENDO;  Service: Endoscopy;  Laterality: N/A;    COLONOSCOPY N/A 12/21/2023    Procedure: COLONOSCOPY;  Surgeon: FANY Fortune MD;  Location: [facility] ENDO;  Service: Endoscopy;  Laterality: N/A;    FIXATION OF SYNDESMOSIS OF ANKLE Left 8/17/2023    Procedure: FIXATION, SYNDESMOSIS, ANKLE;  Surgeon: Kali Santoyo MD;  Location: [facility] OR [address] [place] [address];  Service: Orthopedics;  Laterality: Left;    GASTRIC BYPASS  2009    HYSTERECTOMY  1999    OPEN REDUCTION AND INTERNAL FIXATION (ORIF) OF INJURY OF ANKLE Left 8/17/2023    Procedure: ORIF, ANKLE;  Surgeon: Kali Santoyo MD;  Location: [address];  Service: Orthopedics;  Laterality: Left;    REMOVAL OF EXTERNAL FIXATION DEVICE Left 8/17/2023    Procedure: REMOVAL, EXTERNAL FIXATION DEVICE;  Surgeon: Kali Santoyo MD;  Location: [address];  Service: Orthopedics;  Laterality: Left;       CURRENT MEDS:  Current Outpatient Medications   Medication Sig Dispense Refill    amLODIPine (NORVASC) 5 MG tablet TAKE 1 TABLET DAILY 90 tablet 3    ascorbic acid, vitamin C, (VITAMIN C) 250 mg Chew Take by mouth once daily.      aspirin 81 MG Chew Take 1 tablet (81 mg total) by mouth 2 (two) times a day. End dates 9 /29/23  0    calcium-vitamin D 250 mg-2.5 mcg (100 unit) per tablet Take 1 tablet by mouth once daily.      EScitalopram oxalate  (LEXAPRO) 10 MG tablet TAKE 1 TABLET DAILY 90 tablet 3    ferrous sulfate (FEOSOL) 325 mg (65 mg iron) Tab tablet Take 325 mg by mouth once daily. TAKE ON MON, WED, AND FRI      lisinopriL (PRINIVIL,ZESTRIL) 5 MG tablet TAKE 1 TABLET DAILY 90 tablet 3    melatonin 10 mg Tab 1 tablet BEDTIME (route: oral)      multivitamin (ONE DAILY MULTIVITAMIN) per tablet Take 1 tablet by mouth once daily.      pregabalin (LYRICA) 75 MG capsule TAKE 1 CAPSULE TWICE A  capsule 3    rosuvastatin (CRESTOR) 10 MG tablet TAKE 1 TABLET DAILY 90 tablet 3    tirzepatide (MOUNJARO) 5 mg/0.5 mL PnIj Inject 5 mg into the skin every 7 days. 6 mL 3     No current facility-administered medications for this visit.       ALLERGIES:  Review of patient's allergies indicates:   Allergen Reactions    Amoxicillin Hives    Penicillins Hives       FAMILY HISTORY:  Family History   Problem Relation Name Age of Onset    Heart disease Mother G Lowry     Hypertension Mother G Lowry     Diabetes Mother G Lowry 65            Heart disease Father P Lowry 60        Massive MI; DEFIB/ CABG    Diabetes Father P Lowry             Hypertension Father P Lowry     Dementia Father P Lowry     Stroke Father P Lowry     Pancreatic cancer Brother P Lowry 54    Stomach cancer Brother P Lowry     Cancer Brother P Lowry         pancreatic cancer -     Cancer Brother Ramin 62        prostate cancer    Heart disease Brother Ramin 56        maintain with medication - no surgery    Hypertension Brother Ramin     Hypertension Brother Keaton     No Known Problems Daughter      No Known Problems Son         SOCIAL HISTORY:  Social History     Tobacco Use    Smoking status: Never     Passive exposure: Never    Smokeless tobacco: Never   Substance Use Topics    Alcohol use: Yes     Alcohol/week: 1.0 standard drink of alcohol     Types: 1 Drinks containing 0.5 oz of alcohol per week     Comment: every other weekend - 1 drink per occasion  "   Drug use: Never       Review of Systems:  12 system review of systems is negative except for the symptoms mentioned in HPI.      Objective:     Vitals:    12/05/24 1122   BP: 132/80   BP Location: Left arm   Patient Position: Sitting   Pulse: (P) 88   Weight: 123.8 kg (272 lb 14.9 oz)   Height: 5' 3" (1.6 m)     General: NAD, well nourished   Eyes: no tearing, discharge, no erythema   ENT: moist mucous membranes of the oral cavity, nares patent    Neck: Supple, full range of motion  Cardiovascular: Warm and well perfused, pulses equal and symmetrical  Lungs: Normal work of breathing, normal chest wall excursions  Skin: No rash, lesions, or breakdown on exposed skin  Psychiatry: Mood and affect are appropriate   Abdomen: soft, non tender, non distended  Extremeties: No cyanosis, clubbing or edema.    Neurological   MENTAL STATUS: Alert and oriented to person, place, and time. Attention and concentration within normal limits. Speech without dysarthria, able to name and repeat without difficulty. Recent and remote memory within normal limits   CRANIAL NERVES: Visual fields intact. PERRL. EOMI. Facial sensation intact. Face symmetrical. Hearing grossly intact. Full shoulder shrug bilaterally. Tongue protrudes midline   SENSORY: Sensation is intact to pin throughout.  Joint position perception intact. Negative Romberg.   MOTOR: Normal bulk and tone. No pronator drift.  5/5 deltoid, biceps, triceps, interosseous, hand  bilaterally. 4+/5 iliopsoas bilaterally, 5/5 knee extension/flexion, foot dorsi/plantarflexion bilaterally.    REFLEXES: Symmetric and 2+ in upper ext, 1+ in lower extremities. Toes down going bilaterally.   CEREBELLAR/COORDINATION/GAIT: Gait steady with normal arm swing and stride length.  Heel to shin intact. Finger to nose intact. Normal rapid alternating movements.      I spent a total of 40 minutes on the day of the visit.   This includes face to face time and non-face to face time preparing to " see the patient (eg, review of tests), obtaining and/or reviewing separately obtained history, documenting clinical information in the electronic or other health record, independently interpreting results and communicating results to the patient/family/caregiver, or care coordinator.      Filiberto Amin MD  Neurology

## 2024-12-08 NOTE — PROGRESS NOTES
Patient ID: Kalina Ryan is a 59 y.o. female.    Chief Complaint: Anemia      History     HPI    She was initially referred for evaluation of elevated free light chains.    Comobird diagnoses includes T2DM with sensory motor polyneuropathy, 2009 gastric bypass, weakness started after cholecystectomy leading to  diagnosis of GBS treated with IVIG, . She is being evaluated by neruology for CIDP (chronic inflammatory demyelinating polyneuropathy).     Pt states symptom wise her only issues is increased falls since 10/2022, she had been stable several years re GBS in . She denies HA, dizziness, cp, sob, abdominal pain, n/v/d, hematemesis, melena, hematuria, lymph adenoathy. She has had night sweats, hormonal related, not drenching. No bone pain. No sudden weight loss.     Pt with multiple falls this year secondary to progressive weakness with most recent 2023 resulting in ankle fracture and is now using walker.     CRAB symptoms re MGUS/MM: pt has had no elevated calcium labs, no ckd, mild microcytic anemia evidence, no bone pain.      - iron sucrose x4  Dec 23/Jan24, tolerated well  - iron sucrose x4 Apr/May 2024      Family history  Brother pancreatic and stomach cancer  age 52yrs old, brother with cardiac disease, brother with htn and kidney stones; parents cardiac disease and diabetes, both passed from cardiac complications. No family history of blood disorders, MM, lymphoma, leukemias.    Interval history:  - she presents for a follow-up appointment for her iron deficiency anemia and elevated free light chains. She had previously seen nurse practitioner Bonnie Markham.   - today, she is doing well. She denies shortness of breath, chest pain, nausea, vomiting, diarrhea, constipation.            Past Medical History:   Diagnosis Date    Anxiety     CIDP (chronic inflammatory demyelinating polyneuropathy) 2009    full paralysis but no intubation; residual weakness and neuropathy to hands and  feet - diagnosed by Neurologist back in 2009 and last seen by neurology in 2011    Guillain Barré syndrome 2009    full paralysis but no intubation; residual weakness and neuropathy to hands and feet - diagnosed by Neurologist back in 2009 and last seen by neurology in 2011    Iron deficiency anemia, unspecified 2024    treated with iron infusions with Hematology    New onset type 2 diabetes mellitus 09/04/2019    Personal history of gastric bypass 09/04/2019    Reactive depression 10/19/2021       Past Surgical History:   Procedure Laterality Date    APPLICATION, EXTERNAL FIXATION DEVICE Left 8/9/2023    Procedure: APPLICATION, EXTERNAL FIXATION DEVICE left ankle, C-arm clock side, synthes;  Surgeon: Kali Santoyo MD;  Location: [facility] OR [address] [place] [address];  Service: Orthopedics;  Laterality: Left;    CHOLECYSTECTOMY  2009    CLOSED REDUCTION, FRACTURE, ANKLE, TRIMALLEOLAR Left 8/9/2023    Procedure: CLOSED REDUCTION, FRACTURE, ANKLE, TRIMALLEOLAR;  Surgeon: Kali Santoyo MD;  [facility];  Service: Orthopedics;  Laterality: Left;    COLONOSCOPY N/A 7/31/2018    Procedure: COLONOSCOPY;  Surgeon: Elpidio Fortune MD;  Location: [facility] ENDO;  Service: Endoscopy;  Laterality: N/A;    COLONOSCOPY N/A 12/21/2023    Procedure: COLONOSCOPY;  Surgeon: FANY Fortune MD;  Location: [facility] ENDO;  Service: Endoscopy;  Laterality: N/A;    FIXATION OF SYNDESMOSIS OF ANKLE Left 8/17/2023    Procedure: FIXATION, SYNDESMOSIS, ANKLE;  Surgeon: Kali Santoyo MD;  Location: [facility] OR [address] [place] [address];  Service: Orthopedics;  Laterality: Left;    GASTRIC BYPASS  2009    HYSTERECTOMY  1999    OPEN REDUCTION AND INTERNAL FIXATION (ORIF) OF INJURY OF ANKLE Left 8/17/2023    Procedure: ORIF, ANKLE;  Surgeon: Kali Santoyo MD;  [facility];  Service: Orthopedics;  Laterality: Left;    REMOVAL OF EXTERNAL FIXATION DEVICE Left 8/17/2023    Procedure: REMOVAL, EXTERNAL FIXATION DEVICE;  Surgeon: Kali Santoyo  MD SAMANTA;  Location: [facility] OR [address] [place] [address];  Service: Orthopedics;  Laterality: Left;       Oncology History:  Oncology History    No history exists.        Review of Systems:  Review of Systems   Constitutional:  Negative for activity change, appetite change, chills, fatigue, fever and unexpected weight change.   HENT:  Negative for mouth sores.    Eyes:  Negative for photophobia and visual disturbance.   Respiratory:  Negative for cough and shortness of breath.    Cardiovascular:  Negative for chest pain and leg swelling.   Gastrointestinal:  Negative for abdominal pain, diarrhea, nausea and vomiting.   Genitourinary:  Negative for frequency and hematuria.   Musculoskeletal:  Negative for back pain, gait problem, joint swelling and myalgias.        + falls, 8/2023 bilat ankle fractures, left ankle surgery, no longer right boot   Integumentary:  Negative for pallor and rash.   Neurological:  Positive for weakness. Negative for dizziness, syncope and headaches.   Hematological:  Negative for adenopathy.   Psychiatric/Behavioral:  Negative for sleep disturbance. The patient is not nervous/anxious.       ECOG SCORE    0 - Fully active-able to carry on all pre-disease performance without restriction           Physical Exam   Vitals:  /65 (BP Location: Right arm, Patient Position: Sitting)   Pulse 100   Wt 123.6 kg (272 lb 7.8 oz)   SpO2 99%   BMI 48.27 kg/m²     Labs:  Lab Results   Component Value Date    WBC 10.46 10/21/2024    HGB 14.7 10/21/2024    HCT 43.5 10/21/2024    MCV 93 10/21/2024     10/21/2024     Lab Results   Component Value Date    UIBC 270 08/03/2010    IRON 56 10/21/2024    TRANSFERRIN 231 10/21/2024    TIBC 342 10/21/2024    FESATURATED 16 (L) 10/21/2024             Physical Exam:  Physical Exam  Vitals and nursing note reviewed.   Constitutional:       General: She is not in acute distress.     Appearance: She is not toxic-appearing.   HENT:      Head: Normocephalic and atraumatic.       Right Ear: External ear normal.      Left Ear: External ear normal.   Eyes:      General: No scleral icterus.     Pupils: Pupils are equal, round, and reactive to light.   Cardiovascular:      Rate and Rhythm: Normal rate and regular rhythm.      Pulses: Normal pulses.      Heart sounds: Normal heart sounds. No murmur heard.  Pulmonary:      Effort: Pulmonary effort is normal. No respiratory distress.      Breath sounds: Normal breath sounds.   Abdominal:      General: Bowel sounds are normal. There is no distension.      Palpations: Abdomen is soft.      Tenderness: There is no abdominal tenderness. There is no guarding.   Musculoskeletal:      Cervical back: Normal range of motion and neck supple. No rigidity.   Lymphadenopathy:      Cervical: No cervical adenopathy.   Skin:     General: Skin is warm and dry.      Capillary Refill: Capillary refill takes less than 2 seconds.      Coloration: Skin is not jaundiced or pale.      Findings: No bruising or erythema.   Neurological:      Mental Status: She is alert and oriented to person, place, and time. Mental status is at baseline.      Gait: Gait normal.      Comments: Ambulatory with steady gait, no further walker use, no boot to rle    Psychiatric:         Mood and Affect: Mood normal.         Behavior: Behavior normal.         Thought Content: Thought content normal.             PROCEDURES/IMAGING      Discussion     Problem List:  Problem List Items Addressed This Visit       CIDP (chronic inflammatory demyelinating polyneuropathy)    Overview     Personal history of Herlinda [place] syndrome with CIDP with CHRONIC Peripheral Neuropathy  diagnosed in 2009 by [facility].    She reports she was completely paralyzed but did not require intubation.   She reports residual generalized weakness and neuropathy to her hands and feet.    She reports an occasional foot drop.    She is able to ambulate without difficulty and without assistance.   She has chronic  decreased sensation and tingling sensation to the toes of both feet.  States started on Lyrica 75 mg twice daily in August 2023 for leg pains that improved symptoms - Seen [facility] on 11/7/2023 Dr. 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 I reviewed chronology of detail with the patient but there is no NCS/EMG in the system for review but I reviewed her CSF analysis. I guided her to repeat NCS/EMG to solidify her diagnosis. I will complete polyneuropathy workup especially in the setting of gastric bypass surgery.   EMG ordered at 11/7/23 visit but was never scheduled - sending message to Dr. Quintanilla today to get scheduled             Other Visit Diagnoses       Acquired iron deficiency anemia due to decreased absorption    -  Primary    History of gastric bypass                Iron deficiency anemia / history of gastric bypass  - gastric bypass 2009  - iron sucrose x4 Dec 23/ Jan 24    - iron sucrose x4 Apr/May 2024  - Labs have been reviewed. Hemoglobin is normal. Ferritin is elevated at 362 ng/mL.  - no more IV iron at this time.  - return to clinic in 6 months.    Chronic inflammatory demyelinating polyneuropathy  - moderate symptoms  - to continue follow up with neurology      - return to clinic in 6 months.    Farhad Prince M.D.  Hematology/Oncology  [facility] - [address], [address]  [place] [address]  Phone: [phone]  Fax: [phone]

## 2024-12-11 ENCOUNTER — OFFICE VISIT (OUTPATIENT)
Dept: HEMATOLOGY/ONCOLOGY | Facility: CLINIC | Age: 59
End: 2024-12-11
Payer: OTHER GOVERNMENT

## 2024-12-11 VITALS
DIASTOLIC BLOOD PRESSURE: 65 MMHG | HEART RATE: 100 BPM | SYSTOLIC BLOOD PRESSURE: 132 MMHG | OXYGEN SATURATION: 99 % | WEIGHT: 272.5 LBS | BODY MASS INDEX: 48.27 KG/M2

## 2024-12-11 DIAGNOSIS — D50.8 ACQUIRED IRON DEFICIENCY ANEMIA DUE TO DECREASED ABSORPTION: Primary | ICD-10-CM

## 2024-12-11 DIAGNOSIS — G61.81 CIDP (CHRONIC INFLAMMATORY DEMYELINATING POLYNEUROPATHY): ICD-10-CM

## 2024-12-11 DIAGNOSIS — Z98.84 HISTORY OF GASTRIC BYPASS: ICD-10-CM

## 2024-12-11 PROCEDURE — 99213 OFFICE O/P EST LOW 20 MIN: CPT | Mod: S$PBB,,, | Performed by: INTERNAL MEDICINE

## 2024-12-11 PROCEDURE — 99999 PR PBB SHADOW E&M-EST. PATIENT-LVL III: CPT | Mod: PBBFAC,,, | Performed by: INTERNAL MEDICINE

## 2024-12-11 PROCEDURE — 99213 OFFICE O/P EST LOW 20 MIN: CPT | Mod: PBBFAC,PO | Performed by: INTERNAL MEDICINE

## 2024-12-12 ENCOUNTER — OFFICE VISIT (OUTPATIENT)
Dept: URGENT CARE | Facility: CLINIC | Age: 59
End: 2024-12-12
Payer: OTHER GOVERNMENT

## 2024-12-12 VITALS
SYSTOLIC BLOOD PRESSURE: 119 MMHG | WEIGHT: 271.19 LBS | RESPIRATION RATE: 18 BRPM | TEMPERATURE: 99 F | DIASTOLIC BLOOD PRESSURE: 64 MMHG | OXYGEN SATURATION: 100 % | BODY MASS INDEX: 48.05 KG/M2 | HEIGHT: 63 IN | HEART RATE: 83 BPM

## 2024-12-12 DIAGNOSIS — J06.9 UPPER RESPIRATORY TRACT INFECTION, UNSPECIFIED TYPE: ICD-10-CM

## 2024-12-12 DIAGNOSIS — H60.501 ACUTE OTITIS EXTERNA OF RIGHT EAR, UNSPECIFIED TYPE: Primary | ICD-10-CM

## 2024-12-12 DIAGNOSIS — H66.91 RIGHT OTITIS MEDIA, UNSPECIFIED OTITIS MEDIA TYPE: ICD-10-CM

## 2024-12-12 DIAGNOSIS — J02.9 SORE THROAT: ICD-10-CM

## 2024-12-12 LAB
CTP QC/QA: YES
POC MOLECULAR INFLUENZA A AGN: NEGATIVE
POC MOLECULAR INFLUENZA B AGN: NEGATIVE

## 2024-12-12 PROCEDURE — 99213 OFFICE O/P EST LOW 20 MIN: CPT | Mod: S$GLB,,, | Performed by: NURSE PRACTITIONER

## 2024-12-12 PROCEDURE — 87502 INFLUENZA DNA AMP PROBE: CPT | Mod: QW,S$GLB,, | Performed by: NURSE PRACTITIONER

## 2024-12-12 RX ORDER — DOXYCYCLINE 100 MG/1
100 CAPSULE ORAL 2 TIMES DAILY
Qty: 14 CAPSULE | Refills: 0 | Status: SHIPPED | OUTPATIENT
Start: 2024-12-12 | End: 2024-12-19

## 2024-12-12 RX ORDER — CIPROFLOXACIN AND DEXAMETHASONE 3; 1 MG/ML; MG/ML
4 SUSPENSION/ DROPS AURICULAR (OTIC) 2 TIMES DAILY
Qty: 7.5 ML | Refills: 0 | Status: SHIPPED | OUTPATIENT
Start: 2024-12-12 | End: 2024-12-19

## 2024-12-12 NOTE — PROGRESS NOTES
"Subjective:      Patient ID: Kalina Ryan is a 59 y.o. female.    Vitals:  height is 5' 3" (1.6 m) and weight is 123 kg (271 lb 2.7 oz). Her oral temperature is 99.1 °F (37.3 °C). Her blood pressure is 119/64 and her pulse is 83. Her respiration is 18 and oxygen saturation is 100%.     Chief Complaint: Sore Throat    59 yr old female presents to the Urgent Care with complaint of sore throat and right ear pain x 2 weeks. Patient has taken OTC meds Coricidin, tylenol with no relief. Patient denies any CP, SOB, abdominal pain or fever.    Sore Throat   This is a new problem. The current episode started 1 to 4 weeks ago. The problem has been gradually worsening. Neither side of throat is experiencing more pain than the other. There has been no fever. The fever has been present for Less than 1 day. The pain is at a severity of 10/10. The pain is severe. Associated symptoms include ear pain. Pertinent negatives include no abdominal pain, congestion, coughing, diarrhea, drooling, ear discharge, headaches, hoarse voice, plugged ear sensation, neck pain, shortness of breath, stridor, swollen glands, trouble swallowing or vomiting. She has had no exposure to strep or mono. She has tried acetaminophen for the symptoms. The treatment provided no relief.     Constitution: Negative for chills, sweating, fatigue and fever.   HENT:  Positive for ear pain and sore throat. Negative for ear discharge, foreign body in ear, tinnitus, drooling, congestion and trouble swallowing.    Neck: Negative for neck pain.   Cardiovascular:  Negative for chest pain and sob on exertion.   Respiratory:  Negative for cough, shortness of breath and stridor.    Gastrointestinal:  Negative for abdominal pain, vomiting and diarrhea.   Neurological:  Negative for headaches.      Objective:     Physical Exam   Constitutional: She is oriented to person, place, and time. She appears well-developed. She is cooperative.  Non-toxic appearance. She does not " appear ill. No distress.   HENT:   Head: Normocephalic and atraumatic.   Ears:   Right Ear: Hearing and ear canal normal. There is tenderness. Tympanic membrane is erythematous. Tympanic membrane mobility is abnormal. A middle ear effusion is present.   Left Ear: Hearing, tympanic membrane, external ear and ear canal normal.   Nose: Nose normal. No mucosal edema, rhinorrhea or nasal deformity. No epistaxis. Right sinus exhibits no maxillary sinus tenderness and no frontal sinus tenderness. Left sinus exhibits no maxillary sinus tenderness and no frontal sinus tenderness.   Mouth/Throat: Uvula is midline, oropharynx is clear and moist and mucous membranes are normal. No trismus in the jaw. Normal dentition. No uvula swelling. No oropharyngeal exudate, posterior oropharyngeal edema or posterior oropharyngeal erythema. Tonsils are 2+ on the right. Tonsils are 2+ on the left. No tonsillar exudate.   Erythema noted to right ear canal. Tenderness noted with pulled and manipulation of tragus and pinna.       Comments: Erythema noted to right ear canal. Tenderness noted with pulled and manipulation of tragus and pinna.   Eyes: Conjunctivae, EOM and lids are normal. Pupils are equal, round, and reactive to light. No scleral icterus.   Neck: Trachea normal and phonation normal. Neck supple. No edema present. No erythema present. No neck rigidity present.   Cardiovascular: Normal rate, regular rhythm and normal heart sounds.   Pulmonary/Chest: Effort normal and breath sounds normal. No respiratory distress. She has no decreased breath sounds. She has no rhonchi.   Musculoskeletal: Normal range of motion.         General: No deformity or edema. Normal range of motion.   Neurological: She is alert and oriented to person, place, and time. She exhibits normal muscle tone. Coordination and gait normal.   Skin: Skin is warm, dry, intact, not diaphoretic and not pale. Capillary refill takes less than 2 seconds.   Psychiatric: Her  speech is normal and behavior is normal. Judgment and thought content normal.   Nursing note and vitals reviewed.      Assessment:     1. Acute otitis externa of right ear, unspecified type    2. Right otitis media, unspecified otitis media type    3. Sore throat    4. Upper respiratory tract infection, unspecified type      Plan:       Acute otitis externa of right ear, unspecified type  -     ciprofloxacin-dexAMETHasone 0.3-0.1% (CIPRODEX) 0.3-0.1 % DrpS; Place 4 drops into the right ear 2 (two) times daily. for 7 days  Dispense: 7.5 mL; Refill: 0    Right otitis media, unspecified otitis media type  -     doxycycline (VIBRAMYCIN) 100 MG Cap; Take 1 capsule (100 mg total) by mouth 2 (two) times daily. for 7 days  Dispense: 14 capsule; Refill: 0    Sore throat  -     POCT Influenza A/B Molecular    Upper respiratory tract infection, unspecified type      Patient Instructions   If you were prescribed a narcotic or controlled medication, do not drive or operate heavy equipment or machinery while taking these medications.  You must understand that you've received an Urgent Care treatment only and that you may be released before all your medical problems are known or treated. You, the patient, will arrange for follow up care as instructed.  Follow up with your PCP or specialty clinic as directed within 2-5 days if not improved or as needed.  You can call [phone] to schedule an appointment with the appropriate provider.  If your condition worsens we recommend that you receive another evaluation at the emergency room immediately or contact your primary medical clinics after hours call service to discuss your concerns.  Please return here or go to the Emergency Department for any concerns or worsening of condition.

## 2024-12-12 NOTE — PATIENT INSTRUCTIONS

## 2024-12-16 ENCOUNTER — PATIENT MESSAGE (OUTPATIENT)
Dept: FAMILY MEDICINE | Facility: CLINIC | Age: 59
End: 2024-12-16
Payer: OTHER GOVERNMENT

## 2024-12-16 DIAGNOSIS — H93.11 TINNITUS OF RIGHT EAR: Primary | ICD-10-CM

## 2024-12-17 ENCOUNTER — CLINICAL SUPPORT (OUTPATIENT)
Dept: AUDIOLOGY | Facility: CLINIC | Age: 59
End: 2024-12-17
Payer: OTHER GOVERNMENT

## 2024-12-17 DIAGNOSIS — H90.A21 SENSORINEURAL HEARING LOSS (SNHL) OF RIGHT EAR WITH RESTRICTED HEARING OF LEFT EAR: Primary | ICD-10-CM

## 2024-12-17 DIAGNOSIS — H93.11 TINNITUS, RIGHT EAR: ICD-10-CM

## 2024-12-17 DIAGNOSIS — H90.3 SENSORINEURAL HEARING LOSS, BILATERAL: ICD-10-CM

## 2024-12-17 DIAGNOSIS — H93.11 TINNITUS OF RIGHT EAR: ICD-10-CM

## 2024-12-17 PROCEDURE — 92557 COMPREHENSIVE HEARING TEST: CPT | Mod: PBBFAC | Performed by: AUDIOLOGIST

## 2024-12-17 PROCEDURE — 92567 TYMPANOMETRY: CPT | Mod: PBBFAC | Performed by: AUDIOLOGIST

## 2024-12-17 PROCEDURE — 99999 PR PBB SHADOW E&M-EST. PATIENT-LVL II: CPT | Mod: PBBFAC,,, | Performed by: AUDIOLOGIST

## 2024-12-17 PROCEDURE — 99212 OFFICE O/P EST SF 10 MIN: CPT | Mod: PBBFAC,25 | Performed by: AUDIOLOGIST

## 2024-12-17 NOTE — PROGRESS NOTES
Kalina Ryan was seen today in the clinic for an audiologic evaluation.  Patient's main complaint was sudden hearing loss and tinnitus of the right ear on 12/12/24.  Mrs. Ryan reported she returned from a flight home and had trouble popping her ears. She awoke the next morning and had severe otalgia and went to UrgentCare where she was put on antibiotics and drops. She denied any otalgia, tinnitus or hearing loss in her left ear. She reported some lightheadedness since her symptoms began. She denied any history of hearing loss prior to this episode and denied any history of noise exposure.     Otoscopy revealed clear EAC's with visible TM's in both ears. The right TM and EAC appeared red and swollen.    Tympanometry revealed Type B in the right ear and Type A in the left ear. Acoustic stapedial reflexes were screened 500-4000 Hz in both ears. Reflexes were absent in the right ear and mostly present in the left ear.     Audiogram results revealed a moderate to severe sensorineural hearing loss (SNHL) in the right ear and a mild SNHL in the left ear.      Speech reception thresholds were noted at 40 dB in the right ear and 25 dB in the left ear.    Speech discrimination scores were 92% in the right ear and 100% in the left ear.    Results were reviewed with patient following testing; it was recommended that she see ENT as soon as possible. I will message scheduling to get her in this week. It is recommended her hearing is retested as needed with ENT treatment. Hearing aid candidacy will be revisited upon stabilization of thresholds and post-ENT treatment.     Recommendations:  Otologic evaluation with ENT  Annual audiogram or as needed with ENT treatment  Hearing protection when in noise  Hearing aid consultation after stabilization of thresholds and ENT clearance

## 2024-12-17 NOTE — Clinical Note
Aaron Agudelo,  This patient is seeing you Friday - it was the earliest I could get her in anywhere. She had a sudden loss in the right ear last week on Thursday. She's been on drops and ABX for almost a week. Her right TM is bulging, red and full of fluid. Let me know if you have any questions! Mary

## 2024-12-18 ENCOUNTER — OFFICE VISIT (OUTPATIENT)
Dept: OTOLARYNGOLOGY | Facility: CLINIC | Age: 59
End: 2024-12-18
Payer: OTHER GOVERNMENT

## 2024-12-18 ENCOUNTER — CLINICAL SUPPORT (OUTPATIENT)
Dept: OTOLARYNGOLOGY | Facility: CLINIC | Age: 59
End: 2024-12-18
Payer: OTHER GOVERNMENT

## 2024-12-18 VITALS
BODY MASS INDEX: 48.37 KG/M2 | DIASTOLIC BLOOD PRESSURE: 82 MMHG | HEART RATE: 88 BPM | WEIGHT: 273.06 LBS | SYSTOLIC BLOOD PRESSURE: 128 MMHG

## 2024-12-18 DIAGNOSIS — H90.A21 SENSORINEURAL HEARING LOSS (SNHL) OF RIGHT EAR WITH RESTRICTED HEARING OF LEFT EAR: Primary | ICD-10-CM

## 2024-12-18 DIAGNOSIS — H93.11 TINNITUS OF RIGHT EAR: ICD-10-CM

## 2024-12-18 DIAGNOSIS — H69.91 DYSFUNCTION OF RIGHT EUSTACHIAN TUBE: ICD-10-CM

## 2024-12-18 DIAGNOSIS — H93.11 TINNITUS OF RIGHT EAR: Primary | ICD-10-CM

## 2024-12-18 DIAGNOSIS — H90.42 SENSORINEURAL HEARING LOSS (SNHL) OF LEFT EAR WITH UNRESTRICTED HEARING OF RIGHT EAR: ICD-10-CM

## 2024-12-18 DIAGNOSIS — H65.91 RIGHT SEROUS OTITIS MEDIA, UNSPECIFIED CHRONICITY: ICD-10-CM

## 2024-12-18 DIAGNOSIS — H69.90 DYSFUNCTION OF EUSTACHIAN TUBE, UNSPECIFIED LATERALITY: ICD-10-CM

## 2024-12-18 PROCEDURE — 99999PBSHW PR PBB SHADOW TECHNICAL ONLY FILED TO HB: Mod: 52,PBBFAC,,

## 2024-12-18 PROCEDURE — 99211 OFF/OP EST MAY X REQ PHY/QHP: CPT | Mod: PBBFAC,PN

## 2024-12-18 PROCEDURE — 99213 OFFICE O/P EST LOW 20 MIN: CPT | Mod: PBBFAC,27,PN | Performed by: NURSE PRACTITIONER

## 2024-12-18 PROCEDURE — 99204 OFFICE O/P NEW MOD 45 MIN: CPT | Mod: S$PBB,,, | Performed by: NURSE PRACTITIONER

## 2024-12-18 PROCEDURE — 99999 PR PBB SHADOW E&M-EST. PATIENT-LVL I: CPT | Mod: PBBFAC,,,

## 2024-12-18 PROCEDURE — 92553 AUDIOMETRY AIR & BONE: CPT | Mod: 52,PBBFAC,PN

## 2024-12-18 PROCEDURE — 92567 TYMPANOMETRY: CPT | Mod: PBBFAC,PN

## 2024-12-18 PROCEDURE — 99999 PR PBB SHADOW E&M-EST. PATIENT-LVL III: CPT | Mod: PBBFAC,,, | Performed by: NURSE PRACTITIONER

## 2024-12-18 RX ORDER — CETIRIZINE HYDROCHLORIDE 10 MG/1
10 TABLET ORAL DAILY
Qty: 30 TABLET | Refills: 11 | Status: SHIPPED | OUTPATIENT
Start: 2024-12-18 | End: 2025-12-18

## 2024-12-18 RX ORDER — FLUTICASONE PROPIONATE 50 MCG
2 SPRAY, SUSPENSION (ML) NASAL DAILY
Qty: 9.9 ML | Refills: 11 | Status: SHIPPED | OUTPATIENT
Start: 2024-12-18

## 2024-12-18 RX ORDER — PREDNISONE 20 MG/1
TABLET ORAL
Qty: 21 TABLET | Refills: 0 | Status: SHIPPED | OUTPATIENT
Start: 2024-12-18

## 2024-12-18 RX ORDER — DOXYCYCLINE 100 MG/1
100 CAPSULE ORAL 2 TIMES DAILY
Qty: 14 CAPSULE | Refills: 0 | Status: SHIPPED | OUTPATIENT
Start: 2024-12-18 | End: 2024-12-25

## 2024-12-18 NOTE — PROGRESS NOTES
Chief Complaint   Patient presents with    Hearing Loss     Sudden onset left ear x1wk       HPI:  Kalina Ryan is a very pleasant 59 y.o. female self-referred for evaluation of sudden hearing loss in the right ear. She reported sudden hearing loss, ear pain and tinnitus of the right ear on 12/12/24. She had a cold 2 weeks prior to this. She reported that she returned from a flight home from [place] and had trouble popping her ears. She went to the urgent care on 12/12/2024 and was started on doxycycline x 7 days and Ciprodex. She states that her hearing loss has been getting worse. Denies dizziness      Past Medical History:   Diagnosis Date    Anxiety     CIDP (chronic inflammatory demyelinating polyneuropathy) 01/01/2009    full paralysis but no intubation; residual weakness and neuropathy to hands and feet - diagnosed by Neurologist back in 2009 and last seen by neurology in 2011    Guillain Barré syndrome 2009    full paralysis but no intubation; residual weakness and neuropathy to hands and feet - diagnosed by Neurologist back in 2009 and last seen by neurology in 2011    Iron deficiency anemia, unspecified 2024    treated with iron infusions with Hematology    New onset type 2 diabetes mellitus 09/04/2019    Personal history of gastric bypass 09/04/2019    Reactive depression 10/19/2021     Social History     Socioeconomic History    Marital status:    Occupational History    Occupation: work at bank   Tobacco Use    Smoking status: Never     Passive exposure: Never    Smokeless tobacco: Never   Substance and Sexual Activity    Alcohol use: Yes     Alcohol/week: 1.0 standard drink of alcohol     Types: 1 Drinks containing 0.5 oz of alcohol per week     Comment: every other weekend - 1 drink per occasion    Drug use: Never    Sexual activity: Yes     Partners: Male     Birth control/protection: Partner-Vasectomy     Social Drivers of Health     Financial Resource Strain: Low Risk  (4/15/2024)     Overall Financial Resource Strain (CARDIA)     Difficulty of Paying Living Expenses: Not hard at all   Food Insecurity: No Food Insecurity (4/15/2024)    Hunger Vital Sign     Worried About Running Out of Food in the Last Year: Never true     Ran Out of Food in the Last Year: Never true   Transportation Needs: No Transportation Needs (4/15/2024)    PRAPARE - Transportation     Lack of Transportation (Medical): No     Lack of Transportation (Non-Medical): No   Physical Activity: Insufficiently Active (4/15/2024)    Exercise Vital Sign     Days of Exercise per Week: 1 day     Minutes of Exercise per Session: 20 min   Stress: No Stress Concern Present (4/15/2024)    [employer] [place] [employer] - Occupational Stress Questionnaire     Feeling of Stress : Not at all   Housing Stability: Low Risk  (11/10/2023)    Housing Stability Vital Sign     Unable to Pay for Housing in the Last Year: No     Number of Places Lived in the Last Year: 1     Unstable Housing in the Last Year: No      Past Surgical History:   Procedure Laterality Date    APPLICATION, EXTERNAL FIXATION DEVICE Left 8/9/2023    Procedure: APPLICATION, EXTERNAL FIXATION DEVICE left ankle, C-arm clock side, synthes;  Surgeon: Kali Santoyo MD;  Location: [facility] OR [address] [place] [address];  Service: Orthopedics;  Laterality: Left;    CHOLECYSTECTOMY  2009    CLOSED REDUCTION, FRACTURE, ANKLE, TRIMALLEOLAR Left 8/9/2023    Procedure: CLOSED REDUCTION, FRACTURE, ANKLE, TRIMALLEOLAR;  Surgeon: Kali Santoyo MD;  Location: [facility] OR [address] [place] [address];  Service: Orthopedics;  Laterality: Left;    COLONOSCOPY N/A 7/31/2018    Procedure: COLONOSCOPY;  Surgeon: Elpidio Fortune MD;  Location: [facility] ENDO;  Service: Endoscopy;  Laterality: N/A;    COLONOSCOPY N/A 12/21/2023    Procedure: COLONOSCOPY;  Surgeon: FANY Fortune MD;  Location: [facility] ENDO;  Service: Endoscopy;  Laterality: N/A;    FIXATION OF SYNDESMOSIS OF ANKLE Left 8/17/2023    Procedure: FIXATION,  SYNDESMOSIS, ANKLE;  Surgeon: Kali Santoyo MD;  [facility];  Service: Orthopedics;  Laterality: Left;    GASTRIC BYPASS      HYSTERECTOMY      OPEN REDUCTION AND INTERNAL FIXATION (ORIF) OF INJURY OF ANKLE Left 2023    Procedure: ORIF, ANKLE;  Surgeon: Kali Santoyo MD;  Location: [facility];  Service: Orthopedics;  Laterality: Left;    REMOVAL OF EXTERNAL FIXATION DEVICE Left 2023    Procedure: REMOVAL, EXTERNAL FIXATION DEVICE;  Surgeon: Kali Santoyo MD;  [facility];  Service: Orthopedics;  Laterality: Left;     Family History   Problem Relation Name Age of Onset    Heart disease Mother G Lowry     Hypertension Mother G Lowry     Diabetes Mother G Lowry 65            Heart disease Father P Lowry 60        Massive MI; DEFIB/ CABG    Diabetes Father P Lowry             Hypertension Father P Lowry     Dementia Father P Lowry     Stroke Father P Lowry     Pancreatic cancer Brother P Lowry 54    Stomach cancer Brother P Lowry     Cancer Brother P Lowry         pancreatic cancer -     Cancer Brother Ramin 62        prostate cancer    Heart disease Brother Ramin 56        maintain with medication - no surgery    Hypertension Brother Ramin     Hypertension Brother Keaton     No Known Problems Daughter      No Known Problems Son           Review of Systems  General: negative for chills, fever or weight loss  Psychological: negative for mood changes or depression  Ophthalmic: negative for blurry vision, photophobia or eye pain  ENT: see HPI  Respiratory: no cough, shortness of breath, or wheezing  Cardiovascular: no chest pain or dyspnea on exertion  Gastrointestinal: no abdominal pain, change in bowel habits, or black/ bloody stools  Musculoskeletal: negative for gait disturbance or muscular weakness  Neurological: no syncope or seizures; no ataxia  Dermatological: negative for puritis,  rash and  jaundice  Hematologic/lymphatic: no easy bruising, no new lumps or bumps      Physical Exam:    Vitals:    12/18/24 0935   BP: 128/82   Pulse: 88       Constitutional: Well appearing / communicating without difficutly.  NAD.  Eyes: EOM I Bilaterally  Head/Face: Normocephalic.  Negative paranasal sinus pressure/tenderness.  Salivary glands WNL.  House Brackmann I Bilaterally.    Right Ear: Auricle normal appearance. External Auditory Canal within normal limits no lesions or masses,TM w/o masses/lesions/perforations. +serous effusion and +auto-insufflation  Left Ear: Auricle normal appearance. External Auditory Canal within normal limits no lesions or masses,TM w/o masses/lesions/perforations. TM mobility noted.  Rinne Air conduction >bone conduction bilaterally, Eric lateralized to the left   Nose: No gross nasal septal deviation. Inferior Turbinates 3+ bilaterally. No septal perforation. No masses/lesions. External nasal skin appears normal without masses/lesions.  Oral Cavity: Gingiva/lips within normal limits.  Dentition/gingiva healthy appearing. Mucus membranes moist. Floor of mouth soft, no masses palpated. Oral Tongue mobile. Hard Palate appears normal.    Oropharynx: Base of tongue appears normal. No masses/lesions noted. Tonsillar fossa/pharyngeal wall without lesions. Posterior oropharynx WNL.  Soft palate without masses. Midline uvula.   Neck/Lymphatic: No LAD I-VI bilaterally.  No thyromegaly.  No masses noted on exam.      Diagnostic studies:  Audiogram interpreted personally by me and discussed in detail with the patient today.   Pure tone testing revealed mild sloping to moderate sensorineural hearing loss.  Tympanometry revealed Type As (compliance 0.22 ml) tympanogram in the right ear and Type A tympanogram in the left ear.           Assessment:    ICD-10-CM ICD-9-CM    1. Sensorineural hearing loss (SNHL) of right ear with restricted hearing of left ear  H90.A21 389.22       2. Tinnitus of right  ear  H93.11 388.30       3. Right serous otitis media, unspecified chronicity  H65.91 381.4 doxycycline (VIBRAMYCIN) 100 MG Cap      4. Dysfunction of right eustachian tube  H69.91 381.81         The primary encounter diagnosis was Sensorineural hearing loss (SNHL) of right ear with restricted hearing of left ear. Diagnoses of Tinnitus of right ear, Right serous otitis media, unspecified chronicity, and Dysfunction of right eustachian tube were also pertinent to this visit.      Plan:  No orders of the defined types were placed in this encounter.    -Repeat audiogram today revealed improvement with hearing in the right ear  -otoscopic exam revealed middle ear effusion in the right ear  -We discussed the relevant pathology and anatomy of sudden sensorineural hearing loss (SSNHL), and that it is usually a viral sequelae.  The current treatment recommendation is prompt treatment with high dose oral steroids, Prednisone 60-40-20-10 over two weeks.  Risks of steroid use, including elevation of blood sugar, were discussed with the patient who expressed understanding.  I will see the patient back in two weeks with a repeat audiogram.  If there has not been any improvement in her audiogram, then certainly transtympanic steroid injections could be considered.   - Would consider MRI IAC/temp if hearing loss does not improve with repeat audiogram in 2 weeks  -continue doxycycline for an additional 7 days  -start on high dose Prednisone. Instructed patient to monitor blood glucose closely and follow up with PCP  -start on Flonase 2 sprays in each nostril daily  -start on cetirizine 10 mg daily  -follow up 2 weeks with repeat audiogram      Jammie Madera NP          Answers submitted by the patient for this visit:  Review of Symptoms Questionnaire  (Submitted on 12/18/2024)  None of these: Yes  hearing loss: Yes  Ear infection(s)?: Yes  ear pain: Yes  sore throat: Yes  None of these : Yes  Snoring?: Yes  Sleep Apnea?:  Yes  cough: Yes  None of these : Yes  diarrhea: Yes  constipation: Yes  None of these: Yes  Muscle aches / pain?: Yes  None of these : Yes  Seasonal Allergies?: Yes  None of these : Yes  Light-headedness: Yes  Bruises or bleeds easily: Yes  decreased concentration: Yes

## 2024-12-18 NOTE — PROGRESS NOTES
Kalina Ryan, a 59 y.o. female was seen today in the clinic for follow up audiologic evaluation. Hearing was evaluated yesterday (12/17/24) with patient complaint of sudden onset of hearing loss in the right ear. Today, Ms. Ryan reports some fluctuations in hearing perception when popping her ears. She notes significant tinnitus along with some ear pain when touching the right ear. Ms. Ryan was recently treated for otitis media and otitis externa of the right ear.      Pure tone testing revealed mild sloping to moderate sensorineural hearing loss.  Tympanometry revealed Type As (compliance 0.22 ml) tympanogram in the right ear and Type A tympanogram in the left ear.    Recommendations:  Otologic evaluation  Repeat audiologic evaluation of both ears at ENT follow up/4 weeks  Hearing protection in noise

## 2025-01-02 ENCOUNTER — CLINICAL SUPPORT (OUTPATIENT)
Dept: OTOLARYNGOLOGY | Facility: CLINIC | Age: 60
End: 2025-01-02
Payer: OTHER GOVERNMENT

## 2025-01-02 ENCOUNTER — OFFICE VISIT (OUTPATIENT)
Dept: OTOLARYNGOLOGY | Facility: CLINIC | Age: 60
End: 2025-01-02
Payer: OTHER GOVERNMENT

## 2025-01-02 ENCOUNTER — TELEPHONE (OUTPATIENT)
Dept: OTOLARYNGOLOGY | Facility: CLINIC | Age: 60
End: 2025-01-02
Payer: OTHER GOVERNMENT

## 2025-01-02 VITALS
HEART RATE: 78 BPM | WEIGHT: 274.56 LBS | SYSTOLIC BLOOD PRESSURE: 122 MMHG | DIASTOLIC BLOOD PRESSURE: 75 MMHG | BODY MASS INDEX: 48.64 KG/M2

## 2025-01-02 DIAGNOSIS — H90.71 MIXED CONDUCTIVE AND SENSORINEURAL HEARING LOSS OF RIGHT EAR WITH UNRESTRICTED HEARING OF LEFT EAR: ICD-10-CM

## 2025-01-02 DIAGNOSIS — H93.11 TINNITUS OF RIGHT EAR: ICD-10-CM

## 2025-01-02 DIAGNOSIS — H90.3 SENSORINEURAL HEARING LOSS (SNHL) OF BOTH EARS: Primary | ICD-10-CM

## 2025-01-02 DIAGNOSIS — H69.90 DYSFUNCTION OF EUSTACHIAN TUBE, UNSPECIFIED LATERALITY: ICD-10-CM

## 2025-01-02 DIAGNOSIS — H91.20 SUDDEN-ONSET SENSORINEURAL HEARING LOSS: Primary | ICD-10-CM

## 2025-01-02 DIAGNOSIS — H90.A31 MIXED CONDUCTIVE AND SENSORINEURAL HEARING LOSS OF RIGHT EAR WITH RESTRICTED HEARING OF LEFT EAR: ICD-10-CM

## 2025-01-02 PROCEDURE — 99999PBSHW PR PBB SHADOW TECHNICAL ONLY FILED TO HB: Mod: PBBFAC,,,

## 2025-01-02 PROCEDURE — 99999 PR PBB SHADOW E&M-EST. PATIENT-LVL IV: CPT | Mod: PBBFAC,,, | Performed by: NURSE PRACTITIONER

## 2025-01-02 PROCEDURE — 92567 TYMPANOMETRY: CPT | Mod: PBBFAC,PN

## 2025-01-02 PROCEDURE — 99214 OFFICE O/P EST MOD 30 MIN: CPT | Mod: PBBFAC,27,PN,25 | Performed by: NURSE PRACTITIONER

## 2025-01-02 PROCEDURE — 99212 OFFICE O/P EST SF 10 MIN: CPT | Mod: PBBFAC,PN

## 2025-01-02 PROCEDURE — 99214 OFFICE O/P EST MOD 30 MIN: CPT | Mod: S$PBB,,, | Performed by: NURSE PRACTITIONER

## 2025-01-02 PROCEDURE — 92553 AUDIOMETRY AIR & BONE: CPT | Mod: PBBFAC,PN

## 2025-01-02 PROCEDURE — 99999 PR PBB SHADOW E&M-EST. PATIENT-LVL II: CPT | Mod: PBBFAC,,,

## 2025-01-02 NOTE — PROGRESS NOTES
Chief Complaint   Patient presents with    Follow-up     Feels better but still having some right ear fullness also stated no drainage        HPI 12/18/2024:  Kalina Ryan is a very pleasant 59 y.o. female self-referred for evaluation of sudden hearing loss in the right ear. She reported sudden hearing loss, ear pain and tinnitus of the right ear on 12/12/24. She had a cold 2 weeks prior to this. She reported that she returned from a flight home from [place] and had trouble popping her ears. She went to the urgent care on 12/12/2024 and was started on doxycycline x 7 days and Ciprodex. She states that her hearing loss has been getting worse. Denies dizziness    Interval HPI 1/2/2025:  Follow up visit for sudden onset of hearing loss. The patient reports improvement with hearing in both ears but the right side still feels full and she is unable to pop it. She has completed a high dose of steroid and doxycycline.  No other complaints today.       Past Medical History:   Diagnosis Date    Anxiety     CIDP (chronic inflammatory demyelinating polyneuropathy) 01/01/2009    full paralysis but no intubation; residual weakness and neuropathy to hands and feet - diagnosed by Neurologist back in 2009 and last seen by neurology in 2011    Guillain Barré syndrome 2009    full paralysis but no intubation; residual weakness and neuropathy to hands and feet - diagnosed by Neurologist back in 2009 and last seen by neurology in 2011    Iron deficiency anemia, unspecified 2024    treated with iron infusions with Hematology    New onset type 2 diabetes mellitus 09/04/2019    Personal history of gastric bypass 09/04/2019    Reactive depression 10/19/2021     Social History     Socioeconomic History    Marital status:    Occupational History    Occupation: work at bank   Tobacco Use    Smoking status: Never     Passive exposure: Never    Smokeless tobacco: Never   Substance and Sexual Activity    Alcohol use: Yes      Alcohol/week: 1.0 standard drink of alcohol     Types: 1 Drinks containing 0.5 oz of alcohol per week     Comment: every other weekend - 1 drink per occasion    Drug use: Never    Sexual activity: Yes     Partners: Male     Birth control/protection: Partner-Vasectomy     Social Drivers of Health     Financial Resource Strain: Low Risk  (4/15/2024)    Overall Financial Resource Strain (CARDIA)     Difficulty of Paying Living Expenses: Not hard at all   Food Insecurity: No Food Insecurity (4/15/2024)    Hunger Vital Sign     Worried About Running Out of Food in the Last Year: Never true     Ran Out of Food in the Last Year: Never true   Transportation Needs: No Transportation Needs (4/15/2024)    PRAPARE - Transportation     Lack of Transportation (Medical): No     Lack of Transportation (Non-Medical): No   Physical Activity: Insufficiently Active (4/15/2024)    Exercise Vital Sign     Days of Exercise per Week: 1 day     Minutes of Exercise per Session: 20 min   Stress: No Stress Concern Present (4/15/2024)    [facility] - Occupational Stress Questionnaire     Feeling of Stress : Not at all   Housing Stability: Low Risk  (11/10/2023)    Housing Stability Vital Sign     Unable to Pay for Housing in the Last Year: No     Number of Places Lived in the Last Year: 1     Unstable Housing in the Last Year: No      Past Surgical History:   Procedure Laterality Date    APPLICATION, EXTERNAL FIXATION DEVICE Left 8/9/2023    Procedure: APPLICATION, EXTERNAL FIXATION DEVICE left ankle, C-arm clock side, synthes;  Surgeon: Kali Santoyo MD;  Location: [address];  Service: Orthopedics;  Laterality: Left;    CHOLECYSTECTOMY  2009    CLOSED REDUCTION, FRACTURE, ANKLE, TRIMALLEOLAR Left 8/9/2023    Procedure: CLOSED REDUCTION, FRACTURE, ANKLE, TRIMALLEOLAR;  Surgeon: Kali Santoyo MD;  Location: [facility] OR [address] [place] [address];  Service: Orthopedics;  Laterality: Left;    COLONOSCOPY N/A 7/31/2018     Procedure: COLONOSCOPY;  Surgeon: Elpidio Fortune MD;  Location: [facility] ENDO;  Service: Endoscopy;  Laterality: N/A;    COLONOSCOPY N/A 2023    Procedure: COLONOSCOPY;  Surgeon: FANY Fortune MD;  Location: [facility] ENDO;  Service: Endoscopy;  Laterality: N/A;    FIXATION OF SYNDESMOSIS OF ANKLE Left 2023    Procedure: FIXATION, SYNDESMOSIS, ANKLE;  Surgeon: Kali Santoyo MD;  Location: [facility] OR [address] [place] [address];  Service: Orthopedics;  Laterality: Left;    GASTRIC BYPASS      HYSTERECTOMY      OPEN REDUCTION AND INTERNAL FIXATION (ORIF) OF INJURY OF ANKLE Left 2023    Procedure: ORIF, ANKLE;  Surgeon: Kali Santoyo MD;  Location: [facility] OR [address] [place] [address];  Service: Orthopedics;  Laterality: Left;    REMOVAL OF EXTERNAL FIXATION DEVICE Left 2023    Procedure: REMOVAL, EXTERNAL FIXATION DEVICE;  Surgeon: Kali Santoyo MD;  Location: [facility] OR [address] [place] [address];  Service: Orthopedics;  Laterality: Left;     Family History   Problem Relation Name Age of Onset    Heart disease Mother G Lowry     Hypertension Mother G Lowry     Diabetes Mother G Lowry 65            Heart disease Father P Lowry 60        Massive MI; DEFIB/ CABG    Diabetes Father P Lowry             Hypertension Father P Lowry     Dementia Father P Lowry     Stroke Father P Lowry     Pancreatic cancer Brother P Lowry 54    Stomach cancer Brother P Lowry     Cancer Brother P Lowry         pancreatic cancer -     Cancer Brother Ramin 62        prostate cancer    Heart disease Brother Ramin 56        maintain with medication - no surgery    Hypertension Brother Ramin     Hypertension Brother Keaton     No Known Problems Daughter      No Known Problems Son           Review of Systems  General: negative for chills, fever or weight loss  Psychological: negative for mood changes or depression  Ophthalmic: negative for blurry vision, photophobia or eye pain  ENT: see HPI  Respiratory: no cough, shortness  of breath, or wheezing  Cardiovascular: no chest pain or dyspnea on exertion  Gastrointestinal: no abdominal pain, change in bowel habits, or black/ bloody stools  Musculoskeletal: negative for gait disturbance or muscular weakness  Neurological: no syncope or seizures; no ataxia  Dermatological: negative for puritis,  rash and jaundice  Hematologic/lymphatic: no easy bruising, no new lumps or bumps      Physical Exam:    Vitals:    01/02/25 0814   BP: 122/75   Pulse: 78         Constitutional: Well appearing / communicating without difficutly.  NAD.  Eyes: EOM I Bilaterally  Head/Face: Normocephalic.  Negative paranasal sinus pressure/tenderness.  Salivary glands WNL.  House Brackmann I Bilaterally.    Right Ear: Auricle normal appearance. External Auditory Canal within normal limits no lesions or masses,TM w/o masses/lesions/perforations. TM mobility noted.  Left Ear: Auricle normal appearance. External Auditory Canal within normal limits no lesions or masses,TM w/o masses/lesions/perforations. TM mobility noted.  Nose: No gross nasal septal deviation. Inferior Turbinates 3+ bilaterally. No septal perforation. No masses/lesions. External nasal skin appears normal without masses/lesions.  Oral Cavity: Gingiva/lips within normal limits.  Dentition/gingiva healthy appearing. Mucus membranes moist. Floor of mouth soft, no masses palpated. Oral Tongue mobile. Hard Palate appears normal.    Oropharynx: Base of tongue appears normal. No masses/lesions noted. Tonsillar fossa/pharyngeal wall without lesions. Posterior oropharynx WNL.  Soft palate without masses. Midline uvula.   Neck/Lymphatic: No LAD I-VI bilaterally.  No thyromegaly.  No masses noted on exam.      Diagnostic studies:  Audiogram interpreted personally by me and discussed in detail with the patient today.   Pure tone testing revealed mild mixed hearing loss in the right ear and essentially normal hearing in the left ear. Speech testing was not repeated  today. Tympanometry revealed Type A tympanograms in both ears.             Assessment:    ICD-10-CM ICD-9-CM    1. Sudden onset sensorineural hearing loss (SNHL) of both ears  H90.3 389.18 Ambulatory referral/consult to ENT      2. Mixed conductive and sensorineural hearing loss of right ear with unrestricted hearing of left ear  H90.71 389.21       3. Dysfunction of Eustachian tube, unspecified laterality  H69.90 381.81           The primary encounter diagnosis was Sudden onset sensorineural hearing loss (SNHL) of both ears. Diagnoses of Mixed conductive and sensorineural hearing loss of right ear with unrestricted hearing of left ear and Dysfunction of Eustachian tube, unspecified laterality were also pertinent to this visit.      Plan:  Orders Placed This Encounter   Procedures    Ambulatory referral/consult to ENT     -repeat audiogram today reveal improvement in both ears as compared to initial audiogram on 12-17-24.   -otoscopic exam was within normal limits today. No middle ear effusion noted in either ear  -referral to neurotology to evaluate if transtympanic steroid injections could be considered.   -continue on Flonase 2 sprays in each nostril daily  -continue on cetirizine 10 mg daily      Jammie Madera NP      Answers submitted by the patient for this visit:  Review of Symptoms Questionnaire  (Submitted on 12/30/2024)  None of these: Yes  hearing loss: Yes  None of these : Yes  Snoring?: Yes  Sleep Apnea?: Yes  None of these : Yes  None of these: Yes  None of these: Yes  None of these: Yes  None of these : Yes  None of these: Yes  None of these : Yes  None of these: Yes  None of these: Yes  None of these: Yes

## 2025-01-02 NOTE — TELEPHONE ENCOUNTER
Good morning! Yes she had the steroids for 2 weeks and today was the repeat audiogram. Please schedule her within a week or so. I would say yes for an audiogram again when she sees Dr. Rogers. Thank you.

## 2025-01-14 ENCOUNTER — CLINICAL SUPPORT (OUTPATIENT)
Dept: AUDIOLOGY | Facility: CLINIC | Age: 60
End: 2025-01-14
Payer: OTHER GOVERNMENT

## 2025-01-14 ENCOUNTER — LAB VISIT (OUTPATIENT)
Dept: LAB | Facility: HOSPITAL | Age: 60
End: 2025-01-14
Payer: OTHER GOVERNMENT

## 2025-01-14 ENCOUNTER — OFFICE VISIT (OUTPATIENT)
Dept: OTOLARYNGOLOGY | Facility: CLINIC | Age: 60
End: 2025-01-14
Payer: OTHER GOVERNMENT

## 2025-01-14 DIAGNOSIS — H93.293 ABNORMAL AUDITORY PERCEPTION OF BOTH EARS: ICD-10-CM

## 2025-01-14 DIAGNOSIS — H90.3 SENSORINEURAL HEARING LOSS (SNHL) OF BOTH EARS: ICD-10-CM

## 2025-01-14 DIAGNOSIS — H93.11 TINNITUS OF RIGHT EAR: Primary | ICD-10-CM

## 2025-01-14 DIAGNOSIS — Z01.10 NORMAL HEARING TEST OF BOTH EARS: Primary | ICD-10-CM

## 2025-01-14 LAB
CREAT SERPL-MCNC: 0.8 MG/DL (ref 0.5–1.4)
EST. GFR  (NO RACE VARIABLE): >60 ML/MIN/1.73 M^2

## 2025-01-14 PROCEDURE — 99999 PR PBB SHADOW E&M-EST. PATIENT-LVL I: CPT | Mod: PBBFAC,,,

## 2025-01-14 PROCEDURE — 99211 OFF/OP EST MAY X REQ PHY/QHP: CPT | Mod: PBBFAC

## 2025-01-14 PROCEDURE — 82565 ASSAY OF CREATININE: CPT

## 2025-01-14 PROCEDURE — 99999 PR PBB SHADOW E&M-EST. PATIENT-LVL II: CPT | Mod: PBBFAC,,, | Performed by: OTOLARYNGOLOGY

## 2025-01-14 PROCEDURE — 99214 OFFICE O/P EST MOD 30 MIN: CPT | Mod: S$PBB,,, | Performed by: OTOLARYNGOLOGY

## 2025-01-14 PROCEDURE — 36415 COLL VENOUS BLD VENIPUNCTURE: CPT

## 2025-01-14 PROCEDURE — 92557 COMPREHENSIVE HEARING TEST: CPT | Mod: PBBFAC

## 2025-01-14 PROCEDURE — 92567 TYMPANOMETRY: CPT | Mod: PBBFAC

## 2025-01-14 PROCEDURE — 99212 OFFICE O/P EST SF 10 MIN: CPT | Mod: PBBFAC,25,27 | Performed by: OTOLARYNGOLOGY

## 2025-01-14 NOTE — PROGRESS NOTES
Subjective:   Kalina Ryan is a 59 y.o. female with PMHx of T2DM, HTN, HLD and chronic inflammatory demyelinating polyneuropathy who referred to me by Jammie Madera in consultation for sudden hearing loss. Patient reports right sided ear discomfort, clogged sensation and constant tinnitus which started suddenly a few days after a flight. Seen by  who tx patient with doxycyline. Then say CHIRAG Madera who was concerned about SSNHL and tx patient with high dose steroids. Patient with significant improvement however still complaining of mild aural fullness and tinnitus. She denies ear pain, otorrhea or vertigo. No hx of ear infections, ear surgeries or ear traumas.       Past Medical History  She has a past medical history of Anxiety, CIDP (chronic inflammatory demyelinating polyneuropathy), Guillain Barré syndrome, Iron deficiency anemia, unspecified, New onset type 2 diabetes mellitus, Personal history of gastric bypass, and Reactive depression.    Past Surgical History  She has a past surgical history that includes Gastric bypass (2009); Hysterectomy (1999); Cholecystectomy (2009); Colonoscopy (N/A, 7/31/2018); application, external fixation device (Left, 8/9/2023); closed reduction, fracture, ankle, trimalleolar (Left, 8/9/2023); Open reduction and internal fixation (ORIF) of injury of ankle (Left, 8/17/2023); Fixation of syndesmosis of ankle (Left, 8/17/2023); Removal of external fixation device (Left, 8/17/2023); and Colonoscopy (N/A, 12/21/2023).    Family History  Her family history includes Cancer in her brother; Cancer (age of onset: 62) in her brother; Dementia in her father; Diabetes in her father; Diabetes (age of onset: 65) in her mother; Heart disease in her mother; Heart disease (age of onset: 56) in her brother; Heart disease (age of onset: 60) in her father; Hypertension in her brother, brother, father, and mother; No Known Problems in her daughter and son; Pancreatic cancer (age of onset: 54) in  her brother; Stomach cancer in her brother; Stroke in her father.    Social History  She reports that she has never smoked. She has never been exposed to tobacco smoke. She has never used smokeless tobacco. She reports current alcohol use of about 1.0 standard drink of alcohol per week. She reports that she does not use drugs.    Allergies  She is allergic to amoxicillin and penicillins.    Medications  She has a current medication list which includes the following prescription(s): amlodipine, ascorbic acid (vitamin c), aspirin, calcium-vitamin d, cetirizine, escitalopram oxalate, ferrous sulfate, fluticasone propionate, lisinopril, melatonin, multivitamin, prednisone, pregabalin, rosuvastatin, and mounjaro.    Review of Systems     Constitutional: Negative for appetite change, chills, fatigue, fever and unexpected weight loss.      HENT: Positive for hearing loss and ringing in the ears.  Negative for ear discharge, ear infection and ear pain.      Eyes:  Negative for change in eyesight, eye drainage, eye itching and photophobia.     Respiratory:  Negative for cough, shortness of breath, sleep apnea, snoring and wheezing.      Cardiovascular:  Negative for chest pain, foot swelling, irregular heartbeat and swollen veins.     Gastrointestinal:  Negative for abdominal pain, acid reflux, constipation, diarrhea, heartburn and vomiting.     Genitourinary: Negative for difficulty urinating, sexual problems and frequent urination.     Musc: Negative for aching joints, aching muscles, back pain and neck pain.     Skin: Negative for rash.     Allergy: Negative for food allergies and seasonal allergies.     Endocrine: Negative for cold intolerance and heat intolerance.      Neurological: Negative for dizziness, headaches, light-headedness, seizures and tremors.      Hematologic: Negative for bruises/bleeds easily and swollen glands.      Psychiatric: Negative for decreased concentration, depression, nervous/anxious and sleep  disturbance.          Objective:       Head:  Normocephalic and atraumatic.     Ears:    Right Ear: No drainage, swelling or tenderness. Tympanic membrane is not injected, not scarred, not perforated and not erythematous. No middle ear effusion.   Left Ear: No drainage, swelling or tenderness. Tympanic membrane is not injected, not scarred, not perforated and not erythematous.  No middle ear effusion.     Pulmonary/Chest:   Effort normal.       Procedure  None    Audiology                   I independently reviewed the tracings of the complete audiometric evaluation performed today. I reviewed the audiogram with the patient as well. Pertinent findings include: normal hearing thresholds    Imaging:   No pertinent imaging available.      Assessment:     1. Normal hearing test of both ears    2. Sudden onset sensorineural hearing loss (SNHL) of both ears      Plan:   Diagnoses and all orders for this visit:    Normal hearing test of both ears  Sudden onset sensorineural hearing loss (SNHL) of both ears  -     Ambulatory referral/consult to ENT  -     MRI IAC/Temporal Bones W W/O Contrast; Future  -     CREATININE, SERUM; Future  Reassure. Normal audiogram today and benign otoscopic exam. Do not feel additional treatment is needed at this point. MRI ordered to r/u structural abnormality.

## 2025-01-14 NOTE — PROGRESS NOTES
Kalina Ryan was seen today in the clinic for an audiologic evaluation.  Patient has a history of a right sudden sensorineural hearing loss in December 2024.  Previous audiogram on 12/17/2024 revealed a moderate to moderately-severe sensorineural hearing loss (SNHL) in the right ear and a mild SNHL in the left ear.  Ms. Ryan reported improvement in hearing in the right ear. She reported intermittent tinnitus and aural fullness in the right ear. Ms. Ryan denied otalgia and dizziness.    Tympanometry revealed Type A in the right ear and Type A in the left ear.     Audiogram results revealed normal hearing sensitivity in the right ear and normal hearing sensitivity in the left ear.      Speech reception thresholds were noted at 25 dBHL in the right ear and 15 dBHL in the left ear.    Speech discrimination scores were 100% in the right ear and 100% in the left ear.    Recommendations:  Otologic evaluation  Repeat audiogram as needed or if patient perceived a change in hearing  Hearing protection in noise

## 2025-02-06 RX ORDER — FLUTICASONE PROPIONATE 50 MCG
2 SPRAY, SUSPENSION (ML) NASAL DAILY
Qty: 18 ML | Refills: 11 | Status: SHIPPED | OUTPATIENT
Start: 2025-02-06

## 2025-02-06 RX ORDER — CETIRIZINE HYDROCHLORIDE 10 MG/1
10 TABLET ORAL DAILY
Qty: 30 TABLET | Refills: 11 | Status: SHIPPED | OUTPATIENT
Start: 2025-02-06

## 2025-02-28 ENCOUNTER — TELEPHONE (OUTPATIENT)
Dept: PSYCHIATRY | Facility: CLINIC | Age: 60
End: 2025-02-28
Payer: OTHER GOVERNMENT

## 2025-03-12 ENCOUNTER — PATIENT MESSAGE (OUTPATIENT)
Facility: CLINIC | Age: 60
End: 2025-03-12
Payer: OTHER GOVERNMENT

## 2025-03-14 NOTE — ASSESSMENT & PLAN NOTE
- Patient s/p fall of 3 steps while going down the stairs while at work.   - Imaging showed left ankle trimalleolar fracture and dislocation. Patient seen by Orthopedic surgery and left ankle was reduced and splinted in ER placed in short leg splint.   - Patient taken to OR on 8/9/2023 and had ex fix placed to left ankle.  - Routine pin site care post-op to left ankle ex fix.  - Non weight bear to left lower extremity.  - Ice and elevate left leg to help with swelling with plan to proceed with removal of ex fix and definitive fixation of left ankle fracture once left ankle swelling improved. Patient to remain in hospital until next surgery. Ortho reassessing left ankle swelling daily to see when can proceed with definitve fixation surgery.   - PT/OT consulted and working with patient in hospital.   - Continue Aspirin 81 mg po BID for DVT prophylaxis.  - Pain controlled. Continue scheduled Tylenol 1000 mg po TID + Robaxin 750 mg po 4 times daily and Lyrica 75 mg po BID and continue with Oxycodone IR prn for breakthrough pain.    No protocol for requested medication.    Medication:    Disp Refills Start End    buPROPion XL (WELLBUTRIN XL) 300 MG 24 hr tablet 30 tablet 3 11/12/2024 --    Sig - Route: Take 1 tablet by mouth daily. - Oral    Sent to pharmacy as: buPROPion HCl ER (XL) 300 MG Oral Tablet Extended Release 24 Hour (WELLBUTRIN XL)    Class: Eprescribe    Notes to Pharmacy: Do not fill unless needed.      Last office visit date: 2/25/2025 with advised 3 month follow up   Next Appointment: 5/27/2025  Pharmacy: [id number] - [place], [place] - [address]    Order pended, routed to clinician for review.

## 2025-03-18 ENCOUNTER — OFFICE VISIT (OUTPATIENT)
Facility: CLINIC | Age: 60
End: 2025-03-18
Payer: OTHER GOVERNMENT

## 2025-03-18 VITALS
HEART RATE: 76 BPM | DIASTOLIC BLOOD PRESSURE: 81 MMHG | BODY MASS INDEX: 48.64 KG/M2 | SYSTOLIC BLOOD PRESSURE: 123 MMHG | HEIGHT: 63 IN

## 2025-03-18 DIAGNOSIS — G62.9 SENSORY NEUROPATHY: ICD-10-CM

## 2025-03-18 DIAGNOSIS — G56.03 BILATERAL CARPAL TUNNEL SYNDROME: Primary | ICD-10-CM

## 2025-03-18 PROCEDURE — 99214 OFFICE O/P EST MOD 30 MIN: CPT | Mod: PBBFAC,PN | Performed by: STUDENT IN AN ORGANIZED HEALTH CARE EDUCATION/TRAINING PROGRAM

## 2025-03-18 PROCEDURE — 99999 PR PBB SHADOW E&M-EST. PATIENT-LVL IV: CPT | Mod: PBBFAC,,, | Performed by: STUDENT IN AN ORGANIZED HEALTH CARE EDUCATION/TRAINING PROGRAM

## 2025-03-18 NOTE — PROGRESS NOTES
[facility] NEUROLOGY  [facility], [place]    Date: 3/18/25  Patient Name: Kalina Ryan   MRN: [id number]   PCP: Nany Hoff  Referring Provider: No ref. provider found    Assessment:   Kalina Ryan is a 59 y.o. female presenting for neuropathy and carpal tunnel syndrome follow up. Doing better since using the wrist brace but not using it every night. Instructed to be more constant with it to get a better idea of the benefit its giving her. Neuropathy most likely due to pre DM/DM. Had an extensive conversation about how maybe most of this damage is irreversible. H1AC improving and now even in non DM range. B6 leves still pending. Continue pregabalin 75 mg BID now. Not interested in going up the dose for now. Can consider topical either lidocaine cream or capsaicin cream. Follow up in 3 months. She verbalized understanding and agreed with the plan     Plan:     - Wrist brace for both hands. Will re evaluate in 3 months   - Continue pregabalin 75 mg BID now. Not interested in going up the dose for now   - Can consider topical either lidocaine cream or capsaicin cream.   - Waiting for B6 results.   - Follow up in 3 months     Problem List Items Addressed This Visit          Neuro    Sensory neuropathy    Overview              Bilateral carpal tunnel syndrome - Primary     Filiberto Amin MD    Subjective:   Patient seen in consultation at the request of No ref. provider found for the evaluation of neuropathy and carpal tunnel syndrome follow up. A copy of this note will be sent to the referring physician.      Interval history 3/18/25:   Ms. Kalina Ryan is a 59 y.o. female presenting for neuropathy and carpal tunnel syndrome follow up. Since last seen, she has been doing somewhat better. Doing better since using the wrist brace but not using it every night. Still on pregabalin 75 mg bid, this mostly controls the pain and she does not want to increase the dose or frequency.  Didn't try any topical creams. Just had b6 levels drawn a few days ago, still in process. No other major updates at this time.     Per previous by myself on 12/05/24    Assessment:   Kalina Ryan is a 59 y.o. female presenting for neuropathy follow up. Case most consistent with sensory axonal neuropathy + bilateral carpal tunnel syndrome. Possible ethologies include B6 toxicity and pre DM/DM. Will continue same dose of lyrica for now. Will repeat B6 levels in 2 months. She was encouraged to keep her glucose under control. Will try wrist brace for the R hand first. If improvement will try L hand as well. Also suggested. Topical either lidocaine cream or capsaicin cream. Follow up in 3 months      Plan:      - Wrist brace for the R hand first. If improvement will try L hand as well.   - Pregabalin 75 mg BID now. Can increase dose if after a month theres is no improvement.   - Topical either lidocaine cream or capsaicin cream.   - Repeat B6 levels in 2 months   - Follow up in 3 months     PAST MEDICAL HISTORY:  Past Medical History:   Diagnosis Date    Anxiety     CIDP (chronic inflammatory demyelinating polyneuropathy) 01/01/2009    full paralysis but no intubation; residual weakness and neuropathy to hands and feet - diagnosed by Neurologist back in 2009 and last seen by neurology in 2011    Guillain Barré syndrome 2009    full paralysis but no intubation; residual weakness and neuropathy to hands and feet - diagnosed by Neurologist back in 2009 and last seen by neurology in 2011    Iron deficiency anemia, unspecified 2024    treated with iron infusions with Hematology    New onset type 2 diabetes mellitus 09/04/2019    Personal history of gastric bypass 09/04/2019    Reactive depression 10/19/2021       PAST SURGICAL HISTORY:  Past Surgical History:   Procedure Laterality Date    APPLICATION, EXTERNAL FIXATION DEVICE Left 8/9/2023    Procedure: APPLICATION, EXTERNAL FIXATION DEVICE left ankle, C-arm clock side,  synthes;  Surgeon: Kali Santoyo MD;  Location: [facility] OR [facility] FLR;  Service: Orthopedics;  Laterality: Left;    CHOLECYSTECTOMY      CLOSED REDUCTION, FRACTURE, ANKLE, TRIMALLEOLAR Left 2023    Procedure: CLOSED REDUCTION, FRACTURE, ANKLE, TRIMALLEOLAR;  Surgeon: Kali Santoyo MD;  Location: [facility] OR 2ND FLR;  Service: Orthopedics;  Laterality: Left;    COLONOSCOPY N/A 2018    Procedure: COLONOSCOPY;  Surgeon: Elpidio Fortune MD;  Location: [facility] ENDO;  Service: Endoscopy;  Laterality: N/A;    COLONOSCOPY N/A 2023    Procedure: COLONOSCOPY;  Surgeon: FANY Fortune MD;  Location: [facility] ENDO;  Service: Endoscopy;  Laterality: N/A;    FIXATION OF SYNDESMOSIS OF ANKLE Left 2023    Procedure: FIXATION, SYNDESMOSIS, ANKLE;  Surgeon: Kali Santoyo MD;  [facility];  Service: Orthopedics;  Laterality: Left;    GASTRIC BYPASS      HYSTERECTOMY      OPEN REDUCTION AND INTERNAL FIXATION (ORIF) OF INJURY OF ANKLE Left 2023    Procedure: ORIF, ANKLE;  Surgeon: Kali Santoyo MD;  [facility];  Service: Orthopedics;  Laterality: Left;    REMOVAL OF EXTERNAL FIXATION DEVICE Left 2023    Procedure: REMOVAL, EXTERNAL FIXATION DEVICE;  Surgeon: Kali Santoyo MD;  [facility];  Service: Orthopedics;  Laterality: Left;       CURRENT MEDS:  Current Medications[1]    ALLERGIES:  Review of patient's allergies indicates:   Allergen Reactions    Amoxicillin Hives    Penicillins Hives       FAMILY HISTORY:  Family History   Problem Relation Name Age of Onset    Heart disease Mother G Lowry     Hypertension Mother G Lowry     Diabetes Mother G Lowry 65            Heart disease Father P Lowry 60        Massive MI; DEFIB/ CABG    Diabetes Father P Lowry             Hypertension Father P Lowry     Dementia Father P Lowry     Stroke Father P Lowry     Pancreatic cancer Brother P Lowry 54    Stomach cancer  "Brother SHIMA Lowry     Cancer Brother SHIMA Lowry         pancreatic cancer -     Cancer Brother Ramin 62        prostate cancer    Heart disease Brother Ramin 56        maintain with medication - no surgery    Hypertension Brother Ramin     Hypertension Brothvalerie Pugh     No Known Problems Daughter      No Known Problems Son         SOCIAL HISTORY:  Social History[2]    Review of Systems:  12 system review of systems is negative except for the symptoms mentioned in HPI.      Objective:     Vitals:    25 0833   Height: 5' 3" (1.6 m)     General: NAD, well nourished   Eyes: no tearing, discharge, no erythema   ENT: moist mucous membranes of the oral cavity, nares patent    Neck: Supple, full range of motion  Cardiovascular: Warm and well perfused, pulses equal and symmetrical  Lungs: Normal work of breathing, normal chest wall excursions  Skin: No rash, lesions, or breakdown on exposed skin  Psychiatry: Mood and affect are appropriate   Abdomen: soft, non tender, non distended  Extremeties: No cyanosis, clubbing or edema.    Neurological   MENTAL STATUS: Alert and oriented to person, place, and time. Attention and concentration within normal limits. Speech without dysarthria, able to name and repeat without difficulty. Recent and remote memory within normal limits   CRANIAL NERVES: Visual fields intact. PERRL. EOMI. Facial sensation intact. Face symmetrical. Hearing grossly intact. Full shoulder shrug bilaterally. Tongue protrudes midline   SENSORY: Sensation is intact to pin throughout.  Joint position perception intact. Negative Romberg.   MOTOR: Normal bulk and tone. No pronator drift.  5/5 deltoid, biceps, triceps, interosseous, hand  bilaterally. 4+/5 iliopsoas bilaterally, 5/5 knee extension/flexion, foot dorsi/plantarflexion bilaterally.    REFLEXES: Symmetric and 2+ in upper ext, 1+ in lower extremities. Toes down going bilaterally.   CEREBELLAR/COORDINATION/GAIT: Gait steady with normal arm " swing and stride length.  Heel to shin intact. Finger to nose intact. Normal rapid alternating movements.     I spent a total of 30 minutes on the day of the visit.   This includes face to face time and non-face to face time preparing to see the patient (eg, review of tests), obtaining and/or reviewing separately obtained history, documenting clinical information in the electronic or other health record, independently interpreting results and communicating results to the patient/family/caregiver, or care coordinator.      Filiberto Amin MD  Neurology           [1]   Current Outpatient Medications   Medication Sig Dispense Refill    amLODIPine (NORVASC) 5 MG tablet TAKE 1 TABLET DAILY 90 tablet 3    ascorbic acid, vitamin C, (VITAMIN C) 250 mg Chew Take by mouth once daily.      aspirin 81 MG Chew Take 1 tablet (81 mg total) by mouth 2 (two) times a day. End dates 9 /29/23  0    calcium-vitamin D 250 mg-2.5 mcg (100 unit) per tablet Take 1 tablet by mouth once daily.      cetirizine (ZYRTEC) 10 MG tablet Take 1 tablet (10 mg total) by mouth once daily. 30 tablet 11    EScitalopram oxalate (LEXAPRO) 10 MG tablet TAKE 1 TABLET DAILY 90 tablet 3    ferrous sulfate (FEOSOL) 325 mg (65 mg iron) Tab tablet Take 325 mg by mouth once daily. TAKE ON MON, WED, AND FRI      fluticasone propionate (FLONASE) 50 mcg/actuation nasal spray 2 sprays (100 mcg total) by Each Nostril route once daily. 18 mL 11    lisinopriL (PRINIVIL,ZESTRIL) 5 MG tablet TAKE 1 TABLET DAILY 90 tablet 3    melatonin 10 mg Tab 1 tablet BEDTIME (route: oral)      multivitamin (ONE DAILY MULTIVITAMIN) per tablet Take 1 tablet by mouth once daily.      pregabalin (LYRICA) 75 MG capsule TAKE 1 CAPSULE TWICE A  capsule 3    rosuvastatin (CRESTOR) 10 MG tablet TAKE 1 TABLET DAILY 90 tablet 3    tirzepatide (MOUNJARO) 5 mg/0.5 mL PnIj Inject 5 mg into the skin every 7 days. 6 mL 3    predniSONE (DELTASONE) 20 MG tablet Take 3 tab in am x 3d, then 2 tab  in am x 3d, then 1 tab in am x 3d, then 1/2 tab in am x 3d 21 tablet 0     No current facility-administered medications for this visit.   [2]   Social History  Tobacco Use    Smoking status: Never     Passive exposure: Never    Smokeless tobacco: Never   Substance Use Topics    Alcohol use: Yes     Alcohol/week: 1.0 standard drink of alcohol     Types: 1 Drinks containing 0.5 oz of alcohol per week     Comment: every other weekend - 1 drink per occasion    Drug use: Never

## 2025-03-20 ENCOUNTER — RESULTS FOLLOW-UP (OUTPATIENT)
Facility: CLINIC | Age: 60
End: 2025-03-20

## 2025-04-07 ENCOUNTER — TELEPHONE (OUTPATIENT)
Dept: FAMILY MEDICINE | Facility: CLINIC | Age: 60
End: 2025-04-07
Payer: OTHER GOVERNMENT

## 2025-04-07 NOTE — TELEPHONE ENCOUNTER
----- Message from Monica sent at 4/7/2025 11:20 AM CDT -----  Type:  Patient Returning CallWho Called: Pt Who Left Message for Patient: Melanie Does the patient know what this is regarding?: returning missed call Would the patient rather a call back or a response via [employer]? Call Best Call Back Number: [phone] Additional Information:

## 2025-04-07 NOTE — TELEPHONE ENCOUNTER
Spoke with patient in regards to her appt schedule for 04/21- reschedule patient to 05/19 due to NP Nany on vacation 04/21

## 2025-05-19 ENCOUNTER — RESULTS FOLLOW-UP (OUTPATIENT)
Dept: FAMILY MEDICINE | Facility: CLINIC | Age: 60
End: 2025-05-19

## 2025-05-19 ENCOUNTER — OFFICE VISIT (OUTPATIENT)
Dept: FAMILY MEDICINE | Facility: CLINIC | Age: 60
End: 2025-05-19
Payer: OTHER GOVERNMENT

## 2025-05-19 VITALS
BODY MASS INDEX: 48.71 KG/M2 | SYSTOLIC BLOOD PRESSURE: 134 MMHG | OXYGEN SATURATION: 98 % | HEART RATE: 77 BPM | TEMPERATURE: 98 F | WEIGHT: 274.94 LBS | DIASTOLIC BLOOD PRESSURE: 80 MMHG | HEIGHT: 63 IN

## 2025-05-19 DIAGNOSIS — Z13.29 THYROID DISORDER SCREEN: ICD-10-CM

## 2025-05-19 DIAGNOSIS — G56.03 BILATERAL CARPAL TUNNEL SYNDROME: ICD-10-CM

## 2025-05-19 DIAGNOSIS — Z82.49 FAMILY HISTORY OF CARDIOVASCULAR DISEASE: ICD-10-CM

## 2025-05-19 DIAGNOSIS — E66.813 CLASS 3 SEVERE OBESITY DUE TO EXCESS CALORIES WITH SERIOUS COMORBIDITY AND BODY MASS INDEX (BMI) OF 45.0 TO 49.9 IN ADULT: ICD-10-CM

## 2025-05-19 DIAGNOSIS — Z98.84 PERSONAL HISTORY OF GASTRIC BYPASS: ICD-10-CM

## 2025-05-19 DIAGNOSIS — E11.40 TYPE 2 DIABETES MELLITUS WITH DIABETIC NEUROPATHY, WITHOUT LONG-TERM CURRENT USE OF INSULIN: Primary | ICD-10-CM

## 2025-05-19 DIAGNOSIS — Z00.00 ENCOUNTER FOR BLOOD TEST FOR ROUTINE GENERAL PHYSICAL EXAMINATION: ICD-10-CM

## 2025-05-19 DIAGNOSIS — E66.01 CLASS 3 SEVERE OBESITY DUE TO EXCESS CALORIES WITH SERIOUS COMORBIDITY AND BODY MASS INDEX (BMI) OF 45.0 TO 49.9 IN ADULT: ICD-10-CM

## 2025-05-19 DIAGNOSIS — D50.8 IRON DEFICIENCY ANEMIA SECONDARY TO INADEQUATE DIETARY IRON INTAKE: ICD-10-CM

## 2025-05-19 DIAGNOSIS — E11.69 HYPERLIPIDEMIA ASSOCIATED WITH TYPE 2 DIABETES MELLITUS: ICD-10-CM

## 2025-05-19 DIAGNOSIS — E11.59 HYPERTENSION ASSOCIATED WITH DIABETES: ICD-10-CM

## 2025-05-19 DIAGNOSIS — F41.9 ANXIETY: ICD-10-CM

## 2025-05-19 DIAGNOSIS — Z86.0100 HISTORY OF COLONIC POLYPS: ICD-10-CM

## 2025-05-19 DIAGNOSIS — I15.2 HYPERTENSION ASSOCIATED WITH DIABETES: ICD-10-CM

## 2025-05-19 DIAGNOSIS — E78.5 HYPERLIPIDEMIA ASSOCIATED WITH TYPE 2 DIABETES MELLITUS: ICD-10-CM

## 2025-05-19 DIAGNOSIS — G62.9 SENSORY NEUROPATHY: ICD-10-CM

## 2025-05-19 DIAGNOSIS — F33.0 MILD EPISODE OF RECURRENT MAJOR DEPRESSIVE DISORDER: ICD-10-CM

## 2025-05-19 LAB
ALBUMIN/CREAT UR: NORMAL
CREAT UR-MCNC: 42 MG/DL (ref 15–325)
MICROALBUMIN UR-MCNC: <5 UG/ML (ref ?–5000)

## 2025-05-19 PROCEDURE — 82570 ASSAY OF URINE CREATININE: CPT | Performed by: NURSE PRACTITIONER

## 2025-05-19 PROCEDURE — 99999 PR PBB SHADOW E&M-EST. PATIENT-LVL IV: CPT | Mod: PBBFAC,,, | Performed by: NURSE PRACTITIONER

## 2025-05-19 PROCEDURE — 99214 OFFICE O/P EST MOD 30 MIN: CPT | Mod: S$PBB,,, | Performed by: NURSE PRACTITIONER

## 2025-05-19 PROCEDURE — 99214 OFFICE O/P EST MOD 30 MIN: CPT | Mod: PBBFAC,PN | Performed by: NURSE PRACTITIONER

## 2025-05-19 PROCEDURE — G2211 COMPLEX E/M VISIT ADD ON: HCPCS | Mod: S$PBB,,, | Performed by: NURSE PRACTITIONER
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
## 2025-05-26 DIAGNOSIS — F41.9 ANXIETY: ICD-10-CM

## 2025-05-26 RX ORDER — ESCITALOPRAM OXALATE 10 MG/1
10 TABLET ORAL
Qty: 90 TABLET | Refills: 3 | Status: SHIPPED | OUTPATIENT
Start: 2025-05-26

## 2025-06-09 DIAGNOSIS — I15.2 HYPERTENSION ASSOCIATED WITH DIABETES: ICD-10-CM

## 2025-06-09 DIAGNOSIS — E11.59 HYPERTENSION ASSOCIATED WITH DIABETES: ICD-10-CM

## 2025-06-09 RX ORDER — LISINOPRIL 5 MG/1
5 TABLET ORAL
Qty: 90 TABLET | Refills: 3 | Status: SHIPPED | OUTPATIENT
Start: 2025-06-09

## 2025-06-11 DIAGNOSIS — E11.59 HYPERTENSION ASSOCIATED WITH DIABETES: ICD-10-CM

## 2025-06-11 DIAGNOSIS — I15.2 HYPERTENSION ASSOCIATED WITH DIABETES: ICD-10-CM

## 2025-06-11 RX ORDER — AMLODIPINE BESYLATE 5 MG/1
5 TABLET ORAL
Qty: 90 TABLET | Refills: 3 | Status: SHIPPED | OUTPATIENT
Start: 2025-06-11

## 2025-06-24 ENCOUNTER — PATIENT MESSAGE (OUTPATIENT)
Facility: CLINIC | Age: 60
End: 2025-06-24
Payer: OTHER GOVERNMENT

## 2025-06-26 DIAGNOSIS — E78.5 HYPERLIPIDEMIA ASSOCIATED WITH TYPE 2 DIABETES MELLITUS: ICD-10-CM

## 2025-06-26 DIAGNOSIS — E11.69 HYPERLIPIDEMIA ASSOCIATED WITH TYPE 2 DIABETES MELLITUS: ICD-10-CM

## 2025-06-27 RX ORDER — ROSUVASTATIN CALCIUM 10 MG/1
10 TABLET, COATED ORAL
Qty: 90 TABLET | Refills: 3 | Status: SHIPPED | OUTPATIENT
Start: 2025-06-27

## 2025-07-29 ENCOUNTER — OFFICE VISIT (OUTPATIENT)
Dept: OPTOMETRY | Facility: CLINIC | Age: 60
End: 2025-07-29
Payer: OTHER GOVERNMENT

## 2025-07-29 DIAGNOSIS — H04.123 DRY EYE SYNDROME OF BOTH EYES: ICD-10-CM

## 2025-07-29 DIAGNOSIS — H52.203 MYOPIA WITH ASTIGMATISM AND PRESBYOPIA, BILATERAL: ICD-10-CM

## 2025-07-29 DIAGNOSIS — H52.4 MYOPIA WITH ASTIGMATISM AND PRESBYOPIA, BILATERAL: ICD-10-CM

## 2025-07-29 DIAGNOSIS — Z01.00 ROUTINE EYE EXAM: Primary | ICD-10-CM

## 2025-07-29 DIAGNOSIS — H43.811 PVD (POSTERIOR VITREOUS DETACHMENT), RIGHT: ICD-10-CM

## 2025-07-29 DIAGNOSIS — H25.13 NUCLEAR SCLEROSIS OF BOTH EYES: ICD-10-CM

## 2025-07-29 DIAGNOSIS — H52.13 MYOPIA WITH ASTIGMATISM AND PRESBYOPIA, BILATERAL: ICD-10-CM

## 2025-07-29 PROCEDURE — 99999 PR PBB SHADOW E&M-EST. PATIENT-LVL I: CPT | Mod: PBBFAC,,, | Performed by: OPTOMETRIST

## 2025-07-29 PROCEDURE — 99211 OFF/OP EST MAY X REQ PHY/QHP: CPT | Mod: PBBFAC,PN | Performed by: OPTOMETRIST

## 2025-07-29 PROCEDURE — 92014 COMPRE OPH EXAM EST PT 1/>: CPT | Mod: S$PBB,,, | Performed by: OPTOMETRIST

## 2025-07-29 PROCEDURE — 92015 DETERMINE REFRACTIVE STATE: CPT | Mod: ,,, | Performed by: OPTOMETRIST

## 2025-07-29 NOTE — PROGRESS NOTES
HPI    CC: 61 yo F presents today for diabetic eye exam. Pt reports noticeable   vision changes at both ranges with current prescription.    CASIMIRO: 4/17/2024    (+) Changes in vision, at both ranges  (-) Pain  (-) Irritation   (-) Itching   (-) Flashes  (+) Floaters, longstanding  (+) Glasses wearer  (-) CL wearer  (+) Uses eye gtts, [place] gtts    Does patient want a refraction today? yes    (-) Eye injury  (-) Eye surgery   (+)POHx   Type 2 diabetes mellitus without retinopathy   PVD (posterior vitreous detachment), right   Nuclear sclerosis of both eyes   Dry eye syndrome of both eyes  (-)FOHx    (+)DM  Hemoglobin A1C       Date                     Value               Ref Range             Status                10/17/2024               5.5                 4.0 - 5.6 %           Final                  04/12/2024               5.8 (H)             4.0 - 5.6 %           Final                  10/13/2023               5.4                 4.0 - 5.6 %           Final                         05/16/2025               5.5                 4.0 - 5.6 %           Final                   Last edited by Amanda Reynolds MA on 7/29/2025  1:23 PM.            Assessment /Plan     For exam results, see Encounter Report.    Routine eye exam    Myopia with astigmatism and presbyopia, bilateral    Nuclear sclerosis of both eyes    PVD (posterior vitreous detachment), right    Dry eye syndrome of both eyes      Spectera Exam.     2. Updated SRx. Mild change from habitual. Monitor yearly.      3. Educated pt on findings. Not visually significant. No need for removal at this time. Monitor yearly.      4. Educated pt on findings. No holes, tears, detachments 360 OU. (-)ho sign. Small vitreous tuft with surrounding pigment OD - longstanding and stable. Educated on s/s of RD and to RTC ASAP if occur. Monitor 1 month unless changes noted sooner.      5. Educated pt on findings. Recommended ATs TID-QID + avila/gel QHS for added lubrication  and comfort. If symptoms worsen or dont improve, RTC. Monitor.        RTC in 1 year for annual eye exam or sooner if needed.

## 2025-09-01 DIAGNOSIS — E11.40 TYPE 2 DIABETES MELLITUS WITH DIABETIC NEUROPATHY, WITHOUT LONG-TERM CURRENT USE OF INSULIN: ICD-10-CM

## 2025-09-02 RX ORDER — TIRZEPATIDE 5 MG/.5ML
INJECTION, SOLUTION SUBCUTANEOUS
Qty: 6 ML | Refills: 0 | Status: SHIPPED | OUTPATIENT
Start: 2025-09-02

## (undated) DEVICE — SPONGE GAUZE 16PLY 4X4

## (undated) DEVICE — BANDAGE ESMARK 6X12

## (undated) DEVICE — SPONGE COTTON TRAY 4X4IN

## (undated) DEVICE — SUT ETHILON 3/0 18IN PS-1

## (undated) DEVICE — SPLINT PLASTER EXT FAST 4X15

## (undated) DEVICE — IMPLANTABLE DEVICE
Type: IMPLANTABLE DEVICE | Site: ANKLE | Status: NON-FUNCTIONAL
Removed: 2023-08-17

## (undated) DEVICE — BANDAGE ELAS SOFTWRAP ST 4X5YD

## (undated) DEVICE — DRAPE THREE-QTR REINF 53X77IN

## (undated) DEVICE — PAD CAST SPECIALIST STRL 6

## (undated) DEVICE — BIT DRILL CALIB QC 2.5X230MM

## (undated) DEVICE — DRAPE T EXTRM SURG 121X128X90

## (undated) DEVICE — TOWEL OR DISP STRL BLUE 4/PK

## (undated) DEVICE — BNDG COFLEX FOAM LF2 ST 4X5YD

## (undated) DEVICE — TRAY MINOR ORTHO OMC

## (undated) DEVICE — BIT DRILL 3.5MM 110MM STERILE

## (undated) DEVICE — DRESSING N ADH OIL EMUL 3X8

## (undated) DEVICE — DRAPE U SPLIT SHEET 54X76IN

## (undated) DEVICE — SUT VICRYL PLUS 0 CT1 18IN

## (undated) DEVICE — PAD CAST SPECIALIST STRL 4

## (undated) DEVICE — Device

## (undated) DEVICE — BANDAGE ELAS SOFTWRAP ST 6X5YD

## (undated) DEVICE — TIP YANKAUERS BULB NO VENT

## (undated) DEVICE — BIT DRILL 2.0MM D DEPTH 110MM

## (undated) DEVICE — BANDAGE ACE DOUBLE STER 6IN

## (undated) DEVICE — VAC WOUND DISPOSABLE PREVENA

## (undated) DEVICE — SPONGE DERMACEA GAUZE 4X4

## (undated) DEVICE — SCREW CORTEX 2.7 X 18
Type: IMPLANTABLE DEVICE | Site: ANKLE | Status: NON-FUNCTIONAL
Removed: 2023-08-17

## (undated) DEVICE — DRESSING ABSRB N ADH 3X4IN

## (undated) DEVICE — APPLICATOR CHLORAPREP ORN 26ML

## (undated) DEVICE — CATH SUCTION 10FR

## (undated) DEVICE — DRAPE C-ARMOR EQUIPMENT COVER

## (undated) DEVICE — DRAPE C-ARM ELAS CLIP 42X120IN

## (undated) DEVICE — ELECTRODE REM PLYHSV RETURN 9

## (undated) DEVICE — SUT VICRYL PLUS 3-0 SH 18IN

## (undated) DEVICE — BLADE SURG CARBON STEEL #10

## (undated) DEVICE — TOURNIQUET SB QC DP 34X4IN

## (undated) DEVICE — DRAPE STERI U-SHAPED 47X51IN

## (undated) DEVICE — DRAPE TOP 53X102IN

## (undated) DEVICE — GOWN AERO CHROME W/ TOWEL XL

## (undated) DEVICE — TAPE SURG DURAPORE 2 X10YD

## (undated) DEVICE — SCRUB HIBICLENS 4% CHG 4OZ